# Patient Record
Sex: FEMALE | Race: BLACK OR AFRICAN AMERICAN | HISPANIC OR LATINO | Employment: OTHER | URBAN - METROPOLITAN AREA
[De-identification: names, ages, dates, MRNs, and addresses within clinical notes are randomized per-mention and may not be internally consistent; named-entity substitution may affect disease eponyms.]

---

## 2017-01-05 RX ORDER — ESOMEPRAZOLE MAGNESIUM 40 MG/1
CAPSULE, DELAYED RELEASE ORAL
Qty: 90 CAPSULE | Refills: 0 | Status: SHIPPED | OUTPATIENT
Start: 2017-01-05 | End: 2017-03-03

## 2017-01-13 ENCOUNTER — OFFICE VISIT (OUTPATIENT)
Dept: INTERNAL MEDICINE | Facility: CLINIC | Age: 77
End: 2017-01-13
Attending: FAMILY MEDICINE
Payer: MEDICARE

## 2017-01-13 VITALS
SYSTOLIC BLOOD PRESSURE: 136 MMHG | WEIGHT: 179 LBS | HEIGHT: 58 IN | OXYGEN SATURATION: 97 % | DIASTOLIC BLOOD PRESSURE: 96 MMHG | BODY MASS INDEX: 37.57 KG/M2 | HEART RATE: 83 BPM

## 2017-01-13 DIAGNOSIS — R05.9 COUGH: Primary | ICD-10-CM

## 2017-01-13 DIAGNOSIS — I50.9 CONGESTIVE HEART FAILURE, UNSPECIFIED CONGESTIVE HEART FAILURE CHRONICITY, UNSPECIFIED CONGESTIVE HEART FAILURE TYPE: ICD-10-CM

## 2017-01-13 DIAGNOSIS — B18.2 CHRONIC HEPATITIS C WITHOUT HEPATIC COMA: ICD-10-CM

## 2017-01-13 DIAGNOSIS — E03.9 HYPOTHYROIDISM, UNSPECIFIED TYPE: ICD-10-CM

## 2017-01-13 DIAGNOSIS — I48.91 ATRIAL FIBRILLATION, UNSPECIFIED TYPE: ICD-10-CM

## 2017-01-13 DIAGNOSIS — I10 HTN (HYPERTENSION), BENIGN: ICD-10-CM

## 2017-01-13 DIAGNOSIS — M51.36 BULGING LUMBAR DISC: ICD-10-CM

## 2017-01-13 PROCEDURE — 99999 PR PBB SHADOW E&M-EST. PATIENT-LVL III: CPT | Mod: PBBFAC,,, | Performed by: FAMILY MEDICINE

## 2017-01-13 PROCEDURE — 3080F DIAST BP >= 90 MM HG: CPT | Mod: S$GLB,,, | Performed by: FAMILY MEDICINE

## 2017-01-13 PROCEDURE — 99214 OFFICE O/P EST MOD 30 MIN: CPT | Mod: S$GLB,,, | Performed by: FAMILY MEDICINE

## 2017-01-13 PROCEDURE — 3075F SYST BP GE 130 - 139MM HG: CPT | Mod: S$GLB,,, | Performed by: FAMILY MEDICINE

## 2017-01-13 PROCEDURE — 99499 UNLISTED E&M SERVICE: CPT | Mod: S$PBB,,, | Performed by: FAMILY MEDICINE

## 2017-01-13 PROCEDURE — 1160F RVW MEDS BY RX/DR IN RCRD: CPT | Mod: S$GLB,,, | Performed by: FAMILY MEDICINE

## 2017-01-13 PROCEDURE — 1157F ADVNC CARE PLAN IN RCRD: CPT | Mod: S$GLB,,, | Performed by: FAMILY MEDICINE

## 2017-01-13 PROCEDURE — 1159F MED LIST DOCD IN RCRD: CPT | Mod: S$GLB,,, | Performed by: FAMILY MEDICINE

## 2017-01-13 PROCEDURE — 1125F AMNT PAIN NOTED PAIN PRSNT: CPT | Mod: S$GLB,,, | Performed by: FAMILY MEDICINE

## 2017-01-13 RX ORDER — LIDOCAINE AND PRILOCAINE 25; 25 MG/G; MG/G
CREAM TOPICAL
Qty: 30 G | Refills: 3 | Status: SHIPPED | OUTPATIENT
Start: 2017-01-13 | End: 2017-04-06

## 2017-01-13 RX ORDER — AZITHROMYCIN 250 MG/1
TABLET, FILM COATED ORAL
Qty: 6 TABLET | Refills: 0 | Status: SHIPPED | OUTPATIENT
Start: 2017-01-13 | End: 2017-01-18

## 2017-01-13 RX ORDER — FLUTICASONE PROPIONATE AND SALMETEROL 50; 250 UG/1; UG/1
POWDER RESPIRATORY (INHALATION)
Refills: 11 | COMMUNITY
Start: 2016-10-29 | End: 2017-10-25 | Stop reason: SDUPTHER

## 2017-01-13 RX ORDER — EPINEPHRINE 0.3 MG/.3ML
1 INJECTION SUBCUTANEOUS ONCE
Qty: 1 ML | Refills: 3 | Status: SHIPPED | OUTPATIENT
Start: 2017-01-13 | End: 2017-02-17

## 2017-01-13 NOTE — PROGRESS NOTES
"Subjective:      Patient ID: Alfredina Claudette Parks is a 76 y.o. female.    Chief Complaint: Asthma (f/u)    HPI Comments: She is here for one week productive cough, wheezing, runny nose. She denies ear pain or drainage. Her sore throat has resolved. She denies fevers.     Review of Systems   Constitutional: Negative for fever.   Respiratory: Positive for cough. Negative for shortness of breath.    Cardiovascular: Negative.    Gastrointestinal: Negative.      I personally reviewed Past Medical History, Past Surgical history,  Past Social History and Family History    Objective:     Visit Vitals    BP (!) 136/96    Pulse 83    Ht 4' 10" (1.473 m)    Wt 81.2 kg (179 lb 0.2 oz)    SpO2 97%    BMI 37.41 kg/m2       Physical Exam   Constitutional: She is oriented to person, place, and time. She appears well-developed and well-nourished. No distress.   HENT:   Head: Normocephalic and atraumatic.   Right Ear: External ear normal.   Left Ear: External ear normal.   Mouth/Throat: Oropharynx is clear and moist.   Eyes: Conjunctivae and EOM are normal. Pupils are equal, round, and reactive to light. Right eye exhibits no discharge. Left eye exhibits no discharge. No scleral icterus.   Neck: Normal range of motion. Neck supple.   Cardiovascular: Normal rate, regular rhythm, normal heart sounds and intact distal pulses.  Exam reveals no gallop.    No murmur heard.  Pulmonary/Chest: Effort normal and breath sounds normal. No respiratory distress. She has no wheezes. She has no rales. She exhibits no tenderness.   Neurological: She is alert and oriented to person, place, and time.   Skin: Skin is warm and dry.   Vitals reviewed.      Mitzy was seen today for asthma.    Diagnoses and all orders for this visit:    Cough  -will treat with azithromycin, no worsening asthma or wheezing, will continue with current asthma regimen, call if no improvement and we will check cxr    Bulging lumbar disc  -will trial topical emla " for pain, patient follows with pain management and does not want follow up at this time     Hypothyroidism, unspecified type  -     TSH; Future  -     T4, free; Future      HTN (hypertension), benign  -  Uncontrolled, she will return in one week for BP recheck     Congestive heart failure, unspecified congestive heart failure chronicity, unspecified congestive heart failure type  Stable, cont current medication     Atrial fibrillation, unspecified type  -rate/rhythm controlled, will continue current regimen     Chronic hepatitis C without hepatic coma  -     HEPATITIS C RNA, QUANTITATIVE, PCR; Future    Other orders  -     epinephrine (EPIPEN) 0.3 mg/0.3 mL AtIn; Inject 0.3 mLs (0.3 mg total) into the muscle once.  -     azithromycin (Z-COLTEN) 250 MG tablet; Take 2 tablets by mouth on day 1; Take 1 tablet by mouth on days 2-5  -     lidocaine-prilocaine (EMLA) cream; APPLY TOPICALLY AS NEEDED

## 2017-01-16 ENCOUNTER — TELEPHONE (OUTPATIENT)
Dept: INTERNAL MEDICINE | Facility: CLINIC | Age: 77
End: 2017-01-16

## 2017-01-16 NOTE — TELEPHONE ENCOUNTER
Attempted to contact Breana Lerma with [facility]. Message left on [employer] in reference to Breana's request. No fax has been received for this patient at this time. Will attempt to contact at a later time.

## 2017-01-16 NOTE — TELEPHONE ENCOUNTER
"----- Message from Mariana Cee sent at 1/16/2017 11:17 AM CST -----  Contact: Breana / [employer] / # [phone] / ext# [phone]  X  1st Request  _  2nd Request  _  3rd Request    Who: Mitzy Garcia (mrn# [id number])    Why:  Breana with [employer] called requesting, "confirmation that paperwork has been received."  Please give a call back at your earliest convenience.  THANKS!    What Number to Call Back: [phone] /ext# [phone]    When to Expect a call back: (Before the end of the day)   -- if call after 3:00 call back will be tomorrow.                        "

## 2017-01-18 ENCOUNTER — CLINICAL SUPPORT (OUTPATIENT)
Dept: INTERNAL MEDICINE | Facility: CLINIC | Age: 77
End: 2017-01-18
Payer: MEDICARE

## 2017-01-18 ENCOUNTER — TELEPHONE (OUTPATIENT)
Dept: INTERNAL MEDICINE | Facility: CLINIC | Age: 77
End: 2017-01-18

## 2017-01-18 ENCOUNTER — LAB VISIT (OUTPATIENT)
Dept: LAB | Facility: OTHER | Age: 77
End: 2017-01-18
Attending: FAMILY MEDICINE
Payer: MEDICARE

## 2017-01-18 VITALS — HEART RATE: 74 BPM | DIASTOLIC BLOOD PRESSURE: 82 MMHG | SYSTOLIC BLOOD PRESSURE: 134 MMHG

## 2017-01-18 DIAGNOSIS — I48.91 ATRIAL FIBRILLATION, UNSPECIFIED TYPE: ICD-10-CM

## 2017-01-18 DIAGNOSIS — I10 HTN (HYPERTENSION), BENIGN: ICD-10-CM

## 2017-01-18 DIAGNOSIS — E03.9 HYPOTHYROIDISM, UNSPECIFIED TYPE: ICD-10-CM

## 2017-01-18 DIAGNOSIS — I50.9 CONGESTIVE HEART FAILURE, UNSPECIFIED CONGESTIVE HEART FAILURE CHRONICITY, UNSPECIFIED CONGESTIVE HEART FAILURE TYPE: ICD-10-CM

## 2017-01-18 DIAGNOSIS — B18.2 CHRONIC HEPATITIS C WITHOUT HEPATIC COMA: ICD-10-CM

## 2017-01-18 DIAGNOSIS — M51.36 BULGING LUMBAR DISC: ICD-10-CM

## 2017-01-18 LAB
25(OH)D3+25(OH)D2 SERPL-MCNC: 25 NG/ML
ALBUMIN SERPL BCP-MCNC: 3 G/DL
ALP SERPL-CCNC: 36 U/L
ALT SERPL W/O P-5'-P-CCNC: 38 U/L
ANION GAP SERPL CALC-SCNC: 7 MMOL/L
AST SERPL-CCNC: 42 U/L
BASOPHILS # BLD AUTO: 0.02 K/UL
BASOPHILS NFR BLD: 0.3 %
BILIRUB SERPL-MCNC: 0.3 MG/DL
BUN SERPL-MCNC: 23 MG/DL
CALCIUM SERPL-MCNC: 8.9 MG/DL
CHLORIDE SERPL-SCNC: 104 MMOL/L
CHOLEST/HDLC SERPL: 3.2 {RATIO}
CO2 SERPL-SCNC: 29 MMOL/L
CREAT SERPL-MCNC: 0.9 MG/DL
DIFFERENTIAL METHOD: NORMAL
EOSINOPHIL # BLD AUTO: 0.2 K/UL
EOSINOPHIL NFR BLD: 2.7 %
ERYTHROCYTE [DISTWIDTH] IN BLOOD BY AUTOMATED COUNT: 13.4 %
EST. GFR  (AFRICAN AMERICAN): >60 ML/MIN/1.73 M^2
EST. GFR  (NON AFRICAN AMERICAN): >60 ML/MIN/1.73 M^2
FERRITIN SERPL-MCNC: 75 NG/ML
GLUCOSE SERPL-MCNC: 107 MG/DL
HCT VFR BLD AUTO: 38.2 %
HDL/CHOLESTEROL RATIO: 31.1 %
HDLC SERPL-MCNC: 132 MG/DL
HDLC SERPL-MCNC: 41 MG/DL
HGB BLD-MCNC: 12.4 G/DL
IRON SERPL-MCNC: 63 UG/DL
LDLC SERPL CALC-MCNC: 67.2 MG/DL
LYMPHOCYTES # BLD AUTO: 2.5 K/UL
LYMPHOCYTES NFR BLD: 42.6 %
MCH RBC QN AUTO: 30.3 PG
MCHC RBC AUTO-ENTMCNC: 32.5 %
MCV RBC AUTO: 93 FL
MONOCYTES # BLD AUTO: 0.5 K/UL
MONOCYTES NFR BLD: 8.1 %
NEUTROPHILS # BLD AUTO: 2.7 K/UL
NEUTROPHILS NFR BLD: 46.1 %
NONHDLC SERPL-MCNC: 91 MG/DL
PLATELET # BLD AUTO: 152 K/UL
PMV BLD AUTO: 11.9 FL
POTASSIUM SERPL-SCNC: 4.2 MMOL/L
PROT SERPL-MCNC: 7.4 G/DL
RBC # BLD AUTO: 4.09 M/UL
SATURATED IRON: 18 %
SODIUM SERPL-SCNC: 140 MMOL/L
T4 FREE SERPL-MCNC: 1.05 NG/DL
TOTAL IRON BINDING CAPACITY: 354 UG/DL
TRANSFERRIN SERPL-MCNC: 239 MG/DL
TRIGL SERPL-MCNC: 119 MG/DL
TSH SERPL DL<=0.005 MIU/L-ACNC: 3.3 UIU/ML
WBC # BLD AUTO: 5.82 K/UL

## 2017-01-18 PROCEDURE — 80061 LIPID PANEL: CPT

## 2017-01-18 PROCEDURE — 99999 PR PBB SHADOW E&M-EST. PATIENT-LVL I: CPT | Mod: PBBFAC,,,

## 2017-01-18 PROCEDURE — 87522 HEPATITIS C REVRS TRNSCRPJ: CPT

## 2017-01-18 PROCEDURE — 84443 ASSAY THYROID STIM HORMONE: CPT

## 2017-01-18 PROCEDURE — 82728 ASSAY OF FERRITIN: CPT

## 2017-01-18 PROCEDURE — 83036 HEMOGLOBIN GLYCOSYLATED A1C: CPT

## 2017-01-18 PROCEDURE — 36415 COLL VENOUS BLD VENIPUNCTURE: CPT

## 2017-01-18 PROCEDURE — 80053 COMPREHEN METABOLIC PANEL: CPT

## 2017-01-18 PROCEDURE — 85025 COMPLETE CBC W/AUTO DIFF WBC: CPT

## 2017-01-18 PROCEDURE — 84439 ASSAY OF FREE THYROXINE: CPT

## 2017-01-18 PROCEDURE — 83540 ASSAY OF IRON: CPT

## 2017-01-18 PROCEDURE — 82306 VITAMIN D 25 HYDROXY: CPT

## 2017-01-18 NOTE — PROGRESS NOTES
Alfredina Claudette Parks 76 y.o. female is here today for Blood Pressure check.   History of HTN yes.    Review of patient's allergies indicates:   Allergen Reactions    Milk containing products Shortness Of Breath    Nuts [tree nut] Anaphylaxis    Fosamax [alendronate]      dizziness     Creatinine   Date Value Ref Range Status   08/25/2016 0.89 0.60 - 0.93 mg/dL Final     Comment:     For patients >49 years of age, the reference limit  for Creatinine is approximately 13% higher for people  identified as -American.          Sodium   Date Value Ref Range Status   08/25/2016 138 135 - 146 mmol/L Final     Potassium   Date Value Ref Range Status   08/25/2016 4.2 3.5 - 5.3 mmol/L Final   ]  Patient verifies taking blood pressure medications on a regular bases at the same time of the day.     Current Outpatient Prescriptions:     ADVAIR DISKUS 250-50 mcg/dose diskus inhaler, TAKE 1 PUFF PO BID, Disp: , Rfl: 11    albuterol (PROVENTIL) 2.5 mg /3 mL (0.083 %) nebulizer solution, TAKE 3ML BY NEBULIZATION EVERY 6 HOURS AS NEEDED FOR WHEEZING., Disp: 1050 mL, Rfl: 3    amlodipine (NORVASC) 5 MG tablet, TK 1 T PO QD., Disp: , Rfl: 1    aspirin (ECOTRIN) 81 MG EC tablet, Take 81 mg by mouth once daily.  , Disp: , Rfl:     azithromycin (Z-COLTEN) 250 MG tablet, Take 2 tablets by mouth on day 1; Take 1 tablet by mouth on days 2-5, Disp: 6 tablet, Rfl: 0    benzonatate (TESSALON) 100 MG capsule, TAKE 2 CAPSULES BY MOUTH THREE TIMES DAILY AS NEEDED FOR COUGH, Disp: 40 capsule, Rfl: 0    calcium carbonate-vitamin D3 (CALCIUM 600 + D,3,) 600 mg calcium- 200 unit Cap, Take 1,200 mg by mouth once daily., Disp: , Rfl:     carvedilol (COREG) 12.5 MG tablet, TAKE 1 TABLET BY MOUTH TWICE DAILY, Disp: 180 tablet, Rfl: 4    diclofenac sodium 1 % Gel, Apply 2 g topically 2 (two) times daily. To affected areas, Disp: 100 g, Rfl: 5    diltiaZEM (CARDIZEM CD) 240 MG 24 hr capsule, Take 1 capsule (240 mg total) by mouth once  daily., Disp: 30 capsule, Rfl: 11    docusate sodium (COLACE) 100 MG capsule, Take 1 capsule (100 mg total) by mouth 3 (three) times daily as needed for Constipation., Disp: 90 capsule, Rfl: 1    epinephrine (EPIPEN) 0.3 mg/0.3 mL AtIn, Inject 0.3 mLs (0.3 mg total) into the muscle once., Disp: 1 mL, Rfl: 3    esomeprazole (NEXIUM) 40 MG capsule, Take 1 capsule (40 mg total) by mouth before breakfast., Disp: 90 capsule, Rfl: 1    fluticasone (FLONASE) 50 mcg/actuation nasal spray, SPRAY ONCE IN EACH NOSTRIL EVERY DAY (Patient taking differently: SPRAY ONCE IN EACH NOSTRIL PRN), Disp: 48 g, Rfl: 5    fluticasone (FLONASE) 50 mcg/actuation nasal spray, SPRAY ONCE IN EACH NOSTRIL EVERY DAY, Disp: 16 g, Rfl: 11    fluticasone-salmeterol 500-50 mcg/dose (ADVAIR DISKUS) 500-50 mcg/dose DsDv diskus inhaler, Inhale 1 puff into the lungs 2 (two) times daily., Disp: 60 each, Rfl: 3    hydrocodone-acetaminophen 7.5-325mg (NORCO) 7.5-325 mg per tablet, Take 1 tablet by mouth every 6 (six) hours as needed for Pain., Disp: , Rfl:     levothyroxine (SYNTHROID) 50 MCG tablet, TAKE 1 TABLET BY MOUTH EVERY DAY, Disp: 90 tablet, Rfl: 3    lidocaine-prilocaine (EMLA) cream, APPLY TOPICALLY AS NEEDED, Disp: 30 g, Rfl: 3    montelukast (SINGULAIR) 10 mg tablet, , Disp: , Rfl: 3    nabumetone (RELAFEN) 500 MG tablet, Take 1 tablet (500 mg total) by mouth 4 (four) times daily as needed for Pain., Disp: 60 tablet, Rfl: 1    NEXIUM 40 mg capsule, TAKE 1 CAPSULE(40 MG) BY MOUTH DAILY AS NEEDED, Disp: 90 capsule, Rfl: 0    oxybutynin (DITROPAN-XL) 5 MG TR24, TK 1 T PO  QD PRF  INCONTINENECE, Disp: , Rfl: 3    PATADAY 0.2 % Drop, INT 1 GTT IN OU QD, Disp: , Rfl: 2    polyethylene glycol (GLYCOLAX) 17 gram PwPk, Take 17 g by mouth daily as needed., Disp: 30 each, Rfl: 0    triamcinolone acetonide 0.025% (KENALOG) 0.025 % cream, Apply topically 2 (two) times daily., Disp: 60 g, Rfl: 0    valsartan-hydrochlorothiazide (DIOVAN-HCT)  320-25 mg per tablet, TAKE 1 TABLET BY MOUTH EVERY DAY, Disp: 90 tablet, Rfl: 4    VENTOLIN HFA 90 mcg/actuation inhaler, INHALE 2 PUFFS BY MOUTH EVERY 6 HOURS AS NEEDED, Disp: 18 g, Rfl: 11    [DISCONTINUED] denosumab (PROLIA) 60 mg/mL Syrg, Inject 1 mL (60 mg total) into the skin every 6 (six) months., Disp: 1 mL, Rfl: 3  Does patient have record of home blood pressure readings.  NO  Last dose of blood pressure medication was taken at 10:am yesterday.  Patient is asymptomatic.     BP: 134/82 , Pulse: 74.      Dr. Frost notified.

## 2017-01-19 ENCOUNTER — TELEPHONE (OUTPATIENT)
Dept: INTERNAL MEDICINE | Facility: CLINIC | Age: 77
End: 2017-01-19

## 2017-01-19 LAB
ESTIMATED AVG GLUCOSE: 123 MG/DL
HBA1C MFR BLD HPLC: 5.9 %
HCV LOG: 6.65 LOG (10) IU/ML
HCV RNA QUANT PCR: ABNORMAL IU/ML
HCV, QUALITATIVE: DETECTED IU/ML

## 2017-01-19 RX ORDER — ERGOCALCIFEROL 1.25 MG/1
50000 CAPSULE ORAL
Qty: 12 CAPSULE | Refills: 0 | Status: SHIPPED | OUTPATIENT
Start: 2017-01-19 | End: 2017-02-18

## 2017-01-20 ENCOUNTER — TELEPHONE (OUTPATIENT)
Dept: INTERNAL MEDICINE | Facility: CLINIC | Age: 77
End: 2017-01-20

## 2017-01-20 NOTE — TELEPHONE ENCOUNTER
----- Message from Ayesha Nettles sent at 1/19/2017  4:43 PM CST -----  Contact: pt  _  1st Request  _  2nd Request  _  3rd Request        Who: pt    Why: pt returned the nurse's phone call.    What Number to Call Back:[phone]    When to Expect a call back: (Before the end of the day)   -- if call after 3:00 call back will be tomorrow.      3:06 PM  Left message to return clinic's call

## 2017-01-30 ENCOUNTER — TELEPHONE (OUTPATIENT)
Dept: GASTROENTEROLOGY | Facility: CLINIC | Age: 77
End: 2017-01-30

## 2017-01-30 NOTE — TELEPHONE ENCOUNTER
Spoke with Tari and informed that clinic appt was booked vs procedure. Informed her that the clinic appt was not needed and that we can just schedule the procedure. Daughter verbalized understanding and she will call me back once she checks her mothers other appts.

## 2017-01-30 NOTE — TELEPHONE ENCOUNTER
----- Message from Herminia Benito MA sent at 1/30/2017  3:17 PM CST -----  Contact: [phone] daughter marylou  Want's to know if mother need to fast on the day of her appt. Please advise

## 2017-01-31 ENCOUNTER — TELEPHONE (OUTPATIENT)
Dept: GASTROENTEROLOGY | Facility: CLINIC | Age: 77
End: 2017-01-31

## 2017-01-31 NOTE — TELEPHONE ENCOUNTER
----- Message from Herminia Benito MA sent at 1/30/2017  3:54 PM CST -----  Contact: [phone] BANG DAUGHTER   Please return her call at your earliest convenience regarding mother's Endoscopy appt. Please advsie

## 2017-02-08 ENCOUNTER — TELEPHONE (OUTPATIENT)
Dept: INTERNAL MEDICINE | Facility: CLINIC | Age: 77
End: 2017-02-08

## 2017-02-08 NOTE — TELEPHONE ENCOUNTER
----- Message from Ally Kearney sent at 2/7/2017  4:52 PM CST -----  Contact: Renée Chinchilla with [facility] called about medication appeal. Please call her at [phone]  9:43 AM  Attempted to return Renée with [facility] phone call. Left a vm.

## 2017-02-17 ENCOUNTER — OFFICE VISIT (OUTPATIENT)
Dept: INTERNAL MEDICINE | Facility: CLINIC | Age: 77
End: 2017-02-17
Attending: FAMILY MEDICINE
Payer: MEDICARE

## 2017-02-17 VITALS
OXYGEN SATURATION: 97 % | SYSTOLIC BLOOD PRESSURE: 104 MMHG | DIASTOLIC BLOOD PRESSURE: 62 MMHG | HEART RATE: 82 BPM | BODY MASS INDEX: 36 KG/M2 | HEIGHT: 58 IN | WEIGHT: 171.5 LBS

## 2017-02-17 DIAGNOSIS — Z23 NEED FOR TDAP VACCINATION: ICD-10-CM

## 2017-02-17 DIAGNOSIS — I48.91 ATRIAL FIBRILLATION, UNSPECIFIED TYPE: Primary | ICD-10-CM

## 2017-02-17 DIAGNOSIS — J45.21 ASTHMA WITHOUT STATUS ASTHMATICUS, MILD INTERMITTENT, WITH ACUTE EXACERBATION: ICD-10-CM

## 2017-02-17 DIAGNOSIS — I10 HTN (HYPERTENSION), BENIGN: ICD-10-CM

## 2017-02-17 PROCEDURE — 1160F RVW MEDS BY RX/DR IN RCRD: CPT | Mod: S$GLB,,, | Performed by: FAMILY MEDICINE

## 2017-02-17 PROCEDURE — 1157F ADVNC CARE PLAN IN RCRD: CPT | Mod: S$GLB,,, | Performed by: FAMILY MEDICINE

## 2017-02-17 PROCEDURE — 3074F SYST BP LT 130 MM HG: CPT | Mod: S$GLB,,, | Performed by: FAMILY MEDICINE

## 2017-02-17 PROCEDURE — 99499 UNLISTED E&M SERVICE: CPT | Mod: S$PBB,,, | Performed by: FAMILY MEDICINE

## 2017-02-17 PROCEDURE — 1159F MED LIST DOCD IN RCRD: CPT | Mod: S$GLB,,, | Performed by: FAMILY MEDICINE

## 2017-02-17 PROCEDURE — 99999 PR PBB SHADOW E&M-EST. PATIENT-LVL III: CPT | Mod: PBBFAC,,, | Performed by: FAMILY MEDICINE

## 2017-02-17 PROCEDURE — 99213 OFFICE O/P EST LOW 20 MIN: CPT | Mod: S$GLB,,, | Performed by: FAMILY MEDICINE

## 2017-02-17 PROCEDURE — 3078F DIAST BP <80 MM HG: CPT | Mod: S$GLB,,, | Performed by: FAMILY MEDICINE

## 2017-02-17 PROCEDURE — 1125F AMNT PAIN NOTED PAIN PRSNT: CPT | Mod: S$GLB,,, | Performed by: FAMILY MEDICINE

## 2017-02-17 RX ORDER — SOLIFENACIN SUCCINATE 5 MG/1
5 TABLET, FILM COATED ORAL DAILY
Qty: 30 TABLET | Refills: 11 | Status: ON HOLD
Start: 2017-02-17 | End: 2019-01-09 | Stop reason: HOSPADM

## 2017-02-17 RX ORDER — VALSARTAN 160 MG/1
160 TABLET ORAL DAILY PRN
Qty: 90 TABLET | Refills: 0
Start: 2017-02-17 | End: 2017-03-10 | Stop reason: SDUPTHER

## 2017-02-17 RX ORDER — NIFEDIPINE 60 MG/1
60 TABLET, EXTENDED RELEASE ORAL DAILY
Qty: 30 TABLET | Refills: 0
Start: 2017-02-17 | End: 2017-03-10

## 2017-02-17 RX ORDER — HYDRALAZINE HYDROCHLORIDE 10 MG/1
10 TABLET, FILM COATED ORAL DAILY PRN
Qty: 90 TABLET | Refills: 0
Start: 2017-02-17 | End: 2018-12-07

## 2017-02-17 RX ORDER — LEVALBUTEROL TARTRATE 45 UG/1
1-2 AEROSOL, METERED ORAL EVERY 6 HOURS PRN
Qty: 15 G | Refills: 3 | Status: SHIPPED | OUTPATIENT
Start: 2017-02-17 | End: 2017-02-20

## 2017-02-17 RX ORDER — AMIODARONE HYDROCHLORIDE 200 MG/1
200 TABLET ORAL DAILY
Qty: 30 TABLET | Refills: 0
Start: 2017-02-17 | End: 2017-04-06

## 2017-02-17 NOTE — PROGRESS NOTES
"Subjective:      Patient ID: Alfredina Claudette Parks is a 76 y.o. female.    Chief Complaint: Hypertension (f/u and ER discharge)    HPI Comments: She is here for follow up. She is continuing to feel jittery after albuterol and she is 11 days from discharge from [facility].  She was at her cardiologist appointment and sent over to the ED. She was ruled out of stroke. She did get nifedipine and valsartan today. She does have the omron bp cuff. She does have a loop monitor that has been placed.     Review of Systems   Respiratory: Negative.    Cardiovascular: Negative.    Gastrointestinal: Negative.    Genitourinary: Negative.      I personally reviewed Past Medical History, Past Surgical history,  Past Social History and Family History    Objective:     Visit Vitals    /62    Pulse 82    Ht 4' 10" (1.473 m)    Wt 77.8 kg (171 lb 8.3 oz)    SpO2 97%    BMI 35.85 kg/m2       Physical Exam   Constitutional: She is oriented to person, place, and time. She appears well-developed and well-nourished. No distress.   HENT:   Head: Normocephalic and atraumatic.   Right Ear: External ear normal.   Left Ear: External ear normal.   Mouth/Throat: Oropharynx is clear and moist.   Eyes: Conjunctivae and EOM are normal. Pupils are equal, round, and reactive to light. Right eye exhibits no discharge. Left eye exhibits no discharge. No scleral icterus.   Neck: Normal range of motion. No tracheal deviation present. No thyromegaly present.   Cardiovascular: Normal rate, regular rhythm, normal heart sounds and intact distal pulses.  Exam reveals no gallop.    No murmur heard.  Pulmonary/Chest: Effort normal and breath sounds normal. No respiratory distress. She has no wheezes. She has no rales. She exhibits no tenderness.   Abdominal: Soft. Bowel sounds are normal. She exhibits no distension and no mass. There is no tenderness. There is no rebound and no guarding.   Neurological: She is alert and oriented to person, place, and " time.   Skin: Skin is warm and dry.   Psychiatric: She has a normal mood and affect. Her behavior is normal. Judgment and thought content normal.   Vitals reviewed.      Mitzy was seen today for hypertension.    Diagnoses and all orders for this visit:    Atrial fibrillation, unspecified type  -controlled, cont amiodarone     Need for Tdap vaccination     HTN (hypertension), benign  -controlled on current regimen     Asthma without status asthmaticus, mild intermittent, with acute exacerbation  -patient to use xopenex prn, singulair held, and pulm recommended advair prn     Other orders  -     Cancel: Tdap Vaccine  -     levalbuterol (XOPENEX HFA) 45 mcg/actuation inhaler; Inhale 1-2 puffs into the lungs every 6 (six) hours as needed for Wheezing or Shortness of Breath. Rescue  -     solifenacin (VESICARE) 5 MG tablet; Take 1 tablet (5 mg total) by mouth once daily.  -     amiodarone (PACERONE) 200 MG Tab; Take 1 tablet (200 mg total) by mouth once daily.  -     hydrALAZINE (APRESOLINE) 10 MG tablet; Take 1 tablet (10 mg total) by mouth daily as needed (SBP>180).  -     nifedipine (PROCARDIA-XL) 60 MG (OSM) 24 hr tablet; Take 1 tablet (60 mg total) by mouth once daily. Hold for spb <120  -     valsartan (DIOVAN) 160 MG tablet; Take 1 tablet (160 mg total) by mouth daily as needed. Hold if sbp <120

## 2017-02-17 NOTE — MR AVS SNAPSHOT
[facility]  [address]  [facility] [address]  Phone: [phone]  Fax: [phone]                  Alfredina Claudette Parks   2017 10:20 AM   Office Visit    Description:  Female : 1940   Provider:  Veronica Frost MD   Department:  [facility]           Reason for Visit     Hypertension           Diagnoses this Visit        Comments    Need for Tdap vaccination    -  Primary            To Do List           Goals (5 Years of Data)     None       These Medications        Disp Refills Start End    levalbuterol (XOPENEX HFA) 45 mcg/actuation inhaler 15 g 3 2017    Inhale 1-2 puffs into the lungs every 6 (six) hours as needed for Wheezing or Shortness of Breath. Rescue - Inhalation    Pharmacy: [id number] - [place], [place] - [address] Ph #: [phone]       solifenacin (VESICARE) 5 MG tablet 30 tablet 11 2017    Take 1 tablet (5 mg total) by mouth once daily. - Oral    Pharmacy: [id number] - [place], [place] - [address] Ph #: [phone]       amiodarone (PACERONE) 200 MG Tab 30 tablet 0 2017    Take 1 tablet (200 mg total) by mouth once daily. - Oral    Pharmacy: [id number] - [place], [place] - [address] Ph #: [phone]       hydrALAZINE (APRESOLINE) 10 MG tablet 90 tablet 0 2017    Take 1 tablet (10 mg total) by mouth daily as needed (SBP>180). - Oral    Pharmacy: [id number] - [place] - [address] Ph #: [phone]       nifedipine (PROCARDIA-XL) 60 MG (OSM) 24 hr tablet 30 tablet 0 2017    Take 1 tablet (60 mg total) by mouth once daily. Hold for spb <120 - Oral    Pharmacy: [id number] - [place] - [address] [facility] Ph #: [phone]       valsartan (DIOVAN) 160 MG tablet 90 tablet 0 2/17/2017 2/17/2018    Take 1 tablet (160 mg total) by mouth daily as needed. Hold if sbp <120 - Oral    Pharmacy: [id number] - [place], [place] - [address] [facility] Ph #: [phone]         [facility] On Call     [facility] On Call Nurse Care Line - 24/7 Assistance  Registered nurses in the [facility] On Call Center provide clinical advisement, health education, appointment booking, and other advisory services.  Call for this free service at [phone].             Medications           Message regarding Medications     Verify the changes and/or additions to your medication regime listed below are the same as discussed with your clinician today.  If any of these changes or additions are incorrect, please notify your healthcare provider.        START taking these NEW medications        Refills    levalbuterol (XOPENEX HFA) 45 mcg/actuation inhaler 3    Sig: Inhale 1-2 puffs into the lungs every 6 (six) hours as needed for Wheezing or Shortness of Breath. Rescue    Class: Normal    Route: Inhalation    solifenacin (VESICARE) 5 MG tablet 11    Sig: Take 1 tablet (5 mg total) by mouth once daily.    Class: No Print    Route: Oral    amiodarone (PACERONE) 200 MG Tab 0    Sig: Take 1 tablet (200 mg total) by mouth once daily.    Class: No Print    Route: Oral    hydrALAZINE (APRESOLINE) 10 MG tablet 0    Sig: Take 1 tablet (10 mg total) by mouth daily as needed (SBP>180).    Class: No Print    Route: Oral    nifedipine (PROCARDIA-XL) 60 MG (OSM) 24 hr tablet 0    Sig: Take 1 tablet (60 mg total) by mouth once daily. Hold for spb <120    Class: No Print    Route: Oral    valsartan (DIOVAN) 160 MG tablet 0    Sig: Take 1 tablet (160 mg total) by mouth daily as needed. Hold if sbp <120    Class: No Print    Route: Oral      STOP taking  these medications     hydrocodone-acetaminophen 7.5-325mg (NORCO) 7.5-325 mg per tablet Take 1 tablet by mouth every 6 (six) hours as needed for Pain.    fluticasone (FLONASE) 50 mcg/actuation nasal spray SPRAY ONCE IN EACH NOSTRIL EVERY DAY    montelukast (SINGULAIR) 10 mg tablet     oxybutynin (DITROPAN-XL) 5 MG TR24 TK 1 T PO  QD PRF  INCONTINENECE    diltiaZEM (CARDIZEM CD) 240 MG 24 hr capsule Take 1 capsule (240 mg total) by mouth once daily.    VENTOLIN HFA 90 mcg/actuation inhaler INHALE 2 PUFFS BY MOUTH EVERY 6 HOURS AS NEEDED    benzonatate (TESSALON) 100 MG capsule TAKE 2 CAPSULES BY MOUTH THREE TIMES DAILY AS NEEDED FOR COUGH    epinephrine (EPIPEN) 0.3 mg/0.3 mL AtIn Inject 0.3 mLs (0.3 mg total) into the muscle once.    valsartan-hydrochlorothiazide (DIOVAN-HCT) 320-25 mg per tablet TAKE 1 TABLET BY MOUTH EVERY DAY    carvedilol (COREG) 12.5 MG tablet TAKE 1 TABLET BY MOUTH TWICE DAILY           Verify that the below list of medications is an accurate representation of the medications you are currently taking.  If none reported, the list may be blank. If incorrect, please contact your healthcare provider. Carry this list with you in case of emergency.           Current Medications     ADVAIR DISKUS 250-50 mcg/dose diskus inhaler TAKE 1 PUFF PO BID    albuterol (PROVENTIL) 2.5 mg /3 mL (0.083 %) nebulizer solution TAKE 3ML BY NEBULIZATION EVERY 6 HOURS AS NEEDED FOR WHEEZING.    aspirin (ECOTRIN) 81 MG EC tablet Take 81 mg by mouth once daily.      calcium carbonate-vitamin D3 (CALCIUM 600 + D,3,) 600 mg calcium- 200 unit Cap Take 1,200 mg by mouth once daily.    diclofenac sodium 1 % Gel Apply 2 g topically 2 (two) times daily. To affected areas    docusate sodium (COLACE) 100 MG capsule Take 1 capsule (100 mg total) by mouth 3 (three) times daily as needed for Constipation.    ergocalciferol (ERGOCALCIFEROL) 50,000 unit Cap Take 1 capsule (50,000 Units total) by mouth every 7 days.     "fluticasone-salmeterol 500-50 mcg/dose (ADVAIR DISKUS) 500-50 mcg/dose DsDv diskus inhaler Inhale 1 puff into the lungs 2 (two) times daily.    levothyroxine (SYNTHROID) 50 MCG tablet TAKE 1 TABLET BY MOUTH EVERY DAY    lidocaine-prilocaine (EMLA) cream APPLY TOPICALLY AS NEEDED    nabumetone (RELAFEN) 500 MG tablet Take 1 tablet (500 mg total) by mouth 4 (four) times daily as needed for Pain.    NEXIUM 40 mg capsule TAKE 1 CAPSULE(40 MG) BY MOUTH DAILY AS NEEDED    PATADAY 0.2 % Drop INT 1 GTT IN OU QD    amiodarone (PACERONE) 200 MG Tab Take 1 tablet (200 mg total) by mouth once daily.    hydrALAZINE (APRESOLINE) 10 MG tablet Take 1 tablet (10 mg total) by mouth daily as needed (SBP>180).    levalbuterol (XOPENEX HFA) 45 mcg/actuation inhaler Inhale 1-2 puffs into the lungs every 6 (six) hours as needed for Wheezing or Shortness of Breath. Rescue    nifedipine (PROCARDIA-XL) 60 MG (OSM) 24 hr tablet Take 1 tablet (60 mg total) by mouth once daily. Hold for spb <120    polyethylene glycol (GLYCOLAX) 17 gram PwPk Take 17 g by mouth daily as needed.    solifenacin (VESICARE) 5 MG tablet Take 1 tablet (5 mg total) by mouth once daily.    triamcinolone acetonide 0.025% (KENALOG) 0.025 % cream Apply topically 2 (two) times daily.    valsartan (DIOVAN) 160 MG tablet Take 1 tablet (160 mg total) by mouth daily as needed. Hold if sbp <120           Clinical Reference Information           Your Vitals Were     BP Pulse Height Weight SpO2 BMI    104/62 82 4' 10" (1.473 m) 77.8 kg (171 lb 8.3 oz) 97% 35.85 kg/m2      Blood Pressure          Most Recent Value    BP  104/62      Allergies as of 2/17/2017     Milk Containing Products    Nuts [Tree Nut]    Fosamax [Alendronate]      Immunizations Administered on Date of Encounter - 2/17/2017     None      [facility] Sign-Up     Activating your [facility] account is as easy as 1-2-3!     1) Visit [email or web address], select Sign Up Now, enter this activation code and your date of birth, " then select Next.  [email or web address]  Expires: 4/3/2017 10:14 AM      2) Create a username and password to use when you visit [facility] in the future and select a security question in case you lose your password and select Next.    3) Enter your e-mail address and click Sign Up!    Additional Information  If you have questions, please e-mail [email or web address] or call [phone] to talk to our [facility] staff. Remember, [email or web address] is NOT to be used for urgent needs. For medical emergencies, dial 911.         Language Assistance Services     ATTENTION: Language assistance services are available, free of charge. Please call [phone].      ATENCIÓN: Si habla español, tiene a cho disposición servicios gratuitos de asistencia lingüística. Llame al [phone].     CHÚ Ý: N?u b?n nói Ti?ng Vi?t, có các d?ch v? h? tr? ngôn ng? mi?n phí dành cho b?n. G?i s? [phone].         [facility] complies with applicable Federal civil rights laws and does not discriminate on the basis of race, color, national origin, age, disability, or sex.

## 2017-02-21 ENCOUNTER — TELEPHONE (OUTPATIENT)
Dept: GASTROENTEROLOGY | Facility: CLINIC | Age: 77
End: 2017-02-21

## 2017-02-21 NOTE — TELEPHONE ENCOUNTER
Spoke with pts daughter, pt was recently in hospital for episode of afib, she is now on a loop monitor and following cardiology, daughter stated that she will go back next month for another f/u appt and will inquire with cardiology if ok to proceed with procedures and let me know.

## 2017-02-22 ENCOUNTER — TELEPHONE (OUTPATIENT)
Dept: INTERNAL MEDICINE | Facility: CLINIC | Age: 77
End: 2017-02-22

## 2017-02-22 NOTE — TELEPHONE ENCOUNTER
----- Message from Karla Pina sent at 2/22/2017 11:16 AM CST -----  _  1st Request  _  2nd Request  _  3rd Request        Who: Amy from Pharm dept at [facility]    Why: they are calling to check to see of you received the forms for her RX of NEXIUM 40 mg and if so they need them fax back before Friday  Fax [phone]  What Number to Call Back:[phone]  When to Expect a call back: (Before the end of the day)   -- if the call is after 12:00, the call back will be tomorrow.  3:01 PM  Confirmed with pharmacy that we received the form and that it was filled out and faxed back                  ]

## 2017-02-23 ENCOUNTER — TELEPHONE (OUTPATIENT)
Dept: INTERNAL MEDICINE | Facility: CLINIC | Age: 77
End: 2017-02-23

## 2017-02-23 NOTE — TELEPHONE ENCOUNTER
----- Message from Lana Castillo sent at 2/23/2017 11:11 AM CST -----  Contact: Tari Ortiz(Daughter)  X  1st Request  _  2nd Request  _  3rd Request        Who: Tari Ortiz(Daughter)    Why: Pt daughter is calling to speak with clinical staff regarding pt Nexium prescription.  Pt daughter says that she was calling to provide some insurance information to get medication approved for pt.    What Number to Call Back: [phone]    When to Expect a call back: (Before the end of the day)   -- if the call is after 12:00, the call back will be tomorrow.      3:38 PM  Spoke with patient's daughter Tari and informed her that we did fax patient's PA form for her Nexium medication. Tari requested a similar medication that will hold her mother over until we get a response from patient's insurance company.                .

## 2017-02-24 NOTE — TELEPHONE ENCOUNTER
Spoke with patient's daughter Tari and informed her of the medication recommendation per Dr. Pearl. Tari verbalized understanding.

## 2017-03-03 ENCOUNTER — TELEPHONE (OUTPATIENT)
Dept: INTERNAL MEDICINE | Facility: CLINIC | Age: 77
End: 2017-03-03

## 2017-03-03 PROBLEM — R60.0 BILATERAL EDEMA OF LOWER EXTREMITY: Status: ACTIVE | Noted: 2017-03-03

## 2017-03-03 RX ORDER — OMEPRAZOLE 40 MG/1
40 CAPSULE, DELAYED RELEASE ORAL DAILY
Qty: 90 CAPSULE | Refills: 4 | Status: SHIPPED | OUTPATIENT
Start: 2017-03-03 | End: 2017-03-03

## 2017-03-03 RX ORDER — ESOMEPRAZOLE MAGNESIUM 40 MG/1
40 CAPSULE, DELAYED RELEASE ORAL
Qty: 90 CAPSULE | Refills: 2 | Status: SHIPPED | OUTPATIENT
Start: 2017-03-03 | End: 2017-11-24 | Stop reason: SDUPTHER

## 2017-03-03 NOTE — TELEPHONE ENCOUNTER
----- Message from Kaylene Mejia sent at 3/3/2017 12:54 PM CST -----  Contact: Madie   X_  1st Request  _  2nd Request  _  3rd Request    Who:Madie with [employer]     Why:Madie with [employer] states she would like to speak the staff in regards to the patient medication NEXIUM 40 mg capsule    What Number to Call Back: [phone]    When to Expect a call back: (Before the end of the day)   -- if call after 3:00 call back will be tomorrow.

## 2017-03-03 NOTE — TELEPHONE ENCOUNTER
Spoke to Madie from [employer]. They state that the pt can take the esomeprazole magnesium 40mg (generic for NEXIUM) and would not need a PA. States she will have needed to tried and failed that med first before they can start an appeal. Please advise

## 2017-03-06 NOTE — TELEPHONE ENCOUNTER
Pt daughter advised that med was sent to pharmacy. States she picked it up Saturday. Pt daughter has no further questions or concerns at this time.

## 2017-04-06 PROBLEM — M19.90 ARTHRITIS: Status: ACTIVE | Noted: 2017-04-06

## 2017-04-06 PROBLEM — R73.03 PRE-DIABETES: Status: ACTIVE | Noted: 2017-04-06

## 2017-05-01 RX ORDER — FLUTICASONE PROPIONATE 50 MCG
SPRAY, SUSPENSION (ML) NASAL
Qty: 16 G | Refills: 6 | Status: SHIPPED | OUTPATIENT
Start: 2017-05-01 | End: 2017-11-22 | Stop reason: SDUPTHER

## 2017-05-05 ENCOUNTER — TELEPHONE (OUTPATIENT)
Dept: INTERNAL MEDICINE | Facility: CLINIC | Age: 77
End: 2017-05-05

## 2017-05-05 NOTE — TELEPHONE ENCOUNTER
----- Message from Christina Clifford sent at 5/5/2017  9:58 AM CDT -----  Contact: Tari Ortiz (Daughter)  x_  1st Request  _  2nd Request  _  3rd Request        Who: Tari Ortiz (Daughter)    Why: pt daughter would like a call back says its regarding patient last TB skin test. Please call, Thanks!    What Number to Call Back: [phone]    When to Expect a call back: (Before the end of the day)   -- if the call is after 12:00, the call back will be tomorrow.

## 2017-05-05 NOTE — TELEPHONE ENCOUNTER
Pt's daughter stated that she may not be able to attend the appt with her mother and just wants to make sure the paperwork she brings in gets filled out by the PCP. Pt had no further questions or concerns at this time.

## 2017-05-08 NOTE — TELEPHONE ENCOUNTER
Pt's daughter (marylou) states it a form is for pt's  to sign off on pt's overall health. Said she will fax over forms to be filled out.

## 2017-05-18 ENCOUNTER — TELEPHONE (OUTPATIENT)
Dept: INTERNAL MEDICINE | Facility: CLINIC | Age: 77
End: 2017-05-18

## 2017-05-18 PROBLEM — I73.9 CLAUDICATION: Status: ACTIVE | Noted: 2017-05-18

## 2017-05-18 NOTE — TELEPHONE ENCOUNTER
----- Message from Christina Clifford sent at 5/18/2017  8:21 AM CDT -----  Contact: Tari Ortiz (Daughter)  _  1st Request  _  2nd Request  _  3rd Request        Who: Tari Ortiz (Daughter)      Why: pt daughter would like to know if faxed was received. Please call, Thanks!    What Number to Call Back: [phone]          When to Expect a call back: (Before the end of the day)   -- if the call is after 12:00, the call back will be tomorrow.

## 2017-05-19 ENCOUNTER — TELEPHONE (OUTPATIENT)
Dept: INTERNAL MEDICINE | Facility: CLINIC | Age: 77
End: 2017-05-19

## 2017-05-19 ENCOUNTER — OFFICE VISIT (OUTPATIENT)
Dept: INTERNAL MEDICINE | Facility: CLINIC | Age: 77
End: 2017-05-19
Attending: FAMILY MEDICINE
Payer: MEDICARE

## 2017-05-19 ENCOUNTER — LAB VISIT (OUTPATIENT)
Dept: LAB | Facility: OTHER | Age: 77
End: 2017-05-19
Attending: FAMILY MEDICINE
Payer: MEDICARE

## 2017-05-19 VITALS
BODY MASS INDEX: 35.36 KG/M2 | HEIGHT: 58 IN | HEART RATE: 85 BPM | OXYGEN SATURATION: 95 % | WEIGHT: 168.44 LBS | DIASTOLIC BLOOD PRESSURE: 82 MMHG | SYSTOLIC BLOOD PRESSURE: 122 MMHG

## 2017-05-19 DIAGNOSIS — R73.03 PRE-DIABETES: ICD-10-CM

## 2017-05-19 DIAGNOSIS — B18.2 HEP C W/ COMA, CHRONIC: ICD-10-CM

## 2017-05-19 DIAGNOSIS — I10 HTN (HYPERTENSION), BENIGN: ICD-10-CM

## 2017-05-19 DIAGNOSIS — E03.9 HYPOTHYROIDISM, UNSPECIFIED TYPE: Primary | ICD-10-CM

## 2017-05-19 DIAGNOSIS — E55.9 VITAMIN D DEFICIENCY: ICD-10-CM

## 2017-05-19 DIAGNOSIS — Z00.00 ANNUAL PHYSICAL EXAM: ICD-10-CM

## 2017-05-19 DIAGNOSIS — I48.0 PAF (PAROXYSMAL ATRIAL FIBRILLATION): ICD-10-CM

## 2017-05-19 DIAGNOSIS — Z00.00 ANNUAL PHYSICAL EXAM: Primary | ICD-10-CM

## 2017-05-19 LAB
25(OH)D3+25(OH)D2 SERPL-MCNC: 20 NG/ML
ALBUMIN SERPL BCP-MCNC: 3.4 G/DL
ALP SERPL-CCNC: 39 U/L
ALT SERPL W/O P-5'-P-CCNC: 50 U/L
ANION GAP SERPL CALC-SCNC: 10 MMOL/L
AST SERPL-CCNC: 49 U/L
BASOPHILS # BLD AUTO: 0.03 K/UL
BASOPHILS NFR BLD: 0.5 %
BILIRUB SERPL-MCNC: 0.4 MG/DL
BNP SERPL-MCNC: 34 PG/ML
BUN SERPL-MCNC: 19 MG/DL
CALCIUM SERPL-MCNC: 9.2 MG/DL
CHLORIDE SERPL-SCNC: 106 MMOL/L
CHOLEST/HDLC SERPL: 3.5 {RATIO}
CK SERPL-CCNC: 74 U/L
CO2 SERPL-SCNC: 24 MMOL/L
CREAT SERPL-MCNC: 0.9 MG/DL
DIFFERENTIAL METHOD: ABNORMAL
EOSINOPHIL # BLD AUTO: 0.1 K/UL
EOSINOPHIL NFR BLD: 1.8 %
ERYTHROCYTE [DISTWIDTH] IN BLOOD BY AUTOMATED COUNT: 14.5 %
EST. GFR  (AFRICAN AMERICAN): >60 ML/MIN/1.73 M^2
EST. GFR  (NON AFRICAN AMERICAN): >60 ML/MIN/1.73 M^2
GLUCOSE SERPL-MCNC: 87 MG/DL
HCT VFR BLD AUTO: 40.2 %
HDL/CHOLESTEROL RATIO: 28.5 %
HDLC SERPL-MCNC: 165 MG/DL
HDLC SERPL-MCNC: 47 MG/DL
HGB BLD-MCNC: 12.9 G/DL
LDLC SERPL CALC-MCNC: 93.2 MG/DL
LYMPHOCYTES # BLD AUTO: 3 K/UL
LYMPHOCYTES NFR BLD: 44.8 %
MCH RBC QN AUTO: 30.1 PG
MCHC RBC AUTO-ENTMCNC: 32.1 %
MCV RBC AUTO: 94 FL
MONOCYTES # BLD AUTO: 0.6 K/UL
MONOCYTES NFR BLD: 9 %
NEUTROPHILS # BLD AUTO: 2.9 K/UL
NEUTROPHILS NFR BLD: 43.7 %
NONHDLC SERPL-MCNC: 118 MG/DL
PLATELET # BLD AUTO: 147 K/UL
PMV BLD AUTO: 10.8 FL
POTASSIUM SERPL-SCNC: 4 MMOL/L
PROT SERPL-MCNC: 7.6 G/DL
RBC # BLD AUTO: 4.29 M/UL
SODIUM SERPL-SCNC: 140 MMOL/L
T4 FREE SERPL-MCNC: 0.84 NG/DL
TRIGL SERPL-MCNC: 124 MG/DL
TSH SERPL DL<=0.005 MIU/L-ACNC: 18.96 UIU/ML
WBC # BLD AUTO: 6.59 K/UL

## 2017-05-19 PROCEDURE — 99999 PR PBB SHADOW E&M-EST. PATIENT-LVL III: CPT | Mod: PBBFAC,,, | Performed by: FAMILY MEDICINE

## 2017-05-19 PROCEDURE — 80061 LIPID PANEL: CPT

## 2017-05-19 PROCEDURE — 3079F DIAST BP 80-89 MM HG: CPT | Mod: S$GLB,,, | Performed by: FAMILY MEDICINE

## 2017-05-19 PROCEDURE — 86580 TB INTRADERMAL TEST: CPT | Mod: S$GLB,,, | Performed by: FAMILY MEDICINE

## 2017-05-19 PROCEDURE — 83880 ASSAY OF NATRIURETIC PEPTIDE: CPT

## 2017-05-19 PROCEDURE — 80053 COMPREHEN METABOLIC PANEL: CPT

## 2017-05-19 PROCEDURE — 84439 ASSAY OF FREE THYROXINE: CPT

## 2017-05-19 PROCEDURE — 1125F AMNT PAIN NOTED PAIN PRSNT: CPT | Mod: S$GLB,,, | Performed by: FAMILY MEDICINE

## 2017-05-19 PROCEDURE — 3074F SYST BP LT 130 MM HG: CPT | Mod: S$GLB,,, | Performed by: FAMILY MEDICINE

## 2017-05-19 PROCEDURE — 1160F RVW MEDS BY RX/DR IN RCRD: CPT | Mod: S$GLB,,, | Performed by: FAMILY MEDICINE

## 2017-05-19 PROCEDURE — 1159F MED LIST DOCD IN RCRD: CPT | Mod: S$GLB,,, | Performed by: FAMILY MEDICINE

## 2017-05-19 PROCEDURE — 85025 COMPLETE CBC W/AUTO DIFF WBC: CPT

## 2017-05-19 PROCEDURE — 84443 ASSAY THYROID STIM HORMONE: CPT

## 2017-05-19 PROCEDURE — 82306 VITAMIN D 25 HYDROXY: CPT

## 2017-05-19 PROCEDURE — 99214 OFFICE O/P EST MOD 30 MIN: CPT | Mod: 25,S$GLB,, | Performed by: FAMILY MEDICINE

## 2017-05-19 PROCEDURE — 82550 ASSAY OF CK (CPK): CPT

## 2017-05-19 PROCEDURE — 99499 UNLISTED E&M SERVICE: CPT | Mod: S$PBB,,, | Performed by: FAMILY MEDICINE

## 2017-05-19 PROCEDURE — 36415 COLL VENOUS BLD VENIPUNCTURE: CPT

## 2017-05-19 PROCEDURE — 87522 HEPATITIS C REVRS TRNSCRPJ: CPT

## 2017-05-19 RX ORDER — LEVOTHYROXINE SODIUM 100 UG/1
TABLET ORAL
Qty: 90 TABLET | Refills: 1 | OUTPATIENT
Start: 2017-05-19

## 2017-05-19 RX ORDER — LEVOTHYROXINE SODIUM 100 UG/1
100 TABLET ORAL DAILY
Qty: 30 TABLET | Refills: 1 | Status: SHIPPED | OUTPATIENT
Start: 2017-05-19 | End: 2017-06-27 | Stop reason: SDUPTHER

## 2017-05-19 NOTE — TELEPHONE ENCOUNTER
Please inform thyroid studies are abnormal, we will need to increase her synthroid dose to 100mcg and recheck in 8 weeks please schedule  Her liver function test is elevated and we will need hepatology follow up the referral has been placed   Her anemia is stable

## 2017-05-19 NOTE — PROGRESS NOTES
"Subjective:      Patient ID: Alfredina Claudette Parks is a 77 y.o. female.    Chief Complaint: Annual Exam    HPI Comments: She is here for paperwork completion to attend adult . She denies any worsening sob or chest pain. Her swelling in her legs has improved with the compression stockings. She has been using advair as needed.     Review of Systems   Constitutional: Negative.    Respiratory: Negative.    Cardiovascular: Negative.    Gastrointestinal: Negative.      I personally reviewed Past Medical History, Past Surgical history,  Past Social History and Family History    Objective:   /82  Pulse 85  Ht 4' 10" (1.473 m)  Wt 76.4 kg (168 lb 6.9 oz)  SpO2 95%  BMI 35.2 kg/m2    Physical Exam   Constitutional: She is oriented to person, place, and time. She appears well-developed and well-nourished. No distress.   HENT:   Head: Normocephalic.   Right Ear: External ear normal.   Left Ear: External ear normal.   Mouth/Throat: Oropharynx is clear and moist.   Eyes: Conjunctivae and EOM are normal. Pupils are equal, round, and reactive to light. Right eye exhibits no discharge. Left eye exhibits no discharge. No scleral icterus.   Neck: Normal range of motion. Neck supple.   Cardiovascular: Normal rate, regular rhythm, normal heart sounds and intact distal pulses.  Exam reveals no gallop.    No murmur heard.  Pulmonary/Chest: Effort normal and breath sounds normal. No respiratory distress. She has no wheezes. She has no rales. She exhibits no tenderness.   Abdominal: Soft. Bowel sounds are normal. She exhibits no distension and no mass. There is no tenderness. There is no rebound and no guarding.   Musculoskeletal: Normal range of motion. She exhibits no edema.   Neurological: She is alert and oriented to person, place, and time.   Skin: Skin is warm and dry.   Psychiatric: She has a normal mood and affect. Her behavior is normal. Judgment and thought content normal.   Vitals reviewed.      Mitzy" was seen today for annual exam.    Diagnoses and all orders for this visit:    Annual physical exam  -     QUANTIFERON GOLD TB; Future  -     Vitamin D; Future  -     HEPATITIS C RNA, QUANTITATIVE, PCR; Future  -     Ambulatory Referral to Hepatology  -     POCT TB Skin Test  -     CBC auto differential; Future  -     Comprehensive metabolic panel; Future  -     TSH; Future  -     T4, free; Future  -     Brain natriuretic peptide; Future  -     CK; Future  -     Lipid panel; Future    Vitamin D deficiency  -     Vitamin D; Future    Hep C w/ coma, chronic  -     HEPATITIS C RNA, QUANTITATIVE, PCR; Future    Pre-diabetes  - will follow hga1c     HTN (hypertension), benign  - controlled on current regimen     PAF (paroxysmal atrial fibrillation)  - stable on current regimen     Other orders  -     levothyroxine (SYNTHROID) 100 MCG tablet; Take 1 tablet (100 mcg total) by mouth once daily.

## 2017-05-19 NOTE — MR AVS SNAPSHOT
[facility]  [address]  [place] [address]  Phone: [phone]  Fax: [phone]                  Alfredina Claudette Parks   2017 10:00 AM   Office Visit    Description:  Female : 1940   Provider:  Veronica Frost MD   Department:  [facility]           Reason for Visit     Annual Exam           Diagnoses this Visit        Comments    Annual physical exam    -  Primary     Vitamin D deficiency         Hep C w/ coma, chronic         Pre-diabetes         HTN (hypertension), benign         PAF (paroxysmal atrial fibrillation)                To Do List           Future Appointments        Provider Department Dept Phone    2017 10:00 AM Veronica Frost MD [facility] [phone]      Goals (5 Years of Data)     None      [facility]     [facility] -  Assistance  Unless otherwise directed by your provider, please contact [facility], our nurse care line that is available for  assistance.     Registered nurses in the [facility] provide: appointment scheduling, clinical advisement, health education, and other advisory services.  Call: [phone] (toll free)               Medications           Message regarding Medications     Verify the changes and/or additions to your medication regime listed below are the same as discussed with your clinician today.  If any of these changes or additions are incorrect, please notify your healthcare provider.             Verify that the below list of medications is an accurate representation of the medications you are currently taking.  If none reported, the list may be blank. If incorrect, please contact your healthcare provider. Carry this list with you in case of emergency.           Current Medications     ADVAIR DISKUS 250-50 mcg/dose diskus inhaler TAKE 1 PUFF PO BID    albuterol (PROVENTIL) 2.5 mg /3 mL (0.083 %) nebulizer solution  "TAKE 3ML BY NEBULIZATION EVERY 6 HOURS AS NEEDED FOR WHEEZING.    amiodarone (PACERONE) 100 MG Tab Take 1 tablet (100 mg total) by mouth once daily.    aspirin (ECOTRIN) 81 MG EC tablet Take 81 mg by mouth once daily.      diclofenac sodium 1 % Gel Apply 2 g topically once daily.    docusate sodium (COLACE) 100 MG capsule Take 1 capsule (100 mg total) by mouth 3 (three) times daily as needed for Constipation.    esomeprazole (NEXIUM) 40 MG capsule Take 1 capsule (40 mg total) by mouth before breakfast.    fluticasone (FLONASE) 50 mcg/actuation nasal spray SPRAY ONCE IN EACH NOSTRIL EVERY DAY    hydrALAZINE (APRESOLINE) 10 MG tablet Take 1 tablet (10 mg total) by mouth daily as needed (SBP>180).    levothyroxine (SYNTHROID) 50 MCG tablet TAKE 1 TABLET BY MOUTH EVERY DAY    meclizine (ANTIVERT) 12.5 mg tablet Take 12.5 mg by mouth 3 (three) times daily as needed.    nifedipine 30 MG ORAL TR24 (PROCARDIA-XL) 30 MG (OSM) 24 hr tablet Take 1 tablet (30 mg total) by mouth once daily.    solifenacin (VESICARE) 5 MG tablet Take 1 tablet (5 mg total) by mouth once daily.    tobramycin sulfate 0.3% (TOBREX) 0.3 % ophthalmic solution Place 1 drop into both eyes 3 (three) times daily.    valsartan (DIOVAN) 160 MG tablet Take 1 tablet (160 mg total) by mouth daily as needed. Hold if sbp <120    triamcinolone acetonide 0.025% (KENALOG) 0.025 % cream Apply topically 2 (two) times daily.           Clinical Reference Information           Your Vitals Were     BP Pulse Height Weight SpO2 BMI    122/82 85 4' 10" (1.473 m) 76.4 kg (168 lb 6.9 oz) 95% 35.2 kg/m2      Blood Pressure          Most Recent Value    BP  122/82      Allergies as of 5/19/2017     Milk Containing Products    Nuts [Tree Nut]    Fosamax [Alendronate]      Immunizations Administered on Date of Encounter - 5/19/2017     Name Date Dose VIS Date Route    PPD Test 5/19/2017 0.1 mL N/A Intradermal      Orders Placed During Today's Visit      Normal Orders This Visit    " Ambulatory Referral to Hepatology     POCT TB Skin Test     Future Labs/Procedures Expected by Expires    Brain natriuretic peptide  5/19/2017 7/18/2018    CBC auto differential  5/19/2017 5/19/2018    CK  5/19/2017 7/18/2018    Comprehensive metabolic panel  5/19/2017 5/19/2018    HEPATITIS C RNA, QUANTITATIVE, PCR  5/19/2017 7/18/2018    Lipid panel  5/19/2017 5/19/2018    QUANTIFERON GOLD TB  5/19/2017 7/18/2018    T4, free  5/19/2017 7/18/2018    TSH  5/19/2017 5/19/2018    Vitamin D  5/19/2017 5/19/2018      [facility] Sign-Up     Activating your [facility] account is as easy as 1-2-3!     1) Visit [email or web address], select Sign Up Now, enter this activation code and your date of birth, then select Next.  [email or web address]  Expires: 5/21/2017  3:55 PM      2) Create a username and password to use when you visit [facility] in the future and select a security question in case you lose your password and select Next.    3) Enter your e-mail address and click Sign Up!    Additional Information  If you have questions, please e-mail [email or web address] or call [phone] to talk to our [facility] staff. Remember, [facility] is NOT to be used for urgent needs. For medical emergencies, dial 911.         Language Assistance Services     ATTENTION: Language assistance services are available, free of charge. Please call [phone].      ATENCIÓN: Si habla español, tiene a cho disposición servicios gratuitos de asistencia lingüística. Llame al [phone].     CHÚ Ý: N?u b?n nói Ti?ng Vi?t, có các d?ch v? h? tr? ngôn ng? mi?n phí dành cho b?n. G?i s? [phone].         [facility] complies with applicable Federal civil rights laws and does not discriminate on the basis of race, color, national origin, age, disability, or sex.

## 2017-05-19 NOTE — TELEPHONE ENCOUNTER
Informed patient of lab results per Dr. Frost. Patient verbalized understanding and all f/u appointments were made.

## 2017-05-19 NOTE — PROGRESS NOTES
PPD Placement note  Alfredina Claudette Parks, 77 y.o. female is here today for placement of PPD test  Reason for PPD test: nursing home  Pt taken PPD test before: n/a  Verified in allergy area and with patient that they are not allergic to the products PPD is made of (Phenol or Tween). No:  Is patient taking any oral or IV steroid medication now or have they taken it in the last month? no  Has the patient ever received the BCG vaccine?: no  Has the patient been in recent contact with anyone known or suspected of having active TB disease?: no       Date of exposure (if applicable): n/a       Name of person they were exposed to (if applicable): n/a  Patient's Country of origin?: n/a  O: Alert and oriented in NAD.  P:  PPD placed on 5/19/2017. Left forearm. Lot#[id number] exp: 10/28/2018   Patient advised for reading within 48-72 hours. Pt having reading done at nursing home. Will fax results Monday 5/15/17

## 2017-05-22 ENCOUNTER — TELEPHONE (OUTPATIENT)
Dept: HEPATOLOGY | Facility: CLINIC | Age: 77
End: 2017-05-22

## 2017-05-22 DIAGNOSIS — B18.2 CHRONIC HEPATITIS C WITHOUT HEPATIC COMA: Primary | ICD-10-CM

## 2017-05-22 LAB
HCV LOG: 6.33 LOG (10) IU/ML
HCV RNA QUANT PCR: ABNORMAL IU/ML
HCV, QUALITATIVE: DETECTED IU/ML

## 2017-05-22 NOTE — TELEPHONE ENCOUNTER
Chart reviewed. HCV RNA and genotype 1a noted. CBC and CMP done 5/19.  Pt was incorrectly scheduled for upcoming consult.    Spoke to pt. U/s, fibroscan, and clinic visit scheduled on 6/13. Reminders mailed.

## 2017-05-22 NOTE — TELEPHONE ENCOUNTER
----- Message from Hao Flynn PA-C sent at 5/19/2017  3:29 PM CDT -----  Can you schedule her per HCV protocol. She was incorrectly scheduled by an outside department.

## 2017-05-23 ENCOUNTER — TELEPHONE (OUTPATIENT)
Dept: INTERNAL MEDICINE | Facility: CLINIC | Age: 77
End: 2017-05-23

## 2017-05-23 NOTE — TELEPHONE ENCOUNTER
Informed of need to fax result to our office. Titi given fax number with instructions to fax over PPD results.

## 2017-05-23 NOTE — TELEPHONE ENCOUNTER
----- Message from Ally Kearney sent at 5/23/2017  9:54 AM CDT -----  Contact: [facility]   x_  1st Request  _  2nd Request  _  3rd Request        Who:  Titi    Why: Titi called she stated that she have the results of the pateint's PPD. Please call her.     What Number to Call Back: [phone] Ext [phone]    When to Expect a call back: (Before the end of the day)   -- if the call is after 12:00, the call back will be tomorrow.

## 2017-05-25 ENCOUNTER — TELEPHONE (OUTPATIENT)
Dept: INTERNAL MEDICINE | Facility: CLINIC | Age: 77
End: 2017-05-25

## 2017-05-25 RX ORDER — ERGOCALCIFEROL 1.25 MG/1
50000 CAPSULE ORAL
Qty: 12 CAPSULE | Refills: 0 | Status: SHIPPED | OUTPATIENT
Start: 2017-05-25 | End: 2017-05-26

## 2017-05-25 NOTE — TELEPHONE ENCOUNTER
Her muscle enzyme is normal   Your vitamin D is low. I will send a prescription to the pharmacy for a weekly supplement you will take once weekly for 12 weeks, then after the 12 weeks you will need to take a daily supplement available over the counter of 1000 IU

## 2017-05-26 RX ORDER — ASPIRIN 325 MG
50000 TABLET, DELAYED RELEASE (ENTERIC COATED) ORAL WEEKLY
Qty: 12 CAPSULE | Refills: 0 | COMMUNITY
Start: 2017-05-26 | End: 2017-07-10

## 2017-05-26 NOTE — TELEPHONE ENCOUNTER
Please advise patient try humidifier at night, along with small application of vaseline to the nares nightly for 5 nights     decara is a name brand vitamin D that has been sent to the pharmacy and should be $22 for the 12 week course

## 2017-05-26 NOTE — TELEPHONE ENCOUNTER
Informed pt of advise. Pt demonstrated verbal understanding of information and had no further questions or concerns at this time.

## 2017-05-26 NOTE — TELEPHONE ENCOUNTER
Pt states she can not get her medication as her insurance is not covering it and she can not afford it. Pt also states she has been experincing nose bleeds. Pt states it happened last Saturday, Monday, and today. Please advise

## 2017-05-30 ENCOUNTER — TELEPHONE (OUTPATIENT)
Dept: HEPATOLOGY | Facility: CLINIC | Age: 77
End: 2017-05-30

## 2017-05-30 NOTE — TELEPHONE ENCOUNTER
----- Message from Alecia Hagan RN sent at 5/30/2017  9:55 AM CDT -----  Contact: daughter/Tari      ----- Message -----  From: Ariana Blackmon  Sent: 5/30/2017   9:17 AM  To: [facility] Hepatitis C Staff    Has a loop recorder inserted recently just wanted to let us know before her appts in case there is an issue can call daughter if needed @ # [phone]

## 2017-06-13 ENCOUNTER — OFFICE VISIT (OUTPATIENT)
Dept: HEPATOLOGY | Facility: CLINIC | Age: 77
End: 2017-06-13
Payer: MEDICARE

## 2017-06-13 ENCOUNTER — HOSPITAL ENCOUNTER (OUTPATIENT)
Dept: RADIOLOGY | Facility: HOSPITAL | Age: 77
Discharge: HOME | End: 2017-06-13
Attending: INTERNAL MEDICINE
Payer: MEDICARE

## 2017-06-13 ENCOUNTER — PROCEDURE VISIT (OUTPATIENT)
Dept: HEPATOLOGY | Facility: CLINIC | Age: 77
End: 2017-06-13
Attending: INTERNAL MEDICINE
Payer: MEDICARE

## 2017-06-13 VITALS
DIASTOLIC BLOOD PRESSURE: 67 MMHG | SYSTOLIC BLOOD PRESSURE: 141 MMHG | BODY MASS INDEX: 34.27 KG/M2 | WEIGHT: 170 LBS | HEIGHT: 59 IN | HEART RATE: 79 BPM | OXYGEN SATURATION: 97 % | TEMPERATURE: 97 F

## 2017-06-13 DIAGNOSIS — K74.00 LIVER FIBROSIS: ICD-10-CM

## 2017-06-13 DIAGNOSIS — B18.2 CHRONIC HEPATITIS C WITHOUT HEPATIC COMA: Primary | ICD-10-CM

## 2017-06-13 DIAGNOSIS — B18.2 CHRONIC HEPATITIS C WITHOUT HEPATIC COMA: ICD-10-CM

## 2017-06-13 PROCEDURE — 1159F MED LIST DOCD IN RCRD: CPT | Mod: S$GLB,,, | Performed by: PHYSICIAN ASSISTANT

## 2017-06-13 PROCEDURE — 99499 UNLISTED E&M SERVICE: CPT | Mod: S$PBB,,, | Performed by: PHYSICIAN ASSISTANT

## 2017-06-13 PROCEDURE — 99214 OFFICE O/P EST MOD 30 MIN: CPT | Mod: S$GLB,,, | Performed by: PHYSICIAN ASSISTANT

## 2017-06-13 PROCEDURE — 1125F AMNT PAIN NOTED PAIN PRSNT: CPT | Mod: S$GLB,,, | Performed by: PHYSICIAN ASSISTANT

## 2017-06-13 PROCEDURE — 76700 US EXAM ABDOM COMPLETE: CPT | Mod: 26,,, | Performed by: RADIOLOGY

## 2017-06-13 PROCEDURE — 99999 PR PBB SHADOW E&M-EST. PATIENT-LVL IV: CPT | Mod: PBBFAC,,, | Performed by: PHYSICIAN ASSISTANT
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
## 2017-06-13 NOTE — LETTER
June 13, 2017      Veronica Frost MD  [address]  [facility] [address]           [facility]as[facility] domingo - Hepatology  [address]  [facility] [address]  Phone: [phone]  Fax: [phone]          Patient: Alfredina Claudette Parks   MR Number: [id number]   YOB: 1940   Date of Visit: 6/13/2017       Dear Dr. Veronica Frost:    Thank you for referring Mitzy Garcia to me for evaluation. Attached you will find relevant portions of my assessment and plan of care.    If you have questions, please do not hesitate to call me. I look forward to following Mitzy Garcia along with you.    Sincerely,    EYAD Seoosure  CC:  No Recipients    If you would like to receive this communication electronically, please contact [email or web address] or [phone] to request more information on [facility] access.    For providers and/or their staff who would like to refer a patient to [facility], please contact us through our one-stop-shop provider referral line, [facility], at [phone].    If you feel you have received this communication in error or would no longer like to receive these types of communications, please e-mail [email or web address]

## 2017-06-19 ENCOUNTER — TELEPHONE (OUTPATIENT)
Dept: HEPATOLOGY | Facility: CLINIC | Age: 77
End: 2017-06-19

## 2017-06-19 NOTE — TELEPHONE ENCOUNTER
----- Message from Hao Flynn PA-C sent at 6/19/2017  1:51 PM CDT -----  Please let her know that these labs are stable. She lacks immunity to HBV, we can vaccinate if she would like. We can discuss results further at our follow up, thanks

## 2017-06-20 NOTE — PROCEDURES
Procedures   Fibroscan Procedure     Name: Alfredina Claudette Parks  Date of Procedure : 06/20/2017   :: Tien Sharp MD  Diagnosis: HCV  Probe: XL    Poor echo subject- unable to get accurate reading.

## 2017-06-23 ENCOUNTER — TELEPHONE (OUTPATIENT)
Dept: INTERNAL MEDICINE | Facility: CLINIC | Age: 77
End: 2017-06-23

## 2017-06-23 NOTE — TELEPHONE ENCOUNTER
She may need to give the Colace more of a chance to work.  She could also try a different laxative such as senna or dulcolax, both of which are OTC.

## 2017-06-23 NOTE — TELEPHONE ENCOUNTER
Pt states she is experiencing constipation. Pt states symptoms started yesterday. She states she was prescribed colace and took it yesterday and today but is still not having any relief. Pt states she is also drinking plenty of fluids but that it is not helping either. Pt states she is also feeling pain in her side. Pt was unable to rate the pain on a scale of 1-10 when asked but did say its hurts very bad. Pt requesting for something to be sent in to help with the constipation. Please advise and authorize.

## 2017-06-23 NOTE — TELEPHONE ENCOUNTER
----- Message from Brett Macedo sent at 6/23/2017  1:41 PM CDT -----  Contact: pt  x_ 1st Request   _ 2nd Request   _ 3rd Request     Who: PARKS,ALFREDINA CLAUDETTE [[id number]]    Why: pt called stated she is constipated and would like something called in to [id number] - [place], [place] - [address] AT [facility] [phone]      What Number to Call Back: [phone]    When to Expect a call back: (Before the end of the day)   -- if call after 3:00 call back will be tomorrow.

## 2017-06-26 ENCOUNTER — TELEPHONE (OUTPATIENT)
Dept: INTERNAL MEDICINE | Facility: CLINIC | Age: 77
End: 2017-06-26

## 2017-06-26 NOTE — TELEPHONE ENCOUNTER
----- Message from Christina Hi sent at 6/26/2017  4:04 PM CDT -----  Contact: Tari patient's daughter  _x  1st Request  _  2nd Request  _  3rd Request        Who: Tari patient's daughter    Why: Patient daughter would like a callback in regards to clarification on Rx levothyroxine (SYNTHROID) 100 MCG tablet for her mother.     What Number to Call Back: [phone]    When to Expect a call back: (Before the end of the day)   -- if call after 3:00 call back will be tomorrow.    4:14 PM  Patient's daughter Tari had a question regarding her mother's medication. Daughter was informed that her mother can take 100mcg of that medication. Ms hickman verbalized understanding.

## 2017-06-26 NOTE — TELEPHONE ENCOUNTER
----- Message from Ayesha Nettles sent at 6/26/2017  9:07 AM CDT -----  Contact: pt  _  1st Request  _  2nd Request  _  3rd Request        Who:     Why: pt needs advice and also requesting stool softners. Please call the pt     What Number to Call Back:[phone]    When to Expect a call back: (Before the end of the day)   -- if the call is after 12:00, the call back will be tomorrow.    11:44 AM  Patient called in regards to her medication not working. Patient was reminded about the advice that Dr. Grove gave her on last Friday and the otc stool softeners that he recommended. Patient stated that the ducolax that Dr. Grove recommended but was confused about if it was a stool softener. Patient was informed that it was in fact an otc stool softener. Patient had no further question.

## 2017-06-27 RX ORDER — LEVOTHYROXINE SODIUM 100 UG/1
TABLET ORAL
Qty: 30 TABLET | Refills: 0 | Status: SHIPPED | OUTPATIENT
Start: 2017-06-27 | End: 2017-07-18

## 2017-07-07 ENCOUNTER — OFFICE VISIT (OUTPATIENT)
Dept: HEPATOLOGY | Facility: CLINIC | Age: 77
End: 2017-07-07
Payer: MEDICARE

## 2017-07-07 ENCOUNTER — TELEPHONE (OUTPATIENT)
Dept: OTOLARYNGOLOGY | Facility: CLINIC | Age: 77
End: 2017-07-07

## 2017-07-07 VITALS
BODY MASS INDEX: 35.12 KG/M2 | TEMPERATURE: 97 F | WEIGHT: 167.31 LBS | DIASTOLIC BLOOD PRESSURE: 68 MMHG | HEIGHT: 58 IN | SYSTOLIC BLOOD PRESSURE: 145 MMHG | HEART RATE: 80 BPM | OXYGEN SATURATION: 97 % | RESPIRATION RATE: 18 BRPM

## 2017-07-07 DIAGNOSIS — B18.2 CHRONIC HEPATITIS C WITHOUT HEPATIC COMA: ICD-10-CM

## 2017-07-07 DIAGNOSIS — K74.60 CIRRHOSIS OF LIVER WITHOUT ASCITES, UNSPECIFIED HEPATIC CIRRHOSIS TYPE: Primary | ICD-10-CM

## 2017-07-07 PROCEDURE — 99499 UNLISTED E&M SERVICE: CPT | Mod: S$PBB,,, | Performed by: PHYSICIAN ASSISTANT

## 2017-07-07 PROCEDURE — 1125F AMNT PAIN NOTED PAIN PRSNT: CPT | Mod: S$GLB,,, | Performed by: PHYSICIAN ASSISTANT

## 2017-07-07 PROCEDURE — 1159F MED LIST DOCD IN RCRD: CPT | Mod: S$GLB,,, | Performed by: PHYSICIAN ASSISTANT

## 2017-07-07 PROCEDURE — 99999 PR PBB SHADOW E&M-EST. PATIENT-LVL V: CPT | Mod: PBBFAC,,, | Performed by: PHYSICIAN ASSISTANT

## 2017-07-07 PROCEDURE — 99214 OFFICE O/P EST MOD 30 MIN: CPT | Mod: S$GLB,,, | Performed by: PHYSICIAN ASSISTANT

## 2017-07-07 NOTE — TELEPHONE ENCOUNTER
----- Message from Hao Flynn PA-C sent at 7/7/2017  2:14 PM CDT -----  Please recall for follow up and HCC screening in 6 months

## 2017-07-07 NOTE — PROGRESS NOTES
HEPATOLOGY CLINIC VISIT NOTE - HCV clinic    REFERRING PROVIDER: Dr. Veronica Frost     CHIEF COMPLAINT: Hepatitis C - discuss treatment    HISTORY: This is a 77 y.o. Black or  female, here with daughter, with chronic hepatitis C and advacned fibrosis/probable cirrhosis who returns for follow up. Labs reveal that she is genotype 1a with a HCV RNA of 2,142,558 IU/mL. She has taken treatment in the past with IFN x 1 year during 8033-1867, was able to continue treatment during [place] in [place], [place]. She had a liver biopsy in 2004 noting Stage 3, fibrosis. Unfortunately, her body habitus and breathing was prohibitive to getting a fibroscan today. Her labs reveal elevated transaminases, hypoalbuminemia, and mild thrombocytopenia. Pt denies signs of hepatic decompensation including: jaundice, dark urine, abdominal distention, hematemesis, melena, slowed mentation.Her baseline Ns5a testing revealed no resistance. Given Amiodarone use, will proceed with Zepatier x 12 weeks.     H/o Afib on Amiodarone. Has loop recorder for monitoring of Afib. She has h/o pancreatic cyst, overdue for EUS.     HCV history:  Genotype 1a  HCV RNA: 2,142,558 IU/mL  Treatment experienced: IFN x 1 year     Risk Factors:  Blood transfusion- (+) ? in 70s    service-  Tattoos-  Incarceration-  IV/JUDY-    Liver staging:  Liver biopsy 2004, grade 2, Stage 3  AST 49, ALT 50  Tbili WNL  Albumin <3.5  PLTs 147    Advanced fibrosis?Cirrhosis history:  - Well compensated  - MELD score   Computed MELD-Na score unavailable. Necessary lab results were not found in the last year.  Computed MELD score unavailable. Necessary lab results were not found in the last year.    - HCC screening:   - U/S: due 10/2017   - AFP: 9.5, will monitor with HCV treatment   (?)Portal HTN:   - EGD: due for EUS, can plan for EGD at same time.       Denies jaundice, dark urine, abdominal distention, hematemesis, melena, slowed mentation.   No  abnormal skin rashes. No generalized joint pain.                     Past Medical History:   Diagnosis Date    Asthma     Asthma     Atrial fibrillation     Bulging lumbar disc     Cataract     Cervical radiculopathy     Diabetes mellitus     pre diabetes     Hepatitis C     Hepatitis C     Hyperglycemia     Hypertension     Hypothyroidism     Osteoporosis     Pancreatic cyst     Vitamin D deficiency disease        Past Surgical History:   Procedure Laterality Date    BREAST SURGERY      biopsy    CYST REMOVAL      EYE SURGERY      cataracts    HYSTERECTOMY      TOE SURGERY Bilateral     big toenails     FAMILY HISTORY: Negative for liver disease    SOCIAL HISTORY:   History   Smoking Status    Never Smoker   Smokeless Tobacco    Never Used       History   Alcohol Use No       History   Drug Use No       ROS:   No fever, chills, weight loss, fatigue  No chest pain, palpitations, dyspnea, cough  No abdominal pain, nausea, vomiting  No skin rashes   (+) mild lower extremity edema  No depression or anxiety      PHYSICAL EXAM:  Friendly Black or  female, in no acute distress; alert and oriented to person, place and time  VITALS: reviewed  HEENT: Sclerae anicteric.   NECK: Supple  CVS: Regular rate and rhythm. No murmurs  LUNGS: Normal respiratory effort. Clear bilaterally  ABDOMEN: Flat, soft, nontender. No organomegaly or masses. No ascites or hernias.  SKIN: Warm and dry. No jaundice, No obvious rashes.   EXTREMITIES: No lower extremity edema  NEURO/PSYCH: Normal gate. Memory intact. Thought and speech pattern appropriate. Behavior normal. No depression or anxiety noted.    RECENT LABS:  Lab Results   Component Value Date    WBC 6.59 05/19/2017    HGB 12.9 05/19/2017     (L) 05/19/2017     Lab Results   Component Value Date    INR 1.2 03/13/2006     Lab Results   Component Value Date    AST 49 (H) 05/19/2017    ALT 50 (H) 05/19/2017    BILITOT 0.4 05/19/2017    ALBUMIN 3.4  (L) 05/19/2017    ALKPHOS 39 (L) 05/19/2017    CREATININE 0.9 05/19/2017    BUN 19 05/19/2017     05/19/2017    K 4.0 05/19/2017    AFP 9.5 (H) 06/13/2017     RECENT IMAGING:  U/S abdomen 06/2017  Narrative     Time of Procedure: 06/13/17 08:45:00  Accession # [id number]    Technique: Complete abdominal ultrasound    Clinical indication: Chronic viral hepatitis c    Comparison: CT abdomen pelvis 10/7/2015.    Findings:    The visualized portion of the pancreas is unremarkable.      The liver is normal in size.  The liver demonstrates homogeneous echotexture.  No focal hepatic lesions are seen.    The gallbladder is unremarkable with no evidence of calculi.  The common duct is dilated in the extrahepatic portion and 1.1 cm previously 1.0 cm.  The gallbladder wall is not thickened.  There is no sonographic Boggs sign.  No dilated intrahepatic radicles are seen.  No pericholecystic fluid.    The spleen is normal in size measuring 10.4 x 4.1 cm with a homogeneous echotexture.    The aorta tapers normally.  The inferior vena cava is normal in appearance.    The kidneys are normal size bilaterally, with a left renal simple cyst, stable from prior exams      There is no evidence of ascites.   Impression     Stable extrahepatic biliary ductal dilatation without evidence of intrahepatic biliary ductal dilatation.   No focal liver lesions.     ASSESSMENT  77 y.o. Black or  female with:  1. CHRONIC HEPATITIS C, GENOTYPE 1a - treatment experienced  -- Prior HCV treatment - IFN x 1 year  -- Elevated transaminases  -- Unknown immunity to HAV or HBV  -- given current amiodarone use, would not consider for treatment with Sofosbuvir based regimen    2. ADVANCED FIBROSIS/?CIRRHOSIS  -- unable to get fibroscan, loop recorded makes her ineligible for MR elastography  -- certainly could have had progression from biopsy >10 yeas ago, however liver function stable  -- will need life long HCC screening regardless  -  HCC screening:   - U/S: due 10/2017   - AFP: 9.5, will monitor with HCV treatment   (?)Portal HTN:   - EGD: due for EUS, can plan for EGD at same time.     3. H/O PANCREATIC CYST  -- EUS done 02/106, rec repeat in 1 year, overdue   -- discussed liver biopsy at time an option, however does not  of HCV or liver cancer screening  -- will review with cardiologist whether he believes she can proceed with EUS, per daughter's request.     EDUCATION:  Discussed goal of HCV eradication to prevent progression of liver disease.  Discussed use of Zepatier daily x 12 weeks w/ potential side effects of fatigue and headache.      Discussed that if Ribavirin is used, Ribavirin is classified as a Category X medication. I advised the patient on the need for 2 methods of contraception during treatment and 6 months following treatment.     Herbal / alternative therapies must be discontinued / avoided    - Pt to notify me if other drugs are prescribed so potential interactions can be assessed    Importance of drug compliance reviewed w/ risk of treatment failure and viral resistance if pt is not adherent to regimen      Zepatier contains lactose as binder to form tablet, also inert ingredient of several other medications. Pt expressed no intolerance to current medications     PLAN:  1. Obtain authorization to treat HCV with Zepatier daily x 12 weeks  -- Rx will be routed to [facility]  -- Patient will notify me of exact treatment start date so appropriate lab f/u can be scheduled.  2. Follow up at SVR 12 with HCC screening  3. Will need EUS/EGD, pt to review with cards.     Duration of visit: 45 minutes  With >50% of visit spent on counseling pt on HCV diagnosis    Hao Flynn PA-C

## 2017-07-11 ENCOUNTER — TELEPHONE (OUTPATIENT)
Dept: PHARMACY | Facility: CLINIC | Age: 77
End: 2017-07-11

## 2017-07-11 NOTE — TELEPHONE ENCOUNTER
Faxed prior authorization for Zepatier to insurance company for review on Tues 07/11/2017 @12:43pm SUZY

## 2017-07-12 ENCOUNTER — EPISODE CHANGES (OUTPATIENT)
Dept: HEPATOLOGY | Facility: CLINIC | Age: 77
End: 2017-07-12

## 2017-07-12 NOTE — TELEPHONE ENCOUNTER
DOCUMENTATION ONLY:  Prior authorization for zepatier approved from 7/12/2017-7/12/2018    $0 copay

## 2017-07-14 ENCOUNTER — OFFICE VISIT (OUTPATIENT)
Dept: INTERNAL MEDICINE | Facility: CLINIC | Age: 77
End: 2017-07-14
Attending: INTERNAL MEDICINE
Payer: MEDICARE

## 2017-07-14 ENCOUNTER — LAB VISIT (OUTPATIENT)
Dept: LAB | Facility: OTHER | Age: 77
End: 2017-07-14
Attending: FAMILY MEDICINE
Payer: MEDICARE

## 2017-07-14 VITALS
SYSTOLIC BLOOD PRESSURE: 138 MMHG | HEIGHT: 58 IN | DIASTOLIC BLOOD PRESSURE: 80 MMHG | WEIGHT: 169.31 LBS | BODY MASS INDEX: 35.54 KG/M2 | HEART RATE: 72 BPM

## 2017-07-14 DIAGNOSIS — K59.00 CONSTIPATION, UNSPECIFIED CONSTIPATION TYPE: Primary | ICD-10-CM

## 2017-07-14 DIAGNOSIS — M25.511 CHRONIC PAIN OF BOTH SHOULDERS: ICD-10-CM

## 2017-07-14 DIAGNOSIS — M25.512 CHRONIC PAIN OF BOTH SHOULDERS: ICD-10-CM

## 2017-07-14 DIAGNOSIS — R60.0 BILATERAL LEG EDEMA: ICD-10-CM

## 2017-07-14 DIAGNOSIS — G89.29 CHRONIC PAIN OF BOTH SHOULDERS: ICD-10-CM

## 2017-07-14 DIAGNOSIS — E03.9 HYPOTHYROIDISM, UNSPECIFIED TYPE: ICD-10-CM

## 2017-07-14 LAB
T4 FREE SERPL-MCNC: 1.02 NG/DL
TSH SERPL DL<=0.005 MIU/L-ACNC: 10.64 UIU/ML

## 2017-07-14 PROCEDURE — 1159F MED LIST DOCD IN RCRD: CPT | Mod: S$GLB,,, | Performed by: INTERNAL MEDICINE

## 2017-07-14 PROCEDURE — 1125F AMNT PAIN NOTED PAIN PRSNT: CPT | Mod: S$GLB,,, | Performed by: INTERNAL MEDICINE

## 2017-07-14 PROCEDURE — 99499 UNLISTED E&M SERVICE: CPT | Mod: S$PBB,,, | Performed by: INTERNAL MEDICINE

## 2017-07-14 PROCEDURE — 99214 OFFICE O/P EST MOD 30 MIN: CPT | Mod: S$GLB,,, | Performed by: INTERNAL MEDICINE

## 2017-07-14 PROCEDURE — 99999 PR PBB SHADOW E&M-EST. PATIENT-LVL III: CPT | Mod: PBBFAC,,, | Performed by: INTERNAL MEDICINE

## 2017-07-14 RX ORDER — POLYETHYLENE GLYCOL 3350 17 G/17G
17 POWDER, FOR SOLUTION ORAL DAILY
Qty: 238 G | Refills: 1 | Status: SHIPPED | OUTPATIENT
Start: 2017-07-14 | End: 2017-09-15 | Stop reason: SDUPTHER

## 2017-07-14 NOTE — PATIENT INSTRUCTIONS
High fiber diet - 25 grams per day. Emphasis on whole grain breads such as double fiber wheat bread and high fiber cereals and bars.   Drink 8 glasses of water per day 64 - 96 oz per day  Fiber supplements such as KONSYL, METAMUCIL can be taken 1-2 x per day  Regular exercise - 30 min brisk walking 5 days per week  Stool softener such as colace 1 tablet twice a day. If works too well then can take 1 tablet a day or every other day.  Probiotics such as culturelle or align    Laxative  m

## 2017-07-14 NOTE — PROGRESS NOTES
"Subjective:       Patient ID: Alfredina Claudette Parks is a 77 y.o. female.    Chief Complaint: Leg Swelling and Shoulder Pain    Here for routine f/u  Pt normally seen by my colleague Dr. Frost  Her concers today are chronic pains and swelling in legs.  Chronic pain of both knees, shoulders and low back pain. Followed by Dr. Pugh and is interested in getting injections again. meloxicam offered but pt politely declined as she does not want any additional medication.    Leg swelling  Frequent nosebleeds and had one 7/4/17 and she has f/u with ENT    Pancreatic cyst/HCV/liver fibrosis  -cleared by cardiology for EGD/EUS. Needs f/u of pancreatic cyst along with liver Bx and hepatology will pursue at same time    Hypothyroidism  -increased to 100mcg 2 months prior. + constipation. Pt denies fatigue, unexplained georgette gain/loss, palpitations, tremors, diarrhea,or dysphagia.      Paroxysmal A-fib  -seen by card earlier this week. Checked loop recorder due to Afib history and it showed 0.25% Afib,no tachycardia or pauses. Her CHADVASC score is a 3. After discussion with family and pt decided on ASA for anticoagulation due to fall risk. Coreg 6.25 added. On amiodarone with LFT elevation prior to starting this.    Asthma  -no acute issues. Denies chest tightness/wheezing, chronic cough    Constipation  -drinks 1 bottle of water daily.Daughter gives her spinach and other vegetables but she does not like to "eat horse food"            Review of Systems    Objective:      Vitals:    07/14/17 1039   BP: 138/80   Pulse: 72   Weight: 76.8 kg (169 lb 5 oz)   Height: 4' 10" (1.473 m)      Physical Exam   Constitutional: She is oriented to person, place, and time. She appears well-developed and well-nourished. She does not have a sickly appearance. No distress.   HENT:   Head: Normocephalic and atraumatic.   Eyes: Conjunctivae and EOM are normal. Right eye exhibits no discharge. Left eye exhibits no discharge. No scleral " icterus.   Pulmonary/Chest: Effort normal. No respiratory distress.   Abdominal: Normal appearance. She exhibits no distension.   Musculoskeletal: She exhibits edema (1+ bilaterally).   Neurological: She is alert and oriented to person, place, and time.   Skin: Skin is warm and dry. She is not diaphoretic.   Psychiatric: She has a normal mood and affect. Her speech is normal.       Assessment:       1. Constipation, unspecified constipation type    2. Chronic pain of both shoulders    3. Bilateral leg edema        Plan:       Mitzy was seen today for leg swelling and shoulder pain.    Diagnoses and all orders for this visit:    Constipation, unspecified constipation type  -discussed need to increase water and fiber intake. Exercise is difficulty  -     polyethylene glycol (GLYCOLAX) 17 gram/dose powder; Take 17 g by mouth once daily.    Chronic pain of both shoulders  -no acute changes. Pt not interested in PT or additional medications at this time. f/u with ortho    Bilateral leg edema  -no respiratory complaints. Compression stockings, low salt diet, and elevation. Continue to monitor. Office prompts discussed.     RTC in 4-6 months with PCP        Side effects of medication(s) were discussed in detail and patient voiced understanding.  Patient will call back for any issues or complications.

## 2017-07-18 ENCOUNTER — PATIENT MESSAGE (OUTPATIENT)
Dept: HEPATOLOGY | Facility: CLINIC | Age: 77
End: 2017-07-18

## 2017-07-18 ENCOUNTER — TELEPHONE (OUTPATIENT)
Dept: INTERNAL MEDICINE | Facility: CLINIC | Age: 77
End: 2017-07-18

## 2017-07-18 DIAGNOSIS — K86.2 PANCREATIC CYST: Primary | ICD-10-CM

## 2017-07-18 DIAGNOSIS — E03.9 HYPOTHYROIDISM, UNSPECIFIED TYPE: Primary | ICD-10-CM

## 2017-07-18 RX ORDER — LEVOTHYROXINE SODIUM 125 UG/1
125 TABLET ORAL DAILY
Qty: 30 TABLET | Refills: 1 | Status: SHIPPED | OUTPATIENT
Start: 2017-07-18 | End: 2017-09-27 | Stop reason: SDUPTHER

## 2017-07-18 RX ORDER — LEVOTHYROXINE SODIUM 125 UG/1
TABLET ORAL
Qty: 90 TABLET | Refills: 1 | OUTPATIENT
Start: 2017-07-18

## 2017-07-18 NOTE — TELEPHONE ENCOUNTER
Informed pt of lab results and advise. Pt demonstrated verbal understanding of information and had no further questions or concerns at this time.

## 2017-07-18 NOTE — TELEPHONE ENCOUNTER
Thyroid studies have improved however we need to increase your dose for synthroid. We will send in the new prescription and repeat lab   in 6-8 weeks

## 2017-07-21 ENCOUNTER — EPISODE CHANGES (OUTPATIENT)
Dept: HEPATOLOGY | Facility: CLINIC | Age: 77
End: 2017-07-21

## 2017-07-31 ENCOUNTER — PATIENT MESSAGE (OUTPATIENT)
Dept: HEPATOLOGY | Facility: CLINIC | Age: 77
End: 2017-07-31

## 2017-08-11 ENCOUNTER — PATIENT MESSAGE (OUTPATIENT)
Dept: HEPATOLOGY | Facility: CLINIC | Age: 77
End: 2017-08-11

## 2017-08-22 ENCOUNTER — PATIENT MESSAGE (OUTPATIENT)
Dept: HEPATOLOGY | Facility: CLINIC | Age: 77
End: 2017-08-22

## 2017-08-23 RX ORDER — FLUTICASONE PROPIONATE AND SALMETEROL 50; 250 UG/1; UG/1
POWDER RESPIRATORY (INHALATION)
Qty: 60 EACH | Refills: 3 | Status: SHIPPED | OUTPATIENT
Start: 2017-08-23 | End: 2018-01-31 | Stop reason: SDUPTHER

## 2017-09-06 ENCOUNTER — TELEPHONE (OUTPATIENT)
Dept: INTERNAL MEDICINE | Facility: CLINIC | Age: 77
End: 2017-09-06

## 2017-09-06 ENCOUNTER — TELEPHONE (OUTPATIENT)
Dept: ADMINISTRATIVE | Facility: HOSPITAL | Age: 77
End: 2017-09-06

## 2017-09-06 NOTE — TELEPHONE ENCOUNTER
----- Message from Ally Christel sent at 9/6/2017 12:48 PM CDT -----  Contact: Symone vasquez_  1st Request  _  2nd Request  _  3rd Request        Who: Symone/ [facility]    Why: Needs Home Health order for the patient. Fax number below.    What Number to Call Back: [phone]    When to Expect a call back: (With in 24 hours)

## 2017-09-06 NOTE — TELEPHONE ENCOUNTER
Attached is the d/c summary for MRN: [id number] MELANI BARONE. She was recently d/c' from [facility]. Please have Dr. Frost send HH orders to [facility] for a new diagnosis of UTI and Syncope. Mbr is currently not feeling well and has [facility] in the home but only for PT/OT. We attempted to get an appointment with Dr. Frost next week, but the soonest she had available was on 10/5/17.    Please see media for discharge report.

## 2017-09-06 NOTE — TELEPHONE ENCOUNTER
"----- Message from Mariana Cee sent at 9/6/2017  2:31 PM CDT -----  Contact: Patient herself  _  1st Request  X  2nd Request  _  3rd Request    Who: Mitzy Garcia (mrn# [id number])    Why: Patient's daughter called requesting an appointment for her mother on Wednesday 09/13/2017 or Thursday 09/14/2017. Say, "it would be her hospital follow-up."  I did attempt to schedule per request but was unsuccessful.  Please give a call back at your earliest convenience.      THANKS!      What Number to Call Back: [phone]    When to Expect a call back: (With in 24 hours)    3:12 PM  Spoke with Tari (patient's daughter) and informed her that Dr. Frost will be out of the office during her requested time. Offered appointment with Dr. Doll whom the patient has previously seen, and Tari agreed. Tari scheduled the appointment and had no further questions or concerns.                    "

## 2017-09-08 ENCOUNTER — TELEPHONE (OUTPATIENT)
Dept: ENDOSCOPY | Facility: HOSPITAL | Age: 77
End: 2017-09-08

## 2017-09-08 ENCOUNTER — PATIENT MESSAGE (OUTPATIENT)
Dept: HEPATOLOGY | Facility: CLINIC | Age: 77
End: 2017-09-08

## 2017-09-08 NOTE — TELEPHONE ENCOUNTER
"Contacted patient to schedule an EUS. Patient stated she was not ready to schedule at this time stating, "I already have so many doctor appointments." Patient to call back when ready to schedule Direct number given.   "

## 2017-09-15 ENCOUNTER — OFFICE VISIT (OUTPATIENT)
Dept: INTERNAL MEDICINE | Facility: CLINIC | Age: 77
End: 2017-09-15
Attending: FAMILY MEDICINE
Payer: MEDICARE

## 2017-09-15 VITALS
WEIGHT: 168.88 LBS | BODY MASS INDEX: 35.45 KG/M2 | HEIGHT: 58 IN | DIASTOLIC BLOOD PRESSURE: 62 MMHG | HEART RATE: 76 BPM | SYSTOLIC BLOOD PRESSURE: 114 MMHG

## 2017-09-15 DIAGNOSIS — K59.00 CONSTIPATION, UNSPECIFIED CONSTIPATION TYPE: ICD-10-CM

## 2017-09-15 DIAGNOSIS — S22.43XS CLOSED FRACTURE OF MULTIPLE RIBS OF BOTH SIDES, SEQUELA: ICD-10-CM

## 2017-09-15 DIAGNOSIS — A41.9 SEPSIS SECONDARY TO UTI: Primary | ICD-10-CM

## 2017-09-15 DIAGNOSIS — N39.0 SEPSIS SECONDARY TO UTI: Primary | ICD-10-CM

## 2017-09-15 PROCEDURE — 3008F BODY MASS INDEX DOCD: CPT | Mod: S$GLB,,, | Performed by: INTERNAL MEDICINE

## 2017-09-15 PROCEDURE — 99999 PR PBB SHADOW E&M-EST. PATIENT-LVL III: CPT | Mod: PBBFAC,,, | Performed by: INTERNAL MEDICINE

## 2017-09-15 PROCEDURE — 3078F DIAST BP <80 MM HG: CPT | Mod: S$GLB,,, | Performed by: INTERNAL MEDICINE

## 2017-09-15 PROCEDURE — 1159F MED LIST DOCD IN RCRD: CPT | Mod: S$GLB,,, | Performed by: INTERNAL MEDICINE

## 2017-09-15 PROCEDURE — 1125F AMNT PAIN NOTED PAIN PRSNT: CPT | Mod: S$GLB,,, | Performed by: INTERNAL MEDICINE

## 2017-09-15 PROCEDURE — 99213 OFFICE O/P EST LOW 20 MIN: CPT | Mod: S$GLB,,, | Performed by: INTERNAL MEDICINE

## 2017-09-15 PROCEDURE — 3074F SYST BP LT 130 MM HG: CPT | Mod: S$GLB,,, | Performed by: INTERNAL MEDICINE

## 2017-09-15 RX ORDER — METHYLPREDNISOLONE 4 MG/1
TABLET ORAL
COMMUNITY
Start: 2017-07-26 | End: 2018-02-20

## 2017-09-15 RX ORDER — HYDROCHLOROTHIAZIDE 12.5 MG/1
TABLET ORAL
COMMUNITY
Start: 2017-07-26 | End: 2017-10-25

## 2017-09-15 RX ORDER — POLYETHYLENE GLYCOL 3350 17 G/17G
17 POWDER, FOR SOLUTION ORAL DAILY
Qty: 238 G | Refills: 1 | Status: SHIPPED | OUTPATIENT
Start: 2017-09-15 | End: 2017-11-22 | Stop reason: SDUPTHER

## 2017-09-15 RX ORDER — DOCUSATE SODIUM 100 MG/1
100 CAPSULE, LIQUID FILLED ORAL 3 TIMES DAILY PRN
Qty: 270 CAPSULE | Refills: 1 | Status: SHIPPED | OUTPATIENT
Start: 2017-09-15 | End: 2017-11-22 | Stop reason: SDUPTHER

## 2017-09-15 NOTE — PROGRESS NOTES
"Subjective:       Patient ID: Alfredina Claudette Parks is a 77 y.o. female.    Chief Complaint: Hospital Follow Up    Here for hospital f/u  Pt of Dr. Frost    Diagnosed with sepsis related to UTI that was diagnosed after fall. She reports she was having frequent urination and when walking to restroom in middle of the night she blacked out and was taken to [facility]. Reports fractured ribs bilaterally. Discharged approx 3 weeks prior. PT finished yesterday. Denies F/C, abd pain, dysuria, freuqent urination, dizziness. Energy levels are almost back to normal. She reports some constipation.         Review of Systems    Objective:       Vitals:    09/15/17 0831   BP: 114/62   Pulse: 76   Weight: 76.6 kg (168 lb 14 oz)   Height: 4' 10" (1.473 m)      Physical Exam   Constitutional: She is oriented to person, place, and time. She appears well-developed and well-nourished. No distress.   HENT:   Head: Normocephalic and atraumatic.   Mouth/Throat: Oropharynx is clear and moist. No oropharyngeal exudate.   Eyes: Conjunctivae and EOM are normal. Pupils are equal, round, and reactive to light. No scleral icterus.   Neck: No thyromegaly present.   Cardiovascular: Normal rate, regular rhythm and normal heart sounds.    No murmur heard.  Pulmonary/Chest: Effort normal and breath sounds normal. She has no wheezes. She has no rales. She exhibits tenderness.   Abdominal: Soft. She exhibits no distension. There is no tenderness.   Musculoskeletal: She exhibits edema (1+ bilaterally). She exhibits no tenderness.   Lymphadenopathy:     She has no cervical adenopathy.   Neurological: She is alert and oriented to person, place, and time.   Skin: Skin is warm and dry.   Psychiatric: She has a normal mood and affect. Her behavior is normal.       Assessment:       1. Sepsis secondary to UTI    2. Constipation, unspecified constipation type    3. Closed fracture of multiple ribs of both sides, sequela        Plan:       Mitzy was " seen today for hospital follow up.    Diagnoses and all orders for this visit:    Sepsis secondary to UTI  -resolved. Will obtain records to make sure admission was uncomplicated.     Constipation, unspecified constipation type  -     polyethylene glycol (GLYCOLAX) 17 gram/dose powder; Take 17 g by mouth once daily.  -     docusate sodium (COLACE) 100 MG capsule; Take 1 capsule (100 mg total) by mouth 3 (three) times daily as needed for Constipation.    Closed fracture of multiple ribs of both sides, sequela  Has IS. Continue OTC pain meds             Side effects of medication(s) were discussed in detail and patient voiced understanding.  Patient will call back for any issues or complications.

## 2017-09-22 ENCOUNTER — TELEPHONE (OUTPATIENT)
Dept: INTERNAL MEDICINE | Facility: CLINIC | Age: 77
End: 2017-09-22

## 2017-09-22 DIAGNOSIS — R53.81 DEBILITY: Primary | ICD-10-CM

## 2017-09-27 RX ORDER — LEVOTHYROXINE SODIUM 125 UG/1
TABLET ORAL
Qty: 30 TABLET | Refills: 0 | Status: SHIPPED | OUTPATIENT
Start: 2017-09-27 | End: 2018-02-20

## 2017-10-02 RX ORDER — FLUTICASONE PROPIONATE 50 MCG
SPRAY, SUSPENSION (ML) NASAL
Qty: 16 G | Refills: 3 | Status: SHIPPED | OUTPATIENT
Start: 2017-10-02 | End: 2017-10-25 | Stop reason: SDUPTHER

## 2017-10-05 ENCOUNTER — TELEPHONE (OUTPATIENT)
Dept: INTERNAL MEDICINE | Facility: CLINIC | Age: 77
End: 2017-10-05

## 2017-10-05 ENCOUNTER — EPISODE CHANGES (OUTPATIENT)
Dept: HEPATOLOGY | Facility: CLINIC | Age: 77
End: 2017-10-05

## 2017-10-05 NOTE — TELEPHONE ENCOUNTER
----- Message from Rodger Sandoval sent at 10/5/2017 12:24 PM CDT -----  Contact: Tari Cao (daughter)  X_ 1st Request  _ 2nd Request  _ 3rd Request    Who: Tari Cao (daughter)    Why: Patient needs to be seen tomorrow for swelling of the ankles recommended by the cardiologist, Dr. Morse.    What Number to Call Back: [phone]    When to Expect a call back: (Before the end of the day)  -- if call after 3:00 call back will be tomorrow.  12:47 PM  Ms Marc stated that the patient will follow up with her cardiologist regarding her ankle swelling.

## 2017-10-19 ENCOUNTER — PATIENT MESSAGE (OUTPATIENT)
Dept: HEPATOLOGY | Facility: CLINIC | Age: 77
End: 2017-10-19

## 2017-10-24 ENCOUNTER — PATIENT OUTREACH (OUTPATIENT)
Dept: INTERNAL MEDICINE | Facility: CLINIC | Age: 77
End: 2017-10-24

## 2017-10-24 RX ORDER — ALBUTEROL SULFATE 90 UG/1
AEROSOL, METERED RESPIRATORY (INHALATION)
Refills: 9 | COMMUNITY
Start: 2017-08-23 | End: 2018-02-20 | Stop reason: SDUPTHER

## 2017-10-24 NOTE — PROGRESS NOTES
[facility] is committed to your overall health.  To help you get the most out of each of your visits, we will review your information to make sure you are up to date on all of your recommended tests and/or procedures.       Your PCP  Veronica Frost MD   found that you may be due for:       Health Maintenance Due   Topic Date Due    TETANUS VACCINE  03/06/1958    Influenza Vaccine  08/01/2017     Medicare does not cover all immunizations to be given in the clinic. Check your benefits to ensure that you do not need to receive your immunizations at the pharmacy.  You are more than welcome to visit our pharmacy here on campus to receive your vaccinations on the same day of your upcoming scheduled visit.             If you have had any of the above done at another facility, please bring the records or information with you so that your record at [facility] will be complete.  If you would like to schedule any of these, please contact me.     If you are currently taking medication, please bring it with you to your appointment for review.     Also, if you have any type of Advanced Directives, please bring them with you to your office visit so we may scan them into your chart.       Thank you for choosing [facility] for your healthcare needs.     
Yes

## 2017-10-25 ENCOUNTER — OFFICE VISIT (OUTPATIENT)
Dept: HEPATOLOGY | Facility: CLINIC | Age: 77
End: 2017-10-25
Payer: MEDICARE

## 2017-10-25 ENCOUNTER — OFFICE VISIT (OUTPATIENT)
Dept: INTERNAL MEDICINE | Facility: CLINIC | Age: 77
End: 2017-10-25
Attending: INTERNAL MEDICINE
Payer: MEDICARE

## 2017-10-25 VITALS
WEIGHT: 168.44 LBS | OXYGEN SATURATION: 97 % | DIASTOLIC BLOOD PRESSURE: 72 MMHG | HEIGHT: 58 IN | SYSTOLIC BLOOD PRESSURE: 150 MMHG | RESPIRATION RATE: 16 BRPM | BODY MASS INDEX: 35.36 KG/M2 | TEMPERATURE: 97 F | HEART RATE: 79 BPM

## 2017-10-25 VITALS
DIASTOLIC BLOOD PRESSURE: 78 MMHG | HEART RATE: 72 BPM | WEIGHT: 168.44 LBS | HEIGHT: 58 IN | SYSTOLIC BLOOD PRESSURE: 122 MMHG | BODY MASS INDEX: 35.36 KG/M2

## 2017-10-25 DIAGNOSIS — M79.642 LEFT HAND PAIN: Primary | ICD-10-CM

## 2017-10-25 DIAGNOSIS — R60.0 EDEMA OF BOTH LEGS: ICD-10-CM

## 2017-10-25 DIAGNOSIS — B18.2 CHRONIC HEPATITIS C WITHOUT HEPATIC COMA: ICD-10-CM

## 2017-10-25 DIAGNOSIS — K74.60 CIRRHOSIS OF LIVER WITHOUT ASCITES, UNSPECIFIED HEPATIC CIRRHOSIS TYPE: Primary | ICD-10-CM

## 2017-10-25 DIAGNOSIS — K86.2 PANCREATIC CYST: ICD-10-CM

## 2017-10-25 PROCEDURE — 99499 UNLISTED E&M SERVICE: CPT | Mod: S$PBB,,, | Performed by: PHYSICIAN ASSISTANT

## 2017-10-25 PROCEDURE — 99213 OFFICE O/P EST LOW 20 MIN: CPT | Mod: S$GLB,,, | Performed by: INTERNAL MEDICINE

## 2017-10-25 PROCEDURE — 99214 OFFICE O/P EST MOD 30 MIN: CPT | Mod: S$GLB,,, | Performed by: PHYSICIAN ASSISTANT

## 2017-10-25 PROCEDURE — 99999 PR PBB SHADOW E&M-EST. PATIENT-LVL III: CPT | Mod: PBBFAC,,, | Performed by: INTERNAL MEDICINE

## 2017-10-25 PROCEDURE — 99999 PR PBB SHADOW E&M-EST. PATIENT-LVL V: CPT | Mod: PBBFAC,,, | Performed by: PHYSICIAN ASSISTANT

## 2017-10-25 RX ORDER — FUROSEMIDE 20 MG/1
TABLET ORAL
COMMUNITY
Start: 2017-10-05 | End: 2017-12-19 | Stop reason: SDUPTHER

## 2017-10-25 RX ORDER — LEVOTHYROXINE SODIUM 150 UG/1
TABLET ORAL
COMMUNITY
Start: 2017-10-05 | End: 2017-10-25 | Stop reason: SDUPTHER

## 2017-10-25 RX ORDER — VALSARTAN 40 MG/1
TABLET ORAL
COMMUNITY
Start: 2017-10-05 | End: 2017-11-21 | Stop reason: SDUPTHER

## 2017-10-25 NOTE — PROGRESS NOTES
Subjective:       Patient ID: Alfredina Claudette Parks is a 77 y.o. female.    Chief Complaint: Follow-up and Flu Vaccine    Here for urgent visit    Here for urgent visit    3 days ago had left hand pain with swelling, stiffness and pain. Her daughter started her on an old medrol dose pack and symptoms are improving. She denies redness, F/C, trauma, decreased strength distally. Symptoms have since resolved. She denies any similar prior episodes. Daughter reports it was sensitive to even light touch         Answers for HPI/ROS submitted by the patient on 10/24/2017   activity change: No  unexpected weight change: No  neck pain: No  hearing loss: No  rhinorrhea: No  trouble swallowing: No  eye discharge: No  visual disturbance: No  chest tightness: No  wheezing: Yes  chest pain: No  palpitations: No  blood in stool: No  constipation: Yes  vomiting: No  diarrhea: No  polydipsia: No  polyuria: No  difficulty urinating: No  hematuria: No  menstrual problem: No  dysuria: No  joint swelling: Yes  arthralgias: Yes  headaches: No  weakness: No  confusion: No  dysphoric mood: No        Review of Systems   Constitutional: Negative for activity change and unexpected weight change.   HENT: Negative for hearing loss, rhinorrhea and trouble swallowing.    Eyes: Negative for discharge and visual disturbance.   Respiratory: Negative for chest tightness and wheezing.    Cardiovascular: Negative for chest pain and palpitations.   Gastrointestinal: Positive for constipation. Negative for blood in stool, diarrhea and vomiting.   Endocrine: Negative for polydipsia and polyuria.   Genitourinary: Negative for difficulty urinating, dysuria, hematuria and menstrual problem.   Musculoskeletal: Positive for arthralgias and joint swelling. Negative for neck pain.   Neurological: Negative for weakness and headaches.   Psychiatric/Behavioral: Negative for confusion and dysphoric mood.       Objective:      Vitals:    10/25/17 0818   BP: 122/78  "  Pulse: 72   Weight: 76.4 kg (168 lb 6.9 oz)   Height: 4' 10" (1.473 m)      Physical Exam   Constitutional: She is oriented to person, place, and time. She appears well-developed and well-nourished. She does not have a sickly appearance. No distress.   HENT:   Head: Normocephalic and atraumatic.   Eyes: Conjunctivae and EOM are normal. Right eye exhibits no discharge. Left eye exhibits no discharge. No scleral icterus.   Pulmonary/Chest: Effort normal. No respiratory distress.   Abdominal: Normal appearance. She exhibits no distension.   Musculoskeletal:        Left hand: She exhibits normal range of motion, no tenderness, no bony tenderness and normal capillary refill. Normal sensation noted. Normal strength noted.   Neurological: She is alert and oriented to person, place, and time.   Skin: Skin is warm and dry. She is not diaphoretic.   Psychiatric: She has a normal mood and affect. Her speech is normal.       Assessment:       1. Left hand pain        Plan:       Mitzy was seen today for follow-up and flu vaccine.    Diagnoses and all orders for this visit:    Left hand pain  -resolved. Unclear etiology. Gout is on differential.              Side effects of medication(s) were discussed in detail and patient voiced understanding.  Patient will call back for any issues or complications.   "

## 2017-10-25 NOTE — PROGRESS NOTES
HEPATOLOGY CLINIC VISIT NOTE - HCV clinic    REFERRING PROVIDER: Dr. Veronica Frost     CHIEF COMPLAINT: Hepatitis C - discuss treatment    HISTORY: This is a 77 y.o. Black or  female, here with daughter, with chronic hepatitis C and advanced fibrosis/probable cirrhosis who returns for follow up. Since last visit, she was hospitalized for a fall. She has returned to her usual state of health. Recently she has had some mild ankle edema. She was seen by cardiology who felt it could be due to liver disease. Albumin 3.4. Her edema is stable on lasix. She has compression hose on and tries to follow a low salt diet. She has not yet started Zepatier.     HCV history:  Genotype 1a  HCV RNA: 2,142,558 IU/mL  Treatment experienced: IFN x 1 year     Risk Factors:  Blood transfusion- (+) ? in 70s    service-  Tattoos-  Incarceration-  IV/JUDY-    Liver staging:  Liver biopsy 2004, grade 2, Stage 3  AST 49, ALT 50  Tbili WNL  Albumin <3.5  PLTs 147    Advanced fibrosis?Cirrhosis history:  - Well compensated  - MELD score   Computed MELD-Na score unavailable. Necessary lab results were not found in the last year.  Computed MELD score unavailable. Necessary lab results were not found in the last year.    - HCC screening:   - U/S: due 10/2017   - AFP: 9.5, will monitor with HCV treatment   (?)Portal HTN:   - EGD: due for EUS, can plan for EGD at same time.       Denies jaundice, dark urine, abdominal distention, hematemesis, melena, slowed mentation.   No abnormal skin rashes. No generalized joint pain.                     Past Medical History:   Diagnosis Date    Asthma     Asthma     Atrial fibrillation     Bulging lumbar disc     Cataract     Cervical radiculopathy     Diabetes mellitus     pre diabetes     Hepatitis C     Hepatitis C     Hyperglycemia     Hypertension     Hypothyroidism     Osteoporosis     Pancreatic cyst     Vitamin D deficiency disease        Past Surgical History:    Procedure Laterality Date    BREAST SURGERY      biopsy    CYST REMOVAL      EYE SURGERY      cataracts    HYSTERECTOMY      TOE SURGERY Bilateral     big toenails     FAMILY HISTORY: Negative for liver disease    SOCIAL HISTORY:   History   Smoking Status    Never Smoker   Smokeless Tobacco    Never Used       History   Alcohol Use No       History   Drug Use No       ROS:   No fever, chills, weight loss, fatigue  No chest pain, palpitations, dyspnea, cough  No abdominal pain, nausea, vomiting  No skin rashes   (+) mild lower extremity edema, only mildly appreciable in right ankle.   No depression or anxiety      PHYSICAL EXAM:  Friendly Black or  female, in no acute distress; alert and oriented to person, place and time  VITALS: reviewed  HEENT: Sclerae anicteric.   NECK: Supple  CVS: Regular rate and rhythm. No murmurs  LUNGS: Normal respiratory effort. Clear bilaterally  ABDOMEN: Flat, soft, nontender. No organomegaly or masses. No ascites or hernias.  SKIN: Warm and dry. No jaundice, No obvious rashes.   EXTREMITIES: No lower extremity edema  NEURO/PSYCH: Normal gate. Memory intact. Thought and speech pattern appropriate. Behavior normal. No depression or anxiety noted.    RECENT LABS:  Lab Results   Component Value Date    WBC 6.59 05/19/2017    HGB 12.9 05/19/2017     (L) 05/19/2017     Lab Results   Component Value Date    INR 1.2 03/13/2006     Lab Results   Component Value Date    AST 27 10/11/2017    ALT 23 10/11/2017    BILITOT 0.4 10/11/2017    ALBUMIN 3.4 (L) 10/11/2017    ALKPHOS 45 10/11/2017    CREATININE 0.98 (H) 10/11/2017    BUN 26 (H) 10/11/2017     10/11/2017    K 3.9 10/11/2017    AFP 9.5 (H) 06/13/2017     RECENT IMAGING:  U/S abdomen 06/2017  Narrative     Time of Procedure: 06/13/17 08:45:00  Accession # [id number]    Technique: Complete abdominal ultrasound    Clinical indication: Chronic viral hepatitis c    Comparison: CT abdomen pelvis  10/7/2015.    Findings:    The visualized portion of the pancreas is unremarkable.      The liver is normal in size.  The liver demonstrates homogeneous echotexture.  No focal hepatic lesions are seen.    The gallbladder is unremarkable with no evidence of calculi.  The common duct is dilated in the extrahepatic portion and 1.1 cm previously 1.0 cm.  The gallbladder wall is not thickened.  There is no sonographic Boggs sign.  No dilated intrahepatic radicles are seen.  No pericholecystic fluid.    The spleen is normal in size measuring 10.4 x 4.1 cm with a homogeneous echotexture.    The aorta tapers normally.  The inferior vena cava is normal in appearance.    The kidneys are normal size bilaterally, with a left renal simple cyst, stable from prior exams      There is no evidence of ascites.   Impression     Stable extrahepatic biliary ductal dilatation without evidence of intrahepatic biliary ductal dilatation.   No focal liver lesions.     ASSESSMENT  77 y.o. Black or  female with:  1. ANKLE EDEMA  -- none appreciated today, started lasix 20mg  -- albumin 3.4  -- discussed Na restriction  -- increase protein intake   -- could add spironolactone if edema worsens.     2. CHRONIC HEPATITIS C, GENOTYPE 1a - treatment experienced  -- Prior HCV treatment - IFN x 1 year  -- Elevated transaminases  -- Unknown immunity to HAV or HBV  -- given current amiodarone use, would not consider for treatment with Sofosbuvir based regimen    3. ADVANCED FIBROSIS/?CIRRHOSIS  -- unable to get fibroscan, loop recorded makes her ineligible for MR elastography  -- certainly could have had progression from biopsy >10 yeas ago, however liver function stable  -- will need life long HCC screening regardless  - HCC screening:   - U/S: due 10/2017   - AFP: 9.5, will monitor with HCV treatment   (?)Portal HTN:   - EGD: due for EUS, can plan for EGD at same time.     4. H/O PANCREATIC CYST  -- EUS done 02/106, rec repeat in 1  year, overdue   -- waiting for cards clearance- per daughter     PLAN:  1. Pt to notify when she plans to start Zepatier  2. HCC screening due 12/2017, will review by phone.     Hao Flynn PA-C

## 2017-11-22 ENCOUNTER — OFFICE VISIT (OUTPATIENT)
Dept: INTERNAL MEDICINE | Facility: CLINIC | Age: 77
End: 2017-11-22
Attending: INTERNAL MEDICINE
Payer: MEDICARE

## 2017-11-22 VITALS
BODY MASS INDEX: 34.43 KG/M2 | WEIGHT: 164 LBS | DIASTOLIC BLOOD PRESSURE: 88 MMHG | SYSTOLIC BLOOD PRESSURE: 132 MMHG | HEIGHT: 58 IN | HEART RATE: 72 BPM

## 2017-11-22 DIAGNOSIS — J06.9 UPPER RESPIRATORY TRACT INFECTION, UNSPECIFIED TYPE: Primary | ICD-10-CM

## 2017-11-22 DIAGNOSIS — K59.00 CONSTIPATION, UNSPECIFIED CONSTIPATION TYPE: ICD-10-CM

## 2017-11-22 PROCEDURE — 99999 PR PBB SHADOW E&M-EST. PATIENT-LVL III: CPT | Mod: PBBFAC,,, | Performed by: INTERNAL MEDICINE

## 2017-11-22 PROCEDURE — 99213 OFFICE O/P EST LOW 20 MIN: CPT | Mod: S$GLB,,, | Performed by: INTERNAL MEDICINE

## 2017-11-22 RX ORDER — DOCUSATE SODIUM 100 MG/1
100 CAPSULE, LIQUID FILLED ORAL 3 TIMES DAILY PRN
Qty: 270 CAPSULE | Refills: 1 | Status: SHIPPED | OUTPATIENT
Start: 2017-11-22 | End: 2018-02-20 | Stop reason: SDUPTHER

## 2017-11-22 RX ORDER — POLYETHYLENE GLYCOL 3350 17 G/17G
17 POWDER, FOR SOLUTION ORAL DAILY
Qty: 238 G | Refills: 1 | Status: SHIPPED | OUTPATIENT
Start: 2017-11-22 | End: 2018-08-22 | Stop reason: SDUPTHER

## 2017-11-22 RX ORDER — FLUTICASONE PROPIONATE 50 MCG
1 SPRAY, SUSPENSION (ML) NASAL DAILY
Qty: 16 G | Refills: 6 | Status: SHIPPED | OUTPATIENT
Start: 2017-11-22 | End: 2018-05-23 | Stop reason: SDUPTHER

## 2017-11-22 NOTE — PATIENT INSTRUCTIONS
High fiber diet - 25 grams per day. Emphasis on whole grain breads such as double fiber wheat bread and high fiber cereals and bars.   Drink 8 glasses of water per day 64 - 96 oz per day  Fiber supplements such as KONSYL, METAMUCIL can be taken 1-2 x per day  Regular exercise - 30 min brisk walking 5 days per week  Stool softener such as colace 1 tablet twice a day. If works too well then can take 1 tablet a day or every other day.    For cough I recommend guaifenesin DM- 200 to 400 mg every 4 hours as needed; maximum: 2,400 mg/24 hours      Adult Self-Care for Colds  Colds are caused by viruses. They can't be cured with antibiotics. However, you can ease symptoms and support your body's efforts to heal itself.  No matter which symptoms you have, be sure to:  · Drink plenty of fluids (water or clear soup)  · Stop smoking and drinking alcohol  · Get plenty of rest    Understand a fever  · Take your temperature several times a day. If your fever is 100.4°F (38.0°C) for more than a day, call your healthcare provider.  · Relax, lie down. Go to bed if you want. Just get off your feet and rest. Also, drink plenty of fluids to avoid dehydration.  · Take acetaminophen or a nonsteroidal anti-inflammatory agent (NSAID), such as ibuprofen.  Treat a troubled nose kindly  · Breathe steam or heated humidified air to open blocked nasal passages.  a hot shower or use a vaporizer. Be careful not to get burned by the steam.  · Saline nasal sprays and decongestant tablets help open a stuffy nose. Antihistamines can also help, but they can cause side effects such as drowsiness and drying of the eyes, nose, and mouth.  Soothe a sore throat and cough  · Gargle every 2 hours with 1/4 teaspoon of salt dissolved in 1/2 cup of warm water. Suck on throat lozenges and cough drops to moisten your throat.  · Cough medicines are available but it is unclear how well they actually work.  · Take acetaminophen or an NSAID, such as ibuprofen,  to ease throat pain  Ease digestive problems  · Put fluids back into your body. Take frequent sips of clear liquids such as water or broth. Avoid drinks that have a lot of sugar in them, such as juices and sodas. These can make diarrhea worse. Older children and adults can drink sports drinks.  · As your appetite returns, you can resume your normal diet. Ask your healthcare provider if there are any foods you should avoid.  When to seek medical care  When you first notice symptoms, ask your healthcare provider if antiviral medicines are appropriate. Antibiotics should not be taken for colds or flu. Also, call your healthcare provider if you have any of the following symptoms or if you aren't feeling better after 7 days:  · Shortness of breath  · Pain or pressure in the chest or belly (abdomen)  · Worsening symptoms, especially after a period of improvement  · Fever of 100.4°F  (38.0°C) or higher, or fever that doesn't go down with medicine  · Sudden dizziness or confusion  · Severe or continued vomiting  · Signs of dehydration, including extreme thirst, dark urine, infrequent urination, dry mouth  · Spotted, red, or very sore throat   Date Last Reviewed: 12/1/2016  © 6549-6923 [employer]. [address] [place] [address], [place] [address]. All rights reserved. This information is not intended as a substitute for professional medical care. Always follow your healthcare professional's instructions.

## 2017-11-22 NOTE — PROGRESS NOTES
"Subjective:       Patient ID: Alfredina Claudette Parks is a 77 y.o. female.    Chief Complaint: Cough    Here for urgent visit    3 weeks ago felt feverish and went on to develop nasal congestion, rhinorrhea, productive cough. Nasal congestion and fatigue have improved but her cough has continued. Hx of asthma. She denies chest tightness, SOB, wheezing. Patient denies SOB, eye pain, severe HA,abd pain, N/V, diarrhea, or inability to maintain adequate PO intake. Taking coricidin HBP.      She would also like a refill of polyethyline glycol for chronic constipation.          Review of Systems   Constitutional: Negative for activity change and unexpected weight change.   HENT: Negative for hearing loss, rhinorrhea and trouble swallowing.    Eyes: Negative for discharge and visual disturbance.   Respiratory: Positive for wheezing. Negative for chest tightness.    Cardiovascular: Negative for chest pain and palpitations.   Gastrointestinal: Positive for constipation. Negative for blood in stool, diarrhea and vomiting.   Endocrine: Negative for polydipsia.   Genitourinary: Negative for difficulty urinating, dysuria, hematuria and menstrual problem.   Musculoskeletal: Positive for arthralgias. Negative for joint swelling.   Neurological: Negative for weakness and headaches.   Psychiatric/Behavioral: Negative for confusion and dysphoric mood.       Objective:      Vitals:    11/22/17 0847   BP: 132/88   Pulse: 72   Weight: 74.4 kg (164 lb 0.4 oz)   Height: 4' 10" (1.473 m)      Physical Exam   Constitutional: She is oriented to person, place, and time. She appears well-developed and well-nourished.   HENT:   Head: Normocephalic and atraumatic.   Right Ear: Tympanic membrane, external ear and ear canal normal.   Left Ear: Tympanic membrane, external ear and ear canal normal.   Nose: No mucosal edema or rhinorrhea.   Mouth/Throat: No oropharyngeal exudate, posterior oropharyngeal edema or posterior oropharyngeal erythema. "   Eyes: Conjunctivae and EOM are normal.   Pulmonary/Chest: Effort normal and breath sounds normal. No respiratory distress. She has no wheezes.   Abdominal: She exhibits no distension.   Musculoskeletal: She exhibits no edema.   Lymphadenopathy:     She has no cervical adenopathy.   Neurological: She is alert and oriented to person, place, and time.   Skin: Skin is warm and dry. No rash noted.       Assessment:       1. Upper respiratory tract infection, unspecified type    2. Constipation, unspecified constipation type        Plan:       Mitzy was seen today for cough.    Diagnoses and all orders for this visit:    Upper respiratory tract infection, unspecified type  -resolved. Presumed viral. OTC meds discussed.  Prompts to call office discussed.  -     fluticasone (FLONASE) 50 mcg/actuation nasal spray; 1 spray by Each Nare route once daily.    Constipation, unspecified constipation type  -     docusate sodium (COLACE) 100 MG capsule; Take 1 capsule (100 mg total) by mouth 3 (three) times daily as needed for Constipation.  -     polyethylene glycol (GLYCOLAX) 17 gram/dose powder; Take 17 g by mouth once daily.                   Side effects of medication(s) were discussed in detail and patient voiced understanding.  Patient will call back for any issues or complications.

## 2017-11-27 RX ORDER — ESOMEPRAZOLE MAGNESIUM 40 MG/1
CAPSULE, DELAYED RELEASE ORAL
Qty: 90 CAPSULE | Refills: 0 | Status: SHIPPED | OUTPATIENT
Start: 2017-11-27 | End: 2017-11-30 | Stop reason: SDUPTHER

## 2017-11-30 DIAGNOSIS — K21.9 GASTROESOPHAGEAL REFLUX DISEASE, ESOPHAGITIS PRESENCE NOT SPECIFIED: Primary | ICD-10-CM

## 2017-11-30 RX ORDER — ESOMEPRAZOLE MAGNESIUM 40 MG/1
CAPSULE, DELAYED RELEASE ORAL
Qty: 90 CAPSULE | Refills: 0 | Status: SHIPPED | OUTPATIENT
Start: 2017-11-30 | End: 2018-06-07 | Stop reason: SDUPTHER

## 2017-12-04 ENCOUNTER — TELEPHONE (OUTPATIENT)
Dept: INTERNAL MEDICINE | Facility: CLINIC | Age: 77
End: 2017-12-04

## 2017-12-04 DIAGNOSIS — Z12.31 ENCOUNTER FOR SCREENING MAMMOGRAM FOR BREAST CANCER: Primary | ICD-10-CM

## 2017-12-04 NOTE — TELEPHONE ENCOUNTER
----- Message from Ayesha Tho sent at 12/4/2017  8:41 AM CST -----  Contact: pt  _  1st Request  _  2nd Request  _  3rd Request        Who: pt    Why: Requesting a call back in regards to requesting order for mammogram. Please call pt    What Number to Call Back:[phone]    When to Expect a call back: (Within 24 hours)    Please return the call at earliest convenience. Thanks!

## 2017-12-06 ENCOUNTER — TELEPHONE (OUTPATIENT)
Dept: PHARMACY | Facility: CLINIC | Age: 77
End: 2017-12-06

## 2017-12-06 ENCOUNTER — TELEPHONE (OUTPATIENT)
Dept: GASTROENTEROLOGY | Facility: CLINIC | Age: 77
End: 2017-12-06

## 2017-12-06 NOTE — TELEPHONE ENCOUNTER
DOCUMENTATION ONLY:  Prior authorization for Zepatier approved from 12/06/2017 to 02/27/2018 x 12 weeks of treatment.   Case ID# None    Co-pay: $0    Patient Assistance IS NOT required. Forward to clinical pharmacist for consult & shipment.

## 2017-12-06 NOTE — TELEPHONE ENCOUNTER
Spoke with patient's daughter. EUS scheduled for 12/20 at 2p.Reviewed prep instructions. Tari verbalized understanding.

## 2017-12-06 NOTE — TELEPHONE ENCOUNTER
Zepatier initial consultation and shipment attempted.  Ms. Ortiz, patient's daughter, feels comfortable with Ms. Garcia starting Melitonpatier at this time. Daughter requests a call back tomorrow, 12/6, for initial consultation.

## 2017-12-06 NOTE — TELEPHONE ENCOUNTER
Faxed prior authorization for Zepatier to insurance company for review on Wed 12/06/2017 @12:24pm SUZY

## 2017-12-07 NOTE — TELEPHONE ENCOUNTER
Zepatier initial consult requested by daughter to be reschedule until tomorrow, 12/8.  Will call back at that time.

## 2017-12-08 ENCOUNTER — TELEPHONE (OUTPATIENT)
Dept: ENDOSCOPY | Facility: HOSPITAL | Age: 77
End: 2017-12-08

## 2017-12-08 NOTE — TELEPHONE ENCOUNTER
Spoke with patient's daughter, Tari, about instructions for UEUS scheduled 12/20/17 at 1400.  Instructions mailed and emailed.

## 2017-12-11 ENCOUNTER — EPISODE CHANGES (OUTPATIENT)
Dept: HEPATOLOGY | Facility: CLINIC | Age: 77
End: 2017-12-11

## 2017-12-11 ENCOUNTER — TELEPHONE (OUTPATIENT)
Dept: PHARMACY | Facility: CLINIC | Age: 77
End: 2017-12-11

## 2017-12-11 DIAGNOSIS — B18.2 CHRONIC HEPATITIS C WITHOUT HEPATIC COMA: Primary | ICD-10-CM

## 2017-12-11 NOTE — TELEPHONE ENCOUNTER
Initial Medication Consult: Zepatier     Initial Zepatier consult completed on . Zepatier will be shipped on  to arrive at patient's home on  via [employer]. $0.00 copay. Patient will start Zepatier on . Address and CC info confirmed. Confirmed 2 patient identifiers - name and . Therapy Appropriate.     Zepatier 50/100mg- Take one tablet by mouth daily x 12 weeks  Counseling was reviewed:   1. Patient MUST take Zepatier at the SAME time every day.   2. Potential Side effects include: nausea, diarrhea, headaches, insomnia and fatigue.   Headache: Patient may treat with OTC remedies. If Tylenol is used, dose should not exceed 2000mg per day.    4. Medication list reviewed. No DDIs or allergies noted. Patient MUST contact myself or provider prior to starting any new OTC, herbal, or prescription drugs to avoid potential DDIs.    DDI: Medication list reviewed and potential DDIs addressed.     Discussed the importance of staying well hydrated while on therapy. Compliance stressed - patient to take missed doses as soon as remembered, but NOT to take 2 doses in one day. Patient will report questions or concerns to myself or practitioner. Patient verbalizes understanding.  Patient will start Zepatier on . Consultation included: indication; goals of treatment; administration; storage and handling; side effects; how to handle side effects; the importance of compliance; how to handle missed doses; the importance of laboratory monitoring; the importance of keeping all follow up appointments.  Patient understands to report any medication changes to [facility] and provider. All questions answered and addressed to patients satisfaction. I will f/u with her in 1 week from start, [facility] to contact patient in 3 weeks for refills.     Lacey Bishop, PharmD, BCPS  Clinical Pharmacist   [facility]  Phone: [phone]

## 2017-12-12 ENCOUNTER — TELEPHONE (OUTPATIENT)
Dept: HEPATOLOGY | Facility: CLINIC | Age: 77
End: 2017-12-12

## 2017-12-12 ENCOUNTER — TELEPHONE (OUTPATIENT)
Dept: ENDOSCOPY | Facility: HOSPITAL | Age: 77
End: 2017-12-12

## 2017-12-12 DIAGNOSIS — K74.00 LIVER FIBROSIS: ICD-10-CM

## 2017-12-12 DIAGNOSIS — B18.2 CHRONIC HEPATITIS C WITH HEPATIC COMA: Primary | ICD-10-CM

## 2017-12-12 NOTE — TELEPHONE ENCOUNTER
Pt beginning 12 weeks of Zepatier on 12/14/17    Pls schedule:  - CMP, HCV RNA, CBC, PT/INR, AFP  at week 4 - 01/10/18  - CMP at week 8 - 02/07/18  - CMP, HCV RNA at week 12 - end of treatment - 03/07/18    thanks    I believe she will do labs externally.

## 2017-12-12 NOTE — TELEPHONE ENCOUNTER
Patient's daughter, Tari, called.  CARLOSS rescheduled to 1/15/18 at 0800.  Instructions discussed, mailed to patient and emailed to daughter.

## 2017-12-13 ENCOUNTER — HOSPITAL ENCOUNTER (OUTPATIENT)
Dept: RADIOLOGY | Facility: OTHER | Age: 77
Discharge: HOME | End: 2017-12-13
Attending: FAMILY MEDICINE
Payer: MEDICARE

## 2017-12-13 DIAGNOSIS — Z12.31 ENCOUNTER FOR SCREENING MAMMOGRAM FOR BREAST CANCER: ICD-10-CM

## 2017-12-13 PROCEDURE — 77067 SCR MAMMO BI INCL CAD: CPT | Mod: TC

## 2017-12-13 PROCEDURE — 77067 SCR MAMMO BI INCL CAD: CPT | Mod: 26,,, | Performed by: RADIOLOGY

## 2017-12-14 ENCOUNTER — EPISODE CHANGES (OUTPATIENT)
Dept: HEPATOLOGY | Facility: CLINIC | Age: 77
End: 2017-12-14

## 2017-12-16 ENCOUNTER — HOSPITAL ENCOUNTER (OUTPATIENT)
Dept: RADIOLOGY | Facility: OTHER | Age: 77
Discharge: HOME | End: 2017-12-16
Attending: PHYSICIAN ASSISTANT
Payer: MEDICARE

## 2017-12-16 DIAGNOSIS — K74.60 CIRRHOSIS OF LIVER WITHOUT ASCITES, UNSPECIFIED HEPATIC CIRRHOSIS TYPE: ICD-10-CM

## 2017-12-16 DIAGNOSIS — B18.2 CHRONIC HEPATITIS C WITHOUT HEPATIC COMA: ICD-10-CM

## 2017-12-16 PROCEDURE — 76705 ECHO EXAM OF ABDOMEN: CPT | Mod: 26,,, | Performed by: RADIOLOGY

## 2017-12-16 PROCEDURE — 76705 ECHO EXAM OF ABDOMEN: CPT | Mod: TC

## 2017-12-17 ENCOUNTER — PATIENT MESSAGE (OUTPATIENT)
Dept: HEPATOLOGY | Facility: CLINIC | Age: 77
End: 2017-12-17

## 2017-12-18 DIAGNOSIS — K74.00 LIVER FIBROSIS: Primary | ICD-10-CM

## 2017-12-19 ENCOUNTER — PATIENT MESSAGE (OUTPATIENT)
Dept: HEPATOLOGY | Facility: CLINIC | Age: 77
End: 2017-12-19

## 2017-12-19 RX ORDER — FUROSEMIDE 20 MG/1
40 TABLET ORAL DAILY
Qty: 60 TABLET | Refills: 11 | Status: SHIPPED | OUTPATIENT
Start: 2017-12-19 | End: 2018-12-12 | Stop reason: SDUPTHER

## 2017-12-22 ENCOUNTER — TELEPHONE (OUTPATIENT)
Dept: PHARMACY | Facility: CLINIC | Age: 77
End: 2017-12-22

## 2017-12-22 NOTE — TELEPHONE ENCOUNTER
Initial clinical follow-up conducted for Zepatier.  Spoke with patient's caregiver, daughter, Tari.  Name/ confirmed. no missed doses; no new medications; no side effects noted. Patient understands to report any medication changes to [facility] and provider. All questions answered and addressed to patients satisfaction.        Jamil Bradford R.Ph.  Clinical Pharmacist  [facility]  Phone: [phone]

## 2017-12-26 ENCOUNTER — LAB VISIT (OUTPATIENT)
Dept: LAB | Facility: OTHER | Age: 77
End: 2017-12-26
Attending: PHYSICIAN ASSISTANT
Payer: MEDICARE

## 2017-12-26 DIAGNOSIS — K74.00 LIVER FIBROSIS: ICD-10-CM

## 2017-12-26 DIAGNOSIS — B18.2 CHRONIC HEPATITIS C WITH HEPATIC COMA: ICD-10-CM

## 2017-12-26 LAB
ALBUMIN SERPL BCP-MCNC: 3.2 G/DL
ALP SERPL-CCNC: 45 U/L
ALT SERPL W/O P-5'-P-CCNC: 21 U/L
ANION GAP SERPL CALC-SCNC: 11 MMOL/L
AST SERPL-CCNC: 21 U/L
BILIRUB SERPL-MCNC: 0.3 MG/DL
BUN SERPL-MCNC: 32 MG/DL
CALCIUM SERPL-MCNC: 9 MG/DL
CHLORIDE SERPL-SCNC: 103 MMOL/L
CO2 SERPL-SCNC: 29 MMOL/L
CREAT SERPL-MCNC: 1 MG/DL
EST. GFR  (AFRICAN AMERICAN): >60 ML/MIN/1.73 M^2
EST. GFR  (NON AFRICAN AMERICAN): 55 ML/MIN/1.73 M^2
GLUCOSE SERPL-MCNC: 97 MG/DL
POTASSIUM SERPL-SCNC: 3.4 MMOL/L
PROT SERPL-MCNC: 7.9 G/DL
SODIUM SERPL-SCNC: 143 MMOL/L

## 2017-12-26 PROCEDURE — 36415 COLL VENOUS BLD VENIPUNCTURE: CPT

## 2017-12-26 PROCEDURE — 80053 COMPREHEN METABOLIC PANEL: CPT

## 2017-12-27 ENCOUNTER — TELEPHONE (OUTPATIENT)
Dept: HEPATOLOGY | Facility: CLINIC | Age: 77
End: 2017-12-27

## 2017-12-27 ENCOUNTER — PATIENT MESSAGE (OUTPATIENT)
Dept: HEPATOLOGY | Facility: CLINIC | Age: 77
End: 2017-12-27

## 2017-12-27 DIAGNOSIS — K74.60 CIRRHOSIS OF LIVER WITHOUT ASCITES, UNSPECIFIED HEPATIC CIRRHOSIS TYPE: Primary | ICD-10-CM

## 2017-12-27 RX ORDER — SPIRONOLACTONE 50 MG/1
50 TABLET, FILM COATED ORAL DAILY
Qty: 30 TABLET | Refills: 5 | Status: SHIPPED | OUTPATIENT
Start: 2017-12-27 | End: 2018-07-02 | Stop reason: SDUPTHER

## 2017-12-27 NOTE — TELEPHONE ENCOUNTER
MA left pt a voicemail letting her know that I scheduled some labs at [facility] on Jan 4th at 11:15. Told her to call us if she could not make that appt. Left a call back number. EMS

## 2018-01-04 ENCOUNTER — TELEPHONE (OUTPATIENT)
Dept: PHARMACY | Facility: CLINIC | Age: 78
End: 2018-01-04

## 2018-01-04 NOTE — TELEPHONE ENCOUNTER
Zepatier (2 of 3)  refill confirmed. We will ship Zepatier refill on 18 via fedex to arrive on 18. $0 copay- 004. Confirmed 2 patient identifiers - name and .      Spoke with patient's caregiver/daughter, Tari.  Patient takes Zepatier at 4pm daily.  Patient's daughter did not have medication with her to give dose count.  Pt reports they are not having any side effects so far. Patient was started on furosemide for edema in her legs.  No DDIs expected with furosemide and lasix.  No  missed doses, no new allergies or health conditions reported at this time. All questions answered and addressed to patients satisfaction. Pt verbalized understanding.

## 2018-01-10 ENCOUNTER — LAB VISIT (OUTPATIENT)
Dept: LAB | Facility: OTHER | Age: 78
End: 2018-01-10
Payer: MEDICARE

## 2018-01-10 DIAGNOSIS — B18.2 CHRONIC HEPATITIS C WITH HEPATIC COMA: ICD-10-CM

## 2018-01-10 DIAGNOSIS — B18.2 CHRONIC HEPATITIS C WITHOUT HEPATIC COMA: ICD-10-CM

## 2018-01-10 DIAGNOSIS — K74.60 CIRRHOSIS OF LIVER WITHOUT ASCITES, UNSPECIFIED HEPATIC CIRRHOSIS TYPE: ICD-10-CM

## 2018-01-10 DIAGNOSIS — K74.00 LIVER FIBROSIS: ICD-10-CM

## 2018-01-10 LAB
AFP SERPL-MCNC: 4.2 NG/ML
ALBUMIN SERPL BCP-MCNC: 3.2 G/DL
ALP SERPL-CCNC: 47 U/L
ALT SERPL W/O P-5'-P-CCNC: 22 U/L
ANION GAP SERPL CALC-SCNC: 8 MMOL/L
AST SERPL-CCNC: 22 U/L
BASOPHILS # BLD AUTO: 0.04 K/UL
BASOPHILS NFR BLD: 0.6 %
BILIRUB SERPL-MCNC: 0.4 MG/DL
BUN SERPL-MCNC: 26 MG/DL
CALCIUM SERPL-MCNC: 9.1 MG/DL
CHLORIDE SERPL-SCNC: 105 MMOL/L
CO2 SERPL-SCNC: 28 MMOL/L
CREAT SERPL-MCNC: 0.9 MG/DL
DIFFERENTIAL METHOD: ABNORMAL
EOSINOPHIL # BLD AUTO: 0.2 K/UL
EOSINOPHIL NFR BLD: 2.2 %
ERYTHROCYTE [DISTWIDTH] IN BLOOD BY AUTOMATED COUNT: 14.5 %
EST. GFR  (AFRICAN AMERICAN): >60 ML/MIN/1.73 M^2
EST. GFR  (NON AFRICAN AMERICAN): >60 ML/MIN/1.73 M^2
GLUCOSE SERPL-MCNC: 90 MG/DL
HCT VFR BLD AUTO: 38.7 %
HGB BLD-MCNC: 12.2 G/DL
INR PPP: 0.9
LYMPHOCYTES # BLD AUTO: 3.1 K/UL
LYMPHOCYTES NFR BLD: 45.7 %
MCH RBC QN AUTO: 29 PG
MCHC RBC AUTO-ENTMCNC: 31.5 G/DL
MCV RBC AUTO: 92 FL
MONOCYTES # BLD AUTO: 0.7 K/UL
MONOCYTES NFR BLD: 10 %
NEUTROPHILS # BLD AUTO: 2.8 K/UL
NEUTROPHILS NFR BLD: 41.4 %
PLATELET # BLD AUTO: 169 K/UL
PMV BLD AUTO: 10.9 FL
POTASSIUM SERPL-SCNC: 4.1 MMOL/L
PROT SERPL-MCNC: 8.1 G/DL
PROTHROMBIN TIME: 10.4 SEC
RBC # BLD AUTO: 4.21 M/UL
SODIUM SERPL-SCNC: 141 MMOL/L
WBC # BLD AUTO: 6.81 K/UL

## 2018-01-10 PROCEDURE — 85610 PROTHROMBIN TIME: CPT

## 2018-01-10 PROCEDURE — 85025 COMPLETE CBC W/AUTO DIFF WBC: CPT

## 2018-01-10 PROCEDURE — 82105 ALPHA-FETOPROTEIN SERUM: CPT

## 2018-01-10 PROCEDURE — 36415 COLL VENOUS BLD VENIPUNCTURE: CPT

## 2018-01-10 PROCEDURE — 80053 COMPREHEN METABOLIC PANEL: CPT

## 2018-01-10 PROCEDURE — 87522 HEPATITIS C REVRS TRNSCRPJ: CPT

## 2018-01-11 ENCOUNTER — PATIENT MESSAGE (OUTPATIENT)
Dept: HEPATOLOGY | Facility: CLINIC | Age: 78
End: 2018-01-11

## 2018-01-12 LAB
HCV LOG: 1.97 LOG (10) IU/ML
HCV RNA QUANT PCR: 94 IU/ML
HCV, QUALITATIVE: DETECTED IU/ML

## 2018-01-15 ENCOUNTER — SURGERY (OUTPATIENT)
Age: 78
End: 2018-01-15

## 2018-01-15 ENCOUNTER — ANESTHESIA (OUTPATIENT)
Dept: ENDOSCOPY | Facility: HOSPITAL | Age: 78
End: 2018-01-15
Payer: MEDICARE

## 2018-01-15 ENCOUNTER — HOSPITAL ENCOUNTER (OUTPATIENT)
Facility: HOSPITAL | Age: 78
Discharge: HOME | End: 2018-01-15
Attending: INTERNAL MEDICINE | Admitting: INTERNAL MEDICINE
Payer: MEDICARE

## 2018-01-15 ENCOUNTER — ANESTHESIA EVENT (OUTPATIENT)
Dept: ENDOSCOPY | Facility: HOSPITAL | Age: 78
End: 2018-01-15
Payer: MEDICARE

## 2018-01-15 VITALS
HEIGHT: 58 IN | SYSTOLIC BLOOD PRESSURE: 139 MMHG | HEART RATE: 72 BPM | TEMPERATURE: 99 F | BODY MASS INDEX: 35.26 KG/M2 | DIASTOLIC BLOOD PRESSURE: 85 MMHG | OXYGEN SATURATION: 98 % | WEIGHT: 168 LBS | RESPIRATION RATE: 16 BRPM

## 2018-01-15 DIAGNOSIS — K86.2 PANCREAS CYST: ICD-10-CM

## 2018-01-15 LAB — POCT GLUCOSE: 85 MG/DL (ref 70–110)

## 2018-01-15 PROCEDURE — D9220A PRA ANESTHESIA: Mod: CRNA,,, | Performed by: NURSE ANESTHETIST, CERTIFIED REGISTERED

## 2018-01-15 PROCEDURE — 82962 GLUCOSE BLOOD TEST: CPT | Performed by: INTERNAL MEDICINE

## 2018-01-15 PROCEDURE — 37000009 HC ANESTHESIA EA ADD 15 MINS: Performed by: INTERNAL MEDICINE

## 2018-01-15 PROCEDURE — 25000003 PHARM REV CODE 250: Performed by: INTERNAL MEDICINE

## 2018-01-15 PROCEDURE — 37000008 HC ANESTHESIA 1ST 15 MINUTES: Performed by: INTERNAL MEDICINE

## 2018-01-15 PROCEDURE — 43237 ENDOSCOPIC US EXAM ESOPH: CPT | Performed by: INTERNAL MEDICINE

## 2018-01-15 PROCEDURE — 63600175 PHARM REV CODE 636 W HCPCS: Performed by: NURSE ANESTHETIST, CERTIFIED REGISTERED

## 2018-01-15 PROCEDURE — D9220A PRA ANESTHESIA: Mod: ANES,,, | Performed by: ANESTHESIOLOGY

## 2018-01-15 PROCEDURE — 43237 ENDOSCOPIC US EXAM ESOPH: CPT | Mod: ,,, | Performed by: INTERNAL MEDICINE

## 2018-01-15 RX ORDER — LIDOCAINE HCL/PF 100 MG/5ML
SYRINGE (ML) INTRAVENOUS
Status: DISCONTINUED | OUTPATIENT
Start: 2018-01-15 | End: 2018-01-15

## 2018-01-15 RX ORDER — PROPOFOL 10 MG/ML
VIAL (ML) INTRAVENOUS
Status: DISCONTINUED | OUTPATIENT
Start: 2018-01-15 | End: 2018-01-15

## 2018-01-15 RX ORDER — SODIUM CHLORIDE 9 MG/ML
INJECTION, SOLUTION INTRAVENOUS CONTINUOUS
Status: DISCONTINUED | OUTPATIENT
Start: 2018-01-15 | End: 2018-01-15 | Stop reason: HOSPADM

## 2018-01-15 RX ORDER — PROPOFOL 10 MG/ML
VIAL (ML) INTRAVENOUS CONTINUOUS PRN
Status: DISCONTINUED | OUTPATIENT
Start: 2018-01-15 | End: 2018-01-15

## 2018-01-15 RX ADMIN — LIDOCAINE HYDROCHLORIDE 40 MG: 20 INJECTION, SOLUTION INTRAVENOUS at 08:01

## 2018-01-15 RX ADMIN — PROPOFOL 100 MCG/KG/MIN: 10 INJECTION, EMULSION INTRAVENOUS at 08:01

## 2018-01-15 RX ADMIN — PROPOFOL 70 MG: 10 INJECTION, EMULSION INTRAVENOUS at 08:01

## 2018-01-15 RX ADMIN — SODIUM CHLORIDE: 0.9 INJECTION, SOLUTION INTRAVENOUS at 07:01

## 2018-01-15 NOTE — ANESTHESIA PREPROCEDURE EVALUATION
01/15/2018  Alfredina Claudette Parks is a 77 y.o., female.    Anesthesia Evaluation    I have reviewed the Patient Summary Reports.     I have reviewed the Medications.     Review of Systems  Anesthesia Hx:  No problems with previous Anesthesia Denies Hx of Anesthetic complications  History of prior surgery of interest to airway management or planning: Denies Family Hx of Anesthesia complications.    Social:  Non-Smoker    Cardiovascular:   Exercise tolerance: poor Hypertension Dysrhythmias atrial fibrillation Angina ECG has been reviewed. Negative heart cath last year per patient, will have another cath this year.    Pulmonary:   Asthma mild Shortness of breath Denies Sleep Apnea.    Hepatic/GI:   GERD Liver Disease, Hepatitis, C Pandreatic mass   Musculoskeletal:   Neck more painful in flexion Spine Disorders: (Cervical radiculopathy  ) cervical    OB/GYN/PEDS:  Hysterectomy     Neurological:   Neuromuscular Disease, (Cervical radiculopathy )    Endocrine:   Diabetes Hypothyroidism        Physical Exam  General:  Morbid Obesity    Airway/Jaw/Neck:  Airway Findings: Mouth Opening: Normal Tongue: Normal  General Airway Assessment: Adult  Mallampati: II  Improves to II with phonation.  TM Distance: < 4 cm  Jaw/Neck Findings:  Neck ROM: Decreased Lateral Motion, to the right, to the left, Normal Extension, Painful  Neck Findings:  Girth Increased      Dental:  Dental Findings: Upper Dentures   Chest/Lungs:  Chest/Lungs Findings: Clear to auscultation     Heart/Vascular:  Heart Findings: Rate: Normal     Abdomen:  Abdomen Findings:  Normal       Mental Status:  Mental Status Findings:  Cooperative, Alert and Oriented         Anesthesia Plan  Type of Anesthesia, risks & benefits discussed:  Anesthesia Type:  general  Patient's Preference: general  Intra-op Monitoring Plan: standard ASA monitors  Intra-op  Monitoring Plan Comments:   Post Op Pain Control Plan:   Post Op Pain Control Plan Comments:   Induction:   IV  Beta Blocker:  Patient is not currently on a Beta-Blocker (No further documentation required).       Informed Consent: Patient understands risks and agrees with Anesthesia plan.  Questions answered. Anesthesia consent signed with patient.  ASA Score: 3     Day of Surgery Review of History & Physical:    H&P update referred to the surgeon.         Ready For Surgery From Anesthesia Perspective.

## 2018-01-15 NOTE — ANESTHESIA RELEASE NOTE
"Anesthesia Release from PACU Note    Patient: Alfredina Claudette Parks    Procedure(s) Performed: Procedure(s) (LRB):  ULTRASOUND-ENDOSCOPIC-UPPER (N/A)    Anesthesia type: general    Post pain: Adequate analgesia    Post assessment: no apparent anesthetic complications    Last Vitals:   Visit Vitals  BP (!) 148/86   Pulse 73   Temp 36.6 °C (97.9 °F) (Temporal)   Resp 16   Ht 4' 10" (1.473 m)   Wt 76.2 kg (168 lb)   SpO2 97%   Breastfeeding? No   BMI 35.11 kg/m²       Post vital signs: stable    Level of consciousness: awake    Nausea/Vomiting: no nausea/no vomiting    Complications: none    Airway Patency: patent    Respiratory: unassisted    Cardiovascular: stable and blood pressure at baseline    Hydration: euvolemic  "

## 2018-01-15 NOTE — H&P
Short Stay Endoscopy History and Physical    PCP - Veronica Frost MD  Referring Physician - Hao Flynn PA-C  [address] [place] [address]    Procedure - eus  ASA - per anesthesia  Mallampati - per anesthesia  History of Anesthesia problems - no  Family history Anesthesia problems -  no   Plan of anesthesia - General    HPI:  This is a 77 y.o. female here for evaluation of: pancrease cyst    Reflux - no  Dysphagia - no  Abdominal pain - no  Diarrhea - no    ROS:  Constitutional: No fevers, chills, No weight loss  CV: No chest pain  Pulm: No cough, No shortness of breath  Ophtho: No vision changes  GI: see HPI  Derm: No rash    Medical History:  has a past medical history of Asthma; Atrial fibrillation; Bulging lumbar disc; Cataract; Cervical radiculopathy; Diabetes mellitus; Hepatitis C; Hyperglycemia; Hypertension; Hypothyroidism; Osteoporosis; Pancreatic cyst; and Vitamin D deficiency disease.    Surgical History:  has a past surgical history that includes Hysterectomy; Toe Surgery (Bilateral); Eye surgery; Cyst Removal; and Breast cyst excision (Bilateral).    Family History: family history includes Asthma in her other and sister; Breast cancer (age of onset: 55) in her sister; Diabetes in her maternal aunt and maternal grandfather; Lung cancer in her father; Ovarian cancer in her mother; Stomach cancer in her sister; Throat cancer in her brother..    Social History:  reports that she has never smoked. She has never used smokeless tobacco. She reports that she does not drink alcohol or use drugs.    Review of patient's allergies indicates:   Allergen Reactions    Milk containing products Shortness Of Breath    Nuts [tree nut] Anaphylaxis    Fosamax [alendronate]      dizziness       Medications:   Prescriptions Prior to Admission   Medication Sig Dispense Refill Last Dose    ADVAIR DISKUS 250-50 mcg/dose diskus inhaler TAKE 1 PUFF BY MOUTH TWICE DAILY 60 each 3 1/15/2018 at  Unknown time    albuterol (PROVENTIL) 2.5 mg /3 mL (0.083 %) nebulizer solution TAKE 3ML BY NEBULIZATION EVERY 6 HOURS AS NEEDED FOR WHEEZING. 1050 mL 3 1/15/2018 at Unknown time    amiodarone (PACERONE) 100 MG Tab Take 1 tablet (100 mg total) by mouth once daily. 30 tablet 11 1/15/2018 at Unknown time    aspirin (ECOTRIN) 81 MG EC tablet Take 81 mg by mouth once daily.     1/14/2018 at Unknown time    carvedilol (COREG) 6.25 MG tablet Take 1 tablet (6.25 mg total) by mouth 2 (two) times daily with meals. 60 tablet 11 1/15/2018 at Unknown time    diclofenac sodium 1 % Gel Apply 2 g topically once daily. 1 Tube 1 1/15/2018 at Unknown time    docusate sodium (COLACE) 100 MG capsule Take 1 capsule (100 mg total) by mouth 3 (three) times daily as needed for Constipation. 270 capsule 1 1/15/2018 at Unknown time    elbasvir-grazoprevir (ZEPATIER)  mg Tab Take 1 tablet by mouth once daily. 28 tablet 2 1/15/2018 at Unknown time    esomeprazole (NEXIUM) 40 MG capsule TAKE 1 CAPSULE(40 MG) BY MOUTH BEFORE BREAKFAST 90 capsule 0 1/15/2018 at Unknown time    fluticasone (FLONASE) 50 mcg/actuation nasal spray 1 spray by Each Nare route once daily. 16 g 6 1/15/2018 at Unknown time    furosemide (LASIX) 20 MG tablet Take 2 tablets (40 mg total) by mouth once daily. 60 tablet 11 1/15/2018 at Unknown time    hydrALAZINE (APRESOLINE) 10 MG tablet Take 1 tablet (10 mg total) by mouth daily as needed (SBP>180). 90 tablet 0 1/15/2018 at Unknown time    levothyroxine (SYNTHROID) 125 MCG tablet TAKE 1 TABLET(125 MCG) BY MOUTH EVERY DAY 30 tablet 0 1/15/2018 at Unknown time    meclizine (ANTIVERT) 12.5 mg tablet Take 12.5 mg by mouth 3 (three) times daily as needed.   1/15/2018 at Unknown time    methylPREDNISolone (MEDROL DOSEPACK) 4 mg tablet    1/15/2018 at Unknown time    NIFEdipine (PROCARDIA-XL) 30 MG (OSM) 24 hr tablet TAKE 1 TABLET(30 MG) BY MOUTH TWICE DAILY 180 tablet 4 1/15/2018 at Unknown time     polyethylene glycol (GLYCOLAX) 17 gram/dose powder Take 17 g by mouth once daily. 238 g 1 1/15/2018 at Unknown time    solifenacin (VESICARE) 5 MG tablet Take 1 tablet (5 mg total) by mouth once daily. 30 tablet 11 1/15/2018 at Unknown time    spironolactone (ALDACTONE) 50 MG tablet Take 1 tablet (50 mg total) by mouth once daily. 30 tablet 5 1/15/2018 at Unknown time    tobramycin sulfate 0.3% (TOBREX) 0.3 % ophthalmic solution Place 1 drop into both eyes 3 (three) times daily.  0 1/15/2018 at Unknown time    valsartan (DIOVAN) 40 MG tablet Take 1 tablet (40 mg total) by mouth 2 (two) times daily. 180 tablet 2 1/15/2018 at Unknown time    VENTOLIN HFA 90 mcg/actuation inhaler INHALE 2 PUFFS PO Q 6 H PRN  9 1/15/2018 at Unknown time    triamcinolone acetonide 0.025% (KENALOG) 0.025 % cream Apply topically 2 (two) times daily. 60 g 0        Physical Exam:    Vital Signs:   Vitals:    01/15/18 0741   BP: 128/85   Pulse: 79   Resp: 18   Temp: 98.1 °F (36.7 °C)       General Appearance: Well appearing in no acute distress  Eyes:    No scleral icterus  ENT: Neck supple  Lungs: CTA anteriorly  Heart:  Regular rate  Abdomen: Soft, non tender, non distended with normal bowel sounds.  Extremities: No edema  Skin: No rash    Labs:  Lab Results   Component Value Date    WBC 6.81 01/10/2018    HGB 12.2 01/10/2018    HCT 38.7 01/10/2018     01/10/2018    CHOL 165 05/19/2017    TRIG 124 05/19/2017    HDL 47 05/19/2017    ALT 22 01/10/2018    AST 22 01/10/2018     01/10/2018    K 4.1 01/10/2018     01/10/2018    CREATININE 0.9 01/10/2018    BUN 26 (H) 01/10/2018    CO2 28 01/10/2018    TSH 0.94 10/11/2017    INR 0.9 01/10/2018    HGBA1C 5.9 01/18/2017       I have explained the risks and benefits of this endoscopic procedure to the patient including but not limited to bleeding, inflammation, infection, perforation, and death.      Jamil King MD

## 2018-01-15 NOTE — PROVATION PATIENT INSTRUCTIONS
Discharge Summary/Instructions after an Endoscopic Procedure  Patient Name: Mitzy Garcia  Patient MRN: [id number]  Patient YOB: 1940  Monday, January 15, 2018  Jamil King MD  RESTRICTIONS:  During your procedure today, you received medications for sedation.  These   medications may affect your judgment, balance and coordination.  Therefore,   for 24 hours, you have the following restrictions:   - DO NOT drive a car, operate machinery, make legal/financial decisions,   sign important papers or drink alcohol.    ACTIVITY:  The following day: return to full activity including work, except no heavy   lifting, straining or running for 3 days if polyps were removed.  DIET:  Eat and drink normally unless instructed otherwise.     TREATMENT FOR COMMON SIDE EFFECTS:  - Mild abdominal pain, belching, bloating or excessive gas: rest, eat   lightly and use a heating pad.  - Sore Throat: treat with throat lozenges and/or gargle with warm salt   water.  SYMPTOMS TO WATCH FOR AND REPORT TO YOUR PHYSICIAN:  1. Abdominal pain or bloating, other than gas cramps.  2. Chest pain.  3. Back pain.  4. Chills or fever occurring within 24 hours after the procedure.  5. Rectal bleeding, which would show as bright red, maroon, or black stools.   (A tablespoon of blood from the rectum is not serious, especially if   hemorrhoids are present.)  6. Vomiting.  7. Weakness or dizziness.  8. Because air was used during the procedure, expelling large amounts of air   from your rectum or belching is normal.  9. If a bowel prep was taken, you may not have a bowel movement for 1-3   days.  This is normal.  GO DIRECTLY TO THE NEAREST EMERGENCY ROOM IF YOU HAVE ANY OF THE FOLLOWING:      Difficulty breathing  Chills and/or fever over 101 F   Persistent vomiting and/or vomiting blood   Severe abdominal pain   Severe chest pain   Black, tarry stools   Bleeding- more than one tablespoon   Any other symptom or condition that you may feel needs  urgent attention  Your doctor recommends these additional instructions:  If any biopsies were taken, your doctor s clinic will contact you in 1 to 2   weeks with any results.  You are being discharged to home.   Resume your previous diet.   Continue your present medications.   Return to pancreas cyst clinic in one year.  For questions, problems or results please call your physician - Jamil King MD at Work:  [phone].  [facility], EMERGENCY ROOM PHONE NUMBER: [phone]  IF A COMPLICATION OR EMERGENCY SITUATION ARISES AND YOU ARE UNABLE TO REACH   YOUR PHYSICIAN - GO DIRECTLY TO THE EMERGENCY ROOM.  Jamil King MD  1/15/2018 8:40:41 AM  This report has been verified and signed electronically.

## 2018-01-15 NOTE — DISCHARGE INSTRUCTIONS
Endoscopic Ultrasound (EUS)    An endoscopic ultrasound (EUS) is a test to look at the inside of your gastrointestinal (GI) tract. It's commonly used to look for cancers or growths in the esophagus, stomach, pancreas, liver, and rectum. It can help to stage cancer (see how advanced a cancer is). EUS may also be used to help diagnose certain diseases or to drain cysts or abscesses.  What is EUS?  EUS shows both ultrasound images and live video of the GI tract. During the test, a flexible tube called an endoscope (scope) is used. At the end of the scope is a tiny video camera and light. The video camera sends live images to a monitor. The scope also contains a very small ultrasound device. This uses sound waves to create images and send them to a monitor.  A needle is passed through the scope. The needle can be used take a small sample of tissue for testing. This is called a biopsy. The needle can be used to take a sample of fluid. This is called fine-needle aspiration (FNA).  Risks and possible complications of EUS  Risks and possible complications include the following:  · Bleeding  · Infection  · A perforation (hole) in the digestive tract   · Risks of sedation or anesthesia   Before the test  Be prepared prior to the test:  · Tell your healthcare provider what medicine you take. This includes vitamins, herbs, and over-the-counter medicine. It also includes any blood thinners, such as warfarin, clopidogrel, ibuprofen, or daily aspirin. Ask your healthcare provider if you need to stop taking some or all of them before the test.  · You may be prescribed antibiotics to take before or after the test. This depends on the area being studied and what is done during the test. These medicines help prevent infection.  · Carefully follow the instructions for preparing for the test to make sure results are accurate. Instructions may include:  ¨ If youre having an EUS of the upper GI tract (esophagus, stomach, duodenum,  pancreas, liver):  § Do not eat or drink for 6 hours before the test.  ¨ If youre having an EUS of the lower GI tract (rectum):  § Before the test, do bowel prep as instructed to clean your rectum of stool. This may involve a clear liquid diet and using a laxative (liquid or pills) the night before the test. Or it may mean doing one or more enemas the morning of the test.  § Do not eat or drink for 6 hours before the test.  · Be sure to arrive on time at the facility. Bring your identification and health insurance card. Leave valuables at home. If you have them, bring X-rays or other test results with you.  Let the healthcare provider know  For your safety, tell the healthcare provider if you:  · Take insulin. Your dose may need to be changed on the day of your test.  · Are allergic to latex.  · Have any other allergies.  · Are taking blood thinners.   During the test  An endoscopic ultrasound usually takes place in a hospital. The procedure itself may take 1 to 2 hours. You will likely go home soon afterward. During the test:  · You lie on your left side on an exam table.  · An intravenous (IV) line will be put into a vein in your arm or hand. This line supplies fluids and medicines. To keep you comfortable during the test, you will be given a sedative medicine. This medicine prevents discomfort and will make you sleepy.  · If you are having an EUS of the upper GI tract, local anesthetic may be sprayed in your throat. This will help you be more comfortable as the healthcare provider inserts the scope. The healthcare provider then gently puts the flexible scope into your mouth or nose and down your throat.  · If youre having an EUS of the lower GI tract, the healthcare provider gently puts the flexible scope into your anus.  · During the test, the scope sends live video and ultrasound images from inside your body to nearby monitors. These are used to examine your GI tract. Specialized procedures, such as drainage,  are done as needed.  · The healthcare provider may discuss the results with you soon after the test. Biopsy results take several  days.  · In most cases, you can go home within a few hours of the test. When you leave the facility, have an adult family member or friend drive you, even if you don't feel that sleepy.  After the test  Here is what to expect after the test:  · You may feel tired from the sedative. This should wear off by the end of the day.  · If you had an upper digestive endoscopy, your throat may feel sore for a day or two. Over-the-counter sore throat lozenges and spray should help.  · You can eat and drink normally as soon as the test is done.  When to call the healthcare provider  Call your healthcare provider if you notice any of the following:  · Fever of 100.4°F (38.0°C) or higher, or as advised by your healthcare provider  · Shortness of breath  · Vomiting blood, blood in stool, or black stools  · Coughing or hoarse voice that wont go away   Date Last Reviewed: 7/1/2016  © 0765-9992 [employer]. [address] [place] [address]. All rights reserved. This information is not intended as a substitute for professional medical care. Always follow your healthcare professional's instructions.

## 2018-01-15 NOTE — ANESTHESIA POSTPROCEDURE EVALUATION
"Anesthesia Post Evaluation    Patient: Alfredina Claudette Parks    Procedure(s) Performed: Procedure(s) (LRB):  ULTRASOUND-ENDOSCOPIC-UPPER (N/A)    Final Anesthesia Type: general  Patient location during evaluation: [facility]  Patient participation: Yes- Able to Participate  Level of consciousness: awake and alert, oriented and awake  Post-procedure vital signs: reviewed and stable  Pain management: adequate  Airway patency: patent  PONV status at discharge: No PONV  Anesthetic complications: no      Cardiovascular status: blood pressure returned to baseline and hemodynamically stable  Respiratory status: unassisted, spontaneous ventilation and room air  Hydration status: euvolemic  Follow-up not needed.        Visit Vitals  BP (!) 148/86   Pulse 73   Temp 36.6 °C (97.9 °F) (Temporal)   Resp 16   Ht 4' 10" (1.473 m)   Wt 76.2 kg (168 lb)   SpO2 97%   Breastfeeding? No   BMI 35.11 kg/m²       Pain/Shantel Score: Pain Assessment Performed: Yes (1/15/2018  8:40 AM)  Presence of Pain: non-verbal indicators absent (1/15/2018  8:40 AM)  Shantel Score: 10 (1/15/2018  8:56 AM)      "

## 2018-01-16 ENCOUNTER — EPISODE CHANGES (OUTPATIENT)
Dept: HEPATOLOGY | Facility: CLINIC | Age: 78
End: 2018-01-16

## 2018-01-16 ENCOUNTER — TELEPHONE (OUTPATIENT)
Dept: HEPATOLOGY | Facility: CLINIC | Age: 78
End: 2018-01-16

## 2018-01-16 DIAGNOSIS — B18.2 CHRONIC HEPATITIS C WITHOUT HEPATIC COMA: Primary | ICD-10-CM

## 2018-01-16 PROBLEM — M19.90 ARTHRITIS: Status: RESOLVED | Noted: 2017-04-06 | Resolved: 2018-01-16

## 2018-01-16 PROBLEM — K86.2 PANCREAS CYST: Status: RESOLVED | Noted: 2018-01-15 | Resolved: 2018-01-16

## 2018-01-16 PROBLEM — I73.9 CLAUDICATION: Status: RESOLVED | Noted: 2017-05-18 | Resolved: 2018-01-16

## 2018-01-16 NOTE — TELEPHONE ENCOUNTER
HCV LAB REVIEW  Week 4 of Zepatier, planning on 12 weeks treatment    Pertinent labs:  CBC: stable  CMPL stable  PT/INR: WNL  AFP: WNL  HCV RNA: 94    pls call pt:  Labs look good. Liver enzymes look fine. HCV was previously over 2 million and is now down to 94. The medication is working!!  - Continue Zepatier - 1 pill daily - don't miss any doses.    Next labs due / pls schedule:  - CMP at week 8 - 02/07/18  - CMP, HCV RNA at week 12 - end of treatment - 03/07/18

## 2018-01-26 ENCOUNTER — TELEPHONE (OUTPATIENT)
Dept: PHARMACY | Facility: CLINIC | Age: 78
End: 2018-01-26

## 2018-01-26 NOTE — TELEPHONE ENCOUNTER
Zepatier (3 or 3) refill attempted.  No answer.  [facility] for call back.  Copay $0

## 2018-01-29 ENCOUNTER — PATIENT MESSAGE (OUTPATIENT)
Dept: INTERNAL MEDICINE | Facility: CLINIC | Age: 78
End: 2018-01-29

## 2018-01-29 DIAGNOSIS — M79.671 RIGHT FOOT PAIN: Primary | ICD-10-CM

## 2018-01-29 NOTE — TELEPHONE ENCOUNTER
[facility] .     BIBIANA Cherry.Ph.  Clinical Pharmacist  [facility]  Phone: [phone]

## 2018-01-29 NOTE — TELEPHONE ENCOUNTER
Refill readiness for Zepatier confirmed with patient; name/ confirmed; no missed doses; no new medications; no side effects noted; address confirmed for  shipment and  delivery    BIBIANA Cherry.Ph.  Clinical Pharmacist  [facility]  Phone: [phone]

## 2018-02-01 RX ORDER — FLUTICASONE PROPIONATE AND SALMETEROL 50; 250 UG/1; UG/1
POWDER RESPIRATORY (INHALATION)
Qty: 180 EACH | Refills: 3 | Status: SHIPPED | OUTPATIENT
Start: 2018-02-01 | End: 2018-08-17

## 2018-02-07 ENCOUNTER — LAB VISIT (OUTPATIENT)
Dept: LAB | Facility: OTHER | Age: 78
End: 2018-02-07
Attending: PHYSICIAN ASSISTANT
Payer: MEDICARE

## 2018-02-07 DIAGNOSIS — B18.2 CHRONIC HEPATITIS C WITHOUT HEPATIC COMA: ICD-10-CM

## 2018-02-07 DIAGNOSIS — B18.2 CHRONIC HEPATITIS C WITH HEPATIC COMA: ICD-10-CM

## 2018-02-07 DIAGNOSIS — K74.00 LIVER FIBROSIS: ICD-10-CM

## 2018-02-07 LAB
ALBUMIN SERPL BCP-MCNC: 3.4 G/DL
ALP SERPL-CCNC: 43 U/L
ALT SERPL W/O P-5'-P-CCNC: 21 U/L
ANION GAP SERPL CALC-SCNC: 8 MMOL/L
AST SERPL-CCNC: 20 U/L
BILIRUB SERPL-MCNC: 0.3 MG/DL
BUN SERPL-MCNC: 32 MG/DL
CALCIUM SERPL-MCNC: 9 MG/DL
CHLORIDE SERPL-SCNC: 103 MMOL/L
CO2 SERPL-SCNC: 28 MMOL/L
CREAT SERPL-MCNC: 1.1 MG/DL
EST. GFR  (AFRICAN AMERICAN): 57 ML/MIN/1.73 M^2
EST. GFR  (NON AFRICAN AMERICAN): 49 ML/MIN/1.73 M^2
GLUCOSE SERPL-MCNC: 93 MG/DL
POTASSIUM SERPL-SCNC: 4.6 MMOL/L
PROT SERPL-MCNC: 7.9 G/DL
SODIUM SERPL-SCNC: 139 MMOL/L

## 2018-02-07 PROCEDURE — 87522 HEPATITIS C REVRS TRNSCRPJ: CPT

## 2018-02-07 PROCEDURE — 80053 COMPREHEN METABOLIC PANEL: CPT

## 2018-02-08 LAB
HCV LOG: 1.43 LOG (10) IU/ML
HCV RNA QUANT PCR: 27 IU/ML
HCV, QUALITATIVE: DETECTED IU/ML

## 2018-02-12 ENCOUNTER — EPISODE CHANGES (OUTPATIENT)
Dept: HEPATOLOGY | Facility: CLINIC | Age: 78
End: 2018-02-12

## 2018-02-12 ENCOUNTER — TELEPHONE (OUTPATIENT)
Dept: HEPATOLOGY | Facility: CLINIC | Age: 78
End: 2018-02-12

## 2018-02-12 NOTE — TELEPHONE ENCOUNTER
HCV LAB REVIEW  Week 8 of Zepatier, planning on 12 weeks treatment    Pertinent labs:  CMP: stable  HCV RNA: 29 IU/mL     pls call pt:  Labs look good. 4 weeks left!!    Next labs due / pls schedule:  - CMP, HCV RNA at week 12 - end of treatment - 03/07/18

## 2018-02-20 ENCOUNTER — OFFICE VISIT (OUTPATIENT)
Dept: INTERNAL MEDICINE | Facility: CLINIC | Age: 78
End: 2018-02-20
Attending: FAMILY MEDICINE
Payer: MEDICARE

## 2018-02-20 ENCOUNTER — OFFICE VISIT (OUTPATIENT)
Dept: PODIATRY | Facility: CLINIC | Age: 78
End: 2018-02-20
Attending: FAMILY MEDICINE
Payer: MEDICARE

## 2018-02-20 ENCOUNTER — LAB VISIT (OUTPATIENT)
Dept: LAB | Facility: OTHER | Age: 78
End: 2018-02-20
Attending: FAMILY MEDICINE
Payer: MEDICARE

## 2018-02-20 VITALS — BODY MASS INDEX: 34.72 KG/M2 | HEIGHT: 58 IN | WEIGHT: 165.38 LBS

## 2018-02-20 VITALS
OXYGEN SATURATION: 95 % | WEIGHT: 165.38 LBS | SYSTOLIC BLOOD PRESSURE: 134 MMHG | HEART RATE: 69 BPM | BODY MASS INDEX: 34.72 KG/M2 | DIASTOLIC BLOOD PRESSURE: 82 MMHG | HEIGHT: 58 IN

## 2018-02-20 DIAGNOSIS — M05.771 RHEUMATOID ARTHRITIS INVOLVING BOTH FEET WITH POSITIVE RHEUMATOID FACTOR: ICD-10-CM

## 2018-02-20 DIAGNOSIS — Z11.1 SCREENING-PULMONARY TB: ICD-10-CM

## 2018-02-20 DIAGNOSIS — M54.2 NECK PAIN: ICD-10-CM

## 2018-02-20 DIAGNOSIS — J45.21 ASTHMA WITHOUT STATUS ASTHMATICUS, MILD INTERMITTENT, WITH ACUTE EXACERBATION: ICD-10-CM

## 2018-02-20 DIAGNOSIS — N39.41 URGE INCONTINENCE OF URINE: ICD-10-CM

## 2018-02-20 DIAGNOSIS — I87.2 VENOUS INSUFFICIENCY: Primary | ICD-10-CM

## 2018-02-20 DIAGNOSIS — E03.9 HYPOTHYROIDISM, UNSPECIFIED TYPE: ICD-10-CM

## 2018-02-20 DIAGNOSIS — M05.772 RHEUMATOID ARTHRITIS INVOLVING BOTH FEET WITH POSITIVE RHEUMATOID FACTOR: ICD-10-CM

## 2018-02-20 DIAGNOSIS — B18.2 CHRONIC HEPATITIS C WITH HEPATIC COMA: ICD-10-CM

## 2018-02-20 DIAGNOSIS — B35.1 ONYCHOMYCOSIS DUE TO DERMATOPHYTE: ICD-10-CM

## 2018-02-20 DIAGNOSIS — M79.604 PAIN IN BOTH LOWER EXTREMITIES: ICD-10-CM

## 2018-02-20 DIAGNOSIS — L65.9 HAIR LOSS: Primary | ICD-10-CM

## 2018-02-20 DIAGNOSIS — M20.12 HALLUX VALGUS OF LEFT FOOT: ICD-10-CM

## 2018-02-20 DIAGNOSIS — M79.605 PAIN IN BOTH LOWER EXTREMITIES: ICD-10-CM

## 2018-02-20 DIAGNOSIS — K59.00 CONSTIPATION, UNSPECIFIED CONSTIPATION TYPE: ICD-10-CM

## 2018-02-20 DIAGNOSIS — I10 HTN (HYPERTENSION), BENIGN: ICD-10-CM

## 2018-02-20 DIAGNOSIS — M19.90 ARTHRITIS: ICD-10-CM

## 2018-02-20 PROCEDURE — 11721 DEBRIDE NAIL 6 OR MORE: CPT | Mod: Q9,S$GLB,,

## 2018-02-20 PROCEDURE — 1159F MED LIST DOCD IN RCRD: CPT | Mod: S$GLB,,, | Performed by: FAMILY MEDICINE

## 2018-02-20 PROCEDURE — 3008F BODY MASS INDEX DOCD: CPT | Mod: S$GLB,,,

## 2018-02-20 PROCEDURE — 1159F MED LIST DOCD IN RCRD: CPT | Mod: S$GLB,,,

## 2018-02-20 PROCEDURE — 99499 UNLISTED E&M SERVICE: CPT | Mod: S$GLB,,,

## 2018-02-20 PROCEDURE — 99499 UNLISTED E&M SERVICE: CPT | Mod: S$GLB,,, | Performed by: FAMILY MEDICINE

## 2018-02-20 PROCEDURE — 86480 TB TEST CELL IMMUN MEASURE: CPT

## 2018-02-20 PROCEDURE — 99999 PR PBB SHADOW E&M-EST. PATIENT-LVL IV: CPT | Mod: PBBFAC,,, | Performed by: FAMILY MEDICINE

## 2018-02-20 PROCEDURE — 1126F AMNT PAIN NOTED NONE PRSNT: CPT | Mod: S$GLB,,,

## 2018-02-20 PROCEDURE — 1125F AMNT PAIN NOTED PAIN PRSNT: CPT | Mod: S$GLB,,, | Performed by: FAMILY MEDICINE

## 2018-02-20 PROCEDURE — 99203 OFFICE O/P NEW LOW 30 MIN: CPT | Mod: 25,S$GLB,,

## 2018-02-20 PROCEDURE — 99214 OFFICE O/P EST MOD 30 MIN: CPT | Mod: S$GLB,,, | Performed by: FAMILY MEDICINE

## 2018-02-20 PROCEDURE — 3008F BODY MASS INDEX DOCD: CPT | Mod: S$GLB,,, | Performed by: FAMILY MEDICINE

## 2018-02-20 RX ORDER — MUPIROCIN 20 MG/G
OINTMENT TOPICAL
Refills: 0 | COMMUNITY
Start: 2018-01-30 | End: 2018-12-07

## 2018-02-20 RX ORDER — KETOCONAZOLE 20 MG/ML
SHAMPOO, SUSPENSION TOPICAL
Qty: 120 ML | Refills: 5 | Status: SHIPPED | OUTPATIENT
Start: 2018-02-22 | End: 2018-12-07

## 2018-02-20 RX ORDER — ALBUTEROL SULFATE 90 UG/1
AEROSOL, METERED RESPIRATORY (INHALATION)
Qty: 18 G | Refills: 6 | Status: SHIPPED | OUTPATIENT
Start: 2018-02-20

## 2018-02-20 RX ORDER — LEVOTHYROXINE SODIUM 150 UG/1
TABLET ORAL
Refills: 2 | COMMUNITY
Start: 2017-12-30 | End: 2018-02-23

## 2018-02-20 RX ORDER — CLOTRIMAZOLE 1 G/ML
SOLUTION TOPICAL DAILY
Qty: 30 ML | Refills: 11 | Status: ON HOLD | OUTPATIENT
Start: 2018-02-20 | End: 2018-12-17

## 2018-02-20 RX ORDER — DICLOFENAC SODIUM 10 MG/G
4 GEL TOPICAL 4 TIMES DAILY
Qty: 1 TUBE | Refills: 1 | Status: SHIPPED | OUTPATIENT
Start: 2018-02-20 | End: 2018-07-27 | Stop reason: SDUPTHER

## 2018-02-20 RX ORDER — DOCUSATE SODIUM 100 MG/1
100 CAPSULE, LIQUID FILLED ORAL 3 TIMES DAILY PRN
Qty: 270 CAPSULE | Refills: 1 | Status: ON HOLD | OUTPATIENT
Start: 2018-02-20 | End: 2018-12-17

## 2018-02-20 RX ORDER — TOBRAMYCIN AND DEXAMETHASONE 3; 1 MG/ML; MG/ML
SUSPENSION/ DROPS OPHTHALMIC
Refills: 0 | COMMUNITY
Start: 2018-01-08 | End: 2018-02-20 | Stop reason: SDUPTHER

## 2018-02-20 NOTE — PATIENT INSTRUCTIONS
ketoconazole (NIZORAL) 2 % shampoo; Wash hair with medicated shampoo at least 2x/week - let sit on scalp at least 5 minutes prior to rinsing  Dispense: 120 mL; Refill: 5      Understanding Alopecia Areata    Alopecia areata is a condition that causes your hair to fall out. It causes bald patches on the scalp, but it can also cause hair loss on other parts of the body.  How to say it  [id number]   What causes alopecia areata?  Alopecia areata is an autoimmune disease. This means its caused by the bodys immune system attacking its own tissues. The immune system attacks the hair follicles. It causes hair to stop growing, and then break off and fall out.  You may be more likely to have alopecia areata if you have another type of autoimmune disease, such as:  · Thyroid disease  · Vitiligo  · Eczema (atopic dermatitis)  Symptoms of alopecia areata  The main symptom is one or more bald patches on the scalp that occur over a few weeks as hair falls out. Bald patches most often occur on the scalp, but hair can also fall out on the face and other parts of the body. The skin may itch or burn before the hair falls out. Then the skin may be smooth, or have some short hairs left. In some people, the rest of the hair on the head and the entire body may also thin or fall out.  Some people also have small dents or pits in their fingernails, or other nail symptoms. These may include roughness, cracks, red spots, or the nail pulling away from the finger.  Treatment for alopecia areata  You can choose not to have any treatment. For many people, the hair will grow back in time. In some cases, it may fall out again. Treatment doesnt prevent hair from falling out in the future.  Some medicines can help regrow hair faster, or stop hair from falling out. These medicines include:  · Corticosteroid medicine. This is injected into the areas where hair is falling out or gone. The injections are done every 4 to 12 weeks.  Corticosteroid medicine can also be used as an ointment or cream put on the skin. These are often used along with the injections.  · Immunotherapy medicine. This is a type of medicine that causes an allergic reaction on the skin. You apply it once a week as a lotion onto the skin of the scalp.  · Topical minoxidil. You put this over-the-counter medicine right on the scalp. It may help new hair grow.  · Other medicines. Many other medicines can be used, but they may suppress the immune system. They can have unwanted side effects.  Medicines used for treatment have side effects. They also work better on some people than others. It depends on how severe your hair loss is. Talk with your healthcare provider about what medicines are the best options for you.  Living with alopecia areata  For many people, the hair grows back within a year. But your hair may fall out again the future. This process may occur a few times over several years. Or the hair may not fully grow back. In some people, all the scalp hair or body hair may fall out.  Hair loss is more likely to happen again if you have any of these:  · Hair loss for more than 1 year  · Nail symptoms  · A family history of alopecia areata  · Hair loss that started in childhood  · Loss of a lot of hair  · Loss of hair in a band from the ears around the back of the head  Hair loss can cause stress and other emotional upset. Talk with your healthcare provider about finding support groups in your area to help you manage your condition. You can also talk to him or her about cosmetic fixes. A wig can be used to conceal bald patches. Tattooing of the eyebrows can restore the look of eyebrow hairs. Your healthcare provider may have resources to help.  When to call your healthcare provider  Call your healthcare provider right away if you have any of these:  · Symptoms that dont get better, or get worse  · New symptoms   Date Last Reviewed: 5/1/2016  © 2323-7869 [employer]. [address] [place] [address], [place] [address]. All rights reserved. This information is not intended as a substitute for professional medical care. Always follow your healthcare professional's instructions.        Eating to Prevent Gout  Gout is a painful form of arthritis caused by an excess of uric acid. This is a waste product made by the body. It builds up in the body and forms crystals that collect in the joints, bringing on a gout attack. Alcohol and certain foods can trigger a gout attack. Below are some guidelines for changing your diet to help you manage gout. Your healthcare provider can work with you to determine the best eating plan for you. Know that diet is only one part of managing gout. Take your medicines as prescribed and follow the other guidelines your healthcare provider has given you.  Foods to limit  Eating too many foods containing purines may increase the levels of uric acid in your body and increase your risk for a gout attack. It may be best to limit these high-purine foods:  · Alcohol (beer, red wine). You may be told to avoid alcohol completely.  · Certain fish (anchovies, sardines, fish roes, herring, tuna, mussels, codfish, scallops, trout, and leoncio)  · Certain meats (red meat, processed meat, corea, turkey, wild game, and goose)  · Sauces and gravies made with meat  · Organ meats (such as liver, kidneys, sweetbreads, and tripe)  · Legumes (such as dried beans, peas)  · Mushrooms, spinach, asparagus, and cauliflower  · Yeast and yeast extract supplements  Foods to try  Some foods may be helpful for people with gout. You may want to try adding some of the following foods to your diet:  · Dark berries: These include blueberries, blackberries, and cherries. These berries contain chemicals that may lower uric acid.  · Tofu: Tofu, which is made from soy, is a good source of protein. Studies have shown that it may be a better choice than meat for people with gout.  · Omega fatty  acids: These acids are found in fatty fish (such as salmon), certain oils (such as flax, olive, or nut oils), or nuts. They may help prevent inflammation due to gout.  The following guidelines are recommended by the American Medical Association for people with gout. Your diet should be:  · High in fiber, whole grains, fruits, and vegetables.  · Low in protein (15% of calories should come from protein. Choose lean sources such as soy, lean meats, and poultry).  · Low in fat (no more than 30% of calories should come from fat, with only 10% coming from animal fat).   Date Last Reviewed: 6/17/2015  © 6869-3598 [employer]. [address] [place] [address], [place] [address]. All rights reserved. This information is not intended as a substitute for professional medical care. Always follow your healthcare professional's instructions.

## 2018-02-20 NOTE — PROGRESS NOTES
Subjective:       Patient ID: Alfredina Claudette Parks is a 77 y.o. female.    Chief Complaint: Foot Pain (Right foot Pain ) and Last PCP/Visit (PCP: Veronica Frost 2/20/18)    HPI  Mitzy is a 77 y.o. female who presents to the clinic for evaluation and treatment of high risk feet. Mitzy has a past medical history of Absence of bladder continence (4/20/2015); Asthma; Asthma without status asthmaticus (6/28/2012); Atrial fibrillation; BMI 36.0-36.9,adult (8/25/2014); Bulging lumbar disc; Cataract; Cervical radiculopathy; Claudication (5/18/2017); Constipation (8/30/2013); Diabetes mellitus; Diverticular disease (8/30/2013); GERD (gastroesophageal reflux disease) (6/28/2012); Hepatitis C; Hyperglycemia; Hypertension; Hypothyroidism; Osteoporosis; Pancreatic cyst; and Vitamin D deficiency disease. The patient's chief complaint is painful feet and legs especially when her feet swell. She does have chronic swelling in her legs and wears compression stockings which appear to be 20-30 millimeters of mercury pressure. She also complains about painful thick yellow discolored toenails. She had a complete matricectomy on the right and 1 on the left which failed and her toenail removed back. She also underwent a right bunionectomy in the past.    PCP: Veronica Frost MD      Current shoe gear:  Affected Foot: Tennis shoes     Unaffected Foot: Tennis shoes    Last encounter in this department: Visit date not found    Hemoglobin A1C   Date Value Ref Range Status   01/18/2017 5.9 4.5 - 6.2 % Final     Comment:     According to ADA guidelines, hemoglobin A1C <7.0% represents  optimal control in non-pregnant diabetic patients.  Different  metrics may apply to specific populations.   Standards of Medical Care in Diabetes - 2016.  For the purpose of screening for the presence of diabetes:  <5.7%     Consistent with the absence of diabetes  5.7-6.4%  Consistent with increasing risk for diabetes    (prediabetes)  >or=6.5%  Consistent with diabetes  Currently no consensus exists for use of hemoglobin A1C  for diagnosis of diabetes for children.     08/20/2015 5.6 4.5 - 6.2 % Final   08/21/2014 5.6 4.5 - 6.2 % Final       Past Medical History:   Diagnosis Date    Absence of bladder continence 4/20/2015    Asthma     Asthma without status asthmaticus 6/28/2012    Atrial fibrillation     BMI 36.0-36.9,adult 8/25/2014    Bulging lumbar disc     Cataract     Cervical radiculopathy     Claudication 5/18/2017    Constipation 8/30/2013    Diabetes mellitus     pre diabetes     Diverticular disease 8/30/2013    GERD (gastroesophageal reflux disease) 6/28/2012    Hepatitis C     Hyperglycemia     Hypertension     Hypothyroidism     Osteoporosis     Pancreatic cyst     Vitamin D deficiency disease        Past Surgical History:   Procedure Laterality Date    BREAST CYST EXCISION Bilateral     CYST REMOVAL      EYE SURGERY      cataracts    HYSTERECTOMY      TOE SURGERY Bilateral     big toenails       Family History   Problem Relation Age of Onset    Ovarian cancer Mother     Lung cancer Father     Stomach cancer Sister     Asthma Sister     Throat cancer Brother     Diabetes Maternal Grandfather     Diabetes Maternal Aunt     Breast cancer Sister 55    Asthma Other     Emphysema Neg Hx        Social History     Social History    Marital status:      Spouse name: N/A    Number of children: N/A    Years of education: N/A     Occupational History    retired  at [employer]       Social History Main Topics    Smoking status: Never Smoker    Smokeless tobacco: Never Used    Alcohol use No    Drug use: No    Sexual activity: Not Currently     Partners: Male     Other Topics Concern    None     Social History Narrative    None       Current Outpatient Prescriptions   Medication Sig Dispense Refill    ADVAIR DISKUS 250-50 mcg/dose diskus inhaler TAKE 1 PUFF BY MOUTH  TWICE DAILY 180 each 3    albuterol (PROVENTIL) 2.5 mg /3 mL (0.083 %) nebulizer solution TAKE 3ML BY NEBULIZATION EVERY 6 HOURS AS NEEDED FOR WHEEZING. 1050 mL 3    albuterol (VENTOLIN HFA) 90 mcg/actuation inhaler INHALE 2 PUFFS PO Q 6 H PRN 18 g 6    amiodarone (PACERONE) 100 MG Tab Take 1 tablet (100 mg total) by mouth once daily. 30 tablet 11    aspirin (ECOTRIN) 81 MG EC tablet Take 81 mg by mouth once daily.        carvedilol (COREG) 6.25 MG tablet Take 1 tablet (6.25 mg total) by mouth 2 (two) times daily with meals. 60 tablet 11    diclofenac sodium 1 % Gel Apply 4 g topically 4 (four) times daily. 1 Tube 1    docusate sodium (COLACE) 100 MG capsule Take 1 capsule (100 mg total) by mouth 3 (three) times daily as needed for Constipation. 270 capsule 1    elbasvir-grazoprevir (ZEPATIER)  mg Tab Take 1 tablet by mouth once daily. 28 tablet 2    esomeprazole (NEXIUM) 40 MG capsule TAKE 1 CAPSULE(40 MG) BY MOUTH BEFORE BREAKFAST 90 capsule 0    fluticasone (FLONASE) 50 mcg/actuation nasal spray 1 spray by Each Nare route once daily. 16 g 6    furosemide (LASIX) 20 MG tablet Take 2 tablets (40 mg total) by mouth once daily. 60 tablet 11    [START ON 2/22/2018] ketoconazole (NIZORAL) 2 % shampoo Apply topically twice a week. 120 mL 5    levothyroxine (SYNTHROID) 150 MCG tablet TK 1 T PO QD  2    meclizine (ANTIVERT) 12.5 mg tablet Take 12.5 mg by mouth 3 (three) times daily as needed.      mupirocin (BACTROBAN) 2 % ointment APPLY TO NARES BID  0    NIFEdipine (PROCARDIA-XL) 30 MG (OSM) 24 hr tablet TAKE 1 TABLET(30 MG) BY MOUTH TWICE DAILY 60 tablet 0    polyethylene glycol (GLYCOLAX) 17 gram/dose powder Take 17 g by mouth once daily. 238 g 1    solifenacin (VESICARE) 5 MG tablet Take 1 tablet (5 mg total) by mouth once daily. 30 tablet 11    spironolactone (ALDACTONE) 50 MG tablet Take 1 tablet (50 mg total) by mouth once daily. 30 tablet 5    valsartan (DIOVAN) 40 MG tablet Take 1  tablet (40 mg total) by mouth 2 (two) times daily. 180 tablet 2    clotrimazole (LOTRIMIN) 1 % Soln Apply topically once daily. 30 mL 11    hydrALAZINE (APRESOLINE) 10 MG tablet Take 1 tablet (10 mg total) by mouth daily as needed (SBP>180). 90 tablet 0    triamcinolone acetonide 0.025% (KENALOG) 0.025 % cream Apply topically 2 (two) times daily. 60 g 0     No current facility-administered medications for this visit.        Review of patient's allergies indicates:   Allergen Reactions    Milk containing products Shortness Of Breath    Nuts [tree nut] Anaphylaxis    Fosamax [alendronate]      dizziness       Review of Systems  ROS:  Constitution: Negative for chills, fever, weakness and malaise/fatigue.   HEENT: Negative for headaches.   Cardiovascular: Positive for chronic bilateral lower extremity edema   Respiratory: Negative for cough and shortness of breath.   Musculoskeletal: Positive for bilateral foot pain.  Negative for muscle cramps and muscle weakness.   Gastrointestinal: Negative for nausea and vomiting.   Neurological: Negative for numbness and paresthesias.   Dermatological: Negativefor skin rash, Negative for calluses, Positive for fungal nails, Negative for wound.        Objective:      Physical Exam  Constitutional:  Patient is oriented to person, place, and time. Vital signs are normal.  Appears well-developed and well-nourished.     Vascular:  Dorsalis pedis pulses are 2/4 on the right side, and 2/4 on the left side.   Posterior tibial pulses are 2/4 on the right side, and 2/4 on the left side.   Negative for digital hair growth, capillary fill time to all toes <3 seconds, toes are warm touch, moderate bilateral lower extremity swelling and edema with no evidence of stasis changes     Skin/Dermatological:  Skin is warm and intact.  No cyanosis or clubbing.  No rashes noted.  No open wounds.  All ten toenails yellow discolored, thickened 2-4 mm to base with subungual debris except right hallux  nail has been removed.    Musculoskeletal:      Hallux abducto valgus bilaterally worse on the left, hammertoes 2-5 observed.  Pedal rom limited.   (--) ankle joint DF restriction with both knee flexed and extened.    Neurological:  (--) deficits to sharp/dull, light touch or vibratory sensation bilateral feet, ten points tested.   Muscle strength to tibialis anterior, extensor hallucis longus, extensor digitorum longus, peroneal muscles, flexor hallucis/digotorum longus, posterior tibial and gastrosoleal complex is 5/5, normal tone without assymmetry   Patellar reflexes are 2+ on the right side and 2+ on the left side.  Achilles reflexes are 2+ on the right side and 2+ on the left side.      Assessment:       1. Venous insufficiency    2. Pain in both lower extremities    3. Onychomycosis due to dermatophyte    4. Hallux valgus of left foot    5. Rheumatoid arthritis involving both feet with positive rheumatoid factor        Plan:       Venous insufficiency  -     COMPRESSION SLEEVE/SOCK FOR HOME USE    Pain in both lower extremities    Onychomycosis due to dermatophyte  -     clotrimazole (LOTRIMIN) 1 % Soln; Apply topically once daily.  Dispense: 30 mL; Refill: 11    Hallux valgus of left foot    Rheumatoid arthritis involving both feet with positive rheumatoid factor        Shoe inspection. Foot Education. Patient instructed on proper foot hygeine. We discussed wearing proper shoe gear, daily foot inspections, never walking without protective shoe gear, never putting sharp instruments to feet.  We also discussed padding and shoes with high toe boxes for  foot deformities.  Discussed importance of wearing the compression stockings and to monitor for any wounds or infection.  She declined referral for US or to a vascular surgeon for her swelling.  - With patient's permission, all ten toenails were aggressively reduced and debrided  to their soft tissue attachment mechanically with nail nipper, removing all  offending nail and debris. Patient relates relief following the procedure. Patient will continue to monitor the areas daily, inspect her feet, wear protective shoe gear when ambulatory, moisturizer to maintain skin integrity and follow in this office in approximately 3 months, sooner p.r.n.

## 2018-02-20 NOTE — PROGRESS NOTES
"Subjective:      Patient ID: Alfredina Claudette Parks is a 77 y.o. female.    Chief Complaint: Hypertension (Check up) and Neck Pain    She just started claritin yesterday. She did get cauterization of nasal passage by Dr. Mcgrath last week. Her ankles have not been swelling. Yesterday she noticed bilateral neck ache after sleeping funny on it. She did take tylenol and it did not help. She did try an ice pack that did not help. She did try heating pad and this did help. No falls or trauma to the neck. No weakness in her hands. She reports breathing controlled with as needed albuterol.       Review of Systems   Constitutional: Negative.    Respiratory: Negative.    Cardiovascular: Negative.    Gastrointestinal: Negative.    Genitourinary: Negative.      I personally reviewed Past Medical History, Past Surgical history,  Past Social History and Family History    Objective:   /82   Pulse 69   Ht 4' 10" (1.473 m)   Wt 75 kg (165 lb 5.5 oz)   SpO2 95%   BMI 34.56 kg/m²     Physical Exam   Constitutional: She is oriented to person, place, and time. She appears well-developed and well-nourished. No distress.   HENT:   Head: Normocephalic.   Right Ear: External ear normal.   Left Ear: External ear normal.   Mouth/Throat: Oropharynx is clear and moist.   Eyes: Conjunctivae and EOM are normal. Pupils are equal, round, and reactive to light. Right eye exhibits no discharge. Left eye exhibits no discharge. No scleral icterus.   Neck: Normal range of motion. No tracheal deviation present. No thyromegaly present.   Cardiovascular: Normal rate, regular rhythm, normal heart sounds and intact distal pulses.  Exam reveals no gallop.    No murmur heard.  Pulmonary/Chest: Effort normal and breath sounds normal. No respiratory distress. She has no wheezes. She has no rales. She exhibits no tenderness.   Abdominal: Soft. Bowel sounds are normal. She exhibits no distension and no mass. There is no tenderness. There is no " rebound and no guarding.   Musculoskeletal: Normal range of motion.        Cervical back: She exhibits tenderness. She exhibits normal range of motion, no bony tenderness, no swelling, no edema and no deformity.   Neurological: She is alert and oriented to person, place, and time.   Skin: Skin is warm and dry.   Psychiatric: She has a normal mood and affect. Her behavior is normal. Judgment and thought content normal.   Vitals reviewed.      Mitzy was seen today for hypertension and neck pain.    Diagnoses and all orders for this visit:    Hair loss  -ketoconazole twice a week and if no improvement follow up with derm  -     Ambulatory consult to Dermatology    Screening-pulmonary TB  -     QUANTIFERON GOLD TB; Future    Hypothyroidism, unspecified type  -     TSH; Future  -  Cont synthroid     Arthritis  -     diclofenac sodium 1 % Gel; Apply 4 g topically 4 (four) times daily.    Urge incontinence of urine  -     Stable on vesicare  Asthma without status asthmaticus, mild intermittent, with acute exacerbation  -   Stable on albuterol prn     Chronic hepatitis C with hepatic coma  -followed by hepatology and on zepatier   -     diclofenac sodium 1 % Gel; Apply 4 g topically 4 (four) times daily.    HTN (hypertension), benign  -controlled, cont current regimen    Constipation, unspecified constipation type  -     docusate sodium (COLACE) 100 MG capsule; Take 1 capsule (100 mg total) by mouth 3 (three) times daily as needed for Constipation.    Neck strain  -declined any muscle relaxers or formal PT  -handout of exercises given, voltaren prn and call if no improvement or worsening     -     albuterol (VENTOLIN HFA) 90 mcg/actuation inhaler; INHALE 2 PUFFS PO Q 6 H PRN  -     ketoconazole (NIZORAL) 2 % shampoo; Apply topically twice a week.

## 2018-02-21 ENCOUNTER — PATIENT MESSAGE (OUTPATIENT)
Dept: INTERNAL MEDICINE | Facility: CLINIC | Age: 78
End: 2018-02-21

## 2018-02-21 ENCOUNTER — TELEPHONE (OUTPATIENT)
Dept: INTERNAL MEDICINE | Facility: CLINIC | Age: 78
End: 2018-02-21

## 2018-02-21 NOTE — TELEPHONE ENCOUNTER
Spoke with pt. Pt states she does not want to see a dermatologist for hair loss. Pt verbally understood and had no further questions

## 2018-02-22 LAB
MITOGEN NIL: >10 IU/ML
NIL: 0.11 IU/ML
TB ANTIGEN NIL: 0.24 IU/ML
TB ANTIGEN: 0.35 IU/ML
TB GOLD: NEGATIVE

## 2018-02-23 ENCOUNTER — PATIENT MESSAGE (OUTPATIENT)
Dept: INTERNAL MEDICINE | Facility: CLINIC | Age: 78
End: 2018-02-23

## 2018-02-23 DIAGNOSIS — E03.9 HYPOTHYROIDISM, UNSPECIFIED TYPE: Primary | ICD-10-CM

## 2018-02-23 RX ORDER — LEVOTHYROXINE SODIUM 137 UG/1
137 TABLET ORAL
Qty: 30 TABLET | Refills: 2 | Status: SHIPPED | OUTPATIENT
Start: 2018-02-23 | End: 2018-05-02

## 2018-02-23 NOTE — TELEPHONE ENCOUNTER
Thyroid studies are abnormal, we will reduce your dose of synthroid to 137 mcg and recheck in 8 weeks

## 2018-03-05 ENCOUNTER — TELEPHONE (OUTPATIENT)
Dept: INTERNAL MEDICINE | Facility: CLINIC | Age: 78
End: 2018-03-05

## 2018-03-05 NOTE — TELEPHONE ENCOUNTER
Progress notes received from [facility] , Candance McKenna, NP, scanned into patient chart can be viewed under media tab.

## 2018-03-07 ENCOUNTER — LAB VISIT (OUTPATIENT)
Dept: LAB | Facility: OTHER | Age: 78
End: 2018-03-07
Payer: MEDICARE

## 2018-03-07 DIAGNOSIS — K74.00 LIVER FIBROSIS: ICD-10-CM

## 2018-03-07 DIAGNOSIS — B18.2 CHRONIC HEPATITIS C WITH HEPATIC COMA: ICD-10-CM

## 2018-03-07 LAB
ALBUMIN SERPL BCP-MCNC: 3.3 G/DL
ALP SERPL-CCNC: 46 U/L
ALT SERPL W/O P-5'-P-CCNC: 14 U/L
ANION GAP SERPL CALC-SCNC: 7 MMOL/L
AST SERPL-CCNC: 21 U/L
BILIRUB SERPL-MCNC: 0.3 MG/DL
BUN SERPL-MCNC: 33 MG/DL
CALCIUM SERPL-MCNC: 8.9 MG/DL
CHLORIDE SERPL-SCNC: 106 MMOL/L
CO2 SERPL-SCNC: 29 MMOL/L
CREAT SERPL-MCNC: 1 MG/DL
EST. GFR  (AFRICAN AMERICAN): >60 ML/MIN/1.73 M^2
EST. GFR  (NON AFRICAN AMERICAN): 55 ML/MIN/1.73 M^2
GLUCOSE SERPL-MCNC: 100 MG/DL
POTASSIUM SERPL-SCNC: 4.2 MMOL/L
PROT SERPL-MCNC: 7.7 G/DL
SODIUM SERPL-SCNC: 142 MMOL/L

## 2018-03-07 PROCEDURE — 87522 HEPATITIS C REVRS TRNSCRPJ: CPT

## 2018-03-07 PROCEDURE — 80053 COMPREHEN METABOLIC PANEL: CPT

## 2018-03-09 LAB
HCV LOG: <1.08 LOG (10) IU/ML
HCV RNA QUANT PCR: <12 IU/ML
HCV, QUALITATIVE: DETECTED IU/ML

## 2018-03-12 ENCOUNTER — TELEPHONE (OUTPATIENT)
Dept: PHARMACY | Facility: CLINIC | Age: 78
End: 2018-03-12

## 2018-03-12 ENCOUNTER — TELEPHONE (OUTPATIENT)
Dept: HEPATOLOGY | Facility: CLINIC | Age: 78
End: 2018-03-12

## 2018-03-12 DIAGNOSIS — B18.2 CHRONIC HEPATITIS C WITHOUT HEPATIC COMA: Primary | ICD-10-CM

## 2018-03-12 DIAGNOSIS — K74.00 LIVER FIBROSIS: ICD-10-CM

## 2018-03-12 NOTE — TELEPHONE ENCOUNTER
HCV LAB REVIEW  Week 12 of Zepatier, EOT  F3-F4    Pertinent labs:  CMP: stable  HCV RNA: <12, detected    pls call pt:  Labs look good. HCV was too low for lab to count. We will check it again in 6 weeks and 12 weeks to determine a cure. There's a small chance HCV could return. FINGERS CROSSED!    Next labs due/Please schedule:  HCV RNA in 6 weeks-SVR 6: 04/18/18  CBC, CMP, PT/INR, AFP and HCV RNA in 12 weeks- SVR 12: 05/30/18    U/S and f/u visit 06/2018

## 2018-03-12 NOTE — TELEPHONE ENCOUNTER
QoL assessment at EOT Zepatier conducted with patient's daughter/caregiver, Tari.  Patient is doing well after completing Zepatier.  She is able to do normal daily activities such as partcipating in senior center activities.  Stressed importance of keeping EOT and SVR12 labs and appt to ensure virus does not return. Due to Rx completion, [facility] will disharge him from our services but he is welcome to contact us should he have any questions.

## 2018-03-14 ENCOUNTER — EPISODE CHANGES (OUTPATIENT)
Dept: HEPATOLOGY | Facility: CLINIC | Age: 78
End: 2018-03-14

## 2018-03-24 ENCOUNTER — TELEPHONE (OUTPATIENT)
Dept: INTERNAL MEDICINE | Facility: CLINIC | Age: 78
End: 2018-03-24

## 2018-03-24 DIAGNOSIS — M25.562 PAIN AND SWELLING OF LEFT KNEE: Primary | ICD-10-CM

## 2018-03-24 DIAGNOSIS — M79.671 RIGHT FOOT PAIN: ICD-10-CM

## 2018-03-24 DIAGNOSIS — M25.462 PAIN AND SWELLING OF LEFT KNEE: Primary | ICD-10-CM

## 2018-03-24 NOTE — TELEPHONE ENCOUNTER
Please schedule xray of foot and left knee   [email or web address]

## 2018-04-06 ENCOUNTER — HOSPITAL ENCOUNTER (OUTPATIENT)
Dept: RADIOLOGY | Facility: OTHER | Age: 78
Discharge: HOME | End: 2018-04-06
Attending: FAMILY MEDICINE
Payer: MEDICARE

## 2018-04-06 ENCOUNTER — PATIENT MESSAGE (OUTPATIENT)
Dept: INTERNAL MEDICINE | Facility: CLINIC | Age: 78
End: 2018-04-06

## 2018-04-06 DIAGNOSIS — M25.462 PAIN AND SWELLING OF LEFT KNEE: ICD-10-CM

## 2018-04-06 DIAGNOSIS — M25.562 PAIN AND SWELLING OF LEFT KNEE: ICD-10-CM

## 2018-04-06 DIAGNOSIS — M79.671 RIGHT FOOT PAIN: ICD-10-CM

## 2018-04-06 DIAGNOSIS — E79.0 ELEVATED URIC ACID IN BLOOD: Primary | ICD-10-CM

## 2018-04-06 PROCEDURE — 73630 X-RAY EXAM OF FOOT: CPT | Mod: 26,RT,, | Performed by: RADIOLOGY

## 2018-04-06 PROCEDURE — 73630 X-RAY EXAM OF FOOT: CPT | Mod: TC,FY,RT

## 2018-04-06 PROCEDURE — 73560 X-RAY EXAM OF KNEE 1 OR 2: CPT | Mod: TC,FY,LT

## 2018-04-06 PROCEDURE — 73560 X-RAY EXAM OF KNEE 1 OR 2: CPT | Mod: 26,LT,, | Performed by: RADIOLOGY

## 2018-04-06 RX ORDER — ALLOPURINOL 100 MG/1
TABLET ORAL
Qty: 30 TABLET | Refills: 1 | Status: SHIPPED | OUTPATIENT
Start: 2018-04-06 | End: 2018-06-05

## 2018-04-06 RX ORDER — ALLOPURINOL 100 MG/1
100 TABLET ORAL DAILY
Qty: 30 TABLET | Refills: 1 | Status: SHIPPED | OUTPATIENT
Start: 2018-04-06 | End: 2018-04-06 | Stop reason: SDUPTHER

## 2018-04-09 ENCOUNTER — PATIENT MESSAGE (OUTPATIENT)
Dept: INTERNAL MEDICINE | Facility: CLINIC | Age: 78
End: 2018-04-09

## 2018-04-12 RX ORDER — COLCHICINE 0.6 MG/1
.6-1.2 TABLET ORAL 2 TIMES DAILY PRN
Qty: 30 TABLET | Refills: 1 | Status: SHIPPED | OUTPATIENT
Start: 2018-04-12 | End: 2018-07-30 | Stop reason: SDUPTHER

## 2018-04-24 ENCOUNTER — PATIENT MESSAGE (OUTPATIENT)
Dept: INTERNAL MEDICINE | Facility: CLINIC | Age: 78
End: 2018-04-24

## 2018-04-30 ENCOUNTER — LAB VISIT (OUTPATIENT)
Dept: LAB | Facility: OTHER | Age: 78
End: 2018-04-30
Attending: FAMILY MEDICINE
Payer: MEDICARE

## 2018-04-30 ENCOUNTER — PATIENT MESSAGE (OUTPATIENT)
Dept: HEPATOLOGY | Facility: CLINIC | Age: 78
End: 2018-04-30

## 2018-04-30 DIAGNOSIS — E03.9 HYPOTHYROIDISM, UNSPECIFIED TYPE: ICD-10-CM

## 2018-04-30 DIAGNOSIS — B18.2 CHRONIC HEPATITIS C WITHOUT HEPATIC COMA: ICD-10-CM

## 2018-04-30 DIAGNOSIS — K74.00 LIVER FIBROSIS: ICD-10-CM

## 2018-04-30 LAB
T4 FREE SERPL-MCNC: 1.41 NG/DL
TSH SERPL DL<=0.005 MIU/L-ACNC: 0.2 UIU/ML

## 2018-04-30 PROCEDURE — 36415 COLL VENOUS BLD VENIPUNCTURE: CPT

## 2018-04-30 PROCEDURE — 87522 HEPATITIS C REVRS TRNSCRPJ: CPT

## 2018-04-30 PROCEDURE — 84439 ASSAY OF FREE THYROXINE: CPT

## 2018-04-30 PROCEDURE — 84443 ASSAY THYROID STIM HORMONE: CPT

## 2018-05-01 ENCOUNTER — EPISODE CHANGES (OUTPATIENT)
Dept: HEPATOLOGY | Facility: CLINIC | Age: 78
End: 2018-05-01

## 2018-05-02 ENCOUNTER — PATIENT MESSAGE (OUTPATIENT)
Dept: INTERNAL MEDICINE | Facility: CLINIC | Age: 78
End: 2018-05-02

## 2018-05-02 ENCOUNTER — TELEPHONE (OUTPATIENT)
Dept: HEPATOLOGY | Facility: CLINIC | Age: 78
End: 2018-05-02

## 2018-05-02 ENCOUNTER — PATIENT MESSAGE (OUTPATIENT)
Dept: HEPATOLOGY | Facility: CLINIC | Age: 78
End: 2018-05-02

## 2018-05-02 DIAGNOSIS — E03.9 HYPOTHYROIDISM, UNSPECIFIED TYPE: Primary | ICD-10-CM

## 2018-05-02 LAB
HCV LOG: <1.08 LOG (10) IU/ML
HCV RNA QUANT PCR: <12 IU/ML
HCV, QUALITATIVE: NOT DETECTED IU/ML

## 2018-05-02 RX ORDER — LEVOTHYROXINE SODIUM 125 UG/1
125 TABLET ORAL DAILY
Qty: 30 TABLET | Refills: 2 | Status: SHIPPED | OUTPATIENT
Start: 2018-05-02 | End: 2018-06-05 | Stop reason: DRUGHIGH

## 2018-05-02 NOTE — TELEPHONE ENCOUNTER
Informed pt of thyroid studies and Rx reduction. Scheduled pt for TSH recheck in 6 weeks. Pt had no further questions or concerns.

## 2018-05-02 NOTE — TELEPHONE ENCOUNTER
Thyroid studies have improved but are abnormal, we will reduce your dose synthroid to 125 mcg and recheck in 6-8 weeks

## 2018-05-02 NOTE — TELEPHONE ENCOUNTER
HCV LAB REVIEW  SVR 8  F3-F4    Pertinent labs:  HCV RNA: <12, not detected    [facility] message sent     Next labs due/Please schedule:  CBC, CMP, PT/INR, AFP and HCV RNA in 12 weeks- SVR 12: 05/30/18    U/S and f/u visit 06/2018

## 2018-05-03 ENCOUNTER — EPISODE CHANGES (OUTPATIENT)
Dept: HEPATOLOGY | Facility: CLINIC | Age: 78
End: 2018-05-03

## 2018-05-22 ENCOUNTER — PATIENT MESSAGE (OUTPATIENT)
Dept: INTERNAL MEDICINE | Facility: CLINIC | Age: 78
End: 2018-05-22

## 2018-05-22 ENCOUNTER — TELEPHONE (OUTPATIENT)
Dept: INTERNAL MEDICINE | Facility: CLINIC | Age: 78
End: 2018-05-22

## 2018-05-22 NOTE — TELEPHONE ENCOUNTER
----- Message from Rodger Sandoval sent at 5/22/2018  9:43 AM CDT -----  Contact: Tari (daughter)  Name of Who is Calling: Tari (daughter)      What is the request in detail: would like to speak with staff in regards to an expedited order to get patient's Rolator repair. Requesting a call back.      Can the clinic reply by [facility]: yes      [facility] [phone]  5:17 PM  Spoke to patient's daughter Tari and stated that she just needs a letter written by Dr. Frost explaining the need for expedited Rolator repair

## 2018-05-22 NOTE — TELEPHONE ENCOUNTER
Pt needs letter stating a rollator (medical equipment) is medically necessary. Please advise if this can be made for pt

## 2018-05-23 RX ORDER — FLUTICASONE PROPIONATE 50 MCG
SPRAY, SUSPENSION (ML) NASAL
Qty: 48 ML | Refills: 3 | Status: SHIPPED | OUTPATIENT
Start: 2018-05-23

## 2018-06-05 ENCOUNTER — OFFICE VISIT (OUTPATIENT)
Dept: HEPATOLOGY | Facility: CLINIC | Age: 78
End: 2018-06-05
Payer: MEDICARE

## 2018-06-05 ENCOUNTER — HOSPITAL ENCOUNTER (OUTPATIENT)
Dept: RADIOLOGY | Facility: HOSPITAL | Age: 78
Discharge: HOME | End: 2018-06-05
Attending: PHYSICIAN ASSISTANT
Payer: MEDICARE

## 2018-06-05 ENCOUNTER — PATIENT MESSAGE (OUTPATIENT)
Dept: HEPATOLOGY | Facility: CLINIC | Age: 78
End: 2018-06-05

## 2018-06-05 VITALS
BODY MASS INDEX: 35.49 KG/M2 | WEIGHT: 169.06 LBS | SYSTOLIC BLOOD PRESSURE: 141 MMHG | HEIGHT: 58 IN | RESPIRATION RATE: 18 BRPM | DIASTOLIC BLOOD PRESSURE: 78 MMHG | TEMPERATURE: 97 F | OXYGEN SATURATION: 95 % | HEART RATE: 66 BPM

## 2018-06-05 DIAGNOSIS — K74.00 LIVER FIBROSIS: ICD-10-CM

## 2018-06-05 DIAGNOSIS — B18.2 CHRONIC HEPATITIS C WITHOUT HEPATIC COMA: ICD-10-CM

## 2018-06-05 DIAGNOSIS — K83.8 COMMON BILE DUCT DILATATION: ICD-10-CM

## 2018-06-05 DIAGNOSIS — K74.00 LIVER FIBROSIS: Primary | ICD-10-CM

## 2018-06-05 PROCEDURE — 76705 ECHO EXAM OF ABDOMEN: CPT | Mod: 26,,, | Performed by: RADIOLOGY

## 2018-06-05 PROCEDURE — 99214 OFFICE O/P EST MOD 30 MIN: CPT | Mod: S$GLB,,, | Performed by: PHYSICIAN ASSISTANT

## 2018-06-05 PROCEDURE — 3077F SYST BP >= 140 MM HG: CPT | Mod: CPTII,S$GLB,, | Performed by: PHYSICIAN ASSISTANT

## 2018-06-05 PROCEDURE — 3078F DIAST BP <80 MM HG: CPT | Mod: CPTII,S$GLB,, | Performed by: PHYSICIAN ASSISTANT

## 2018-06-05 PROCEDURE — 76705 ECHO EXAM OF ABDOMEN: CPT | Mod: TC

## 2018-06-05 PROCEDURE — 99499 UNLISTED E&M SERVICE: CPT | Mod: S$GLB,,, | Performed by: PHYSICIAN ASSISTANT

## 2018-06-05 PROCEDURE — 99999 PR PBB SHADOW E&M-EST. PATIENT-LVL V: CPT | Mod: PBBFAC,,, | Performed by: PHYSICIAN ASSISTANT

## 2018-06-05 RX ORDER — LEVOTHYROXINE SODIUM 137 UG/1
0.14 CAPSULE ORAL DAILY
COMMUNITY
End: 2018-10-01 | Stop reason: SDUPTHER

## 2018-06-05 NOTE — Clinical Note
Please schedule labs and U/S in 6 months, will review by phone Please recall for f/u visit in 1 year Thanks

## 2018-06-05 NOTE — PROGRESS NOTES
HEPATOLOGY CLINIC VISIT NOTE - HCV clinic    REFERRING PROVIDER: Dr. Veronica Frost     CHIEF COMPLAINT: Hepatitis C - discuss treatment    HISTORY: This is a 78 y.o. Black or  female, here with daughter returns for HCC screening and SVR 12 labs. She has a h/o with chronic hepatitis C and advanced fibrosis/probable cirrhosis.    She completed 12 weeks of Zepatier for HCV, SVR 6 HCV RNA was negative. HCV RNA is pending today. She underwent an U/S prior to visit. There were no concerning liver masses. It was noted that CBD has increased to 13mm. It was last measured at 12mm on U/S from 12/2017. Pt had an EUS to evaluated dilated CBD and pancreatic cyst 01/2018 with Dr. King.     HCV history:  Genotype 1a  HCV RNA: 2,142,558 IU/mL  Treatment experienced: IFN x 1 year; S/p 12 weeks of Zepatier  SVR 12 labs pending today     Risk Factors:  Blood transfusion- (+) ? in 70s       Liver staging:  Liver biopsy 2004, grade 2, Stage 3  Fibroscan has been pursued, but  unfortunately, her body habitus and breathing was prohibitive to getting a fibroscan. At her initial visit, labs reveal elevated transaminases, hypoalbuminemia, and mild thrombocytopenia so patient has been followed for advanced fibrosis/cirrhosis  AST 49, ALT 50  Tbili WNL  Albumin <3.5  PLTs 147    Advanced fibrosis?Cirrhosis history:  - Well compensated  - MELD score   MELD-Na score: 6 at 1/10/2018  9:05 AM  MELD score: 6 at 1/10/2018  9:05 AM  Calculated from:  Serum Creatinine: 0.9 mg/dL (Rounded to 1) at 1/10/2018  9:05 AM  Serum Sodium: 141 mmol/L (Rounded to 137) at 1/10/2018  9:05 AM  Total Bilirubin: 0.4 mg/dL (Rounded to 1) at 1/10/2018  9:05 AM  INR(ratio): 0.9 (Rounded to 1) at 1/10/2018  9:05 AM  Age: 77 years    - HCC screening:   - U/S: due 12/2018   - AFP: WNL    (?)Portal HTN:   - EGD: EUS done 01/2018, esophagus endoscopically normal.       Denies jaundice, dark urine, abdominal distention, hematemesis, melena, slowed  mentation.   No abnormal skin rashes. No generalized joint pain.                     Past Medical History:   Diagnosis Date    Absence of bladder continence 4/20/2015    Asthma     Asthma without status asthmaticus 6/28/2012    Atrial fibrillation     BMI 36.0-36.9,adult 8/25/2014    Bulging lumbar disc     Cataract     Cervical radiculopathy     Claudication 5/18/2017    Constipation 8/30/2013    Diabetes mellitus     pre diabetes     Diverticular disease 8/30/2013    GERD (gastroesophageal reflux disease) 6/28/2012    Hepatitis C     Hyperglycemia     Hypertension     Hypothyroidism     Osteoporosis     Pancreatic cyst     Vitamin D deficiency disease        Past Surgical History:   Procedure Laterality Date    BREAST CYST EXCISION Bilateral     CYST REMOVAL      EYE SURGERY      cataracts    HYSTERECTOMY      TOE SURGERY Bilateral     big toenails     FAMILY HISTORY: Negative for liver disease    SOCIAL HISTORY:   History   Smoking Status    Never Smoker   Smokeless Tobacco    Never Used       History   Alcohol Use No       History   Drug Use No       ROS:   No fever, chills, weight loss, fatigue  No chest pain, palpitations, dyspnea, cough  No abdominal pain, nausea, vomiting  No skin rashes   No lower extremity edema  No depression or anxiety      PHYSICAL EXAM:  Friendly Black or  female, in no acute distress; alert and oriented to person, place and time  VITALS: reviewed  HEENT: Sclerae anicteric.   NECK: Supple  LUNGS: Normal respiratory effort.   ABDOMEN: Flat, soft, nontender. No organomegaly or masses. No ascites or hernias.  SKIN: Warm and dry. No jaundice, No obvious rashes.   EXTREMITIES: No lower extremity edema  NEURO/PSYCH: Normal gate. Memory intact. Thought and speech pattern appropriate. Behavior normal. No depression or anxiety noted.    RECENT LABS:  Lab Results   Component Value Date    WBC 6.81 01/10/2018    HGB 12.2 01/10/2018     01/10/2018      Lab Results   Component Value Date    INR 0.9 01/10/2018     Lab Results   Component Value Date    AST 21 03/07/2018    ALT 14 03/07/2018    BILITOT 0.3 03/07/2018    ALBUMIN 3.3 (L) 03/07/2018    ALKPHOS 46 (L) 03/07/2018    CREATININE 1.0 03/07/2018    BUN 33 (H) 03/07/2018     03/07/2018    K 4.2 03/07/2018    AFP 4.2 01/10/2018     RECENT IMAGING:  U/S abdomen 06/2017  Narrative     Time of Procedure: 06/13/17 08:45:00  Accession # [id number]    Technique: Complete abdominal ultrasound    Clinical indication: Chronic viral hepatitis c    Comparison: CT abdomen pelvis 10/7/2015.    Findings:    The visualized portion of the pancreas is unremarkable.      The liver is normal in size.  The liver demonstrates homogeneous echotexture.  No focal hepatic lesions are seen.    The gallbladder is unremarkable with no evidence of calculi.  The common duct is dilated in the extrahepatic portion and 1.1 cm previously 1.0 cm.  The gallbladder wall is not thickened.  There is no sonographic Boggs sign.  No dilated intrahepatic radicles are seen.  No pericholecystic fluid.    The spleen is normal in size measuring 10.4 x 4.1 cm with a homogeneous echotexture.    The aorta tapers normally.  The inferior vena cava is normal in appearance.    The kidneys are normal size bilaterally, with a left renal simple cyst, stable from prior exams      There is no evidence of ascites.   Impression     Stable extrahepatic biliary ductal dilatation without evidence of intrahepatic biliary ductal dilatation.   No focal liver lesions.     ASSESSMENT  78 y.o. Black or  female with:  1. CHRONIC HEPATITIS C, GENOTYPE 1a - treatment experienced  -- Prior HCV treatment - IFN x 1 year; S/p 12 weeks of Zepatier   -- Elevated transaminases  -- lacking immunity to HAV and HBV, when discussed at previous visit, elected not to pursue given age and minimal risk factors     3. ADVANCED FIBROSIS/?CIRRHOSIS  -- unable to get fibroscan,  loop recorded makes her ineligible for MR elastography  -- certainly could have had progression from biopsy >10 yeas ago, however liver function stable  -- will need life long HCC screening regardless     - HCC screening:   - U/S: due 12/2018   - AFP: WNL    (?)Portal HTN:   - EGD: EUS done 01/2018, esophagus endoscopically normal.       3.DILATED CBD  -- would consider stable but will review with Dr. King in [place]    PLAN:  1. Await pending labs  2. HCC screening due 12/2018, will review by phone.     Hao Flynn PA-C

## 2018-06-06 ENCOUNTER — PATIENT MESSAGE (OUTPATIENT)
Dept: INTERNAL MEDICINE | Facility: CLINIC | Age: 78
End: 2018-06-06

## 2018-06-06 ENCOUNTER — TELEPHONE (OUTPATIENT)
Dept: HEPATOLOGY | Facility: CLINIC | Age: 78
End: 2018-06-06

## 2018-06-06 ENCOUNTER — EPISODE CHANGES (OUTPATIENT)
Dept: HEPATOLOGY | Facility: CLINIC | Age: 78
End: 2018-06-06

## 2018-06-06 NOTE — TELEPHONE ENCOUNTER
----- Message from Jamil King MD sent at 6/6/2018 10:08 AM CDT -----  I think it's within the variability so I agree that I don't see a need for a repeat EUS.  She may be getting follow up imaging for her cyst so that should identify any issues

## 2018-06-07 ENCOUNTER — TELEPHONE (OUTPATIENT)
Dept: HEPATOLOGY | Facility: CLINIC | Age: 78
End: 2018-06-07

## 2018-06-07 ENCOUNTER — EPISODE CHANGES (OUTPATIENT)
Dept: HEPATOLOGY | Facility: CLINIC | Age: 78
End: 2018-06-07

## 2018-06-07 DIAGNOSIS — K21.9 GASTROESOPHAGEAL REFLUX DISEASE, ESOPHAGITIS PRESENCE NOT SPECIFIED: ICD-10-CM

## 2018-06-07 DIAGNOSIS — Z86.19 HISTORY OF HEPATITIS C: Primary | ICD-10-CM

## 2018-06-07 RX ORDER — ESOMEPRAZOLE MAGNESIUM 40 MG/1
CAPSULE, DELAYED RELEASE ORAL
Qty: 90 CAPSULE | Refills: 3 | Status: SHIPPED | OUTPATIENT
Start: 2018-06-07 | End: 2018-11-06

## 2018-06-07 NOTE — TELEPHONE ENCOUNTER
HCV LAB REVIEW  SVR 12  F3-F4    Pertinent labs:  HCV RNA: <12, not detected    [facility] message sent     Next labs due/Please schedule:  HCV RNA in 12 weeks- SVR 24: 08/22/18  HCV RNA in 1 year: 05/30/19

## 2018-06-07 NOTE — TELEPHONE ENCOUNTER
----- Message from Supriya Black sent at 6/7/2018  3:30 PM CDT -----  Contact: Tari Pt daughter   Tari was calling to get some information about medication.    Tari would like a call back at [phone].    Thank You  4:00 PM  Patient's daughter Tari spoke with Dr. Frost personally

## 2018-06-08 ENCOUNTER — PATIENT MESSAGE (OUTPATIENT)
Dept: INTERNAL MEDICINE | Facility: CLINIC | Age: 78
End: 2018-06-08

## 2018-06-08 DIAGNOSIS — M10.9 GOUT, UNSPECIFIED CAUSE, UNSPECIFIED CHRONICITY, UNSPECIFIED SITE: Primary | ICD-10-CM

## 2018-06-12 ENCOUNTER — PATIENT MESSAGE (OUTPATIENT)
Dept: PODIATRY | Facility: CLINIC | Age: 78
End: 2018-06-12

## 2018-06-12 ENCOUNTER — LAB VISIT (OUTPATIENT)
Dept: LAB | Facility: OTHER | Age: 78
End: 2018-06-12
Attending: FAMILY MEDICINE
Payer: MEDICARE

## 2018-06-12 ENCOUNTER — OFFICE VISIT (OUTPATIENT)
Dept: PODIATRY | Facility: CLINIC | Age: 78
End: 2018-06-12
Payer: MEDICARE

## 2018-06-12 VITALS
SYSTOLIC BLOOD PRESSURE: 102 MMHG | WEIGHT: 172.31 LBS | BODY MASS INDEX: 36.01 KG/M2 | DIASTOLIC BLOOD PRESSURE: 63 MMHG | HEART RATE: 79 BPM

## 2018-06-12 DIAGNOSIS — M05.771 RHEUMATOID ARTHRITIS INVOLVING BOTH FEET WITH POSITIVE RHEUMATOID FACTOR: ICD-10-CM

## 2018-06-12 DIAGNOSIS — B35.1 ONYCHOMYCOSIS DUE TO DERMATOPHYTE: ICD-10-CM

## 2018-06-12 DIAGNOSIS — M05.772 RHEUMATOID ARTHRITIS INVOLVING BOTH FEET WITH POSITIVE RHEUMATOID FACTOR: ICD-10-CM

## 2018-06-12 DIAGNOSIS — I87.2 VENOUS INSUFFICIENCY: Primary | ICD-10-CM

## 2018-06-12 DIAGNOSIS — M20.12 HALLUX VALGUS OF LEFT FOOT: ICD-10-CM

## 2018-06-12 DIAGNOSIS — E03.9 HYPOTHYROIDISM, UNSPECIFIED TYPE: ICD-10-CM

## 2018-06-12 LAB — TSH SERPL DL<=0.005 MIU/L-ACNC: 0.54 UIU/ML

## 2018-06-12 PROCEDURE — 84443 ASSAY THYROID STIM HORMONE: CPT

## 2018-06-12 PROCEDURE — 11721 DEBRIDE NAIL 6 OR MORE: CPT | Mod: Q9,S$GLB,,

## 2018-06-12 PROCEDURE — 99499 UNLISTED E&M SERVICE: CPT | Mod: S$GLB,,,

## 2018-06-12 PROCEDURE — 36415 COLL VENOUS BLD VENIPUNCTURE: CPT

## 2018-06-12 NOTE — PROGRESS NOTES
Subjective:       Patient ID: Alfredina Claudette Parks is a 78 y.o. female.    Chief Complaint: Follow-up    HPI  Mitzy is a 78 y.o. female who presents to the clinic for evaluation and treatment of high risk feet. Mitzy has a past medical history of Absence of bladder continence (4/20/2015); Asthma; Asthma without status asthmaticus (6/28/2012); Atrial fibrillation; BMI 36.0-36.9,adult (8/25/2014); Bulging lumbar disc; Cataract; Cervical radiculopathy; Claudication (5/18/2017); Constipation (8/30/2013); Diabetes mellitus; Diverticular disease (8/30/2013); GERD (gastroesophageal reflux disease) (6/28/2012); Hepatitis C; Hyperglycemia; Hypertension; Hypothyroidism; Osteoporosis; Pancreatic cyst; and Vitamin D deficiency disease. The patient's chief complaint is painful feet and legs especially when her feet swell. She does have chronic swelling in her legs and wears compression stockings which appear to be 20-30 millimeters of mercury pressure. She also complains about painful thick yellow discolored toenails. She had a complete matricectomy on the right and 1 on the left which failed and her toenail removed back. She also underwent a right bunionectomy in the past.    PCP: Veronica Frost MD  2/20/18    Current shoe gear:  Affected Foot: Tennis shoes     Unaffected Foot: Tennis shoes    Last encounter in this department: Visit date not found    Hemoglobin A1C   Date Value Ref Range Status   01/18/2017 5.9 4.5 - 6.2 % Final     Comment:     According to ADA guidelines, hemoglobin A1C <7.0% represents  optimal control in non-pregnant diabetic patients.  Different  metrics may apply to specific populations.   Standards of Medical Care in Diabetes - 2016.  For the purpose of screening for the presence of diabetes:  <5.7%     Consistent with the absence of diabetes  5.7-6.4%  Consistent with increasing risk for diabetes   (prediabetes)  >or=6.5%  Consistent with diabetes  Currently no consensus exists for  use of hemoglobin A1C  for diagnosis of diabetes for children.     08/20/2015 5.6 4.5 - 6.2 % Final   08/21/2014 5.6 4.5 - 6.2 % Final       Past Medical History:   Diagnosis Date    Absence of bladder continence 4/20/2015    Asthma     Asthma without status asthmaticus 6/28/2012    Atrial fibrillation     BMI 36.0-36.9,adult 8/25/2014    Bulging lumbar disc     Cataract     Cervical radiculopathy     Claudication 5/18/2017    Constipation 8/30/2013    Diabetes mellitus     pre diabetes     Diverticular disease 8/30/2013    GERD (gastroesophageal reflux disease) 6/28/2012    Hepatitis C     Hyperglycemia     Hypertension     Hypothyroidism     Osteoporosis     Pancreatic cyst     Vitamin D deficiency disease        Past Surgical History:   Procedure Laterality Date    BREAST CYST EXCISION Bilateral     CYST REMOVAL      EYE SURGERY      cataracts    HYSTERECTOMY      TOE SURGERY Bilateral     big toenails       Family History   Problem Relation Age of Onset    Ovarian cancer Mother     Lung cancer Father     Stomach cancer Sister     Asthma Sister     Throat cancer Brother     Diabetes Maternal Grandfather     Diabetes Maternal Aunt     Breast cancer Sister 55    Asthma Other     Emphysema Neg Hx        Social History     Social History    Marital status:      Spouse name: N/A    Number of children: N/A    Years of education: N/A     Occupational History    retired  at [employer]       Social History Main Topics    Smoking status: Never Smoker    Smokeless tobacco: Never Used    Alcohol use No    Drug use: No    Sexual activity: Not Currently     Partners: Male     Other Topics Concern    Not on file     Social History Narrative    No narrative on file       Current Outpatient Prescriptions   Medication Sig Dispense Refill    ADVAIR DISKUS 250-50 mcg/dose diskus inhaler TAKE 1 PUFF BY MOUTH TWICE DAILY 180 each 3    albuterol (PROVENTIL) 2.5 mg /3 mL  (0.083 %) nebulizer solution TAKE 3ML BY NEBULIZATION EVERY 6 HOURS AS NEEDED FOR WHEEZING. 1050 mL 3    albuterol (VENTOLIN HFA) 90 mcg/actuation inhaler INHALE 2 PUFFS PO Q 6 H PRN 18 g 6    amiodarone (PACERONE) 100 MG Tab Take 1 tablet (100 mg total) by mouth once daily. 30 tablet 11    aspirin (ECOTRIN) 81 MG EC tablet Take 81 mg by mouth once daily.        carvedilol (COREG) 6.25 MG tablet Take 1 tablet (6.25 mg total) by mouth 2 (two) times daily with meals. 60 tablet 11    clotrimazole (LOTRIMIN) 1 % Soln Apply topically once daily. 30 mL 11    colchicine 0.6 mg tablet Take 1-2 tablets (0.6-1.2 mg total) by mouth 2 (two) times daily as needed (gout). 30 tablet 1    diclofenac sodium 1 % Gel Apply 4 g topically 4 (four) times daily. 1 Tube 1    docusate sodium (COLACE) 100 MG capsule Take 1 capsule (100 mg total) by mouth 3 (three) times daily as needed for Constipation. 270 capsule 1    esomeprazole (NEXIUM) 40 MG capsule TAKE 1 CAPSULE(40 MG) BY MOUTH BEFORE BREAKFAST 90 capsule 3    fluticasone (FLONASE) 50 mcg/actuation nasal spray SHAKE LIQUID AND USE 1 SPRAY IN EACH NOSTRIL EVERY DAY 48 mL 3    furosemide (LASIX) 20 MG tablet Take 2 tablets (40 mg total) by mouth once daily. (Patient taking differently: Take 20 mg by mouth once daily. ) 60 tablet 11    hydrALAZINE (APRESOLINE) 10 MG tablet Take 1 tablet (10 mg total) by mouth daily as needed (SBP>180). 90 tablet 0    ketoconazole (NIZORAL) 2 % shampoo Apply topically twice a week. 120 mL 5    levothyroxine 137 mcg Cap Take 0.137 mg by mouth once daily.      meclizine (ANTIVERT) 12.5 mg tablet Take 12.5 mg by mouth 3 (three) times daily as needed.      mupirocin (BACTROBAN) 2 % ointment APPLY TO NARES BID  0    NIFEdipine (PROCARDIA-XL) 30 MG (OSM) 24 hr tablet TAKE 1 TABLET(30 MG) BY MOUTH TWICE DAILY 60 tablet 0    polyethylene glycol (GLYCOLAX) 17 gram/dose powder Take 17 g by mouth once daily. 238 g 1    solifenacin (VESICARE) 5 MG  tablet Take 1 tablet (5 mg total) by mouth once daily. 30 tablet 11    spironolactone (ALDACTONE) 50 MG tablet Take 1 tablet (50 mg total) by mouth once daily. 30 tablet 5    triamcinolone acetonide 0.025% (KENALOG) 0.025 % cream Apply topically 2 (two) times daily. 60 g 0    valsartan (DIOVAN) 40 MG tablet Take 1 tablet (40 mg total) by mouth 2 (two) times daily. 180 tablet 2     No current facility-administered medications for this visit.        Review of patient's allergies indicates:   Allergen Reactions    Milk containing products Shortness Of Breath    Nuts [tree nut] Anaphylaxis    Fosamax [alendronate]      dizziness       Review of Systems  ROS:  Constitution: Negative for chills, fever, weakness and malaise/fatigue.   HEENT: Negative for headaches.   Cardiovascular: Positive for chronic bilateral lower extremity edema   Respiratory: Negative for cough and shortness of breath.   Musculoskeletal: Positive for bilateral foot pain.  Negative for muscle cramps and muscle weakness.   Gastrointestinal: Negative for nausea and vomiting.   Neurological: Negative for numbness and paresthesias.   Dermatological: Negativefor skin rash, Negative for calluses, Positive for fungal nails, Negative for wound.        Objective:      Physical Exam  Constitutional:  Patient is oriented to person, place, and time. Vital signs are normal.  Appears well-developed and well-nourished.     Vascular:  Dorsalis pedis pulses are 2/4 on the right side, and 2/4 on the left side.   Posterior tibial pulses are 2/4 on the right side, and 2/4 on the left side.   Negative for digital hair growth, capillary fill time to all toes <3 seconds, toes are warm touch, moderate bilateral lower extremity swelling and edema with no evidence of stasis changes     Skin/Dermatological:  Skin is warm and intact.  No cyanosis or clubbing.  No rashes noted.  No open wounds.  All ten toenails yellow discolored, thickened 2-4 mm to base with subungual  debris except right hallux nail has been removed.    Musculoskeletal:      Hallux abducto valgus bilaterally worse on the left, hammertoes 2-5 observed.  Pedal rom limited.   (--) ankle joint DF restriction with both knee flexed and extened.    Neurological:  (--) deficits to sharp/dull, light touch or vibratory sensation bilateral feet, ten points tested.   Muscle strength to tibialis anterior, extensor hallucis longus, extensor digitorum longus, peroneal muscles, flexor hallucis/digotorum longus, posterior tibial and gastrosoleal complex is 5/5, normal tone without assymmetry   Patellar reflexes are 2+ on the right side and 2+ on the left side.  Achilles reflexes are 2+ on the right side and 2+ on the left side.      Assessment:       1. Venous insufficiency    2. Onychomycosis due to dermatophyte    3. Hallux valgus of left foot    4. Rheumatoid arthritis involving both feet with positive rheumatoid factor        Plan:       Venous insufficiency    Onychomycosis due to dermatophyte    Hallux valgus of left foot    Rheumatoid arthritis involving both feet with positive rheumatoid factor        Shoe inspection. Foot Education. Patient instructed on proper foot hygeine. We discussed wearing proper shoe gear, daily foot inspections, never walking without protective shoe gear, never putting sharp instruments to feet.  We also discussed padding and shoes with high toe boxes for  foot deformities.  Discussed importance of wearing the compression stockings and to monitor for any wounds or infection.  She declined referral for US or to a vascular surgeon for her swelling.  - With patient's permission, all ten toenails were aggressively reduced and debrided  to their soft tissue attachment mechanically with nail nipper, removing all offending nail and debris. Patient relates relief following the procedure. Patient will continue to monitor the areas daily, inspect her feet, wear protective shoe gear when ambulatory,  moisturizer to maintain skin integrity and follow in this office in approximately 2-3 months, sooner p.r.n.

## 2018-06-18 ENCOUNTER — PATIENT MESSAGE (OUTPATIENT)
Dept: INTERNAL MEDICINE | Facility: CLINIC | Age: 78
End: 2018-06-18

## 2018-07-02 RX ORDER — SPIRONOLACTONE 50 MG/1
50 TABLET, FILM COATED ORAL DAILY
Qty: 30 TABLET | Refills: 5 | Status: SHIPPED | OUTPATIENT
Start: 2018-07-02 | End: 2018-07-10 | Stop reason: SDUPTHER

## 2018-07-10 ENCOUNTER — PATIENT MESSAGE (OUTPATIENT)
Dept: HEPATOLOGY | Facility: CLINIC | Age: 78
End: 2018-07-10

## 2018-07-10 RX ORDER — SPIRONOLACTONE 25 MG/1
50 TABLET ORAL DAILY
Qty: 30 TABLET | Refills: 5 | OUTPATIENT
Start: 2018-07-10 | End: 2019-07-10

## 2018-07-10 RX ORDER — SPIRONOLACTONE 25 MG/1
50 TABLET ORAL DAILY
Qty: 30 TABLET | Refills: 5 | Status: SHIPPED | OUTPATIENT
Start: 2018-07-10 | End: 2018-11-05 | Stop reason: SDUPTHER

## 2018-07-24 ENCOUNTER — PATIENT MESSAGE (OUTPATIENT)
Dept: INTERNAL MEDICINE | Facility: CLINIC | Age: 78
End: 2018-07-24

## 2018-07-24 RX ORDER — IRBESARTAN 75 MG/1
75 TABLET ORAL NIGHTLY
Qty: 90 TABLET | Refills: 3
Start: 2018-07-24 | End: 2018-12-04

## 2018-07-27 ENCOUNTER — PATIENT MESSAGE (OUTPATIENT)
Dept: INTERNAL MEDICINE | Facility: CLINIC | Age: 78
End: 2018-07-27

## 2018-07-27 DIAGNOSIS — E03.9 HYPOTHYROIDISM, UNSPECIFIED TYPE: ICD-10-CM

## 2018-07-27 DIAGNOSIS — M19.90 ARTHRITIS: ICD-10-CM

## 2018-07-27 DIAGNOSIS — I10 HTN (HYPERTENSION), BENIGN: ICD-10-CM

## 2018-07-27 DIAGNOSIS — J45.21 ASTHMA WITHOUT STATUS ASTHMATICUS, MILD INTERMITTENT, WITH ACUTE EXACERBATION: ICD-10-CM

## 2018-07-27 DIAGNOSIS — N39.41 URGE INCONTINENCE OF URINE: ICD-10-CM

## 2018-07-27 DIAGNOSIS — B18.2 CHRONIC HEPATITIS C WITH HEPATIC COMA: ICD-10-CM

## 2018-07-30 ENCOUNTER — PATIENT MESSAGE (OUTPATIENT)
Dept: INTERNAL MEDICINE | Facility: CLINIC | Age: 78
End: 2018-07-30

## 2018-07-30 RX ORDER — DICLOFENAC SODIUM 10 MG/G
4 GEL TOPICAL 4 TIMES DAILY
Qty: 1 TUBE | Refills: 6 | Status: SHIPPED | OUTPATIENT
Start: 2018-07-30 | End: 2018-10-25

## 2018-07-30 RX ORDER — COLCHICINE 0.6 MG/1
0.6 TABLET ORAL DAILY
Qty: 90 TABLET | Refills: 3 | Status: ON HOLD | OUTPATIENT
Start: 2018-07-30 | End: 2019-01-02

## 2018-08-07 ENCOUNTER — PATIENT MESSAGE (OUTPATIENT)
Dept: INTERNAL MEDICINE | Facility: CLINIC | Age: 78
End: 2018-08-07

## 2018-08-07 ENCOUNTER — LAB VISIT (OUTPATIENT)
Dept: LAB | Facility: OTHER | Age: 78
End: 2018-08-07
Payer: MEDICARE

## 2018-08-07 DIAGNOSIS — Z86.19 HISTORY OF HEPATITIS C: ICD-10-CM

## 2018-08-07 DIAGNOSIS — M10.9 GOUT, UNSPECIFIED CAUSE, UNSPECIFIED CHRONICITY, UNSPECIFIED SITE: ICD-10-CM

## 2018-08-07 LAB — URATE SERPL-MCNC: 7.3 MG/DL

## 2018-08-07 PROCEDURE — 87522 HEPATITIS C REVRS TRNSCRPJ: CPT

## 2018-08-07 PROCEDURE — 84550 ASSAY OF BLOOD/URIC ACID: CPT

## 2018-08-08 ENCOUNTER — PATIENT MESSAGE (OUTPATIENT)
Dept: ADMINISTRATIVE | Facility: HOSPITAL | Age: 78
End: 2018-08-08

## 2018-08-09 ENCOUNTER — TELEPHONE (OUTPATIENT)
Dept: HEPATOLOGY | Facility: CLINIC | Age: 78
End: 2018-08-09

## 2018-08-09 LAB
HCV LOG: <1.08 LOG (10) IU/ML
HCV RNA QUANT PCR: <12 IU/ML
HCV, QUALITATIVE: NOT DETECTED IU/ML

## 2018-08-09 NOTE — TELEPHONE ENCOUNTER
HCV LAB REVIEW  SVR 24  F3-F4    Pertinent labs:  HCV RNA: <12, not detected    [facility] message sent     Next labs due/Please schedule:  HCV RNA in 1 year: 05/30/19

## 2018-08-10 ENCOUNTER — PATIENT MESSAGE (OUTPATIENT)
Dept: HEPATOLOGY | Facility: CLINIC | Age: 78
End: 2018-08-10

## 2018-08-19 RX ORDER — FLUTICASONE PROPIONATE AND SALMETEROL 250; 50 UG/1; UG/1
1 POWDER RESPIRATORY (INHALATION) 2 TIMES DAILY
Qty: 180 EACH | Refills: 3 | Status: SHIPPED | OUTPATIENT
Start: 2018-08-19

## 2018-08-20 ENCOUNTER — PATIENT MESSAGE (OUTPATIENT)
Dept: INTERNAL MEDICINE | Facility: CLINIC | Age: 78
End: 2018-08-20

## 2018-08-22 ENCOUNTER — OFFICE VISIT (OUTPATIENT)
Dept: INTERNAL MEDICINE | Facility: CLINIC | Age: 78
End: 2018-08-22
Attending: FAMILY MEDICINE
Payer: MEDICARE

## 2018-08-22 ENCOUNTER — EPISODE CHANGES (OUTPATIENT)
Dept: HEPATOLOGY | Facility: CLINIC | Age: 78
End: 2018-08-22

## 2018-08-22 VITALS
OXYGEN SATURATION: 98 % | BODY MASS INDEX: 34.22 KG/M2 | WEIGHT: 169.75 LBS | DIASTOLIC BLOOD PRESSURE: 72 MMHG | HEART RATE: 76 BPM | SYSTOLIC BLOOD PRESSURE: 118 MMHG | HEIGHT: 59 IN

## 2018-08-22 DIAGNOSIS — K59.00 CONSTIPATION, UNSPECIFIED CONSTIPATION TYPE: ICD-10-CM

## 2018-08-22 DIAGNOSIS — M10.9 GOUT, UNSPECIFIED CAUSE, UNSPECIFIED CHRONICITY, UNSPECIFIED SITE: ICD-10-CM

## 2018-08-22 DIAGNOSIS — I10 HTN (HYPERTENSION), BENIGN: Primary | ICD-10-CM

## 2018-08-22 DIAGNOSIS — E03.9 HYPOTHYROIDISM, UNSPECIFIED TYPE: ICD-10-CM

## 2018-08-22 PROCEDURE — 3078F DIAST BP <80 MM HG: CPT | Mod: CPTII,S$GLB,, | Performed by: FAMILY MEDICINE

## 2018-08-22 PROCEDURE — 3074F SYST BP LT 130 MM HG: CPT | Mod: CPTII,S$GLB,, | Performed by: FAMILY MEDICINE

## 2018-08-22 PROCEDURE — 99214 OFFICE O/P EST MOD 30 MIN: CPT | Mod: S$GLB,,, | Performed by: FAMILY MEDICINE

## 2018-08-22 PROCEDURE — 99999 PR PBB SHADOW E&M-EST. PATIENT-LVL III: CPT | Mod: PBBFAC,,, | Performed by: FAMILY MEDICINE

## 2018-08-22 RX ORDER — POLYETHYLENE GLYCOL 3350 17 G/17G
17 POWDER, FOR SOLUTION ORAL DAILY
Qty: 1700 G | Refills: 3 | Status: SHIPPED | OUTPATIENT
Start: 2018-08-22

## 2018-08-22 NOTE — PROGRESS NOTES
"Subjective:      Patient ID: Alfredina Claudette Parks is a 78 y.o. female.    Chief Complaint: Follow-up    She has been using prunes to help with constipation. She does use miralax as needed. She does not like the taste of metamucil. She reports tylenol as needed helps with aches. She reports her hair is not falling out and ketoconazole helped. She is here for paperwork completion.       Review of Systems   Constitutional: Negative for activity change, appetite change, chills, diaphoresis, fatigue, fever and unexpected weight change.   HENT: Negative for congestion, ear discharge, ear pain, hearing loss, postnasal drip, rhinorrhea, sinus pressure and sore throat.    Respiratory: Negative for cough, shortness of breath and wheezing.    Cardiovascular: Negative for chest pain.   Gastrointestinal: Positive for constipation. Negative for abdominal pain, diarrhea, nausea and vomiting.   Musculoskeletal: Positive for arthralgias.     I personally reviewed Past Medical History, Past Surgical history,  Past Social History and Family History    Objective:   /72 (BP Location: Left arm, Patient Position: Sitting, BP Method: Thigh Cuff (Manual))   Pulse 76   Ht 4' 11" (1.499 m)   Wt 77 kg (169 lb 12.1 oz)   SpO2 98%   BMI 34.29 kg/m²     Physical Exam   Constitutional: She is oriented to person, place, and time. She appears well-developed and well-nourished. No distress.   HENT:   Head: Normocephalic.   Right Ear: External ear normal.   Left Ear: External ear normal.   Mouth/Throat: Oropharynx is clear and moist.   Eyes: Conjunctivae and EOM are normal. Pupils are equal, round, and reactive to light. Right eye exhibits no discharge. Left eye exhibits no discharge. No scleral icterus.   Neck: Normal range of motion. No tracheal deviation present. No thyromegaly present.   Cardiovascular: Normal rate, regular rhythm, normal heart sounds and intact distal pulses. Exam reveals no gallop.   No murmur " heard.  Pulmonary/Chest: Effort normal and breath sounds normal. No respiratory distress. She has no wheezes. She has no rales. She exhibits no tenderness.   Abdominal: Soft. Bowel sounds are normal. She exhibits no distension and no mass. There is no tenderness. There is no rebound and no guarding.   Musculoskeletal: Normal range of motion.   Neurological: She is alert and oriented to person, place, and time.   Skin: Skin is warm and dry.   Psychiatric: She has a normal mood and affect. Her behavior is normal. Judgment and thought content normal.   Vitals reviewed.      Mitzy was seen today for follow-up.    Diagnoses and all orders for this visit:    HTN (hypertension), benign  -controlled, cont current regimen     Constipation, unspecified constipation type  -     polyethylene glycol (GLYCOLAX) 17 gram/dose powder; Take 17 g by mouth once daily.    Hypothyroidism, unspecified type  -controlled, cont synthroid     Gout  -patient to start allopurinol and recheck in 6-8 weeks    Over 25 minutes spend with patient reviewing chart and completing paperwork

## 2018-08-27 ENCOUNTER — EPISODE CHANGES (OUTPATIENT)
Dept: HEPATOLOGY | Facility: CLINIC | Age: 78
End: 2018-08-27

## 2018-08-30 ENCOUNTER — OFFICE VISIT (OUTPATIENT)
Dept: PODIATRY | Facility: CLINIC | Age: 78
End: 2018-08-30
Payer: MEDICARE

## 2018-08-30 VITALS
DIASTOLIC BLOOD PRESSURE: 69 MMHG | BODY MASS INDEX: 34.07 KG/M2 | HEART RATE: 77 BPM | HEIGHT: 59 IN | WEIGHT: 169 LBS | SYSTOLIC BLOOD PRESSURE: 111 MMHG

## 2018-08-30 DIAGNOSIS — I87.2 VENOUS INSUFFICIENCY: ICD-10-CM

## 2018-08-30 DIAGNOSIS — B35.3 TINEA PEDIS OF BOTH FEET: ICD-10-CM

## 2018-08-30 DIAGNOSIS — M20.12 HALLUX VALGUS OF LEFT FOOT: ICD-10-CM

## 2018-08-30 DIAGNOSIS — M05.771 RHEUMATOID ARTHRITIS INVOLVING BOTH FEET WITH POSITIVE RHEUMATOID FACTOR: ICD-10-CM

## 2018-08-30 DIAGNOSIS — R73.03 PRE-DIABETES: Primary | ICD-10-CM

## 2018-08-30 DIAGNOSIS — M05.772 RHEUMATOID ARTHRITIS INVOLVING BOTH FEET WITH POSITIVE RHEUMATOID FACTOR: ICD-10-CM

## 2018-08-30 DIAGNOSIS — B35.1 ONYCHOMYCOSIS DUE TO DERMATOPHYTE: ICD-10-CM

## 2018-08-30 PROCEDURE — 99999 PR PBB SHADOW E&M-EST. PATIENT-LVL III: CPT | Mod: PBBFAC,,,

## 2018-08-30 PROCEDURE — 3078F DIAST BP <80 MM HG: CPT | Mod: CPTII,S$GLB,,

## 2018-08-30 PROCEDURE — 3074F SYST BP LT 130 MM HG: CPT | Mod: CPTII,S$GLB,,

## 2018-08-30 PROCEDURE — 99214 OFFICE O/P EST MOD 30 MIN: CPT | Mod: 25,S$GLB,,

## 2018-08-30 PROCEDURE — 11721 DEBRIDE NAIL 6 OR MORE: CPT | Mod: 59,Q9,S$GLB,

## 2018-08-30 PROCEDURE — 11056 PARNG/CUTG B9 HYPRKR LES 2-4: CPT | Mod: Q9,S$GLB,,

## 2018-08-30 RX ORDER — ALLOPURINOL 100 MG/1
TABLET ORAL
Refills: 1 | COMMUNITY
Start: 2018-08-21 | End: 2018-09-17 | Stop reason: SDUPTHER

## 2018-08-30 NOTE — PROGRESS NOTES
Subjective:       Patient ID: Alfredina Claudette Parks is a 78 y.o. female.    Chief Complaint: Follow-up (Nail fungus) and Foot Pain (In between toes, Left foot)    HPI  Mitzy is a 78 y.o. female who presents to the clinic for evaluation and treatment of high risk feet. Mitzy has a past medical history of Absence of bladder continence (4/20/2015), Asthma, Asthma without status asthmaticus (6/28/2012), Atrial fibrillation, BMI 36.0-36.9,adult (8/25/2014), Bulging lumbar disc, Cataract, Cervical radiculopathy, Claudication (5/18/2017), Constipation (8/30/2013), Diabetes mellitus, Diverticular disease (8/30/2013), GERD (gastroesophageal reflux disease) (6/28/2012), Hepatitis C, Hyperglycemia, Hypertension, Hypothyroidism, Osteoporosis, Pancreatic cyst, and Vitamin D deficiency disease. Main complaint today is pain between her toes.  The patient's chief complaint is painful feet and legs especially when her feet swell. She does have chronic swelling in her legs and wears compression stockings which appear to be 20-30 millimeters of mercury pressure. She also complains about painful thick yellow discolored toenails. She had a complete matricectomy on the right and 1 on the left which failed and her toenail removed back. She also underwent a right bunionectomy in the past.    PCP: Veronica Frost MD  2/20/18    Current shoe gear:  Affected Foot: Tennis shoes     Unaffected Foot: Tennis shoes    Last encounter in this department: Visit date not found    Hemoglobin A1C   Date Value Ref Range Status   01/18/2017 5.9 4.5 - 6.2 % Final     Comment:     According to ADA guidelines, hemoglobin A1C <7.0% represents  optimal control in non-pregnant diabetic patients.  Different  metrics may apply to specific populations.   Standards of Medical Care in Diabetes - 2016.  For the purpose of screening for the presence of diabetes:  <5.7%     Consistent with the absence of diabetes  5.7-6.4%  Consistent with increasing  risk for diabetes   (prediabetes)  >or=6.5%  Consistent with diabetes  Currently no consensus exists for use of hemoglobin A1C  for diagnosis of diabetes for children.     08/20/2015 5.6 4.5 - 6.2 % Final   08/21/2014 5.6 4.5 - 6.2 % Final       Past Medical History:   Diagnosis Date    Absence of bladder continence 4/20/2015    Asthma     Asthma without status asthmaticus 6/28/2012    Atrial fibrillation     BMI 36.0-36.9,adult 8/25/2014    Bulging lumbar disc     Cataract     Cervical radiculopathy     Claudication 5/18/2017    Constipation 8/30/2013    Diabetes mellitus     pre diabetes     Diverticular disease 8/30/2013    GERD (gastroesophageal reflux disease) 6/28/2012    Hepatitis C     Hyperglycemia     Hypertension     Hypothyroidism     Osteoporosis     Pancreatic cyst     Vitamin D deficiency disease        Past Surgical History:   Procedure Laterality Date    BREAST CYST EXCISION Bilateral     CYST REMOVAL      EYE SURGERY      cataracts    HYSTERECTOMY      TOE SURGERY Bilateral     big toenails       Family History   Problem Relation Age of Onset    Ovarian cancer Mother     Lung cancer Father     Stomach cancer Sister     Asthma Sister     Throat cancer Brother     Diabetes Maternal Grandfather     Diabetes Maternal Aunt     Breast cancer Sister 55    Asthma Other     Emphysema Neg Hx        Social History     Socioeconomic History    Marital status:      Spouse name: None    Number of children: None    Years of education: None    Highest education level: None   Social Needs    Financial resource strain: None    Food insecurity - worry: None    Food insecurity - inability: None    Transportation needs - medical: None    Transportation needs - non-medical: None   Occupational History    Occupation: retired  at [employer]    Tobacco Use    Smoking status: Never Smoker    Smokeless tobacco: Never Used   Substance and Sexual Activity    Alcohol  use: No     Alcohol/week: 0.0 oz    Drug use: No    Sexual activity: Not Currently     Partners: Male   Other Topics Concern    None   Social History Narrative    None       Current Outpatient Medications   Medication Sig Dispense Refill    albuterol (PROVENTIL) 2.5 mg /3 mL (0.083 %) nebulizer solution TAKE 3ML BY NEBULIZATION EVERY 6 HOURS AS NEEDED FOR WHEEZING. 1050 mL 3    albuterol (VENTOLIN HFA) 90 mcg/actuation inhaler INHALE 2 PUFFS PO Q 6 H PRN 18 g 6    allopurinol (ZYLOPRIM) 100 MG tablet   1    aspirin (ECOTRIN) 81 MG EC tablet Take 81 mg by mouth once daily.        clotrimazole (LOTRIMIN) 1 % Soln Apply topically once daily. 30 mL 11    colchicine 0.6 mg tablet Take 1 tablet (0.6 mg total) by mouth once daily. 90 tablet 3    diclofenac sodium 1 % Gel Apply 4 g topically 4 (four) times daily. 1 Tube 6    docusate sodium (COLACE) 100 MG capsule Take 1 capsule (100 mg total) by mouth 3 (three) times daily as needed for Constipation. 270 capsule 1    esomeprazole (NEXIUM) 40 MG capsule TAKE 1 CAPSULE(40 MG) BY MOUTH BEFORE BREAKFAST 90 capsule 3    fluticasone (FLONASE) 50 mcg/actuation nasal spray SHAKE LIQUID AND USE 1 SPRAY IN EACH NOSTRIL EVERY DAY 48 mL 3    fluticasone-salmeterol 250-50 mcg/dose (ADVAIR DISKUS) 250-50 mcg/dose diskus inhaler Inhale 1 puff into the lungs 2 (two) times daily. Controller 180 each 3    furosemide (LASIX) 20 MG tablet Take 2 tablets (40 mg total) by mouth once daily. (Patient taking differently: Take 20 mg by mouth once daily. ) 60 tablet 11    irbesartan (AVAPRO) 75 MG tablet Take 1 tablet (75 mg total) by mouth every evening. 90 tablet 3    ketoconazole (NIZORAL) 2 % shampoo Apply topically twice a week. 120 mL 5    levothyroxine 137 mcg Cap Take 0.137 mg by mouth once daily.      meclizine (ANTIVERT) 12.5 mg tablet Take 12.5 mg by mouth 3 (three) times daily as needed.      mupirocin (BACTROBAN) 2 % ointment APPLY TO NARES BID  0    NIFEdipine  (PROCARDIA-XL) 30 MG (OSM) 24 hr tablet TAKE 1 TABLET(30 MG) BY MOUTH TWICE DAILY 60 tablet 0    polyethylene glycol (GLYCOLAX) 17 gram/dose powder Take 17 g by mouth once daily. 1700 g 3    spironolactone (ALDACTONE) 25 MG tablet Take 2 tablets (50 mg total) by mouth once daily. 30 tablet 5    carvedilol (COREG) 6.25 MG tablet Take 1 tablet (6.25 mg total) by mouth 2 (two) times daily with meals. 60 tablet 11    hydrALAZINE (APRESOLINE) 10 MG tablet Take 1 tablet (10 mg total) by mouth daily as needed (SBP>180). 90 tablet 0    solifenacin (VESICARE) 5 MG tablet Take 1 tablet (5 mg total) by mouth once daily. 30 tablet 11    triamcinolone acetonide 0.025% (KENALOG) 0.025 % cream Apply topically 2 (two) times daily. 60 g 0     No current facility-administered medications for this visit.        Review of patient's allergies indicates:   Allergen Reactions    Milk containing products Shortness Of Breath    Nuts [tree nut] Anaphylaxis    Fosamax [alendronate]      dizziness       Review of Systems  ROS:  Constitution: Negative for chills, fever, weakness and malaise/fatigue.   HEENT: Negative for headaches.   Cardiovascular: Positive for chronic bilateral lower extremity edema   Respiratory: Negative for cough and shortness of breath.   Musculoskeletal: Positive for bilateral foot pain.  Negative for muscle cramps and muscle weakness.   Gastrointestinal: Negative for nausea and vomiting.   Neurological: Negative for numbness and paresthesias.   Dermatological: Negativefor skin rash, Negative for calluses, Positive for fungal nails, Negative for wound.        Objective:      Physical Exam  Constitutional:  Patient is oriented to person, place, and time. Vital signs are normal.  Appears well-developed and well-nourished.     Vascular:  Dorsalis pedis pulses are 2/4 on the right side, and 2/4 on the left side.   Posterior tibial pulses are 2/4 on the right side, and 2/4 on the left side.   Negative for digital  hair growth, capillary fill time to all toes <3 seconds, toes are warm touch, moderate bilateral lower extremity swelling and edema with no evidence of stasis changes     Skin/Dermatological:  Skin is warm and intact.  No cyanosis or clubbing.  No rashes noted.  No open wounds.  All ten toenails yellow discolored, thickened 2-4 mm to base with subungual debris except right hallux nail has been removed.  + scaly, erythematous rash all interspaces  B/l distal 2nd toe callus    Musculoskeletal:      Hallux abducto valgus bilaterally worse on the left, hammertoes 2-5 observed.  Pedal rom limited.   (--) ankle joint DF restriction with both knee flexed and extened.    Neurological:  (--) deficits to sharp/dull, light touch or vibratory sensation bilateral feet, ten points tested.   Muscle strength to tibialis anterior, extensor hallucis longus, extensor digitorum longus, peroneal muscles, flexor hallucis/digotorum longus, posterior tibial and gastrosoleal complex is 5/5, normal tone without assymmetry   Patellar reflexes are 2+ on the right side and 2+ on the left side.  Achilles reflexes are 2+ on the right side and 2+ on the left side.      Assessment:       1. Pre-diabetes    2. Venous insufficiency    3. Onychomycosis due to dermatophyte    4. Hallux valgus of left foot    5. Rheumatoid arthritis involving both feet with positive rheumatoid factor    6. Tinea pedis of both feet        Plan:       Pre-diabetes    Venous insufficiency    Onychomycosis due to dermatophyte    Hallux valgus of left foot    Rheumatoid arthritis involving both feet with positive rheumatoid factor    Tinea pedis of both feet        Shoe inspection. Foot Education. Patient instructed on proper foot hygeine. We discussed wearing proper shoe gear, daily foot inspections, never walking without protective shoe gear, never putting sharp instruments to feet.  We also discussed padding and shoes with high toe boxes for  foot deformities.  Discussed  importance of wearing the compression stockings and to monitor for any wounds or infection.  She declined referral for US or to a vascular surgeon for her swelling.  Betadine between the toes daily, spacer first IS b/l.  - With patient's permission, all ten toenails were aggressively reduced and debrided  to their soft tissue attachment mechanically with nail nipper, removing all offending nail and debris.  Calluses pared x 2 with 15 blade.  Patient relates relief following the procedure. Patient will continue to monitor the areas daily, inspect her feet, wear protective shoe gear when ambulatory, moisturizer to maintain skin integrity and follow in this office in approximately 2-3 months, sooner p.r.n.

## 2018-09-15 ENCOUNTER — PATIENT MESSAGE (OUTPATIENT)
Dept: INTERNAL MEDICINE | Facility: CLINIC | Age: 78
End: 2018-09-15

## 2018-09-15 DIAGNOSIS — M10.00 ACUTE IDIOPATHIC GOUT, UNSPECIFIED SITE: Primary | ICD-10-CM

## 2018-09-17 ENCOUNTER — PATIENT MESSAGE (OUTPATIENT)
Dept: INTERNAL MEDICINE | Facility: CLINIC | Age: 78
End: 2018-09-17

## 2018-09-17 RX ORDER — ALLOPURINOL 100 MG/1
100 TABLET ORAL DAILY
Qty: 90 TABLET | Refills: 3 | Status: SHIPPED | OUTPATIENT
Start: 2018-09-17

## 2018-09-18 ENCOUNTER — TELEPHONE (OUTPATIENT)
Dept: INTERNAL MEDICINE | Facility: CLINIC | Age: 78
End: 2018-09-18

## 2018-09-18 NOTE — TELEPHONE ENCOUNTER
----- Message from Kalyene Mejia sent at 9/18/2018  3:47 PM CDT -----  Contact: Tari   Name of Who is Calling: Tari    What is the request in detail: Patients daughter Tari  is requesting to speak with the staff in regards to her mother flu injection......Please contact to further discuss and advise     Can the clinic reply by [facility]: No      What [facility] [phone]

## 2018-09-30 ENCOUNTER — PATIENT MESSAGE (OUTPATIENT)
Dept: INTERNAL MEDICINE | Facility: CLINIC | Age: 78
End: 2018-09-30

## 2018-10-01 RX ORDER — LEVOTHYROXINE SODIUM 137 UG/1
0.14 CAPSULE ORAL DAILY
Qty: 90 CAPSULE | Refills: 3 | Status: SHIPPED | OUTPATIENT
Start: 2018-10-01 | End: 2018-10-03

## 2018-10-02 NOTE — TELEPHONE ENCOUNTER
----- Message from Tiffany Diaz sent at 10/1/2018  1:14 PM CDT -----  Contact: daughter            Name of Who is Calling: PARKS,ALFREDINA CLAUDETTE [[id number]]      What is the request in detail: pt's daughter states there is a error with pt's medication.. Pt's daughter would like to speak with Kci.. Please advise    Can the clinic reply by [facility]: no    [facility] [phone]

## 2018-10-03 ENCOUNTER — EPISODE CHANGES (OUTPATIENT)
Dept: HEPATOLOGY | Facility: CLINIC | Age: 78
End: 2018-10-03

## 2018-10-03 RX ORDER — LEVOTHYROXINE SODIUM 125 UG/1
125 TABLET ORAL DAILY
Qty: 90 TABLET | Refills: 2 | Status: SHIPPED | OUTPATIENT
Start: 2018-10-03 | End: 2019-10-03

## 2018-10-03 RX ORDER — LEVOTHYROXINE SODIUM 137 UG/1
137 TABLET ORAL
Qty: 30 TABLET | Refills: 2 | OUTPATIENT
Start: 2018-10-03 | End: 2019-10-03

## 2018-10-03 RX ORDER — LEVOTHYROXINE SODIUM 137 UG/1
137 TABLET ORAL
Qty: 90 TABLET | Refills: 3 | Status: SHIPPED | OUTPATIENT
Start: 2018-10-03 | End: 2018-10-03

## 2018-10-03 NOTE — TELEPHONE ENCOUNTER
----- Message from Christina Clifford sent at 10/3/2018 10:01 AM CDT -----  Contact: Tari Ortiz (Daughter)  Name of Who is Calling: Tari Ortiz (Daughter)    What is the request in detail: patient daughter would like a call back from [facility] regarding wrong medication for levothyroxine being sent to pharmacy. Please call     Can the clinic reply by [facility]: no    What [facility]: [phone]  5:51 PM

## 2018-10-25 ENCOUNTER — OFFICE VISIT (OUTPATIENT)
Dept: PODIATRY | Facility: CLINIC | Age: 78
End: 2018-10-25
Payer: MEDICARE

## 2018-10-25 ENCOUNTER — PATIENT MESSAGE (OUTPATIENT)
Dept: PODIATRY | Facility: CLINIC | Age: 78
End: 2018-10-25

## 2018-10-25 VITALS
SYSTOLIC BLOOD PRESSURE: 110 MMHG | HEART RATE: 77 BPM | HEIGHT: 59 IN | WEIGHT: 169 LBS | BODY MASS INDEX: 34.07 KG/M2 | DIASTOLIC BLOOD PRESSURE: 65 MMHG

## 2018-10-25 DIAGNOSIS — B35.3 TINEA PEDIS OF BOTH FEET: ICD-10-CM

## 2018-10-25 DIAGNOSIS — R73.03 PRE-DIABETES: Primary | ICD-10-CM

## 2018-10-25 DIAGNOSIS — L84 CORN OR CALLUS: ICD-10-CM

## 2018-10-25 DIAGNOSIS — I87.2 VENOUS INSUFFICIENCY: ICD-10-CM

## 2018-10-25 DIAGNOSIS — M05.771 RHEUMATOID ARTHRITIS INVOLVING BOTH FEET WITH POSITIVE RHEUMATOID FACTOR: ICD-10-CM

## 2018-10-25 DIAGNOSIS — M05.772 RHEUMATOID ARTHRITIS INVOLVING BOTH FEET WITH POSITIVE RHEUMATOID FACTOR: ICD-10-CM

## 2018-10-25 DIAGNOSIS — B35.1 ONYCHOMYCOSIS DUE TO DERMATOPHYTE: ICD-10-CM

## 2018-10-25 PROCEDURE — 99499 UNLISTED E&M SERVICE: CPT | Mod: S$GLB,,,

## 2018-10-25 PROCEDURE — 11721 DEBRIDE NAIL 6 OR MORE: CPT | Mod: 59,Q9,S$PBB,

## 2018-10-25 PROCEDURE — 99999 PR PBB SHADOW E&M-EST. PATIENT-LVL III: CPT | Mod: PBBFAC,,,

## 2018-10-25 PROCEDURE — 11056 PARNG/CUTG B9 HYPRKR LES 2-4: CPT | Mod: Q9,PBBFAC,PN

## 2018-10-25 PROCEDURE — 99213 OFFICE O/P EST LOW 20 MIN: CPT | Mod: PBBFAC,PN

## 2018-10-25 PROCEDURE — 11056 PARNG/CUTG B9 HYPRKR LES 2-4: CPT | Mod: Q9,S$PBB,,

## 2018-10-25 PROCEDURE — 11721 DEBRIDE NAIL 6 OR MORE: CPT | Mod: Q9,PBBFAC,PN

## 2018-10-25 PROCEDURE — 99499 UNLISTED E&M SERVICE: CPT | Mod: S$PBB,,,

## 2018-10-25 RX ORDER — AMIODARONE HYDROCHLORIDE 100 MG/1
TABLET ORAL
Refills: 3 | COMMUNITY
Start: 2018-09-10

## 2018-10-25 RX ORDER — CARVEDILOL 6.25 MG/1
TABLET ORAL
Refills: 0 | COMMUNITY
Start: 2018-10-16 | End: 2018-12-07

## 2018-10-25 NOTE — PROGRESS NOTES
Subjective:       Patient ID: Alfredina Claudette Parks is a 78 y.o. female.    Chief Complaint: Foot Pain and Diabetes Mellitus (PCP JUSTIN Frost 08/22/18)    Foot Pain       Mitzy is a 78 y.o. female who presents to the clinic for evaluation and treatment of high risk feet. Mitzy has a past medical history of Absence of bladder continence (4/20/2015), Asthma, Asthma without status asthmaticus (6/28/2012), Atrial fibrillation, BMI 36.0-36.9,adult (8/25/2014), Bulging lumbar disc, Cataract, Cervical radiculopathy, Claudication (5/18/2017), Constipation (8/30/2013), Diabetes mellitus, Diverticular disease (8/30/2013), GERD (gastroesophageal reflux disease) (6/28/2012), Hepatitis C, Hyperglycemia, Hypertension, Hypothyroidism, Osteoporosis, Pancreatic cyst, and Vitamin D deficiency disease. Main complaint today is pain between her toes.  The patient's chief complaint is painful feet and legs especially when her feet swell. She does have chronic swelling in her legs and wears compression stockings which appear to be 20-30 millimeters of mercury pressure. She also complains about painful thick yellow discolored toenails. She had a complete matricectomy on the right and 1 on the left which failed and her toenail removed back. She also underwent a right bunionectomy in the past.    PCP: Veronica Frost MD  2/20/18    Current shoe gear:  Affected Foot: Tennis shoes     Unaffected Foot: Tennis shoes    Last encounter in this department: Visit date not found    Hemoglobin A1C   Date Value Ref Range Status   01/18/2017 5.9 4.5 - 6.2 % Final     Comment:     According to ADA guidelines, hemoglobin A1C <7.0% represents  optimal control in non-pregnant diabetic patients.  Different  metrics may apply to specific populations.   Standards of Medical Care in Diabetes - 2016.  For the purpose of screening for the presence of diabetes:  <5.7%     Consistent with the absence of diabetes  5.7-6.4%  Consistent with  increasing risk for diabetes   (prediabetes)  >or=6.5%  Consistent with diabetes  Currently no consensus exists for use of hemoglobin A1C  for diagnosis of diabetes for children.     08/20/2015 5.6 4.5 - 6.2 % Final   08/21/2014 5.6 4.5 - 6.2 % Final       Past Medical History:   Diagnosis Date    Absence of bladder continence 4/20/2015    Asthma     Asthma without status asthmaticus 6/28/2012    Atrial fibrillation     BMI 36.0-36.9,adult 8/25/2014    Bulging lumbar disc     Cataract     Cervical radiculopathy     Claudication 5/18/2017    Constipation 8/30/2013    Diabetes mellitus     pre diabetes     Diverticular disease 8/30/2013    GERD (gastroesophageal reflux disease) 6/28/2012    Hepatitis C     Hyperglycemia     Hypertension     Hypothyroidism     Osteoporosis     Pancreatic cyst     Vitamin D deficiency disease        Past Surgical History:   Procedure Laterality Date    BREAST CYST EXCISION Bilateral     COLONOSCOPY N/A 7/11/2013    [facility]    CYST REMOVAL      ESOPHAGOGASTRODUODENOSCOPY (EGD) N/A 1/21/2015    [facility]    EYE SURGERY      cataracts    HYSTERECTOMY      TOE SURGERY Bilateral     big toenails    ULTRASOUND-ENDOSCOPIC-UPPER N/A 1/15/2018    [facility] (2ND FLR)    ULTRASOUND-ENDOSCOPIC-UPPER N/A 8/14/2015    [facility]    ULTRASOUND-ENDOSCOPIC-UPPER N/A 1/21/2015    [facility]    ULTRASOUND-ENDOSCOPIC-UPPER W/POSSIBLE FNA N/A 2/15/2016    [facility] at [facility]       Family History   Problem Relation Age of Onset    Ovarian cancer Mother     Lung cancer Father     Stomach cancer Sister     Asthma Sister     Throat cancer Brother     Diabetes Maternal Grandfather     Diabetes Maternal Aunt     Breast cancer Sister 55    Asthma Other     Emphysema Neg  Hx        Social History     Socioeconomic History    Marital status:      Spouse name: None    Number of children: None    Years of education: None    Highest education level: None   Social Needs    Financial resource strain: None    Food insecurity - worry: None    Food insecurity - inability: None    Transportation needs - medical: None    Transportation needs - non-medical: None   Occupational History    Occupation: retired  at [employer]    Tobacco Use    Smoking status: Never Smoker    Smokeless tobacco: Never Used   Substance and Sexual Activity    Alcohol use: No     Alcohol/week: 0.0 oz    Drug use: No    Sexual activity: Not Currently     Partners: Male   Other Topics Concern    None   Social History Narrative    None       Current Outpatient Medications   Medication Sig Dispense Refill    albuterol (PROVENTIL) 2.5 mg /3 mL (0.083 %) nebulizer solution TAKE 3ML BY NEBULIZATION EVERY 6 HOURS AS NEEDED FOR WHEEZING. 1050 mL 3    albuterol (VENTOLIN HFA) 90 mcg/actuation inhaler INHALE 2 PUFFS PO Q 6 H PRN 18 g 6    allopurinol (ZYLOPRIM) 100 MG tablet Take 1 tablet (100 mg total) by mouth once daily. 90 tablet 3    aspirin (ECOTRIN) 81 MG EC tablet Take 81 mg by mouth once daily.        carvedilol (COREG) 6.25 MG tablet Take 1 tablet (6.25 mg total) by mouth 2 (two) times daily with meals. 60 tablet 11    clotrimazole (LOTRIMIN) 1 % Soln Apply topically once daily. 30 mL 11    colchicine 0.6 mg tablet Take 1 tablet (0.6 mg total) by mouth once daily. 90 tablet 3    diclofenac sodium 1 % Gel Apply 4 g topically 4 (four) times daily. 1 Tube 6    docusate sodium (COLACE) 100 MG capsule Take 1 capsule (100 mg total) by mouth 3 (three) times daily as needed for Constipation. 270 capsule 1    esomeprazole (NEXIUM) 40 MG capsule TAKE 1 CAPSULE(40 MG) BY MOUTH BEFORE BREAKFAST 90 capsule 3    fluticasone (FLONASE) 50 mcg/actuation nasal spray SHAKE LIQUID AND USE 1 SPRAY IN  EACH NOSTRIL EVERY DAY 48 mL 3    fluticasone-salmeterol 250-50 mcg/dose (ADVAIR DISKUS) 250-50 mcg/dose diskus inhaler Inhale 1 puff into the lungs 2 (two) times daily. Controller 180 each 3    furosemide (LASIX) 20 MG tablet Take 2 tablets (40 mg total) by mouth once daily. (Patient taking differently: Take 20 mg by mouth once daily. ) 60 tablet 11    hydrALAZINE (APRESOLINE) 10 MG tablet Take 1 tablet (10 mg total) by mouth daily as needed (SBP>180). 90 tablet 0    influenza (FLUZONE HIGH-DOSE) 180 mcg/0.5 mL vaccine Inject into the muscle. 0.5 mL 0    irbesartan (AVAPRO) 75 MG tablet Take 1 tablet (75 mg total) by mouth every evening. 90 tablet 3    ketoconazole (NIZORAL) 2 % shampoo Apply topically twice a week. 120 mL 5    levothyroxine (SYNTHROID) 125 MCG tablet Take 1 tablet (125 mcg total) by mouth once daily. 90 tablet 2    meclizine (ANTIVERT) 12.5 mg tablet Take 12.5 mg by mouth 3 (three) times daily as needed.      mupirocin (BACTROBAN) 2 % ointment APPLY TO NARES BID  0    NIFEdipine (PROCARDIA-XL) 30 MG (OSM) 24 hr tablet TAKE 1 TABLET(30 MG) BY MOUTH TWICE DAILY 60 tablet 0    polyethylene glycol (GLYCOLAX) 17 gram/dose powder Take 17 g by mouth once daily. 1700 g 3    solifenacin (VESICARE) 5 MG tablet Take 1 tablet (5 mg total) by mouth once daily. 30 tablet 11    spironolactone (ALDACTONE) 25 MG tablet Take 2 tablets (50 mg total) by mouth once daily. 30 tablet 5    triamcinolone acetonide 0.025% (KENALOG) 0.025 % cream Apply topically 2 (two) times daily. 60 g 0     No current facility-administered medications for this visit.        Review of patient's allergies indicates:   Allergen Reactions    Milk containing products Shortness Of Breath    Nuts [tree nut] Anaphylaxis    Fosamax [alendronate]      dizziness       Review of Systems  ROS:  Constitution: Negative for chills, fever, weakness and malaise/fatigue.   HEENT: Negative for headaches.   Cardiovascular: Positive for  chronic bilateral lower extremity edema   Respiratory: Negative for cough and shortness of breath.   Musculoskeletal: Positive for bilateral foot pain.  Negative for muscle cramps and muscle weakness.   Gastrointestinal: Negative for nausea and vomiting.   Neurological: Negative for numbness and paresthesias.   Dermatological: Negativefor skin rash, Negative for calluses, Positive for fungal nails, Negative for wound.        Objective:      Physical Exam  Constitutional:  Patient is oriented to person, place, and time. Vital signs are normal.  Appears well-developed and well-nourished.     Vascular:  Dorsalis pedis pulses are 2/4 on the right side, and 2/4 on the left side.   Posterior tibial pulses are 2/4 on the right side, and 2/4 on the left side.   Negative for digital hair growth, capillary fill time to all toes <3 seconds, toes are warm touch, moderate bilateral lower extremity swelling and edema with no evidence of stasis changes     Skin/Dermatological:  Skin is warm and intact.  No cyanosis or clubbing.  No rashes noted.  No open wounds.  All ten toenails yellow discolored, thickened 2-4 mm to base with subungual debris except right hallux nail has been removed.  + scaly, erythematous rash all interspaces  B/l distal 2nd toe callus    Musculoskeletal:      Hallux abducto valgus bilaterally worse on the left, hammertoes 2-5 observed.  Pedal rom limited.   (--) ankle joint DF restriction with both knee flexed and extened.    Neurological:  (--) deficits to sharp/dull, light touch or vibratory sensation bilateral feet, ten points tested.   Muscle strength to tibialis anterior, extensor hallucis longus, extensor digitorum longus, peroneal muscles, flexor hallucis/digotorum longus, posterior tibial and gastrosoleal complex is 5/5, normal tone without assymmetry   Patellar reflexes are 2+ on the right side and 2+ on the left side.  Achilles reflexes are 2+ on the right side and 2+ on the left  side.      Assessment:       1. Pre-diabetes    2. Venous insufficiency    3. Onychomycosis due to dermatophyte    4. Rheumatoid arthritis involving both feet with positive rheumatoid factor        Plan:       Pre-diabetes    Venous insufficiency    Onychomycosis due to dermatophyte    Rheumatoid arthritis involving both feet with positive rheumatoid factor        Shoe inspection. Foot Education. Patient instructed on proper foot hygeine. We discussed wearing proper shoe gear, daily foot inspections, never walking without protective shoe gear, never putting sharp instruments to feet.  We also discussed padding and shoes with high toe boxes for  foot deformities.  Discussed importance of wearing the compression stockings and to monitor for any wounds or infection.  She declined referral for US or to a vascular surgeon for her swelling.  Betadine between the toes daily, spacer first IS b/l.  - With patient's permission, all ten toenails were aggressively reduced and debrided  to their soft tissue attachment mechanically with nail nipper, removing all offending nail and debris.  Calluses pared x 2 with 15 blade.  Patient relates relief following the procedure. Patient will continue to monitor the areas daily, inspect her feet, wear protective shoe gear when ambulatory, moisturizer to maintain skin integrity and follow in this office in approximately 2-3 months, sooner p.r.n.

## 2018-11-05 RX ORDER — SPIRONOLACTONE 25 MG/1
50 TABLET ORAL DAILY
Qty: 30 TABLET | Refills: 5 | Status: ON HOLD | OUTPATIENT
Start: 2018-11-05 | End: 2018-12-17

## 2018-11-06 ENCOUNTER — PATIENT MESSAGE (OUTPATIENT)
Dept: INTERNAL MEDICINE | Facility: CLINIC | Age: 78
End: 2018-11-06

## 2018-11-06 ENCOUNTER — OFFICE VISIT (OUTPATIENT)
Dept: INTERNAL MEDICINE | Facility: CLINIC | Age: 78
End: 2018-11-06
Attending: FAMILY MEDICINE
Payer: MEDICARE

## 2018-11-06 VITALS
DIASTOLIC BLOOD PRESSURE: 62 MMHG | HEIGHT: 59 IN | SYSTOLIC BLOOD PRESSURE: 98 MMHG | BODY MASS INDEX: 33.52 KG/M2 | OXYGEN SATURATION: 98 % | HEART RATE: 77 BPM | WEIGHT: 166.25 LBS

## 2018-11-06 DIAGNOSIS — E03.9 HYPOTHYROIDISM, UNSPECIFIED TYPE: ICD-10-CM

## 2018-11-06 DIAGNOSIS — R43.8 BITTER TASTE: ICD-10-CM

## 2018-11-06 DIAGNOSIS — R73.03 PRE-DIABETES: ICD-10-CM

## 2018-11-06 DIAGNOSIS — M10.9 GOUT, UNSPECIFIED CAUSE, UNSPECIFIED CHRONICITY, UNSPECIFIED SITE: ICD-10-CM

## 2018-11-06 DIAGNOSIS — E78.5 HYPERLIPIDEMIA, UNSPECIFIED HYPERLIPIDEMIA TYPE: ICD-10-CM

## 2018-11-06 DIAGNOSIS — Z12.31 VISIT FOR SCREENING MAMMOGRAM: ICD-10-CM

## 2018-11-06 DIAGNOSIS — I10 HTN (HYPERTENSION), BENIGN: Primary | ICD-10-CM

## 2018-11-06 PROCEDURE — 3078F DIAST BP <80 MM HG: CPT | Mod: CPTII,S$GLB,, | Performed by: FAMILY MEDICINE

## 2018-11-06 PROCEDURE — 3074F SYST BP LT 130 MM HG: CPT | Mod: CPTII,S$GLB,, | Performed by: FAMILY MEDICINE

## 2018-11-06 PROCEDURE — 99214 OFFICE O/P EST MOD 30 MIN: CPT | Mod: S$GLB,,, | Performed by: FAMILY MEDICINE

## 2018-11-06 PROCEDURE — 99999 PR PBB SHADOW E&M-EST. PATIENT-LVL III: CPT | Mod: PBBFAC,,, | Performed by: FAMILY MEDICINE

## 2018-11-06 PROCEDURE — 1101F PT FALLS ASSESS-DOCD LE1/YR: CPT | Mod: CPTII,S$GLB,, | Performed by: FAMILY MEDICINE

## 2018-11-06 RX ORDER — PANTOPRAZOLE SODIUM 40 MG/1
40 TABLET, DELAYED RELEASE ORAL DAILY
Qty: 30 TABLET | Refills: 1 | Status: SHIPPED | OUTPATIENT
Start: 2018-11-06 | End: 2018-11-06 | Stop reason: SDUPTHER

## 2018-11-06 RX ORDER — PANTOPRAZOLE SODIUM 40 MG/1
TABLET, DELAYED RELEASE ORAL
Qty: 30 TABLET | Refills: 1 | Status: SHIPPED | OUTPATIENT
Start: 2018-11-06

## 2018-11-06 NOTE — MEDICAL/APP STUDENT
Subjective:       Patient ID: Alfredina Claudette Parks is a 78 y.o. female.    Chief Complaint: Hypertension (f/u)    Pt is here for f/u    Pt is borderline hypotensive at 98/60 today. She is on spironolactone 25 BID, Nifedipine 30 OD, irbesartan 75 OD, Lasix 40 OD, Carvedilol 6.25 OD,  and Hydralazine 10 mg po PRN.    Pt has had 3 episodes of wheezing last month for which were relieved using her advair and albuterol. Pt reports using albuterol prn every other day. Pt reports dyspnea on exertion, but generally well when resting. Pt does not take advair preventatively, but rather prn. Explained to pt that the medication is preventative rather than for symptom relief.     Pt reports a persistent dry cough that started on Sunday. She started taking Coricidin HBP (acetaminophen 325, chlorpheniramine 2mg, dextromethorphan 10 mg) which has helped. She reports the cough is resolving.     Pt reports 1-2/10 aching abdominal pain on the left side of her abdomen which started a couple of weeks ago. The pain comes and goes. Sometimes nauseated, no vomit. Pt has decreased appetite. Pt reports constipation, no diarrhea. No black or tarry stool, no gildardo blood. Pt is on docusate. Pt has an appointment with her GI next month in regards to her Hep C.     Mouth is bitter. Uses candy help the taste. Explained to pt that amiodarone may be causing the taste. She reports that her cardiologist may take her off of amio and change her to a different medication.     Pt has pain in between her toes on both feet. Saw a podiatrist who removed a corn from in between her left first and second toes. She has a follow up appointment with him next week.     Vax:   Has already had flu shot  Due for mammogram      Review of Systems   Respiratory: Positive for shortness of breath and wheezing.         Sob on exertion, wheezing on occasion which is relieved by advair and albuterol   Cardiovascular: Negative for chest pain and palpitations.    Gastrointestinal: Positive for abdominal pain.       Objective:      Physical Exam    Assessment:       No diagnosis found.    Plan:

## 2018-11-06 NOTE — PROGRESS NOTES
"Subjective:      Patient ID: Alfredina Claudette Parks is a 78 y.o. female.    Chief Complaint: Hypertension (f/u)    HPI   She is here for follow up. She had three episodes of wheezing last month. It does resolve with inhalers. She did have a cough that has improved. She reports constipation, no blood in stool or black tarry stools. She has a bad taste in her mouth that started two weeks ago. She has had some post nasal drip and using coricidin that is helping. If she does eat candy it does reduce the bitterness. She does have a shooting pain that comes and goes in her left abdomen. No vomiting with it. Eating does not bring on the pain. It occurs once or twice a week unsure of triggers.     Review of Systems   Constitutional: Negative for fever.   HENT: Positive for sore throat. Negative for ear pain, postnasal drip and rhinorrhea.    Respiratory: Positive for cough and wheezing. Negative for shortness of breath.    Cardiovascular: Negative for chest pain.   Musculoskeletal: Negative for myalgias.   Skin: Negative for rash.   Neurological: Negative for headaches.     I personally reviewed Past Medical History, Past Surgical history,  Past Social History and Family History      Objective:   BP 98/62   Pulse 77   Ht 4' 11" (1.499 m)   Wt 75.4 kg (166 lb 3.6 oz)   SpO2 98%   BMI 33.57 kg/m²     Physical Exam   Constitutional: She is oriented to person, place, and time. She appears well-developed and well-nourished. No distress.   HENT:   Head: Normocephalic and atraumatic.   Right Ear: Hearing, tympanic membrane, external ear and ear canal normal.   Left Ear: Hearing, tympanic membrane, external ear and ear canal normal.   Nose: Nose normal.   Mouth/Throat: Uvula is midline and oropharynx is clear and moist. No oropharyngeal exudate.   Eyes: Conjunctivae and EOM are normal. Pupils are equal, round, and reactive to light. Right eye exhibits no discharge. Left eye exhibits no discharge. No scleral icterus.   Neck: " Normal range of motion. Neck supple.   Cardiovascular: Normal rate, regular rhythm, normal heart sounds and intact distal pulses. Exam reveals no gallop.   No murmur heard.  Pulmonary/Chest: Effort normal and breath sounds normal. No respiratory distress. She has no wheezes. She has no rales. She exhibits no tenderness.   Abdominal: Soft. Bowel sounds are normal. She exhibits no distension and no mass. There is no tenderness. There is no rebound and no guarding.   Neurological: She is alert and oriented to person, place, and time.   Skin: Skin is warm and dry.   Vitals reviewed.      1. HTN (hypertension), benign    2. Hypothyroidism, unspecified type    3. Pre-diabetes    4. Hyperlipidemia, unspecified hyperlipidemia type    5. Gout, unspecified cause, unspecified chronicity, unspecified site    6. Bitter taste    7. Visit for screening mammogram        1. Hypotensive, consider holding nifedipine and monitoring BP at home   2. stable, synthroid   3. Diet control, will continue to monitor   4. Polypharmacy, patient to discuss with cardiology starting statin   5. Stable, cont allpurinol, check uric acid level  6. Concern for reflux contributing, possible amiodarone, will trial change to protonix and taking it 30 minutes prior to eating, call if no improvement   7. Patient would like to continue screening, will schedule   8. Patient has GI follow up to further discuss work up for abdominal pain, US completed 6/2018    Orders Placed This Encounter   Procedures    Mammo Digital Screening Bilat w/ Wesley    CBC auto differential    Comprehensive metabolic panel    Hemoglobin A1c    Lipid panel    TSH    Uric acid    Lipase     Medications Ordered This Encounter   Medications    pantoprazole (PROTONIX) 40 MG tablet     Sig: Take 1 tablet (40 mg total) by mouth once daily. Take 30 mins bf eating     Dispense:  30 tablet     Refill:  1    The 10-year ASCVD risk score ([place] GABRIEL Jr., et al., 2013) is: 8.8%    Values  used to calculate the score:      Age: 78 years      Sex: Female      Is Non- : Yes      Diabetic: No      Tobacco smoker: No      Systolic Blood Pressure: 98 mmHg      Is BP treated: Yes      HDL Cholesterol: 47 mg/dL      Total Cholesterol: 165 mg/dL

## 2018-11-27 ENCOUNTER — PATIENT MESSAGE (OUTPATIENT)
Dept: HEPATOLOGY | Facility: CLINIC | Age: 78
End: 2018-11-27

## 2018-11-27 ENCOUNTER — PATIENT MESSAGE (OUTPATIENT)
Dept: INTERNAL MEDICINE | Facility: CLINIC | Age: 78
End: 2018-11-27

## 2018-11-28 ENCOUNTER — PATIENT MESSAGE (OUTPATIENT)
Dept: GASTROENTEROLOGY | Facility: CLINIC | Age: 78
End: 2018-11-28

## 2018-11-28 ENCOUNTER — PATIENT MESSAGE (OUTPATIENT)
Dept: HEPATOLOGY | Facility: CLINIC | Age: 78
End: 2018-11-28

## 2018-11-29 ENCOUNTER — PATIENT MESSAGE (OUTPATIENT)
Dept: HEPATOLOGY | Facility: CLINIC | Age: 78
End: 2018-11-29

## 2018-12-03 RX ORDER — PANTOPRAZOLE SODIUM 40 MG/1
TABLET, DELAYED RELEASE ORAL
Qty: 90 TABLET | Refills: 1 | Status: SHIPPED | OUTPATIENT
Start: 2018-12-03 | End: 2018-12-07 | Stop reason: SDUPTHER

## 2018-12-07 ENCOUNTER — LAB VISIT (OUTPATIENT)
Dept: LAB | Facility: HOSPITAL | Age: 78
End: 2018-12-07
Payer: MEDICARE

## 2018-12-07 ENCOUNTER — TELEPHONE (OUTPATIENT)
Dept: HEPATOLOGY | Facility: CLINIC | Age: 78
End: 2018-12-07

## 2018-12-07 ENCOUNTER — PATIENT MESSAGE (OUTPATIENT)
Dept: HEPATOLOGY | Facility: CLINIC | Age: 78
End: 2018-12-07

## 2018-12-07 ENCOUNTER — OFFICE VISIT (OUTPATIENT)
Dept: HEPATOLOGY | Facility: CLINIC | Age: 78
End: 2018-12-07
Payer: MEDICARE

## 2018-12-07 VITALS
HEART RATE: 83 BPM | OXYGEN SATURATION: 97 % | RESPIRATION RATE: 18 BRPM | BODY MASS INDEX: 32.67 KG/M2 | WEIGHT: 155.63 LBS | SYSTOLIC BLOOD PRESSURE: 138 MMHG | DIASTOLIC BLOOD PRESSURE: 76 MMHG | HEIGHT: 58 IN

## 2018-12-07 DIAGNOSIS — K74.00 HEPATIC FIBROSIS: ICD-10-CM

## 2018-12-07 DIAGNOSIS — D37.6 NEOPLASM OF UNCERTAIN BEHAVIOR OF LIVER, GALLBLADDER, AND BILE DUCTS: ICD-10-CM

## 2018-12-07 DIAGNOSIS — K86.2 PANCREATIC CYST: Primary | ICD-10-CM

## 2018-12-07 DIAGNOSIS — K76.9 LIVER DISEASE: ICD-10-CM

## 2018-12-07 DIAGNOSIS — K86.2 PANCREATIC CYST: ICD-10-CM

## 2018-12-07 DIAGNOSIS — Z86.19 HISTORY OF HEPATITIS C: ICD-10-CM

## 2018-12-07 DIAGNOSIS — K74.00 LIVER FIBROSIS: ICD-10-CM

## 2018-12-07 LAB
AFP SERPL-MCNC: 15 NG/ML
ALBUMIN SERPL BCP-MCNC: 2.5 G/DL
ALP SERPL-CCNC: 218 U/L
ALT SERPL W/O P-5'-P-CCNC: 50 U/L
ANION GAP SERPL CALC-SCNC: 10 MMOL/L
AST SERPL-CCNC: 69 U/L
BASOPHILS # BLD AUTO: 0.04 K/UL
BASOPHILS NFR BLD: 0.3 %
BILIRUB SERPL-MCNC: 0.7 MG/DL
BUN SERPL-MCNC: 17 MG/DL
CALCIUM SERPL-MCNC: 9 MG/DL
CANCER AG19-9 SERPL-ACNC: 1616 U/ML
CHLORIDE SERPL-SCNC: 98 MMOL/L
CO2 SERPL-SCNC: 28 MMOL/L
CREAT SERPL-MCNC: 1.2 MG/DL
DIFFERENTIAL METHOD: ABNORMAL
EOSINOPHIL # BLD AUTO: 0.1 K/UL
EOSINOPHIL NFR BLD: 0.6 %
ERYTHROCYTE [DISTWIDTH] IN BLOOD BY AUTOMATED COUNT: 15 %
EST. GFR  (AFRICAN AMERICAN): 50.7 ML/MIN/1.73 M^2
EST. GFR  (NON AFRICAN AMERICAN): 44 ML/MIN/1.73 M^2
GLUCOSE SERPL-MCNC: 118 MG/DL
HCT VFR BLD AUTO: 37 %
HGB BLD-MCNC: 11.7 G/DL
IMM GRANULOCYTES # BLD AUTO: 0.07 K/UL
IMM GRANULOCYTES NFR BLD AUTO: 0.5 %
INR PPP: 1.1
LYMPHOCYTES # BLD AUTO: 2.2 K/UL
LYMPHOCYTES NFR BLD: 16 %
MCH RBC QN AUTO: 30.5 PG
MCHC RBC AUTO-ENTMCNC: 31.6 G/DL
MCV RBC AUTO: 96 FL
MONOCYTES # BLD AUTO: 1.4 K/UL
MONOCYTES NFR BLD: 10.2 %
NEUTROPHILS # BLD AUTO: 10 K/UL
NEUTROPHILS NFR BLD: 72.4 %
NRBC BLD-RTO: 0 /100 WBC
PLATELET # BLD AUTO: 224 K/UL
PMV BLD AUTO: 10.4 FL
POTASSIUM SERPL-SCNC: 3.2 MMOL/L
PROT SERPL-MCNC: 7.8 G/DL
PROTHROMBIN TIME: 11.1 SEC
RBC # BLD AUTO: 3.84 M/UL
SODIUM SERPL-SCNC: 136 MMOL/L
WBC # BLD AUTO: 13.84 K/UL

## 2018-12-07 PROCEDURE — 99214 OFFICE O/P EST MOD 30 MIN: CPT | Mod: S$GLB,,, | Performed by: PHYSICIAN ASSISTANT

## 2018-12-07 PROCEDURE — 36415 COLL VENOUS BLD VENIPUNCTURE: CPT

## 2018-12-07 PROCEDURE — 3078F DIAST BP <80 MM HG: CPT | Mod: CPTII,S$GLB,, | Performed by: PHYSICIAN ASSISTANT

## 2018-12-07 PROCEDURE — 86301 IMMUNOASSAY TUMOR CA 19-9: CPT

## 2018-12-07 PROCEDURE — 82105 ALPHA-FETOPROTEIN SERUM: CPT

## 2018-12-07 PROCEDURE — 85610 PROTHROMBIN TIME: CPT

## 2018-12-07 PROCEDURE — 99999 PR PBB SHADOW E&M-EST. PATIENT-LVL V: CPT | Mod: PBBFAC,,, | Performed by: PHYSICIAN ASSISTANT

## 2018-12-07 PROCEDURE — 3075F SYST BP GE 130 - 139MM HG: CPT | Mod: CPTII,S$GLB,, | Performed by: PHYSICIAN ASSISTANT

## 2018-12-07 PROCEDURE — 3288F FALL RISK ASSESSMENT DOCD: CPT | Mod: CPTII,S$GLB,, | Performed by: PHYSICIAN ASSISTANT

## 2018-12-07 PROCEDURE — 99499 UNLISTED E&M SERVICE: CPT | Mod: S$GLB,,, | Performed by: PHYSICIAN ASSISTANT

## 2018-12-07 PROCEDURE — 1100F PTFALLS ASSESS-DOCD GE2>/YR: CPT | Mod: CPTII,S$GLB,, | Performed by: PHYSICIAN ASSISTANT

## 2018-12-07 PROCEDURE — 85025 COMPLETE CBC W/AUTO DIFF WBC: CPT

## 2018-12-07 PROCEDURE — 80053 COMPREHEN METABOLIC PANEL: CPT

## 2018-12-07 RX ORDER — LOSARTAN POTASSIUM 25 MG/1
25 TABLET ORAL DAILY
Status: ON HOLD | COMMUNITY
Start: 2018-12-03 | End: 2018-12-21 | Stop reason: HOSPADM

## 2018-12-07 RX ORDER — CARVEDILOL 3.12 MG/1
3.12 TABLET ORAL 2 TIMES DAILY WITH MEALS
COMMUNITY

## 2018-12-07 RX ORDER — ESOMEPRAZOLE MAGNESIUM 40 MG/1
40 GRANULE, DELAYED RELEASE ORAL DAILY
Status: ON HOLD | COMMUNITY
End: 2018-12-17

## 2018-12-07 RX ORDER — LORATADINE 10 MG/1
10 TABLET ORAL DAILY
COMMUNITY

## 2018-12-07 NOTE — TELEPHONE ENCOUNTER
Patient: Alfredina Claudette Parks       MRN: [id number]      : 1940     Age: 78 y.o.  [address]  [address]  [facility] [address]    Provider: [facility] - JOCY Solis    Urgency of review: non-urgent    Patient Transplant Status: Not a candidate    Reason for presentation: Indeterminate lesion    Clinical Summary: 78 y.o. 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 H/o pancreatic cyst, followed by [facility]. Recent episode of abdominal pain and chest pain hospitalized at outside facility. CT done noted cystic lesion of pancreas, necrotic lymph node versus mass at pablito hepatis with intrahepatic biliary dilation. Cystic enhancing lesion noted in the liver.     Pt discharged from [facility] and wished to follow up here. EUS not done during IP stay.     Last EUS done 2018, repeat was planned for 2019.     AFP increased from 3.0 to 15  Per outside notes, CA 19 [phone]    Imaging to be reviewed: U/S, CT- external, h/o EUS as well     HCC Treatment History:     ABO:     Platelets:   Lab Results   Component Value Date/Time     2018 10:45 AM     Creatinine:   Lab Results   Component Value Date/Time    CREATININE 1.2 2018 10:45 AM     Bilirubin:   Lab Results   Component Value Date/Time    BILITOT 0.7 2018 10:45 AM     AFP Last 3 each if available:   Lab Results   Component Value Date/Time    AFP 15 (H) 2018 10:45 AM    AFP 3.9 2018 10:20 AM    AFP 4.2 01/10/2018 09:05 AM       MELD: MELD-Na score: 9 at 2018 10:45 AM  MELD score: 9 at 2018 10:45 AM  Calculated from:  Serum Creatinine: 1.2 mg/dL at 2018 10:45 AM  Serum Sodium: 136 mmol/L at 2018 10:45 AM  Total Bilirubin: 0.7 mg/dL (Rounded to 1 mg/dL) at 2018 10:45 AM  INR(ratio): 1.1 at 2018 10:45 AM  Age: 78 years    Plan:     Follow-up Provider:

## 2018-12-07 NOTE — TELEPHONE ENCOUNTER
Patient: Alfredina Claudette Parks       MRN: [id number]      : 1940     Age: 78 y.o.  [address]  [address]  [facility] [address]    Provider: BUTCH - JOCY Solis    Urgency of review: non-urgent    Patient Transplant Status: Not a candidate    Reason for presentation: Indeterminate lesion    Clinical Summary: 78 y.o. 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 H/o pancreatic cyst, followed by [facility]. Recent episode of abdominal pain and chest pain hospitalized at outside facility. CT done noted cystic lesion of pancreas, necrotic lymph node versus mass at pablito hepatis with intrahepatic biliary dilation. Cystic enhancing lesion noted in the liver.     Pt discharged from [facility] and wished to follow up here. EUS not done during IP stay.     Last EUS done 2018, repeat was planned for 2019.     AFP increased from 3.0 to 15  Per outside notes, CA 19 9 4288    Imaging to be reviewed: U/S, CT- external, h/o EUS as well     HCC Treatment History:     ABO:     Platelets:   Lab Results   Component Value Date/Time     2018 10:45 AM     Creatinine:   Lab Results   Component Value Date/Time    CREATININE 1.2 2018 10:45 AM     Bilirubin:   Lab Results   Component Value Date/Time    BILITOT 0.7 2018 10:45 AM     AFP Last 3 each if available:   Lab Results   Component Value Date/Time    AFP 15 (H) 2018 10:45 AM    AFP 3.9 2018 10:20 AM    AFP 4.2 01/10/2018 09:05 AM       MELD: MELD-Na score: 9 at 2018 10:45 AM  MELD score: 9 at 2018 10:45 AM  Calculated from:  Serum Creatinine: 1.2 mg/dL at 2018 10:45 AM  Serum Sodium: 136 mmol/L at 2018 10:45 AM  Total Bilirubin: 0.7 mg/dL (Rounded to 1 mg/dL) at 2018 10:45 AM  INR(ratio): 1.1 at 2018 10:45 AM  Age: 78 years    Plan: Agree w/ EUS for enlaging pancreatic head lesion.  Enlarging central hypoenhancing mass in liver (not definitively cystic) with L biliary ductal dilitation-  concern for panc adenocarcinoma met.- Biopsy  No evidence of HCC.     Recommend biopsy of liver mass.      Follow-up Provider: JOCY Solis

## 2018-12-07 NOTE — PROGRESS NOTES
HEPATOLOGY CLINIC VISIT NOTE - HCV clinic    REFERRING PROVIDER: Dr. Veronica Frost     CHIEF COMPLAINT: Hepatitis C - discuss treatment    HISTORY: This is a 78 y.o. Black or  female, here with daughter returns for HCC screening and SVR 12 labs. She has a h/o with chronic hepatitis C and advanced fibrosis/probable cirrhosis.    She completed 12 weeks of Zepatier for HCV, SVR 6 HCV RNA was negative. HCV RNA is pending today. She underwent an U/S prior to visit. There were no concerning liver masses. It was noted that CBD has increased to 13mm. It was last measured at 12mm on U/S from 12/2017. Pt had an EUS to evaluated dilated CBD and pancreatic cyst 01/2018 with Dr. King.     Liver staging:  Liver biopsy 2004, grade 2, Stage 3  Fibroscan has been pursued, but  unfortunately, her body habitus and breathing was prohibitive to getting a fibroscan. At her initial visit, labs reveal elevated transaminases, hypoalbuminemia, and mild thrombocytopenia so patient has been followed for advanced fibrosis/cirrhosis  Elevated liver enzymes  Tbili WNL  Albumin <3.5  PLTs 147    Advanced fibrosis?Cirrhosis history:  - Well compensated  - MELD score   MELD-Na score: 9 at 12/7/2018 10:45 AM  MELD score: 9 at 12/7/2018 10:45 AM  Calculated from:  Serum Creatinine: 1.2 mg/dL at 12/7/2018 10:45 AM  Serum Sodium: 136 mmol/L at 12/7/2018 10:45 AM  Total Bilirubin: 0.7 mg/dL (Rounded to 1 mg/dL) at 12/7/2018 10:45 AM  INR(ratio): 1.1 at 12/7/2018 10:45 AM  Age: 78 years    - HCC screening:   - U/S: due 12/2018   - AFP: WNL    (?)Portal HTN:   - EGD: EUS done 01/2018, esophagus endoscopically normal.       Denies jaundice, dark urine, abdominal distention, hematemesis, melena, slowed mentation.   No abnormal skin rashes. No generalized joint pain.                     Past Medical History:   Diagnosis Date    Absence of bladder continence 4/20/2015    Asthma     Asthma without status asthmaticus 6/28/2012     Atrial fibrillation     BMI 36.0-36.9,adult 8/25/2014    Bulging lumbar disc     Cataract     Cervical radiculopathy     Claudication 5/18/2017    Constipation 8/30/2013    Diabetes mellitus     pre diabetes     Diverticular disease 8/30/2013    GERD (gastroesophageal reflux disease) 6/28/2012    Hepatitis C     Hyperglycemia     Hypertension     Hypothyroidism     Osteoporosis     Pancreatic cyst     Vitamin D deficiency disease        Past Surgical History:   Procedure Laterality Date    BREAST CYST EXCISION Bilateral     COLONOSCOPY N/A 7/11/2013    [facility]    CYST REMOVAL      ESOPHAGOGASTRODUODENOSCOPY (EGD) N/A 1/21/2015    [facility] at [facility]    EYE SURGERY      cataracts    HYSTERECTOMY      TOE SURGERY Bilateral     big toenails    ULTRASOUND-ENDOSCOPIC-UPPER N/A 1/15/2018    [facility] (2ND F[facility])    ULTRASOUND-ENDOSCOPIC-UPPER N/A 8/14/2015    [facility]    ULTRASOUND-ENDOSCOPIC-UPPER N/A 1/21/2015    [facility] at [facility]    ULTRASOUND-ENDOSCOPIC-UPPER W/POSSIBLE FNA N/A 2/15/2016    [facility] by [facility] at [facility] END[facility]     FAMILY HISTORY: Negative for liver disease    SOCIAL HISTORY:   Social History     Tobacco Use   Smoking Status Never Smoker   Smokeless Tobacco Never Used       Social History     Substance and Sexual Activity   Alcohol Use No    Alcohol/week: 0.0 oz       Social History     Substance and Sexual Activity   Drug Use No       ROS:   No fever, chills, (+)weight loss,(+) fatigue  No chest pain, dyspnea, cough  No abdominal pain, nausea, vomiting  No skin rashes   No lower extremity edema  No depression or anxiety      PHYSICAL EXAM:  Friendly Black or  female, in no acute distress; alert and oriented to person, place and time  VITALS: reviewed  HEENT: Sclerae anicteric.   NECK:  Supple  LUNGS: Normal respiratory effort.   ABDOMEN: Flat, soft, nontender.   SKIN: Warm and dry. No jaundice, No obvious rashes.   EXTREMITIES: No lower extremity edema  NEURO/PSYCH: Normal gate. Memory intact. Thought and speech pattern appropriate. Behavior normal. No depression or anxiety noted.    RECENT LABS:  Lab Results   Component Value Date    WBC 8.63 06/05/2018    HGB 12.7 06/05/2018     06/05/2018     Lab Results   Component Value Date    INR 1.0 06/05/2018     Lab Results   Component Value Date    AST 21 06/05/2018    ALT 17 06/05/2018    BILITOT 0.4 06/05/2018    ALBUMIN 3.4 (L) 06/05/2018    ALKPHOS 49 (L) 06/05/2018    CREATININE 1.2 06/05/2018    BUN 34 (H) 06/05/2018     06/05/2018    K 4.3 06/05/2018    AFP 3.9 06/05/2018     RECENT IMAGING:  U/S abdomen 06/2017  Narrative     Time of Procedure: 06/13/17 08:45:00  Accession # [id number]    Technique: Complete abdominal ultrasound    Clinical indication: Chronic viral hepatitis c    Comparison: CT abdomen pelvis 10/7/2015.    Findings:    The visualized portion of the pancreas is unremarkable.      The liver is normal in size.  The liver demonstrates homogeneous echotexture.  No focal hepatic lesions are seen.    The gallbladder is unremarkable with no evidence of calculi.  The common duct is dilated in the extrahepatic portion and 1.1 cm previously 1.0 cm.  The gallbladder wall is not thickened.  There is no sonographic Boggs sign.  No dilated intrahepatic radicles are seen.  No pericholecystic fluid.    The spleen is normal in size measuring 10.4 x 4.1 cm with a homogeneous echotexture.    The aorta tapers normally.  The inferior vena cava is normal in appearance.    The kidneys are normal size bilaterally, with a left renal simple cyst, stable from prior exams      There is no evidence of ascites.   Impression     Stable extrahepatic biliary ductal dilatation without evidence of intrahepatic biliary ductal dilatation.   No focal  liver lesions.     ASSESSMENT  78 y.o. Black or  female with:  1. ADVANCED FIBROSIS/?CIRRHOSIS  -- unable to get fibroscan, loop recorded makes her ineligible for MR elastography  -- certainly could have had progression from biopsy >10 yeas ago, however liver function stable  -- will need life long HCC screening regardless     - HCC screening:   - U/S: due 12/2018   - AFP: WNL    (?)Portal HTN:   - EGD: EUS done 01/2018, esophagus endoscopically normal.       2.PANCREATIC CYSTLYMPHADENOPATHYBILIARY DILATION  -- recent hospitalization at [facility], imaging obtained  -- concern for progression of pancreatic mass, CA 19-9 elevated  -- imaging will be reviewed at IR, repeat EUS planned for 01/2019, will likely need to be done sooner     PLAN:  1. Labs today  2. Imaging to be reviewed at IR, f/u TBD     Hao Flynn PA-C

## 2018-12-08 ENCOUNTER — PATIENT MESSAGE (OUTPATIENT)
Dept: HEPATOLOGY | Facility: CLINIC | Age: 78
End: 2018-12-08

## 2018-12-09 ENCOUNTER — PATIENT MESSAGE (OUTPATIENT)
Dept: HEPATOLOGY | Facility: CLINIC | Age: 78
End: 2018-12-09

## 2018-12-10 ENCOUNTER — HOSPITAL ENCOUNTER (EMERGENCY)
Facility: HOSPITAL | Age: 78
Discharge: HOME | End: 2018-12-10
Attending: EMERGENCY MEDICINE
Payer: MEDICARE

## 2018-12-10 VITALS
HEART RATE: 87 BPM | HEIGHT: 58 IN | OXYGEN SATURATION: 97 % | TEMPERATURE: 99 F | BODY MASS INDEX: 34.85 KG/M2 | DIASTOLIC BLOOD PRESSURE: 75 MMHG | SYSTOLIC BLOOD PRESSURE: 125 MMHG | WEIGHT: 166 LBS | RESPIRATION RATE: 19 BRPM

## 2018-12-10 DIAGNOSIS — R10.9 ABDOMINAL PAIN, UNSPECIFIED ABDOMINAL LOCATION: Primary | ICD-10-CM

## 2018-12-10 LAB
ALBUMIN SERPL BCP-MCNC: 2.5 G/DL
ALP SERPL-CCNC: 260 U/L
ALT SERPL W/O P-5'-P-CCNC: 64 U/L
ANION GAP SERPL CALC-SCNC: 9 MMOL/L
AST SERPL-CCNC: 95 U/L
BASOPHILS # BLD AUTO: 0.07 K/UL
BASOPHILS NFR BLD: 0.6 %
BILIRUB SERPL-MCNC: 0.8 MG/DL
BILIRUB UR QL STRIP: NEGATIVE
BUN SERPL-MCNC: 22 MG/DL
CALCIUM SERPL-MCNC: 9.1 MG/DL
CHLORIDE SERPL-SCNC: 99 MMOL/L
CLARITY UR REFRACT.AUTO: ABNORMAL
CO2 SERPL-SCNC: 28 MMOL/L
COLOR UR AUTO: YELLOW
CREAT SERPL-MCNC: 1.1 MG/DL
DIFFERENTIAL METHOD: ABNORMAL
EOSINOPHIL # BLD AUTO: 0.1 K/UL
EOSINOPHIL NFR BLD: 0.8 %
ERYTHROCYTE [DISTWIDTH] IN BLOOD BY AUTOMATED COUNT: 15.1 %
EST. GFR  (AFRICAN AMERICAN): 56.4 ML/MIN/1.73 M^2
EST. GFR  (NON AFRICAN AMERICAN): 48.9 ML/MIN/1.73 M^2
GLUCOSE SERPL-MCNC: 112 MG/DL
GLUCOSE UR QL STRIP: NEGATIVE
HCT VFR BLD AUTO: 37.1 %
HGB BLD-MCNC: 11.7 G/DL
HGB UR QL STRIP: NEGATIVE
IMM GRANULOCYTES # BLD AUTO: 0.06 K/UL
IMM GRANULOCYTES NFR BLD AUTO: 0.5 %
KETONES UR QL STRIP: NEGATIVE
LEUKOCYTE ESTERASE UR QL STRIP: NEGATIVE
LIPASE SERPL-CCNC: 14 U/L
LYMPHOCYTES # BLD AUTO: 1.7 K/UL
LYMPHOCYTES NFR BLD: 13.6 %
MCH RBC QN AUTO: 29.8 PG
MCHC RBC AUTO-ENTMCNC: 31.5 G/DL
MCV RBC AUTO: 95 FL
MONOCYTES # BLD AUTO: 1.4 K/UL
MONOCYTES NFR BLD: 10.9 %
NEUTROPHILS # BLD AUTO: 9.2 K/UL
NEUTROPHILS NFR BLD: 73.6 %
NITRITE UR QL STRIP: NEGATIVE
NRBC BLD-RTO: 0 /100 WBC
PH UR STRIP: 8 [PH] (ref 5–8)
PLATELET # BLD AUTO: 243 K/UL
PMV BLD AUTO: 10.5 FL
POTASSIUM SERPL-SCNC: 3.6 MMOL/L
PROT SERPL-MCNC: 8 G/DL
PROT UR QL STRIP: NEGATIVE
RBC # BLD AUTO: 3.92 M/UL
SODIUM SERPL-SCNC: 136 MMOL/L
SP GR UR STRIP: 1 (ref 1–1.03)
URN SPEC COLLECT METH UR: ABNORMAL
WBC # BLD AUTO: 12.5 K/UL

## 2018-12-10 PROCEDURE — 96360 HYDRATION IV INFUSION INIT: CPT

## 2018-12-10 PROCEDURE — 85025 COMPLETE CBC W/AUTO DIFF WBC: CPT

## 2018-12-10 PROCEDURE — 25000003 PHARM REV CODE 250: Performed by: NURSE PRACTITIONER

## 2018-12-10 PROCEDURE — 80053 COMPREHEN METABOLIC PANEL: CPT

## 2018-12-10 PROCEDURE — 96361 HYDRATE IV INFUSION ADD-ON: CPT

## 2018-12-10 PROCEDURE — 81003 URINALYSIS AUTO W/O SCOPE: CPT

## 2018-12-10 PROCEDURE — 99284 EMERGENCY DEPT VISIT MOD MDM: CPT | Mod: 25

## 2018-12-10 PROCEDURE — 99284 EMERGENCY DEPT VISIT MOD MDM: CPT | Mod: ,,, | Performed by: NURSE PRACTITIONER

## 2018-12-10 PROCEDURE — 83690 ASSAY OF LIPASE: CPT

## 2018-12-10 RX ORDER — HYOSCYAMINE SULFATE 0.125 MG
125 TABLET ORAL EVERY 4 HOURS PRN
Qty: 30 TABLET | Refills: 0 | Status: ON HOLD | OUTPATIENT
Start: 2018-12-10 | End: 2018-12-17

## 2018-12-10 RX ORDER — HYOSCYAMINE SULFATE 0.12 MG/1
0.12 TABLET SUBLINGUAL
Status: COMPLETED | OUTPATIENT
Start: 2018-12-10 | End: 2018-12-10

## 2018-12-10 RX ADMIN — SODIUM CHLORIDE 1000 ML: 0.9 INJECTION, SOLUTION INTRAVENOUS at 08:12

## 2018-12-10 RX ADMIN — HYOSCYAMINE SULFATE 0.12 MG: 0.12 TABLET ORAL; SUBLINGUAL at 12:12

## 2018-12-10 NOTE — ED NOTES
Hourly rounding performed at this time. Daughter at bedside. Patient is in bed awake and alert. Daughter reports that patient ate 2 crackers, a bite of sandwich, a spoonful of red beans and rice and 2 bites of chicken. Approx 20 minutes later patient c/o returning abd cramping pain 8/10 and a strong urge to use the bathroom. Patient assisted to bathroom for toileting and daughter instructed to call nurse when she is finished for assistance returning to bed. Daughter verbalized understanding, denies any additional needs at this time and has no complaints or questions. Room assessed for safety measures and cleanliness, no action needed at this time. The bed is in low, locked position with side rails up x 2. Call light is in reach, and patient is oriented to call light use. Patient verbalizes will call nurse if assistance is needed.

## 2018-12-10 NOTE — ED NOTES
JOCY Dalton made aware of patient abd cramping. PA verbalized understanding. No new orders at this time and to continue with DC plan.

## 2018-12-10 NOTE — ED PROVIDER NOTES
Encounter Date: 12/10/2018       History     Chief Complaint   Patient presents with    Abdominal Pain     lower abd pain, has ultrasound sched for friday,      Pt is a 77 yo female with medical history of asthma, cirrhosis, constipation, diabetes, hepatitis-C, hypertension and pancreatic cyst presenting to the ED for abdominal pain.  Pt states she was seen by her hepatologist and was scheduled for an ultrasound on Friday but pain has increased over the past few days.  Pt endorses some nausea and diarrhea but denies vomiting.          Review of patient's allergies indicates:   Allergen Reactions    Milk containing products Shortness Of Breath    Nuts [tree nut] Anaphylaxis    Fosamax [alendronate]      dizziness     Past Medical History:   Diagnosis Date    Absence of bladder continence 4/20/2015    Asthma     Asthma without status asthmaticus 6/28/2012    Atrial fibrillation     BMI 36.0-36.9,adult 8/25/2014    Bulging lumbar disc     Cataract     Cervical radiculopathy     Claudication 5/18/2017    Constipation 8/30/2013    Diabetes mellitus     pre diabetes     Diverticular disease 8/30/2013    GERD (gastroesophageal reflux disease) 6/28/2012    Hepatitis C     Hyperglycemia     Hypertension     Hypothyroidism     Osteoporosis     Pancreatic cyst     Vitamin D deficiency disease      Past Surgical History:   Procedure Laterality Date    BREAST CYST EXCISION Bilateral     COLONOSCOPY N/A 7/11/2013    [facility] by [facility] ENDO    CYST REMOVAL      ESOPHAGOGASTRODUODENOSCOPY (EGD) N/A 1/21/2015    [facility] END[facility]    EYE SURGERY      cataracts    HYSTERECTOMY      TOE SURGERY Bilateral     big toenails    ULTRASOUND-ENDOSCOPIC-UPPER N/A 1/15/2018    [facility] ([facility])    ULTRASOUND-ENDOSCOPIC-UPPER N/A 8/14/2015    [facility] at [facility] END[facility]    ULTRASOUND-ENDOSCOPIC-UPPER N/A  1/21/2015    [facility]    ULTRASOUND-ENDOSCOPIC-UPPER W/POSSIBLE FNA N/A 2/15/2016    [facility] at [facility] ENDO     Family History   Problem Relation Age of Onset    Ovarian cancer Mother     Lung cancer Father     Stomach cancer Sister     Asthma Sister     Throat cancer Brother     Diabetes Maternal Grandfather     Diabetes Maternal Aunt     Breast cancer Sister 55    Asthma Other     Emphysema Neg Hx      Social History     Tobacco Use    Smoking status: Never Smoker    Smokeless tobacco: Never Used   Substance Use Topics    Alcohol use: No     Alcohol/week: 0.0 oz    Drug use: No     Review of Systems   Constitutional: Positive for appetite change and fatigue. Negative for chills and fever.   HENT: Negative for sore throat.    Respiratory: Negative for shortness of breath.    Cardiovascular: Negative for chest pain and palpitations.   Gastrointestinal: Positive for abdominal pain, diarrhea and nausea. Negative for constipation and vomiting.   Genitourinary: Negative for decreased urine volume, difficulty urinating, dysuria and urgency.   Musculoskeletal: Negative for back pain.   Skin: Negative for rash.   Neurological: Negative for dizziness, syncope, weakness and headaches.   Hematological: Does not bruise/bleed easily.       Physical Exam     Initial Vitals [12/10/18 0751]   BP Pulse Resp Temp SpO2   (!) 125/59 85 18 98.6 °F (37 °C) 97 %      MAP       --         Physical Exam    Nursing note and vitals reviewed.  Constitutional: Vital signs are normal. She appears well-developed and well-nourished. She is cooperative.   HENT:   Head: Normocephalic and atraumatic.   Eyes: Conjunctivae and EOM are normal. Pupils are equal, round, and reactive to light.   Neck: Trachea normal, normal range of motion, full passive range of motion without pain and phonation normal. Neck supple. No JVD present.   Cardiovascular: Normal rate and regular rhythm.    Pulses:       Radial pulses are 2+ on the right side, and 2+ on the left side.        Dorsalis pedis pulses are 2+ on the right side, and 2+ on the left side.   Pulmonary/Chest: Effort normal and breath sounds normal.   Abdominal: Soft. Normal appearance and bowel sounds are normal. She exhibits distension. There is tenderness in the periumbilical area and suprapubic area. There is no rigidity, no rebound and no guarding.   Musculoskeletal: Normal range of motion.   Neurological: She is alert and oriented to person, place, and time. She has normal strength. GCS eye subscore is 4. GCS verbal subscore is 5. GCS motor subscore is 6.   Skin: Skin is warm, dry and intact. Capillary refill takes less than 2 seconds. No abrasion and no rash noted. No cyanosis. Nails show no clubbing.         ED Course   Procedures  Labs Reviewed   CBC W/ AUTO DIFFERENTIAL - Abnormal; Notable for the following components:       Result Value    RBC 3.92 (*)     Hemoglobin 11.7 (*)     MCHC 31.5 (*)     RDW 15.1 (*)     Gran # (ANC) 9.2 (*)     Immature Grans (Abs) 0.06 (*)     Mono # 1.4 (*)     Gran% 73.6 (*)     Lymph% 13.6 (*)     All other components within normal limits   COMPREHENSIVE METABOLIC PANEL - Abnormal; Notable for the following components:    Glucose 112 (*)     Albumin 2.5 (*)     Alkaline Phosphatase 260 (*)     AST 95 (*)     ALT 64 (*)     eGFR if  56.4 (*)     eGFR if non  48.9 (*)     All other components within normal limits   URINALYSIS, REFLEX TO URINE CULTURE - Abnormal; Notable for the following components:    Appearance, UA Hazy (*)     All other components within normal limits    Narrative:     Preferred Collection Type->Urine, Clean Catch   LIPASE          Imaging Results          US Abdomen Limited (Final result)  Result time 12/10/18 10:29:43    Final result by Waldo Morse MD (12/10/18 10:29:43)                 Impression:      Persistent dilatation of the common bile  duct with intrahepatic biliary ductal dilatation in the left hepatic lobe; common bile duct measuring 1.1 cm.    Indeterminate hypoechoic solid mass in the left hepatic lobe, corresponding to the mass seen on prior CT.    Adenomyomatosis of the gallbladder wall.    Please note the previously seen mass in the pancreatic head on prior CT is not demonstrated on today's exam and likely obscured by overlying bowel gas.    Electronically signed by resident: Elvia Olvera  Date:    12/10/2018  Time:    10:03    Electronically signed by: Waldo Morse MD  Date:    12/10/2018  Time:    10:29             Narrative:    EXAMINATION:  US ABDOMEN LIMITED    CLINICAL HISTORY:  cirrohsis, lower abdominal pain, pancreatic mass;.    TECHNIQUE:  Limited ultrasound of the right upper quadrant of the abdomen including pancreas, liver, gallbladder, common bile duct was performed.    COMPARISON:  CT abdomen pelvis 11/26/2018.  Ultrasound abdomen limited 06/05/2018.    FINDINGS:  Liver: Normal in size, measuring 13 cm. Homogeneous echotexture. Hypoechoic solid mass in the left lobe, measuring 4.9 x 5.3 x 4.2 cm.    Gallbladder: Partially distended.  Adenomyomatosis of the gallbladder wall is again identified.  No calculi or pericholecystic fluid.  No sonographic Boggs's sign.    Biliary system: The common duct is not dilated, measuring 1.1 mm.  Intrahepatic biliary ductal dilatation seen only in the left hepatic lobe.    Spleen: Normal in size with a homogeneous echotexture, measuring 11.2 x 4.7 cm.    Pancreas: Partially visualized.  The previously seen mass in the pancreatic head on prior CT is not demonstrated in today's exam and likely obscured by overlying bowel gas.    Miscellaneous: No ascites.                                       APC / Resident Notes:   Emergent evaluation of a 77 yo female patient presenting to the ER with chief complaint of lower abdominal pain and dark stools.  Pt endorses some mild diarrhea but denies any  vomiting.  On exam, pt A&O x3.  Abdomen is soft but distended.  No fluid shift noted.  Tenderness noted to the suprapubic and epigastric abdomen.  Bowel sounds within normal limits.  Breath sounds clear bilaterally. No scleral icterus noted.  I will get labs, imaging, hydrate, medicate and reassess. Differential diagnoses include but are not limited to intestinal ischemia, colitis / diverticulitis, Irritable bowel syndrome, appendicitis, pyelonephritis, renal colic, bowel obstruction, cancer.    I discussed the care of this patient with my Supervising Physician.      Patient's CBC unremarkable. WBC of 12.5 with an H&H of 11.7 and 37.1.  LFTs elevated with an alk-phos of 260 with an AST of 95 and ALT of 64.  Ultrasound pending.  Lipase negative at 14.  UA negative for any acute infectious process.  Ultrasound shows Persistent dilatation of the common bile duct with intrahepatic biliary ductal dilatation in the left hepatic lobe.  Indeterminate hypoechoic solid mass in the left hepatic lobe.  Patient and family updated on lab and imaging results.  Will medicate patient and p.o. Trial.      Patient is hemodynamically stable, vital signs are normal. Discharge instructions given. Prescription for Levsin given and explained. Return to ED precautions discussed. Follow up as directed. Pt verbalized understanding of this plan. Pt is stable for discharge.                    Clinical Impression:   The encounter diagnosis was Abdominal pain, unspecified abdominal location.      Disposition:   Disposition: Discharged  Condition: Stable                        Krystle Serrano NP  12/10/18 [id number]

## 2018-12-10 NOTE — ED NOTES
All medications, prescriptions, paperwork, dx education, follow up and discharge information instructed to patient and her daughter.  All questions were answered and patient and her daughter verbalized understanding. Patient AAOx4 VSS, no acute distress reported or observed, reports pain resolved after using bathroom. All LDAs removed, intact, site WNL, no redness or edema observed. Assisted patient to get dressed. Patient transferred to ED waiting area, via wheelchair, per RN with daughter and all belongings.  .

## 2018-12-10 NOTE — ED NOTES
Pt identifiers Alfredina Claudette Parks were checked and correct  LOC: The patient is awake, alert, aware of environment with an appropriate affect. Oriented x3, speaking appropriately  APPEARANCE: Pt resting comfortably, in no acute distress, pt is clean and well groomed, clothing properly fastened  SKIN: Skin warm, dry and intact, normal skin turgor, moist mucus membranes  RESPIRATORY: Airway is open and patent, respirations are spontaneous, even and unlabored, normal effort and rate  CARDIAC: Normal rate and rhythm, no peripheral edema noted, capillary refill < 3 seconds, bilateral radial pulses 2+  ABDOMEN: Soft, non tender, non distended. Bowel sounds present x 4 quadrants. Pt c/o diffuse, cramping abdominal pain, and loose stools.     NEUROLOGIC: PERRL, facial expression is symmetrical, patient moving all extremities spontaneously, normal sensation in all extremities when touched with a finger.  Follows all commands appropriately  MUSCULOSKELETAL: No obvious deformities.

## 2018-12-10 NOTE — ED NOTES
Assumed care, received report from Lili Srivastava RN. Daughter at bedside. Patient is in bed awake, alert, and oriented x 4, cooperative, airway open and patent, respirations, spontaneous, even and unlabored, with normal rate and rhythm, skin warm, CDI, moves all extremities, but weakness observed, c/o MLQ abd cramping 8/10. Assessed for need of repositioning and given opportunity for toileting. Updated patient/daughter instructed patient and daughter on current status, discussed care plan, Hycosamine, both verbalized understanding and denies any additional needs at this time and has no complaints or questions. Room assessed for safety measures and cleanliness, no action needed at this time. The bed is in low, locked position with side rails up x 2. Call light is in reach, and patient is oriented to call light use. Patient verbalizes will call nurse if assistance is needed. Will continue to monitor.

## 2018-12-10 NOTE — ED NOTES
Pt presented to the ED via pov. Pt alert. Pt c/o diffuse abdominal pain. Pt states the pain feels like her stomach is cramping. Pt states she was recently treated for constipation. Pt states her stomach has been cramping since Thanksgiving. Pt c/o loose stools.

## 2018-12-10 NOTE — ED NOTES
Assisted pt to the restroom. Pt tolerated well. Pt alert. Daughter at the bedside. Pt awaiting ultrasound.

## 2018-12-10 NOTE — ED NOTES
Hourly rounding performed at this time. Daughter at bedside. Patient is in bed awake and alert, resting.  Assessed for need of repositioning and given opportunity for toileting. Discussed care plan, patient/daughter denies any additional needs at this time and has no complaints or questions. Room assessed for safety measures and cleanliness, no action needed at this time. The bed is in low, locked position with side rails up x 2. Call light is in reach, and patient/daughter is oriented to call light use. Patient/daughter verbalizes will call nurse if assistance is needed. Will continue to monitor.    Patient tray has not arrived, patient agreed to try a sandwich for po trial.

## 2018-12-10 NOTE — DISCHARGE INSTRUCTIONS
Your labs and imaging today are reassuring.  Please follow up with  the hepatology department for further evaluation of the mass on your pancreas.      Our goal in the emergency department is to always give you outstanding care and exceptional service. You may receive a survey by mail or e-mail in the next week regarding your experience in our ED. We would greatly appreciate your completing and returning the survey. Your feedback provides us with a way to recognize our staff who give very good care and it helps us learn how to improve when your experience was below our aspiration of excellence.

## 2018-12-11 ENCOUNTER — TELEPHONE (OUTPATIENT)
Dept: ENDOSCOPY | Facility: HOSPITAL | Age: 78
End: 2018-12-11

## 2018-12-11 ENCOUNTER — CONFERENCE (OUTPATIENT)
Dept: TRANSPLANT | Facility: CLINIC | Age: 78
End: 2018-12-11

## 2018-12-11 ENCOUNTER — TELEPHONE (OUTPATIENT)
Dept: HEPATOLOGY | Facility: CLINIC | Age: 78
End: 2018-12-11

## 2018-12-11 ENCOUNTER — PATIENT MESSAGE (OUTPATIENT)
Dept: HEPATOLOGY | Facility: CLINIC | Age: 78
End: 2018-12-11

## 2018-12-11 DIAGNOSIS — R16.0 LIVER MASS: Primary | ICD-10-CM

## 2018-12-11 DIAGNOSIS — K86.2 PANCREATIC CYST: ICD-10-CM

## 2018-12-11 NOTE — TELEPHONE ENCOUNTER
Pt reviewed at IR    Plan: Agree w/ EUS for enlaging pancreatic head lesion.  Enlarging central hypoenhancing mass in liver (not definitively cystic) with L biliary ductal dilitation- concern for panc adenocarcinoma met.- Biopsy  No evidence of HCC.      Recommend biopsy of liver mass.    Will plan for EUS with biopsy of liver mass at the same time.       Dr King did previous EUS

## 2018-12-11 NOTE — TELEPHONE ENCOUNTER
Message   Received: Today   Message Contents   MD Emi Gar MA   Caller: Unspecified (Today, 11:29 AM)             Ok, the pancreas lesion is a cyst.   I'll see if we can get the liver mass

## 2018-12-12 RX ORDER — FUROSEMIDE 20 MG/1
TABLET ORAL
Qty: 60 TABLET | Refills: 0 | Status: SHIPPED | OUTPATIENT
Start: 2018-12-12

## 2018-12-12 RX ORDER — FUROSEMIDE 20 MG/1
TABLET ORAL
Qty: 180 TABLET | Refills: 0 | OUTPATIENT
Start: 2018-12-12

## 2018-12-13 ENCOUNTER — TELEPHONE (OUTPATIENT)
Dept: ENDOSCOPY | Facility: HOSPITAL | Age: 78
End: 2018-12-13

## 2018-12-13 ENCOUNTER — PATIENT MESSAGE (OUTPATIENT)
Dept: ENDOSCOPY | Facility: HOSPITAL | Age: 78
End: 2018-12-13

## 2018-12-13 NOTE — TELEPHONE ENCOUNTER
Spoke with patient's daughter. EUS scheduled for 12/21 at 2p. Reviewed prep instructions. Tari verbalized understanding.

## 2018-12-16 ENCOUNTER — HOSPITAL ENCOUNTER (INPATIENT)
Facility: HOSPITAL | Age: 78
LOS: 6 days | Discharge: HOME HEALTH | DRG: 423 | End: 2018-12-22
Attending: EMERGENCY MEDICINE | Admitting: HOSPITALIST
Payer: MEDICARE

## 2018-12-16 DIAGNOSIS — R60.0 EDEMA OF BOTH LEGS: ICD-10-CM

## 2018-12-16 DIAGNOSIS — K83.09 ACUTE CHOLANGITIS: ICD-10-CM

## 2018-12-16 DIAGNOSIS — E87.1 HYPONATREMIA: ICD-10-CM

## 2018-12-16 DIAGNOSIS — K83.09 CHOLANGITIS: ICD-10-CM

## 2018-12-16 DIAGNOSIS — K76.9 LIVER LESION: ICD-10-CM

## 2018-12-16 DIAGNOSIS — K86.89 PANCREATIC MASS: ICD-10-CM

## 2018-12-16 DIAGNOSIS — R79.89 ELEVATED LFTS: ICD-10-CM

## 2018-12-16 DIAGNOSIS — D72.829 LEUKOCYTOSIS, UNSPECIFIED TYPE: ICD-10-CM

## 2018-12-16 DIAGNOSIS — C22.1 CHOLANGIOCARCINOMA: Primary | ICD-10-CM

## 2018-12-16 DIAGNOSIS — K83.1 BILIARY OBSTRUCTION: ICD-10-CM

## 2018-12-16 LAB
ALBUMIN SERPL BCP-MCNC: 2.4 G/DL
ALP SERPL-CCNC: 326 U/L
ALT SERPL W/O P-5'-P-CCNC: 120 U/L
ANION GAP SERPL CALC-SCNC: 10 MMOL/L
AST SERPL-CCNC: 192 U/L
BASOPHILS # BLD AUTO: 0.06 K/UL
BASOPHILS NFR BLD: 0.4 %
BILIRUB SERPL-MCNC: 1.2 MG/DL
BUN SERPL-MCNC: 18 MG/DL
CALCIUM SERPL-MCNC: 8.8 MG/DL
CHLORIDE SERPL-SCNC: 92 MMOL/L
CO2 SERPL-SCNC: 28 MMOL/L
CREAT SERPL-MCNC: 1.1 MG/DL
DIFFERENTIAL METHOD: ABNORMAL
EOSINOPHIL # BLD AUTO: 0.1 K/UL
EOSINOPHIL NFR BLD: 0.7 %
ERYTHROCYTE [DISTWIDTH] IN BLOOD BY AUTOMATED COUNT: 15.6 %
EST. GFR  (AFRICAN AMERICAN): 56.4 ML/MIN/1.73 M^2
EST. GFR  (NON AFRICAN AMERICAN): 48.9 ML/MIN/1.73 M^2
GLUCOSE SERPL-MCNC: 118 MG/DL
HCT VFR BLD AUTO: 35.6 %
HGB BLD-MCNC: 11.3 G/DL
IMM GRANULOCYTES # BLD AUTO: 0.09 K/UL
IMM GRANULOCYTES NFR BLD AUTO: 0.6 %
INR PPP: 1.1
LACTATE SERPL-SCNC: 1.6 MMOL/L
LIPASE SERPL-CCNC: 14 U/L
LYMPHOCYTES # BLD AUTO: 1.9 K/UL
LYMPHOCYTES NFR BLD: 11.6 %
MCH RBC QN AUTO: 30 PG
MCHC RBC AUTO-ENTMCNC: 31.7 G/DL
MCV RBC AUTO: 94 FL
MONOCYTES # BLD AUTO: 1.5 K/UL
MONOCYTES NFR BLD: 9.4 %
NEUTROPHILS # BLD AUTO: 12.5 K/UL
NEUTROPHILS NFR BLD: 77.3 %
NRBC BLD-RTO: 0 /100 WBC
PLATELET # BLD AUTO: 216 K/UL
PMV BLD AUTO: 10.3 FL
POTASSIUM SERPL-SCNC: 4.3 MMOL/L
PROT SERPL-MCNC: 7.9 G/DL
PROTHROMBIN TIME: 11.3 SEC
RBC # BLD AUTO: 3.77 M/UL
SODIUM SERPL-SCNC: 130 MMOL/L
WBC # BLD AUTO: 16.17 K/UL

## 2018-12-16 PROCEDURE — 80053 COMPREHEN METABOLIC PANEL: CPT

## 2018-12-16 PROCEDURE — 85610 PROTHROMBIN TIME: CPT

## 2018-12-16 PROCEDURE — 99285 EMERGENCY DEPT VISIT HI MDM: CPT | Mod: ,,, | Performed by: EMERGENCY MEDICINE

## 2018-12-16 PROCEDURE — 12000002 HC ACUTE/MED SURGE SEMI-PRIVATE ROOM

## 2018-12-16 PROCEDURE — 99285 EMERGENCY DEPT VISIT HI MDM: CPT | Mod: 25

## 2018-12-16 PROCEDURE — 63600175 PHARM REV CODE 636 W HCPCS: Performed by: PHYSICIAN ASSISTANT

## 2018-12-16 PROCEDURE — 83690 ASSAY OF LIPASE: CPT

## 2018-12-16 PROCEDURE — 96365 THER/PROPH/DIAG IV INF INIT: CPT

## 2018-12-16 PROCEDURE — 96360 HYDRATION IV INFUSION INIT: CPT | Mod: 59

## 2018-12-16 PROCEDURE — 96361 HYDRATE IV INFUSION ADD-ON: CPT

## 2018-12-16 PROCEDURE — 83605 ASSAY OF LACTIC ACID: CPT

## 2018-12-16 PROCEDURE — 85025 COMPLETE CBC W/AUTO DIFF WBC: CPT

## 2018-12-16 PROCEDURE — 96375 TX/PRO/DX INJ NEW DRUG ADDON: CPT

## 2018-12-16 RX ORDER — MORPHINE SULFATE 4 MG/ML
4 INJECTION, SOLUTION INTRAMUSCULAR; INTRAVENOUS
Status: COMPLETED | OUTPATIENT
Start: 2018-12-16 | End: 2018-12-16

## 2018-12-16 RX ORDER — METRONIDAZOLE 500 MG/100ML
500 INJECTION, SOLUTION INTRAVENOUS
Status: COMPLETED | OUTPATIENT
Start: 2018-12-17 | End: 2018-12-17

## 2018-12-16 RX ORDER — CEFEPIME HYDROCHLORIDE 1 G/1
1 INJECTION, POWDER, FOR SOLUTION INTRAMUSCULAR; INTRAVENOUS
Status: COMPLETED | OUTPATIENT
Start: 2018-12-17 | End: 2018-12-17

## 2018-12-16 RX ADMIN — MORPHINE SULFATE 4 MG: 4 INJECTION, SOLUTION INTRAMUSCULAR; INTRAVENOUS at 10:12

## 2018-12-17 PROBLEM — K83.09 ACUTE CHOLANGITIS: Status: ACTIVE | Noted: 2018-12-17

## 2018-12-17 LAB
AFP SERPL-MCNC: 16 NG/ML
ALBUMIN SERPL BCP-MCNC: 2.2 G/DL
ALP SERPL-CCNC: 297 U/L
ALT SERPL W/O P-5'-P-CCNC: 106 U/L
ANION GAP SERPL CALC-SCNC: 7 MMOL/L
AST SERPL-CCNC: 134 U/L
BACTERIA #/AREA URNS AUTO: NORMAL /HPF
BASOPHILS # BLD AUTO: 0.05 K/UL
BASOPHILS NFR BLD: 0.4 %
BILIRUB DIRECT SERPL-MCNC: 0.9 MG/DL
BILIRUB SERPL-MCNC: 1.4 MG/DL
BILIRUB UR QL STRIP: NEGATIVE
BUN SERPL-MCNC: 14 MG/DL
CALCIUM SERPL-MCNC: 8.5 MG/DL
CANCER AG19-9 SERPL-ACNC: 2096 U/ML
CHLORIDE SERPL-SCNC: 97 MMOL/L
CLARITY UR REFRACT.AUTO: CLEAR
CO2 SERPL-SCNC: 27 MMOL/L
COLOR UR AUTO: YELLOW
CREAT SERPL-MCNC: 0.8 MG/DL
DIFFERENTIAL METHOD: ABNORMAL
EOSINOPHIL # BLD AUTO: 0.1 K/UL
EOSINOPHIL NFR BLD: 0.6 %
ERYTHROCYTE [DISTWIDTH] IN BLOOD BY AUTOMATED COUNT: 15.9 %
EST. GFR  (AFRICAN AMERICAN): >60 ML/MIN/1.73 M^2
EST. GFR  (NON AFRICAN AMERICAN): >60 ML/MIN/1.73 M^2
ESTIMATED AVG GLUCOSE: 103 MG/DL
GLUCOSE SERPL-MCNC: 87 MG/DL
GLUCOSE UR QL STRIP: NEGATIVE
HBA1C MFR BLD HPLC: 5.2 %
HCT VFR BLD AUTO: 34.7 %
HGB BLD-MCNC: 10.8 G/DL
HGB UR QL STRIP: NEGATIVE
IMM GRANULOCYTES # BLD AUTO: 0.09 K/UL
IMM GRANULOCYTES NFR BLD AUTO: 0.6 %
KETONES UR QL STRIP: NEGATIVE
LEUKOCYTE ESTERASE UR QL STRIP: ABNORMAL
LYMPHOCYTES # BLD AUTO: 2 K/UL
LYMPHOCYTES NFR BLD: 14.3 %
MCH RBC QN AUTO: 29.8 PG
MCHC RBC AUTO-ENTMCNC: 31.1 G/DL
MCV RBC AUTO: 96 FL
MICROSCOPIC COMMENT: NORMAL
MONOCYTES # BLD AUTO: 1.4 K/UL
MONOCYTES NFR BLD: 9.7 %
NEUTROPHILS # BLD AUTO: 10.6 K/UL
NEUTROPHILS NFR BLD: 74.4 %
NITRITE UR QL STRIP: NEGATIVE
NRBC BLD-RTO: 0 /100 WBC
PH UR STRIP: 6 [PH] (ref 5–8)
PLATELET # BLD AUTO: 211 K/UL
PMV BLD AUTO: 10.7 FL
POCT GLUCOSE: 78 MG/DL (ref 70–110)
POCT GLUCOSE: 87 MG/DL (ref 70–110)
POTASSIUM SERPL-SCNC: 4.1 MMOL/L
PROT SERPL-MCNC: 7.4 G/DL
PROT UR QL STRIP: NEGATIVE
RBC # BLD AUTO: 3.62 M/UL
RBC #/AREA URNS AUTO: 0 /HPF (ref 0–4)
SODIUM SERPL-SCNC: 131 MMOL/L
SP GR UR STRIP: 1.01 (ref 1–1.03)
SQUAMOUS #/AREA URNS AUTO: 3 /HPF
URN SPEC COLLECT METH UR: ABNORMAL
WBC # BLD AUTO: 14.21 K/UL
WBC #/AREA URNS AUTO: 4 /HPF (ref 0–5)

## 2018-12-17 PROCEDURE — 25000003 PHARM REV CODE 250: Performed by: PHYSICIAN ASSISTANT

## 2018-12-17 PROCEDURE — 25000003 PHARM REV CODE 250: Performed by: STUDENT IN AN ORGANIZED HEALTH CARE EDUCATION/TRAINING PROGRAM

## 2018-12-17 PROCEDURE — 87522 HEPATITIS C REVRS TRNSCRPJ: CPT

## 2018-12-17 PROCEDURE — 86301 IMMUNOASSAY TUMOR CA 19-9: CPT

## 2018-12-17 PROCEDURE — S0030 INJECTION, METRONIDAZOLE: HCPCS | Performed by: PHYSICIAN ASSISTANT

## 2018-12-17 PROCEDURE — 87040 BLOOD CULTURE FOR BACTERIA: CPT

## 2018-12-17 PROCEDURE — 83036 HEMOGLOBIN GLYCOSYLATED A1C: CPT

## 2018-12-17 PROCEDURE — 81001 URINALYSIS AUTO W/SCOPE: CPT

## 2018-12-17 PROCEDURE — 36415 COLL VENOUS BLD VENIPUNCTURE: CPT

## 2018-12-17 PROCEDURE — 80076 HEPATIC FUNCTION PANEL: CPT

## 2018-12-17 PROCEDURE — 85025 COMPLETE CBC W/AUTO DIFF WBC: CPT

## 2018-12-17 PROCEDURE — 80048 BASIC METABOLIC PNL TOTAL CA: CPT

## 2018-12-17 PROCEDURE — 11000001 HC ACUTE MED/SURG PRIVATE ROOM

## 2018-12-17 PROCEDURE — 25500020 PHARM REV CODE 255: Performed by: EMERGENCY MEDICINE

## 2018-12-17 PROCEDURE — 93010 ELECTROCARDIOGRAM REPORT: CPT | Mod: ,,, | Performed by: INTERNAL MEDICINE

## 2018-12-17 PROCEDURE — 99223 1ST HOSP IP/OBS HIGH 75: CPT | Mod: AI,GC,, | Performed by: HOSPITALIST

## 2018-12-17 PROCEDURE — 63600175 PHARM REV CODE 636 W HCPCS: Performed by: PHYSICIAN ASSISTANT

## 2018-12-17 PROCEDURE — 93005 ELECTROCARDIOGRAM TRACING: CPT

## 2018-12-17 PROCEDURE — S0030 INJECTION, METRONIDAZOLE: HCPCS | Performed by: STUDENT IN AN ORGANIZED HEALTH CARE EDUCATION/TRAINING PROGRAM

## 2018-12-17 PROCEDURE — 82105 ALPHA-FETOPROTEIN SERUM: CPT

## 2018-12-17 PROCEDURE — 63600175 PHARM REV CODE 636 W HCPCS: Performed by: STUDENT IN AN ORGANIZED HEALTH CARE EDUCATION/TRAINING PROGRAM

## 2018-12-17 RX ORDER — INSULIN ASPART 100 [IU]/ML
0-5 INJECTION, SOLUTION INTRAVENOUS; SUBCUTANEOUS
Status: DISCONTINUED | OUTPATIENT
Start: 2018-12-17 | End: 2018-12-21

## 2018-12-17 RX ORDER — LEVOTHYROXINE SODIUM 125 UG/1
125 TABLET ORAL
Status: DISCONTINUED | OUTPATIENT
Start: 2018-12-17 | End: 2018-12-22 | Stop reason: HOSPADM

## 2018-12-17 RX ORDER — ACETAMINOPHEN 325 MG/1
650 TABLET ORAL EVERY 8 HOURS PRN
Status: DISCONTINUED | OUTPATIENT
Start: 2018-12-17 | End: 2018-12-18

## 2018-12-17 RX ORDER — IBUPROFEN 200 MG
24 TABLET ORAL
Status: DISCONTINUED | OUTPATIENT
Start: 2018-12-17 | End: 2018-12-21

## 2018-12-17 RX ORDER — IBUPROFEN 200 MG
16 TABLET ORAL
Status: DISCONTINUED | OUTPATIENT
Start: 2018-12-17 | End: 2018-12-21

## 2018-12-17 RX ORDER — SODIUM CHLORIDE 0.9 % (FLUSH) 0.9 %
5 SYRINGE (ML) INJECTION
Status: DISCONTINUED | OUTPATIENT
Start: 2018-12-17 | End: 2018-12-22 | Stop reason: HOSPADM

## 2018-12-17 RX ORDER — METRONIDAZOLE 500 MG/100ML
500 INJECTION, SOLUTION INTRAVENOUS
Status: DISCONTINUED | OUTPATIENT
Start: 2018-12-17 | End: 2018-12-19

## 2018-12-17 RX ORDER — ACETAMINOPHEN 325 MG/1
650 TABLET ORAL EVERY 4 HOURS PRN
Status: DISCONTINUED | OUTPATIENT
Start: 2018-12-17 | End: 2018-12-17

## 2018-12-17 RX ORDER — GLUCAGON 1 MG
1 KIT INJECTION
Status: DISCONTINUED | OUTPATIENT
Start: 2018-12-17 | End: 2018-12-21

## 2018-12-17 RX ORDER — PANTOPRAZOLE SODIUM 40 MG/1
40 TABLET, DELAYED RELEASE ORAL DAILY
Status: DISCONTINUED | OUTPATIENT
Start: 2018-12-17 | End: 2018-12-22 | Stop reason: HOSPADM

## 2018-12-17 RX ORDER — AMIODARONE HYDROCHLORIDE 100 MG/1
100 TABLET ORAL DAILY
Status: DISCONTINUED | OUTPATIENT
Start: 2018-12-17 | End: 2018-12-22 | Stop reason: HOSPADM

## 2018-12-17 RX ORDER — FLUTICASONE PROPIONATE 50 MCG
1 SPRAY, SUSPENSION (ML) NASAL DAILY
Status: DISCONTINUED | OUTPATIENT
Start: 2018-12-17 | End: 2018-12-22 | Stop reason: HOSPADM

## 2018-12-17 RX ORDER — ASPIRIN 81 MG/1
81 TABLET ORAL DAILY
Status: DISCONTINUED | OUTPATIENT
Start: 2018-12-17 | End: 2018-12-22 | Stop reason: HOSPADM

## 2018-12-17 RX ORDER — COLCHICINE 0.6 MG/1
0.6 TABLET, FILM COATED ORAL DAILY
Status: DISCONTINUED | OUTPATIENT
Start: 2018-12-17 | End: 2018-12-18

## 2018-12-17 RX ORDER — CEFTRIAXONE 1 G/1
1 INJECTION, POWDER, FOR SOLUTION INTRAMUSCULAR; INTRAVENOUS
Status: DISCONTINUED | OUTPATIENT
Start: 2018-12-18 | End: 2018-12-19

## 2018-12-17 RX ORDER — DICLOFENAC SODIUM 10 MG/G
2 GEL TOPICAL
Status: ON HOLD | COMMUNITY
End: 2019-01-02

## 2018-12-17 RX ORDER — SPIRONOLACTONE 50 MG/1
50 TABLET, FILM COATED ORAL DAILY
Status: ON HOLD | COMMUNITY
End: 2018-12-21 | Stop reason: HOSPADM

## 2018-12-17 RX ORDER — ALLOPURINOL 100 MG/1
100 TABLET ORAL DAILY
Status: DISCONTINUED | OUTPATIENT
Start: 2018-12-17 | End: 2018-12-22 | Stop reason: HOSPADM

## 2018-12-17 RX ADMIN — PANTOPRAZOLE SODIUM 40 MG: 40 TABLET, DELAYED RELEASE ORAL at 09:12

## 2018-12-17 RX ADMIN — IOHEXOL 75 ML: 350 INJECTION, SOLUTION INTRAVENOUS at 02:12

## 2018-12-17 RX ADMIN — METRONIDAZOLE 500 MG: 500 INJECTION, SOLUTION INTRAVENOUS at 12:12

## 2018-12-17 RX ADMIN — ALLOPURINOL 100 MG: 100 TABLET ORAL at 09:12

## 2018-12-17 RX ADMIN — ACETAMINOPHEN 650 MG: 325 TABLET ORAL at 09:12

## 2018-12-17 RX ADMIN — ASPIRIN 81 MG: 81 TABLET, COATED ORAL at 09:12

## 2018-12-17 RX ADMIN — AMIODARONE HYDROCHLORIDE 100 MG: 100 TABLET ORAL at 09:12

## 2018-12-17 RX ADMIN — SODIUM CHLORIDE 1800 ML: 0.9 INJECTION, SOLUTION INTRAVENOUS at 01:12

## 2018-12-17 RX ADMIN — METRONIDAZOLE 500 MG: 500 INJECTION, SOLUTION INTRAVENOUS at 09:12

## 2018-12-17 RX ADMIN — COLCHICINE 0.6 MG: 0.6 TABLET, FILM COATED ORAL at 09:12

## 2018-12-17 RX ADMIN — CEFTRIAXONE 2 G: 2 INJECTION, SOLUTION INTRAVENOUS at 02:12

## 2018-12-17 RX ADMIN — SODIUM CHLORIDE 500 ML: 0.9 INJECTION, SOLUTION INTRAVENOUS at 01:12

## 2018-12-17 RX ADMIN — CEFEPIME 1 G: 1 INJECTION, POWDER, FOR SOLUTION INTRAMUSCULAR; INTRAVENOUS at 01:12

## 2018-12-17 RX ADMIN — METRONIDAZOLE 500 MG: 500 INJECTION, SOLUTION INTRAVENOUS at 02:12

## 2018-12-17 RX ADMIN — LEVOTHYROXINE SODIUM 125 MCG: 125 TABLET ORAL at 09:12

## 2018-12-17 NOTE — H&P
Radiology Consult    Alfredina Claudette Parks is a 78 y.o. female with a history of pancreatic cyst, diverticulosis, HCV and liver lesion of unknown etiology.    Presented to ED with abd pain. CT A/P showed increased size of an ill-defined lesion in the central liver, significantly dilated left hepatic bile ducts. Mass lesion in the region of the pancreatic head/neck with additional foci of abnormal soft tissue and adenopathy in the upper abdomen.    IR consulted for liver lesion biopsy.        Past Medical History:   Diagnosis Date    Absence of bladder continence 4/20/2015    Asthma     Asthma without status asthmaticus 6/28/2012    Atrial fibrillation     BMI 36.0-36.9,adult 8/25/2014    Bulging lumbar disc     Cataract     Cervical radiculopathy     Claudication 5/18/2017    Constipation 8/30/2013    Diabetes mellitus     pre diabetes     Diverticular disease 8/30/2013    GERD (gastroesophageal reflux disease) 6/28/2012    Hepatitis C     Hyperglycemia     Hypertension     Hypothyroidism     Osteoporosis     Pancreatic cyst     Vitamin D deficiency disease      Past Surgical History:   Procedure Laterality Date    BREAST CYST EXCISION Bilateral     COLONOSCOPY N/A 7/11/2013    Per[facility]ga [facility] at [facility]    CYST REMOVAL      ESOPHAGOGASTRODUODENOSCOPY (EGD) N/A 1/21/2015    Performed by Genaro [facility] at [facility]    EYE SURGERY      cataracts    HYSTERECTOMY      TOE SURGERY Bilateral     big toenails    ULTRASOUND-ENDOSCOPIC-UPPER N/A 1/15/2018    [facility]formed by [facility]il [facility] (2ND FLR)    ULTRASOUND-ENDOSCOPIC-UPPER N/A 8/14/2015    [facility] at [facility] END[facility]    ULTRASOUND-ENDOSCOPIC-UPPER N/A 1/21/2015    Per[facility] by [facility] at [facility] END[facility]    ULTRASOUND-ENDOSCOPIC-UPPER W/POSSIBLE FNA N/A 2/15/2016    Performed by [facility]o [facility] at [facility] ENDO       Imaging reviewed with Radiology staff, Dr. York.        Scheduled Meds:    allopurinol  100 mg Oral Daily    amiodarone  100 mg Oral Daily    aspirin  81 mg Oral Daily    cefTRIAXone (ROCEPHIN) IVPB  2 g Intravenous Q24H    colchicine  0.6 mg Oral Daily    fluticasone  1 spray Each Nare Daily    levothyroxine  125 mcg Oral Before breakfast    metronidazole  500 mg Intravenous Q8H    pantoprazole  40 mg Oral Daily     Continuous Infusions:   PRN Meds:dextrose 50%, dextrose 50%, glucagon (human recombinant), glucose, glucose, insulin aspart U-100, sodium chloride 0.9%    Allergies:   Review of patient's allergies indicates:   Allergen Reactions    Milk containing products Shortness Of Breath    Nuts [tree nut] Anaphylaxis    Fosamax [alendronate]      dizziness       Labs:  Recent Labs   Lab 12/16/18  2240   INR 1.1       Recent Labs   Lab 12/17/18  0732   WBC 14.21*   HGB 10.8*   HCT 34.7*   MCV 96         Recent Labs   Lab 12/17/18  0732   GLU 87   *   K 4.1   CL 97   CO2 27   BUN 14   CREATININE 0.8   CALCIUM 8.5*   *   *   ALBUMIN 2.2*   BILITOT 1.4*   BILIDIR 0.9*         Vitals (Most Recent):  Temp: 97.8 °F (36.6 °C)(Simultaneous filing. User may not have seen previous data.) (12/17/18 0805)  Pulse: 69 (12/17/18 1151)  Resp: 18(Simultaneous filing. User may not have seen previous data.) (12/17/18 0805)  BP: (!) 120/56 (12/17/18 0805)  SpO2: (!) 94 %(Simultaneous filing. User may not have seen previous data.) (12/17/18 0805)    Assessment:    Pt with abd pain. CT revealing pancreatic mass and liver lesion that is increasing in size. This mass is indeterminate on CT and further evaluation with MRI with and without contrast can provide more details as it is less invasive with less complicaitons than tissue biopsy.     Recommendations:  MRI with and without contrast     Elvia Olvera  Diagnostic and interventional radiology  PGY- II  [phone]

## 2018-12-17 NOTE — SUBJECTIVE & OBJECTIVE
Past Medical History:   Diagnosis Date    Absence of bladder continence 4/20/2015    Asthma     Asthma without status asthmaticus 6/28/2012    Atrial fibrillation     BMI 36.0-36.9,adult 8/25/2014    Bulging lumbar disc     Cataract     Cervical radiculopathy     Claudication 5/18/2017    Constipation 8/30/2013    Diabetes mellitus     pre diabetes     Diverticular disease 8/30/2013    GERD (gastroesophageal reflux disease) 6/28/2012    Hepatitis C     Hyperglycemia     Hypertension     Hypothyroidism     Osteoporosis     Pancreatic cyst     Vitamin D deficiency disease        Past Surgical History:   Procedure Laterality Date    BREAST CYST EXCISION Bilateral     COLONOSCOPY N/A 7/11/2013    [facility] at [facility] END[facility]    CYST REMOVAL      ESOPHAGOGASTRODUODENOSCOPY (EGD) N/A 1/21/2015    Performed by [facility] at [facility] ENDO    EYE SURGERY      cataracts    HYSTERECTOMY      TOE SURGERY Bilateral     big toenails    ULTRASOUND-ENDOSCOPIC-UPPER N/A 1/15/2018    [facility] (2ND F[facility])    ULTRASOUND-ENDOSCOPIC-UPPER N/A 8/14/2015    Performed [facility] at [facility] END[facility]    ULTRASOUND-ENDOSCOPIC-UPPER N/A 1/21/2015    Performed by [facility] at [facility] ENDO    ULTRASOUND-ENDOSCOPIC-UPPER W/POSSIBLE FNA N/A 2/15/2016    Performed by Gen[facility]o MD at [facility] ENDO       Review of patient's allergies indicates:   Allergen Reactions    Milk containing products Shortness Of Breath    Nuts [tree nut] Anaphylaxis    Fosamax [alendronate]      dizziness       No current facility-administered medications on file prior to encounter.      Current Outpatient Medications on File Prior to Encounter   Medication Sig    allopurinol (ZYLOPRIM) 100 MG tablet Take 1 tablet (100 mg total) by mouth once daily.    amiodarone (PACERONE) 200 MG Tab TK 1/2 OF A T PO D    aspirin (ECOTRIN) 81 MG EC tablet Take 81 mg by mouth once  daily.      carvedilol (COREG) 3.125 MG tablet Take 3.125 mg by mouth 2 (two) times daily with meals.    furosemide (LASIX) 20 MG tablet TAKE 2 TABLETS(40 MG) BY MOUTH EVERY DAY    hyoscyamine (ANASPAZ,LEVSIN) 0.125 mg Tab Take 1 tablet (125 mcg total) by mouth every 4 (four) hours as needed.    levothyroxine (SYNTHROID) 125 MCG tablet Take 1 tablet (125 mcg total) by mouth once daily.    loratadine (CLARITIN) 10 mg tablet Take 10 mg by mouth once daily.    losartan (COZAAR) 25 MG tablet Take 25 mg by mouth once daily.    pantoprazole (PROTONIX) 40 MG tablet TAKE 1 TABLET BY MOUTH ONCE DAILY, 30 MINUTES BEFORE EATING    polyethylene glycol (GLYCOLAX) 17 gram/dose powder Take 17 g by mouth once daily.    spironolactone (ALDACTONE) 25 MG tablet Take 2 tablets (50 mg total) by mouth once daily.    albuterol (PROVENTIL) 2.5 mg /3 mL (0.083 %) nebulizer solution TAKE 3ML BY NEBULIZATION EVERY 6 HOURS AS NEEDED FOR WHEEZING.    albuterol (VENTOLIN HFA) 90 mcg/actuation inhaler INHALE 2 PUFFS PO Q 6 H PRN    clotrimazole (LOTRIMIN) 1 % Soln Apply topically once daily.    colchicine 0.6 mg tablet Take 1 tablet (0.6 mg total) by mouth once daily.    docusate sodium (COLACE) 100 MG capsule Take 1 capsule (100 mg total) by mouth 3 (three) times daily as needed for Constipation.    esomeprazole (NEXIUM) 40 mg GrPS Take 40 mg by mouth once daily.    fluticasone (FLONASE) 50 mcg/actuation nasal spray SHAKE LIQUID AND USE 1 SPRAY IN EACH NOSTRIL EVERY DAY    fluticasone-salmeterol 250-50 mcg/dose (ADVAIR DISKUS) 250-50 mcg/dose diskus inhaler Inhale 1 puff into the lungs 2 (two) times daily. Controller    influenza (FLUZONE HIGH-DOSE) 180 mcg/0.5 mL vaccine Inject into the muscle.    solifenacin (VESICARE) 5 MG tablet Take 1 tablet (5 mg total) by mouth once daily.    triamcinolone acetonide 0.025% (KENALOG) 0.025 % cream Apply topically 2 (two) times daily.    [DISCONTINUED] denosumab (PROLIA) 60 mg/mL Syrg  Inject 1 mL (60 mg total) into the skin every 6 (six) months.     Family History     Problem Relation (Age of Onset)    Asthma Sister, Other    Breast cancer Sister (55)    Diabetes Maternal Grandfather, Maternal Aunt    Lung cancer Father    Ovarian cancer Mother    Stomach cancer Sister    Throat cancer Brother        Tobacco Use    Smoking status: Never Smoker    Smokeless tobacco: Never Used   Substance and Sexual Activity    Alcohol use: No     Alcohol/week: 0.0 oz    Drug use: No    Sexual activity: Not Currently     Partners: Male     Review of Systems   Constitutional: Positive for appetite change and fatigue. Negative for activity change, chills, fever and unexpected weight change.   HENT: Negative for congestion, facial swelling, postnasal drip, sneezing and sore throat.    Eyes: Negative for photophobia.   Respiratory: Positive for cough. Negative for choking, shortness of breath and wheezing.    Cardiovascular: Negative for chest pain and leg swelling.   Gastrointestinal: Positive for abdominal pain and nausea. Negative for abdominal distention, anal bleeding, constipation, diarrhea and vomiting.        RUQ and lower abdominal pain   Endocrine: Negative for polydipsia and polyphagia.   Genitourinary: Negative for dysuria, enuresis, flank pain, hematuria and urgency.   Musculoskeletal: Negative for arthralgias, back pain, joint swelling, myalgias and neck stiffness.   Skin: Negative for color change, pallor and rash.   Allergic/Immunologic: Negative for immunocompromised state.   Neurological: Negative for dizziness, seizures, weakness, numbness and headaches.   Hematological: Negative for adenopathy.   Psychiatric/Behavioral: Negative for agitation, behavioral problems and confusion.     Objective:     Vital Signs (Most Recent):  Temp: 98.4 °F (36.9 °C) (12/17/18 0522)  Pulse: 72 (12/17/18 0522)  Resp: 20 (12/17/18 0522)  BP: 122/63 (12/17/18 0522)  SpO2: 95 % (12/17/18 0522) Vital Signs (24h  Range):  Temp:  [98.4 °F (36.9 °C)-98.7 °F (37.1 °C)] 98.4 °F (36.9 °C)  Pulse:  [72-82] 72  Resp:  [18-20] 20  SpO2:  [95 %-100 %] 95 %  BP: (122-145)/(63-91) 122/63     Weight: 75.3 kg (166 lb)  Body mass index is 34.69 kg/m².    Physical Exam   Constitutional: She is oriented to person, place, and time. She appears well-developed and well-nourished. No distress.   HENT:   Head: Normocephalic and atraumatic.   Mouth/Throat: No oropharyngeal exudate.   Eyes: Conjunctivae and EOM are normal. Pupils are equal, round, and reactive to light. Right eye exhibits no discharge. Left eye exhibits no discharge. No scleral icterus.   Very mild jaundice   Neck: Normal range of motion.   Cardiovascular: Normal rate, regular rhythm and normal heart sounds. Exam reveals no gallop.   No murmur heard.  Pulmonary/Chest: Effort normal and breath sounds normal. No respiratory distress. She has no wheezes. She has no rales.   Abdominal: Soft. Bowel sounds are normal. She exhibits no distension and no mass. There is tenderness. There is no guarding.   Musculoskeletal: Normal range of motion. She exhibits no edema or tenderness.   Neurological: She is alert and oriented to person, place, and time.   Skin: Skin is warm and dry. Capillary refill takes less than 2 seconds. No rash noted. She is not diaphoretic. No erythema.   Psychiatric: She has a normal mood and affect. Her behavior is normal.         CRANIAL NERVES     CN III, IV, VI   Pupils are equal, round, and reactive to light.  Extraocular motions are normal.        Significant Labs: All pertinent labs within the past 24 hours have been reviewed.    Significant Imaging: I have reviewed and interpreted all pertinent imaging results/findings within the past 24 hours.

## 2018-12-17 NOTE — HPI
Mrs. Garcia is 78 y.o F patient who has a past medical hx significant for HTN, DM, A Fib (loop recorder placed on 02/2017), diverticulosis, pancreatic cyst, HCV (dx 4 years ago), chronic constipation, and liver lesion of unknown etiology, presents to the ER for an emergent evaluation due to diffuse lower abdominal pain. She has had this 9/10 crampy pain for the past 3 weeks which getting worse by movement. She was seen at [facility] the last week of November when it started and was admitted for 3-4 days. She is scheduled for a liver biopsy on this Friday. She returns to the ER due to intolerable pain. She states that the pain seems to be getting worse and the medications are not helping. She also states that she had black stools, but does admit to taking Pepto Bismol for abdominal pain several times. She reports associated symptoms of decreased appetite, nausea, constipation, and generalized weakness. she denies jaundice, dark urine or itching. CT A/P showed increased size of an ill-defined lesion in the central liver, significantly dilated left hepatic bile ducts. Mass lesion in the region of the pancreatic head/neck with additional foci of abnormal soft tissue and adenopathy in the upper abdomen. Dilated upper common bile duct possibly secondary to mass effect from aforementioned peripancreatic/pancreatic mass. Vitals are normal.

## 2018-12-17 NOTE — PROGRESS NOTES
Pharmacist Renal Dose Adjustment Note    Alfredina Claudette Parks is a 78 y.o. female being treated with the medication zosyn    Patient Data:    Vital Signs (Most Recent):  Temp: 97.8 °F (36.6 °C)(Simultaneous filing. User may not have seen previous data.) (12/17/18 0805)  Pulse: 75(Simultaneous filing. User may not have seen previous data.) (12/17/18 0805)  Resp: 18(Simultaneous filing. User may not have seen previous data.) (12/17/18 0805)  BP: (!) 120/56 (12/17/18 0805)  SpO2: (!) 94 %(Simultaneous filing. User may not have seen previous data.) (12/17/18 0805)   Vital Signs (72h Range):  Temp:  [97.8 °F (36.6 °C)-98.7 °F (37.1 °C)]   Pulse:  [71-84]   Resp:  [18-20]   BP: (120-145)/(56-91)   SpO2:  [94 %-100 %]      Recent Labs   Lab 12/16/18  2240 12/17/18  0732   CREATININE 1.1 0.8     Serum creatinine: 0.8 mg/dL 12/17/18 0732  Estimated creatinine clearance: 66.5 mL/min    Medication: zosyn 4.5g q12h will be changed to medication: zosyn 4.5g q8h    Pharmacist's Name: Jerel Raoy  Pharmacist's Extension: [phone]

## 2018-12-17 NOTE — NURSING
Patient received during rounds and placed in bed. Physician notified an she positioned herself for comfort. Daughter called and gave phone number to reach her if any questions. She stated her mother has a loop recorder in her chest wall for her heart. Will inform physician. Assessment on going.

## 2018-12-17 NOTE — ED NOTES
Telemetry Verification   Patient placed on Telemetry Box  Verified with War Room  Box # [id number]       Rate 74   Rhythm NSR

## 2018-12-17 NOTE — CONSULTS
[facility]  Gastroenterology  Consult Note    Patient Name: Alfredina Claudette Parks  MRN: [id number]  Admission Date: 12/16/2018  Hospital Length of Stay: 0 days  Code Status: Full Code   Attending Provider: Shaina Hill MD   Consulting Provider: Dior Hoyos MD  Primary Care Physician: Veronica Frost MD  Principal Problem:Acute cholangitis    Inpatient consult to [facility]  Consult performed by: Dior Hoyos MD  Consult ordered by: Ori Engel MD  Reason for consult: panc mass        Subjective:     HPI:  Alfredina Claudette Parks is a 78 y.o. female with HTN, DM, A Fib not on AC, diverticulosis, pancreatic cyst (last EUS 1/2018), HCV, chronic constipation, and liver lesion of unknown etiology. She presented to the ER for an emergent evaluation due to diffuse lower abdominal pain on 12/16/2018. She has had this 9/10 crampy pain for the past 3 weeks which getting worse by movement. She was seen at [facility] the last week of November when it started and was admitted for 3-4 days. She is scheduled for a EUS liver biopsy on 12/21/18 with Dr King. She returns to the ER due to intolerable pain. She states that the pain seems to be getting worse and the medications are not helping. She also states that she had black stools, but does admit to taking Pepto Bismol for abdominal pain several times. She reports associated symptoms of decreased appetite, nausea, constipation, and generalized weakness.  she denies jaundice, dark urine or itching. Since admission CT A/P showed increased size of an ill-defined lesion in the central liver, significantly dilated left hepatic bile ducts of 5x5cm, ddx mass vs cholangitic abscess. 3x3cm mass lesion in the region of the pancreatic head/neck with additional foci of abnormal soft tissue and adenopathy in the upper abdomen. Dilated upper common bile duct up to 1.1cm. She was started on abx. VSS afebrile. [facility]  consulted for further evaluation.   She reports her pain has improved, reports low grade fevers at home. Lives alone. From [place]. Has one daughter.          Past Medical History:   Diagnosis Date    Absence of bladder continence 4/20/2015    Asthma     Asthma without status asthmaticus 6/28/2012    Atrial fibrillation     BMI 36.0-36.9,adult 8/25/2014    Bulging lumbar disc     Cataract     Cervical radiculopathy     Claudication 5/18/2017    Constipation 8/30/2013    Diabetes mellitus     pre diabetes     Diverticular disease 8/30/2013    GERD (gastroesophageal reflux disease) 6/28/2012    Hepatitis C     Hyperglycemia     Hypertension     Hypothyroidism     Osteoporosis     Pancreatic cyst     Vitamin D deficiency disease        Past Surgical History:   Procedure Laterality Date    BREAST CYST EXCISION Bilateral     COLONOSCOPY N/A 7/11/2013    [facility] by [facility] ENDO    CYST REMOVAL      ESOPHAGOGASTRODUODENOSCOPY (EGD) N/A 1/21/2015    [facility] by [facility] at [facility] ENDO    EYE SURGERY      cataracts    HYSTERECTOMY      TOE SURGERY Bilateral     big toenails    ULTRASOUND-ENDOSCOPIC-UPPER N/A 1/15/2018    [facility] (2ND F[facility])    ULTRASOUND-ENDOSCOPIC-UPPER N/A 8/14/2015    [facility] by [facility] at [facility] ENDO    ULTRASOUND-ENDOSCOPIC-UPPER N/A 1/21/2015    Per[facility] by [facility] MD at [facility] ENDO    ULTRASOUND-ENDOSCOPIC-UPPER W/POSSIBLE FNA N/A 2/15/2016    Performed by Genaro [facility]ordero MD at [facility] ENDO       Review of patient's allergies indicates:   Allergen Reactions    Milk containing products Shortness Of Breath    Nuts [tree nut] Anaphylaxis    Fosamax [alendronate]      dizziness     Family History     Problem Relation (Age of Onset)    Asthma Sister, Other    Breast cancer Sister (55)    Diabetes Maternal Grandfather, Maternal Aunt    Lung cancer Father    Ovarian cancer Mother     Stomach cancer Sister    Throat cancer Brother        Tobacco Use    Smoking status: Never Smoker    Smokeless tobacco: Never Used   Substance and Sexual Activity    Alcohol use: No     Alcohol/week: 0.0 oz    Drug use: No    Sexual activity: Not Currently     Partners: Male     Review of Systems   Constitutional: Positive for activity change, appetite change, fatigue and fever. Negative for chills and unexpected weight change.   HENT: Negative for congestion, facial swelling, postnasal drip, sneezing and sore throat.    Eyes: Negative for photophobia.   Respiratory: Positive for cough. Negative for choking, shortness of breath and wheezing.    Cardiovascular: Negative for chest pain and leg swelling.   Gastrointestinal: Positive for abdominal pain and nausea. Negative for abdominal distention, anal bleeding, constipation, diarrhea and vomiting.   Endocrine: Negative for polydipsia and polyphagia.   Genitourinary: Negative for dysuria, enuresis, flank pain, hematuria and urgency.   Musculoskeletal: Negative for arthralgias, back pain, joint swelling, myalgias and neck stiffness.   Skin: Negative for color change, pallor and rash.   Allergic/Immunologic: Negative for immunocompromised state.   Neurological: Negative for dizziness, seizures, weakness, numbness and headaches.   Hematological: Negative for adenopathy.   Psychiatric/Behavioral: Negative for agitation, behavioral problems and confusion.     Objective:     Vital Signs (Most Recent):  Temp: 97.8 °F (36.6 °C)(Simultaneous filing. User may not have seen previous data.) (12/17/18 0805)  Pulse: 75(Simultaneous filing. User may not have seen previous data.) (12/17/18 0805)  Resp: 18(Simultaneous filing. User may not have seen previous data.) (12/17/18 0805)  BP: (!) 120/56 (12/17/18 0805)  SpO2: (!) 94 %(Simultaneous filing. User may not have seen previous data.) (12/17/18 0805) Vital Signs (24h Range):  Temp:  [97.8 °F (36.6 °C)-98.7 °F (37.1 °C)] 97.8 °F  "(36.6 °C)  Pulse:  [71-84] 75  Resp:  [18-20] 18  SpO2:  [94 %-100 %] 94 %  BP: (120-145)/(56-91) 120/56     Weight: 70.4 kg (155 lb 3.3 oz) (12/17/18 0805)  Body mass index is 32.44 kg/m².      Intake/Output Summary (Last 24 hours) at 12/17/2018 1004  Last data filed at 12/17/2018 0358  Gross per 24 hour   Intake 2400 ml   Output --   Net 2400 ml       Lines/Drains/Airways     Peripheral Intravenous Line                 Peripheral IV - Single Lumen 12/17/18 0120 Right Forearm less than 1 day         Peripheral IV - Single Lumen 12/17/18 0137 Left Hand less than 1 day                Physical Exam   Constitutional: She is oriented to person, place, and time. She appears well-developed and well-nourished.   Eyes: No scleral icterus.   Neck: Neck supple.   Cardiovascular: Normal rate and regular rhythm. Exam reveals no gallop.   Pulmonary/Chest: Effort normal. No respiratory distress.   Abdominal: Soft. Bowel sounds are normal. She exhibits no distension and no mass. There is tenderness (lower abdomen, RUQ). There is no rebound and no guarding. No hernia.   Musculoskeletal: She exhibits no edema.   Neurological: She is alert and oriented to person, place, and time.   Skin: Skin is warm.   Psychiatric: She has a normal mood and affect. Her behavior is normal.     BP (!) 120/56 (BP Location: Left arm, Patient Position: Lying) Comment (Patient Position): Simultaneous filing. User may not have seen previous data.  Pulse 75 Comment: Simultaneous filing. User may not have seen previous data.  Temp 97.8 °F (36.6 °C) (Oral) Comment: Simultaneous filing. User may not have seen previous data. Comment (Src): Simultaneous filing. User may not have seen previous data.  Resp 18 Comment: Simultaneous filing. User may not have seen previous data.  Ht 4' 10" (1.473 m)   Wt 70.4 kg (155 lb 3.3 oz)   SpO2 (!) 94% Comment: Simultaneous filing. User may not have seen previous data.  Breastfeeding? No   BMI 32.44 kg/m²    Lab " Results   Component Value Date    WBC 14.21 (H) 12/17/2018    HGB 10.8 (L) 12/17/2018    HCT 34.7 (L) 12/17/2018    MCV 96 12/17/2018     12/17/2018     Lab Results   Component Value Date    INR 1.1 12/16/2018     Lab Results   Component Value Date     (L) 12/17/2018    K 4.1 12/17/2018    CREATININE 0.8 12/17/2018     Lab Results   Component Value Date    ALBUMIN 2.2 (L) 12/17/2018     (H) 12/17/2018     (H) 12/17/2018    ALKPHOS 297 (H) 12/17/2018    BILITOT 1.4 (H) 12/17/2018     Lab Results   Component Value Date    AFP 16 (H) 12/17/2018     Lab Results   Component Value Date    LIPASE 14 12/16/2018    AMYLASE 46 11/24/2014     Imaging:  Reviewed and as noted in HPI.         Assessment/Plan:     Pancreatic mass    Alfredina Claudette Parks is a 78 y.o. 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     Given concern for liver abscess, recommend IR evaluation for drainage.   We will plan EUS/ERCP after IR evaluation  Agree with abx and cultures  Hold blood thinners.   Will continue follow.            Thank you for your consult. I will follow-up with patient. Please contact us if you have any additional questions.    Dior oHyos MD  Gastroenterology  [facility]

## 2018-12-17 NOTE — PLAN OF CARE
Pt. admitted with c/o abd. pain and pancreatic .mass. Pt. lives alone and daughter lives around the corner. Uses rollator to anbulate. GI consult with possible ERCP for work up of pancreatic mass. D/C needs to be determined.     Payor: [employer] MANAGED MEDICARE / Plan: [employer] SECURE HEALTH / Product Type: Medicare Advantage /      Veronica Frost MD       [facility] [id number] - [place], [place] - [address] AT [facility] [place] [facility]  [address] [place] [address]  Phone: [phone] Fax: [phone]  \    Future Appointments   Date Time Provider Department Center   1/10/2019 11:20 AM Veronica Frost MD [facility]   1/17/2019  9:30 AM Jamil King MD [facility] GASTR[facility] [address]   1/29/2019  8:45 AM Wolf Martin DPM [facility] [place]   5/30/2019 11:00 AM [facility]          12/17/18 1030   Discharge Assessment   Assessment Type Discharge Planning Assessment   Confirmed/corrected address and phone number on facesheet? Yes   Assessment information obtained from? Patient   Expected Length of Stay (days) 3   Communicated expected length of stay with patient/caregiver yes   Prior to hospitilization cognitive status: Alert/Oriented   Prior to hospitalization functional status: Assistive Equipment   Current cognitive status: Alert/Oriented   Current Functional Status: Assistive Equipment   Lives With alone  (daughter lives close)   Able to Return to Prior Arrangements yes   Is patient able to care for self after discharge? Yes  (with assistance from family)   Who are your caregiver(s) and their phone number(s)? viji Ortiz [phone]   Patient's perception of discharge disposition home or selfcare   Readmission Within the Last 30 Days no previous admission in last 30 days   Patient currently being followed by outpatient case management? No   Patient currently receives any other outside agency services? No    Equipment Currently Used at Home rollator   Do you have any problems affording any of your prescribed medications? No   Is the patient taking medications as prescribed? yes   Does the patient have transportation home? Yes   Transportation Anticipated family or friend will provide   Does the patient receive services at the [facility]mad[facility] Clinic? No   Discharge Plan A Home;Home with family   Discharge Plan B Home   Patient/Family in Agreement with Plan yes

## 2018-12-17 NOTE — NURSING
Was informed per telemetry that patient had a 16 beat run of v-tachy, when arriving to room patient was up in bathroom finishing urinating. She denied any chest discomfort . Denied SOB. Stated she only has stomach ache. Asymptomatic. Ambulated to bed with assistance. Vital signs stable. Call to physician and awaiting call.

## 2018-12-17 NOTE — NURSING
GI physician rounded and spoke with patient questioned test. Patient resting comfortably. No concerns.

## 2018-12-17 NOTE — ASSESSMENT & PLAN NOTE
Alfredina Claudette Parks is a 78 y.o. 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     Given concern for liver abscess, recommend IR evaluation for drainage.   We will plan EUS/ERCP after IR evaluation  Agree with abx and cultures  Hold blood thinners.   Will continue follow.

## 2018-12-17 NOTE — CONSULTS
Radiology Consult    Alfredina Claudette Parks is a 78 y.o. female with a history of pancreatic cyst, diverticulosis, and HCV and who presents with a liver lesion of unknown etiology.     Initially presented to ED with abd pain. CT A/P showed increased size of an ill-defined lesion in the central liver, significantly dilated left hepatic bile ducts. Mass lesion in the region of the pancreatic head/neck with additional foci of abnormal soft tissue and adenopathy in the upper abdomen.     IR consulted for liver lesion biopsy.     .  Past Medical History:   Diagnosis Date    Absence of bladder continence 4/20/2015    Asthma     Asthma without status asthmaticus 6/28/2012    Atrial fibrillation     BMI 36.0-36.9,adult 8/25/2014    Bulging lumbar disc     Cataract     Cervical radiculopathy     Claudication 5/18/2017    Constipation 8/30/2013    Diabetes mellitus     pre diabetes     Diverticular disease 8/30/2013    GERD (gastroesophageal reflux disease) 6/28/2012    Hepatitis C     Hyperglycemia     Hypertension     Hypothyroidism     Osteoporosis     Pancreatic cyst     Vitamin D deficiency disease      Past Surgical History:   Procedure Laterality Date    BREAST CYST EXCISION Bilateral     COLONOSCOPY N/A 7/11/2013    Performed by [facility]ga [facility] at [facility] ENDO    CYST REMOVAL      ESOPHAGOGASTRODUODENOSCOPY (EGD) N/A 1/21/2015    Performed by Gen[facility]ordero [facility] at [facility] ENDO    EYE SURGERY      cataracts    HYSTERECTOMY      TOE SURGERY Bilateral     big toenails    ULTRASOUND-ENDOSCOPIC-UPPER N/A 1/15/2018    [facility] END[facility] (2ND FLR)    ULTRASOUND-ENDOSCOPIC-UPPER N/A 8/14/2015    Performed [facility] at [facility] ENDO    ULTRASOUND-ENDOSCOPIC-UPPER N/A 1/21/2015    Per[facility] by [facility] at [facility] ENDO    ULTRASOUND-ENDOSCOPIC-UPPER W/POSSIBLE FNA N/A 2/15/2016    Performed by [facility] at [facility] ENDO       Imaging reviewed with  Radiology staff, Dr. York.     Procedure: None    Scheduled Meds:    allopurinol  100 mg Oral Daily    amiodarone  100 mg Oral Daily    aspirin  81 mg Oral Daily    cefTRIAXone (ROCEPHIN) IVPB  2 g Intravenous Q24H    colchicine  0.6 mg Oral Daily    fluticasone  1 spray Each Nare Daily    levothyroxine  125 mcg Oral Before breakfast    metronidazole  500 mg Intravenous Q8H    pantoprazole  40 mg Oral Daily     Continuous Infusions:   PRN Meds:dextrose 50%, dextrose 50%, glucagon (human recombinant), glucose, glucose, insulin aspart U-100, sodium chloride 0.9%    Allergies:   Review of patient's allergies indicates:   Allergen Reactions    Milk containing products Shortness Of Breath    Nuts [tree nut] Anaphylaxis    Fosamax [alendronate]      dizziness       Labs:  Recent Labs   Lab 12/16/18  2240   INR 1.1       Recent Labs   Lab 12/17/18  0732   WBC 14.21*   HGB 10.8*   HCT 34.7*   MCV 96         Recent Labs   Lab 12/17/18  0732   GLU 87   *   K 4.1   CL 97   CO2 27   BUN 14   CREATININE 0.8   CALCIUM 8.5*   *   *   ALBUMIN 2.2*   BILITOT 1.4*   BILIDIR 0.9*         Vitals (Most Recent):  Temp: 99.2 °F (37.3 °C) (12/17/18 1212)  Pulse: 63 (12/17/18 1212)  Resp: 16 (12/17/18 1212)  BP: 138/66 (12/17/18 1212)  SpO2: 95 % (12/17/18 1212)    Assessment:     Pt with abd pain. CT revealing pancreatic neck mass and liver lesion which obstructs the left-sided ducts. Unclear if cystic or solid. This lesion appears amenable to biopsy vs drainage but further evaluation with MRI with and without contrast is recommended first.     Recommendations:  - MRI with and without contrast.  - Contact IR when this complete to re-discuss biospy.    D/w Dr. Rao at 1330 hours.     [facility]  Diagnostic and interventional radiology  PGY-II  [phone]

## 2018-12-17 NOTE — H&P
[facility] Hospital Medicine  History & Physical    Patient Name: Alfredina Claudette Parks  MRN: [id number]  Admission Date: 12/16/2018  Attending Physician: Shaina Hill MD   Primary Care Provider: Veronica Frost MD    [facility] Medicine Team: [facility] MED 5 Ori Engel MD     Patient information was obtained from patient and ER records.     Subjective:     Principal Problem:Acute cholangitis    Chief Complaint:   Chief Complaint   Patient presents with    Abdominal Pain     Pt reports lower abdominal pain since Monday. Was seen then and told her pancreas and liver were cause. Scheduled for biopsy on Friday. Pt reports constipation as well.        HPI: Mrs. Garcia is 78 y.o F patient who has a past medical hx significant for HTN, DM, A Fib (loop recorder placed on 02/2017), diverticulosis, pancreatic cyst, HCV (dx 4 years ago), chronic constipation, and liver lesion of unknown etiology, presents to the ER for an emergent evaluation due to diffuse lower abdominal pain. She has had this 9/10 crampy pain for the past 3 weeks which getting worse by movement. She was seen at [facility] the last week of November when it started and was admitted for 3-4 days. She is scheduled for a liver biopsy on this Friday. She returns to the ER due to intolerable pain. She states that the pain seems to be getting worse and the medications are not helping. She also states that she had black stools, but does admit to taking Pepto Bismol for abdominal pain several times. She reports associated symptoms of decreased appetite, nausea, constipation, and generalized weakness.  she denies jaundice, dark urine or itching. CT A/P showed increased size of an ill-defined lesion in the central liver, significantly dilated left hepatic bile ducts. Mass lesion in the region of the pancreatic head/neck with additional foci of abnormal soft tissue and adenopathy in the upper abdomen. Dilated upper common bile duct  possibly secondary to mass effect from aforementioned peripancreatic/pancreatic mass. Vitals are normal.        Past Medical History:   Diagnosis Date    Absence of bladder continence 4/20/2015    Asthma     Asthma without status asthmaticus 6/28/2012    Atrial fibrillation     BMI 36.0-36.9,adult 8/25/2014    Bulging lumbar disc     Cataract     Cervical radiculopathy     Claudication 5/18/2017    Constipation 8/30/2013    Diabetes mellitus     pre diabetes     Diverticular disease 8/30/2013    GERD (gastroesophageal reflux disease) 6/28/2012    Hepatitis C     Hyperglycemia     Hypertension     Hypothyroidism     Osteoporosis     Pancreatic cyst     Vitamin D deficiency disease        Past Surgical History:   Procedure Laterality Date    BREAST CYST EXCISION Bilateral     COLONOSCOPY N/A 7/11/2013    [facility] ENDO    CYST REMOVAL      ESOPHAGOGASTRODUODENOSCOPY (EGD) N/A 1/21/2015    [facility] ENDO    EYE SURGERY      cataracts    HYSTERECTOMY      TOE SURGERY Bilateral     big toenails    ULTRASOUND-ENDOSCOPIC-UPPER N/A 1/15/2018    [facility]formed [facility] ENDO (2ND FLR)    ULTRASOUND-ENDOSCOPIC-UPPER N/A 8/14/2015    Per[facility] by [facility] at [facility] ENDO    ULTRASOUND-ENDOSCOPIC-UPPER N/A 1/21/2015    [facility]formed by [facility] at [facility] ENDO    ULTRASOUND-ENDOSCOPIC-UPPER W/POSSIBLE FNA N/A 2/15/2016    Performed by [facility] at [facility] ENDO       Review of patient's allergies indicates:   Allergen Reactions    Milk containing products Shortness Of Breath    Nuts [tree nut] Anaphylaxis    Fosamax [alendronate]      dizziness       No current facility-administered medications on file prior to encounter.      Current Outpatient Medications on File Prior to Encounter   Medication Sig    allopurinol (ZYLOPRIM) 100 MG tablet Take 1 tablet (100 mg total) by mouth once daily.     amiodarone (PACERONE) 200 MG Tab TK 1/2 OF A T PO D    aspirin (ECOTRIN) 81 MG EC tablet Take 81 mg by mouth once daily.      carvedilol (COREG) 3.125 MG tablet Take 3.125 mg by mouth 2 (two) times daily with meals.    furosemide (LASIX) 20 MG tablet TAKE 2 TABLETS(40 MG) BY MOUTH EVERY DAY    hyoscyamine (ANASPAZ,LEVSIN) 0.125 mg Tab Take 1 tablet (125 mcg total) by mouth every 4 (four) hours as needed.    levothyroxine (SYNTHROID) 125 MCG tablet Take 1 tablet (125 mcg total) by mouth once daily.    loratadine (CLARITIN) 10 mg tablet Take 10 mg by mouth once daily.    losartan (COZAAR) 25 MG tablet Take 25 mg by mouth once daily.    pantoprazole (PROTONIX) 40 MG tablet TAKE 1 TABLET BY MOUTH ONCE DAILY, 30 MINUTES BEFORE EATING    polyethylene glycol (GLYCOLAX) 17 gram/dose powder Take 17 g by mouth once daily.    spironolactone (ALDACTONE) 25 MG tablet Take 2 tablets (50 mg total) by mouth once daily.    albuterol (PROVENTIL) 2.5 mg /3 mL (0.083 %) nebulizer solution TAKE 3ML BY NEBULIZATION EVERY 6 HOURS AS NEEDED FOR WHEEZING.    albuterol (VENTOLIN HFA) 90 mcg/actuation inhaler INHALE 2 PUFFS PO Q 6 H PRN    clotrimazole (LOTRIMIN) 1 % Soln Apply topically once daily.    colchicine 0.6 mg tablet Take 1 tablet (0.6 mg total) by mouth once daily.    docusate sodium (COLACE) 100 MG capsule Take 1 capsule (100 mg total) by mouth 3 (three) times daily as needed for Constipation.    esomeprazole (NEXIUM) 40 mg GrPS Take 40 mg by mouth once daily.    fluticasone (FLONASE) 50 mcg/actuation nasal spray SHAKE LIQUID AND USE 1 SPRAY IN EACH NOSTRIL EVERY DAY    fluticasone-salmeterol 250-50 mcg/dose (ADVAIR DISKUS) 250-50 mcg/dose diskus inhaler Inhale 1 puff into the lungs 2 (two) times daily. Controller    influenza (FLUZONE HIGH-DOSE) 180 mcg/0.5 mL vaccine Inject into the muscle.    solifenacin (VESICARE) 5 MG tablet Take 1 tablet (5 mg total) by mouth once daily.    triamcinolone acetonide 0.025%  (KENALOG) 0.025 % cream Apply topically 2 (two) times daily.    [DISCONTINUED] denosumab (PROLIA) 60 mg/mL Syrg Inject 1 mL (60 mg total) into the skin every 6 (six) months.     Family History     Problem Relation (Age of Onset)    Asthma Sister, Other    Breast cancer Sister (55)    Diabetes Maternal Grandfather, Maternal Aunt    Lung cancer Father    Ovarian cancer Mother    Stomach cancer Sister    Throat cancer Brother        Tobacco Use    Smoking status: Never Smoker    Smokeless tobacco: Never Used   Substance and Sexual Activity    Alcohol use: No     Alcohol/week: 0.0 oz    Drug use: No    Sexual activity: Not Currently     Partners: Male     Review of Systems   Constitutional: Positive for appetite change and fatigue. Negative for activity change, chills, fever and unexpected weight change.   HENT: Negative for congestion, facial swelling, postnasal drip, sneezing and sore throat.    Eyes: Negative for photophobia.   Respiratory: Positive for cough. Negative for choking, shortness of breath and wheezing.    Cardiovascular: Negative for chest pain and leg swelling.   Gastrointestinal: Positive for abdominal pain and nausea. Negative for abdominal distention, anal bleeding, constipation, diarrhea and vomiting.        RUQ and lower abdominal pain   Endocrine: Negative for polydipsia and polyphagia.   Genitourinary: Negative for dysuria, enuresis, flank pain, hematuria and urgency.   Musculoskeletal: Negative for arthralgias, back pain, joint swelling, myalgias and neck stiffness.   Skin: Negative for color change, pallor and rash.   Allergic/Immunologic: Negative for immunocompromised state.   Neurological: Negative for dizziness, seizures, weakness, numbness and headaches.   Hematological: Negative for adenopathy.   Psychiatric/Behavioral: Negative for agitation, behavioral problems and confusion.     Objective:     Vital Signs (Most Recent):  Temp: 98.4 °F (36.9 °C) (12/17/18 0522)  Pulse: 72  (12/17/18 0522)  Resp: 20 (12/17/18 0522)  BP: 122/63 (12/17/18 0522)  SpO2: 95 % (12/17/18 0522) Vital Signs (24h Range):  Temp:  [98.4 °F (36.9 °C)-98.7 °F (37.1 °C)] 98.4 °F (36.9 °C)  Pulse:  [72-82] 72  Resp:  [18-20] 20  SpO2:  [95 %-100 %] 95 %  BP: (122-145)/(63-91) 122/63     Weight: 75.3 kg (166 lb)  Body mass index is 34.69 kg/m².    Physical Exam   Constitutional: She is oriented to person, place, and time. She appears well-developed and well-nourished. No distress.   HENT:   Head: Normocephalic and atraumatic.   Mouth/Throat: No oropharyngeal exudate.   Eyes: Conjunctivae and EOM are normal. Pupils are equal, round, and reactive to light. Right eye exhibits no discharge. Left eye exhibits no discharge. No scleral icterus.   Very mild jaundice   Neck: Normal range of motion.   Cardiovascular: Normal rate, regular rhythm and normal heart sounds. Exam reveals no gallop.   No murmur heard.  Pulmonary/Chest: Effort normal and breath sounds normal. No respiratory distress. She has no wheezes. She has no rales.   Abdominal: Soft. Bowel sounds are normal. She exhibits no distension and no mass. There is tenderness. There is no guarding.   Musculoskeletal: Normal range of motion. She exhibits no edema or tenderness.   Neurological: She is alert and oriented to person, place, and time.   Skin: Skin is warm and dry. Capillary refill takes less than 2 seconds. No rash noted. She is not diaphoretic. No erythema.   Psychiatric: She has a normal mood and affect. Her behavior is normal.         CRANIAL NERVES     CN III, IV, VI   Pupils are equal, round, and reactive to light.  Extraocular motions are normal.        Significant Labs: All pertinent labs within the past 24 hours have been reviewed.    Significant Imaging: I have reviewed and interpreted all pertinent imaging results/findings within the past 24 hours.    Assessment/Plan:     * Acute cholangitis    78 y.o. F patient presented with RUQ and lower abdominal  pain. Vitals are normal. WBC is high at 16.17, high LFTs and alk phosph. CT A/P showed increased size of an ill-defined lesion in the central liver, significantly dilated left hepatic bile ducts. Mass lesion in the region of the pancreatic head/neck with additional foci of abnormal soft tissue and adenopathy in the upper abdomen. Dilated upper common bile duct possibly secondary to mass effect from aforementioned peripancreatic/pancreatic mass.    - she received one dose of Cefepime and Metronidazole at the ED.  - f/u blood culture.  - AES was consulted for possible ERCP, appreciate their recs.  - patient is NPO.           Pancreatic mass    - CT A/P showed mass lesion in the region of the pancreatic head/neck with additional foci of abnormal soft tissue and adenopathy in the upper abdomen. Dilated upper common bile duct possibly secondary to mass effect from aforementioned peripancreatic/pancreatic mass.  - f/u CA 19-9 as well as AFP.  - f/u AES recommendations.             HTN (hypertension), benign    - currently is normotensive.  - hold medications for now and continue monitoring.       Hypothyroidism    - resume home dose synthroid.         VTE Risk Mitigation (From admission, onward)        Ordered     Place sequential compression device  Until discontinued      12/17/18 0642     IP VTE HIGH RISK PATIENT  Once      12/17/18 0642             Ori Engel MD  Department of Hospital Medicine   [facility]

## 2018-12-17 NOTE — PROVIDER PROGRESS NOTES - EMERGENCY DEPT.
Encounter Date: 12/16/2018    ED Physician Progress Notes         EKG - STEMI Decision  Initial Reading: No STEMI present (NSR. HR 82. 0125 12/17/2018.).

## 2018-12-17 NOTE — ASSESSMENT & PLAN NOTE
- CT A/P showed mass lesion in the region of the pancreatic head/neck with additional foci of abnormal soft tissue and adenopathy in the upper abdomen. Dilated upper common bile duct possibly secondary to mass effect from aforementioned peripancreatic/pancreatic mass.  - f/u CA 19-9 as well as AFP.  - f/u AES recommendations.

## 2018-12-17 NOTE — ED TRIAGE NOTES
Pt reports lower abdominal pain since Monday. Had a US. Has spot on both liver and pancrease. Scheduled for biopsy on Friday. Pt reports constipation as well.

## 2018-12-17 NOTE — ED PROVIDER NOTES
Encounter Date: 12/16/2018    SCRIBE #1 NOTE: I, Jonathan Hall, am scribing for, and in the presence of,  Dr. Hong. I have scribed the following portions of the note - the APC attestation.       History     Chief Complaint   Patient presents with    Abdominal Pain     Pt reports lower abdominal pain since Monday. Was seen then and told her pancreas and liver were cause. Scheduled for biopsy on Friday. Pt reports constipation as well.     The patient, who has a past medical hx significant for HTN, DM, A Fib, diverticulosis, pancreatic cyst, HCV, constipation, and liver lesion of unknown etiology, presents to the ER for an emergent evaluation due to diffuse lower abdominal pain. The history is provided by the patient and her daughter. She has had this pain for the past 3 weeks roughly. She was seen at [facility] the last week of November when it started and was admitted. She was seen here afterwards and was discharged home. She is scheduled for a liver biopsy on this Friday. They return to the ER due to intolerable pain. She states that the pain seems to be getting worse and the medications are not helping. She also states that she had black stools, but does admit to taking Pepto Bismol for abdominal pain several times. She reports associated symptoms of decreased appetite, nausea, constipation, and generalized weakness.           Review of patient's allergies indicates:   Allergen Reactions    Milk containing products Shortness Of Breath    Nuts [tree nut] Anaphylaxis    Fosamax [alendronate]      dizziness     Past Medical History:   Diagnosis Date    Absence of bladder continence 4/20/2015    Asthma     Asthma without status asthmaticus 6/28/2012    Atrial fibrillation     BMI 36.0-36.9,adult 8/25/2014    Bulging lumbar disc     Cataract     Cervical radiculopathy     Claudication 5/18/2017    Constipation 8/30/2013    Diabetes mellitus     pre diabetes     Diverticular disease 8/30/2013    GERD  (gastroesophageal reflux disease) 6/28/2012    Hepatitis C     Hyperglycemia     Hypertension     Hypothyroidism     Osteoporosis     Pancreatic cyst     Vitamin D deficiency disease      Past Surgical History:   Procedure Laterality Date    BREAST CYST EXCISION Bilateral     COLONOSCOPY N/A 7/11/2013    Performed by [facility]isha [facility]iega [facility] at [facility]    CYST REMOVAL      ESOPHAGOGASTRODUODENOSCOPY (EGD) N/A 1/21/2015    Performed by Genaro [facility]ordero [facility] at [facility]    EYE SURGERY      cataracts    HYSTERECTOMY      TOE SURGERY Bilateral     big toenails    ULTRASOUND-ENDOSCOPIC-UPPER N/A 1/15/2018    Performed by Jamil King [facility] at [facility] (2ND FLR)    ULTRASOUND-ENDOSCOPIC-UPPER N/A 8/14/2015    Performed by [facility] at [facility]    ULTRASOUND-ENDOSCOPIC-UPPER N/A 1/21/2015    Performed by [facility]o [facility] at [facility] END[facility]    ULTRASOUND-ENDOSCOPIC-UPPER W/POSSIBLE FNA N/A 2/15/2016    Performed by Gen[facility]ordero MD at [facility] END[facility]     Family History   Problem Relation Age of Onset    Ovarian cancer Mother     Lung cancer Father     Stomach cancer Sister     Asthma Sister     Throat cancer Brother     Diabetes Maternal Grandfather     Diabetes Maternal Aunt     Breast cancer Sister 55    Asthma Other     Emphysema Neg Hx      Social History     Tobacco Use    Smoking status: Never Smoker    Smokeless tobacco: Never Used   Substance Use Topics    Alcohol use: No     Alcohol/week: 0.0 oz    Drug use: No     Review of Systems   Constitutional: Positive for appetite change. Negative for chills and fever.   HENT: Negative for sore throat.    Eyes: Negative for visual disturbance.   Respiratory: Negative for cough and shortness of breath.    Cardiovascular: Negative for chest pain.   Gastrointestinal: Positive for abdominal pain, constipation and nausea. Negative for diarrhea, rectal pain and vomiting.   Genitourinary: Negative for decreased urine volume, dysuria, flank pain  and frequency.   Musculoskeletal: Negative for arthralgias and myalgias.   Skin: Negative for color change and rash.   Neurological: Negative for dizziness, syncope, light-headedness, numbness and headaches.   Psychiatric/Behavioral: Negative for confusion.       Physical Exam     Initial Vitals [12/16/18 2117]   BP Pulse Resp Temp SpO2   (!) 145/91 82 18 98.7 °F (37.1 °C) 95 %      MAP       --         Physical Exam    Nursing note and vitals reviewed.  Constitutional: She appears well-developed and well-nourished. She is not diaphoretic.   HENT:   Head: Normocephalic.   Mouth/Throat: Oropharynx is clear and moist.   Eyes: Conjunctivae are normal. No scleral icterus.   Neck: Neck supple.   Cardiovascular: Normal rate and intact distal pulses.   Pulmonary/Chest: No respiratory distress.   Abdominal: Soft. She exhibits no distension. There is tenderness.   Diffuse lower abdominal pain. Voluntary guarding.   Genitourinary:   Genitourinary Comments: DARCIE: Female RN present as chaperone. Non-tender. Normal tone. Brown soft stool. Heme negative.    Musculoskeletal: Normal range of motion.   Neurological: She is alert and oriented to person, place, and time. She has normal strength. No sensory deficit.   Skin: Skin is warm and dry. No rash noted.   No jaundice.    Psychiatric: She has a normal mood and affect.         ED Course   Procedures  Labs Reviewed   CBC W/ AUTO DIFFERENTIAL - Abnormal; Notable for the following components:       Result Value    WBC 16.17 (*)     RBC 3.77 (*)     Hemoglobin 11.3 (*)     Hematocrit 35.6 (*)     MCHC 31.7 (*)     RDW 15.6 (*)     Immature Granulocytes 0.6 (*)     Gran # (ANC) 12.5 (*)     Immature Grans (Abs) 0.09 (*)     Mono # 1.5 (*)     Gran% 77.3 (*)     Lymph% 11.6 (*)     All other components within normal limits   COMPREHENSIVE METABOLIC PANEL - Abnormal; Notable for the following components:    Sodium 130 (*)     Chloride 92 (*)     Glucose 118 (*)     Albumin 2.4 (*)      Total Bilirubin 1.2 (*)     Alkaline Phosphatase 326 (*)      (*)      (*)     eGFR if  56.4 (*)     eGFR if non  48.9 (*)     All other components within normal limits   URINALYSIS, REFLEX TO URINE CULTURE - Abnormal; Notable for the following components:    Leukocytes, UA 1+ (*)     All other components within normal limits    Narrative:     Preferred Collection Type->Urine, Clean Catch one urine cup   LIPASE   LACTIC ACID, PLASMA   PROTIME-INR   URINALYSIS MICROSCOPIC    Narrative:     Preferred Collection Type->Urine, Clean Catch one urine cup     Results for orders placed or performed during the hospital encounter of 12/16/18   Blood culture x two cultures. Draw prior to antibiotics.   Result Value Ref Range    Blood Culture, Routine No Growth to date     Blood Culture, Routine No Growth to date     Blood Culture, Routine No Growth to date    Blood culture x two cultures. Draw prior to antibiotics.   Result Value Ref Range    Blood Culture, Routine No Growth to date     Blood Culture, Routine No Growth to date     Blood Culture, Routine No Growth to date    CBC auto differential   Result Value Ref Range    WBC 16.17 (H) 3.90 - 12.70 K/uL    RBC 3.77 (L) 4.00 - 5.40 M/uL    Hemoglobin 11.3 (L) 12.0 - 16.0 g/dL    Hematocrit 35.6 (L) 37.0 - 48.5 %    MCV 94 82 - 98 fL    MCH 30.0 27.0 - 31.0 pg    MCHC 31.7 (L) 32.0 - 36.0 g/dL    RDW 15.6 (H) 11.5 - 14.5 %    Platelets 216 150 - 350 K/uL    MPV 10.3 9.2 - 12.9 fL    Immature Granulocytes 0.6 (H) 0.0 - 0.5 %    Gran # (ANC) 12.5 (H) 1.8 - 7.7 K/uL    Immature Grans (Abs) 0.09 (H) 0.00 - 0.04 K/uL    Lymph # 1.9 1.0 - 4.8 K/uL    Mono # 1.5 (H) 0.3 - 1.0 K/uL    Eos # 0.1 0.0 - 0.5 K/uL    Baso # 0.06 0.00 - 0.20 K/uL    nRBC 0 0 /100 WBC    Gran% 77.3 (H) 38.0 - 73.0 %    Lymph% 11.6 (L) 18.0 - 48.0 %    Mono% 9.4 4.0 - 15.0 %    Eosinophil% 0.7 0.0 - 8.0 %    Basophil% 0.4 0.0 - 1.9 %    Differential Method Automated     Comprehensive metabolic panel   Result Value Ref Range    Sodium 130 (L) 136 - 145 mmol/L    Potassium 4.3 3.5 - 5.1 mmol/L    Chloride 92 (L) 95 - 110 mmol/L    CO2 28 23 - 29 mmol/L    Glucose 118 (H) 70 - 110 mg/dL    BUN, Bld 18 8 - 23 mg/dL    Creatinine 1.1 0.5 - 1.4 mg/dL    Calcium 8.8 8.7 - 10.5 mg/dL    Total Protein 7.9 6.0 - 8.4 g/dL    Albumin 2.4 (L) 3.5 - 5.2 g/dL    Total Bilirubin 1.2 (H) 0.1 - 1.0 mg/dL    Alkaline Phosphatase 326 (H) 55 - 135 U/L     (H) 10 - 40 U/L     (H) 10 - 44 U/L    Anion Gap 10 8 - 16 mmol/L    eGFR if African American 56.4 (A) >60 mL/min/1.73 m^2    eGFR if non  48.9 (A) >60 mL/min/1.73 m^2   Lipase   Result Value Ref Range    Lipase 14 4 - 60 U/L   Urinalysis, Reflex to Urine Culture Urine, Clean Catch   Result Value Ref Range    Specimen UA Urine, Clean Catch     Color, UA Yellow Yellow, Straw, Roselia    Appearance, UA Clear Clear    pH, UA 6.0 5.0 - 8.0    Specific Gravity, UA 1.015 1.005 - 1.030    Protein, UA Negative Negative    Glucose, UA Negative Negative    Ketones, UA Negative Negative    Bilirubin (UA) Negative Negative    Occult Blood UA Negative Negative    Nitrite, UA Negative Negative    Leukocytes, UA 1+ (A) Negative   Occult blood x 1, stool   Result Value Ref Range    Occult Blood Negative Negative   Lactic acid, plasma   Result Value Ref Range    Lactate (Lactic Acid) 1.6 0.5 - 2.2 mmol/L   Protime-INR   Result Value Ref Range    Prothrombin Time 11.3 9.0 - 12.5 sec    INR 1.1 0.8 - 1.2   Urinalysis Microscopic   Result Value Ref Range    RBC, UA 0 0 - 4 /hpf    WBC, UA 4 0 - 5 /hpf    Bacteria, UA Occasional None-Occ /hpf    Squam Epithel, UA 3 /hpf    Microscopic Comment SEE COMMENT    Hemoglobin A1c   Result Value Ref Range    Hemoglobin A1C 5.2 4.0 - 5.6 %    Estimated Avg Glucose 103 68 - 131 mg/dL   Cancer antigen 19-9   Result Value Ref Range    CA 19-9 2096.0 (H) 2.0 - 40.0 U/mL   AFP tumor marker   Result Value  Ref Range    AFP 16 (H) 0.0 - 8.4 ng/mL   CBC auto differential   Result Value Ref Range    WBC 14.21 (H) 3.90 - 12.70 K/uL    RBC 3.62 (L) 4.00 - 5.40 M/uL    Hemoglobin 10.8 (L) 12.0 - 16.0 g/dL    Hematocrit 34.7 (L) 37.0 - 48.5 %    MCV 96 82 - 98 fL    MCH 29.8 27.0 - 31.0 pg    MCHC 31.1 (L) 32.0 - 36.0 g/dL    RDW 15.9 (H) 11.5 - 14.5 %    Platelets 211 150 - 350 K/uL    MPV 10.7 9.2 - 12.9 fL    Immature Granulocytes 0.6 (H) 0.0 - 0.5 %    Gran # (ANC) 10.6 (H) 1.8 - 7.7 K/uL    Immature Grans (Abs) 0.09 (H) 0.00 - 0.04 K/uL    Lymph # 2.0 1.0 - 4.8 K/uL    Mono # 1.4 (H) 0.3 - 1.0 K/uL    Eos # 0.1 0.0 - 0.5 K/uL    Baso # 0.05 0.00 - 0.20 K/uL    nRBC 0 0 /100 WBC    Gran% 74.4 (H) 38.0 - 73.0 %    Lymph% 14.3 (L) 18.0 - 48.0 %    Mono% 9.7 4.0 - 15.0 %    Eosinophil% 0.6 0.0 - 8.0 %    Basophil% 0.4 0.0 - 1.9 %    Differential Method Automated    Basic metabolic panel   Result Value Ref Range    Sodium 131 (L) 136 - 145 mmol/L    Potassium 4.1 3.5 - 5.1 mmol/L    Chloride 97 95 - 110 mmol/L    CO2 27 23 - 29 mmol/L    Glucose 87 70 - 110 mg/dL    BUN, Bld 14 8 - 23 mg/dL    Creatinine 0.8 0.5 - 1.4 mg/dL    Calcium 8.5 (L) 8.7 - 10.5 mg/dL    Anion Gap 7 (L) 8 - 16 mmol/L    eGFR if African American >60.0 >60 mL/min/1.73 m^2    eGFR if non African American >60.0 >60 mL/min/1.73 m^2   Hepatic function panel   Result Value Ref Range    Total Protein 7.4 6.0 - 8.4 g/dL    Albumin 2.2 (L) 3.5 - 5.2 g/dL    Total Bilirubin 1.4 (H) 0.1 - 1.0 mg/dL    Bilirubin, Direct 0.9 (H) 0.1 - 0.3 mg/dL     (H) 10 - 40 U/L     (H) 10 - 44 U/L    Alkaline Phosphatase 297 (H) 55 - 135 U/L   Basic Metabolic Panel (BMP)   Result Value Ref Range    Sodium 135 (L) 136 - 145 mmol/L    Potassium 4.3 3.5 - 5.1 mmol/L    Chloride 101 95 - 110 mmol/L    CO2 24 23 - 29 mmol/L    Glucose 84 70 - 110 mg/dL    BUN, Bld 11 8 - 23 mg/dL    Creatinine 0.8 0.5 - 1.4 mg/dL    Calcium 8.6 (L) 8.7 - 10.5 mg/dL    Anion Gap 10 8 -  16 mmol/L    eGFR if African American >60.0 >60 mL/min/1.73 m^2    eGFR if non African American >60.0 >60 mL/min/1.73 m^2   Magnesium   Result Value Ref Range    Magnesium 1.6 1.6 - 2.6 mg/dL   Phosphorus   Result Value Ref Range    Phosphorus 3.3 2.7 - 4.5 mg/dL   CBC with Automated Differential   Result Value Ref Range    WBC 12.93 (H) 3.90 - 12.70 K/uL    RBC 3.76 (L) 4.00 - 5.40 M/uL    Hemoglobin 11.4 (L) 12.0 - 16.0 g/dL    Hematocrit 35.9 (L) 37.0 - 48.5 %    MCV 96 82 - 98 fL    MCH 30.3 27.0 - 31.0 pg    MCHC 31.8 (L) 32.0 - 36.0 g/dL    RDW 15.9 (H) 11.5 - 14.5 %    Platelets 195 150 - 350 K/uL    MPV 10.9 9.2 - 12.9 fL    Immature Granulocytes 0.5 0.0 - 0.5 %    Gran # (ANC) 9.1 (H) 1.8 - 7.7 K/uL    Immature Grans (Abs) 0.07 (H) 0.00 - 0.04 K/uL    Lymph # 2.2 1.0 - 4.8 K/uL    Mono # 1.4 (H) 0.3 - 1.0 K/uL    Eos # 0.1 0.0 - 0.5 K/uL    Baso # 0.06 0.00 - 0.20 K/uL    nRBC 0 0 /100 WBC    Gran% 70.5 38.0 - 73.0 %    Lymph% 17.3 (L) 18.0 - 48.0 %    Mono% 10.4 4.0 - 15.0 %    Eosinophil% 0.8 0.0 - 8.0 %    Basophil% 0.5 0.0 - 1.9 %    Differential Method Automated    Hepatic function panel   Result Value Ref Range    Total Protein 7.6 6.0 - 8.4 g/dL    Albumin 2.2 (L) 3.5 - 5.2 g/dL    Total Bilirubin 1.7 (H) 0.1 - 1.0 mg/dL    Bilirubin, Direct 1.1 (H) 0.1 - 0.3 mg/dL     (H) 10 - 40 U/L    ALT 90 (H) 10 - 44 U/L    Alkaline Phosphatase 295 (H) 55 - 135 U/L   Magnesium   Result Value Ref Range    Magnesium 1.4 (L) 1.6 - 2.6 mg/dL   Phosphorus   Result Value Ref Range    Phosphorus 2.8 2.7 - 4.5 mg/dL   CBC with Automated Differential   Result Value Ref Range    WBC 13.23 (H) 3.90 - 12.70 K/uL    RBC 3.76 (L) 4.00 - 5.40 M/uL    Hemoglobin 11.5 (L) 12.0 - 16.0 g/dL    Hematocrit 37.0 37.0 - 48.5 %    MCV 98 82 - 98 fL    MCH 30.6 27.0 - 31.0 pg    MCHC 31.1 (L) 32.0 - 36.0 g/dL    RDW 15.9 (H) 11.5 - 14.5 %    Platelets 197 150 - 350 K/uL    MPV 10.3 9.2 - 12.9 fL    Immature Granulocytes 0.5 0.0  - 0.5 %    Gran # (ANC) 9.8 (H) 1.8 - 7.7 K/uL    Immature Grans (Abs) 0.06 (H) 0.00 - 0.04 K/uL    Lymph # 1.8 1.0 - 4.8 K/uL    Mono # 1.4 (H) 0.3 - 1.0 K/uL    Eos # 0.1 0.0 - 0.5 K/uL    Baso # 0.05 0.00 - 0.20 K/uL    nRBC 0 0 /100 WBC    Gran% 74.2 (H) 38.0 - 73.0 %    Lymph% 13.8 (L) 18.0 - 48.0 %    Mono% 10.7 4.0 - 15.0 %    Eosinophil% 0.4 0.0 - 8.0 %    Basophil% 0.4 0.0 - 1.9 %    Differential Method Automated    Comprehensive metabolic panel   Result Value Ref Range    Sodium 135 (L) 136 - 145 mmol/L    Potassium 3.8 3.5 - 5.1 mmol/L    Chloride 103 95 - 110 mmol/L    CO2 22 (L) 23 - 29 mmol/L    Glucose 99 70 - 110 mg/dL    BUN, Bld 10 8 - 23 mg/dL    Creatinine 0.8 0.5 - 1.4 mg/dL    Calcium 8.3 (L) 8.7 - 10.5 mg/dL    Total Protein 6.9 6.0 - 8.4 g/dL    Albumin 2.0 (L) 3.5 - 5.2 g/dL    Total Bilirubin 1.5 (H) 0.1 - 1.0 mg/dL    Alkaline Phosphatase 283 (H) 55 - 135 U/L    AST 97 (H) 10 - 40 U/L    ALT 76 (H) 10 - 44 U/L    Anion Gap 10 8 - 16 mmol/L    eGFR if African American >60.0 >60 mL/min/1.73 m^2    eGFR if non African American >60.0 >60 mL/min/1.73 m^2   POCT glucose   Result Value Ref Range    POCT Glucose 87 70 - 110 mg/dL   POCT glucose   Result Value Ref Range    POCT Glucose 78 70 - 110 mg/dL   POCT glucose   Result Value Ref Range    POCT Glucose 83 70 - 110 mg/dL   POCT glucose   Result Value Ref Range    POCT Glucose 103 70 - 110 mg/dL            Imaging Results          CT Abdomen Pelvis With Contrast (Final result)     Abnormal  Result time 12/17/18 03:52:19    Final result by Tien Newell MD (12/17/18 03:52:19)                 Impression:      1. Increased size of an ill-defined hypoattenuating lesion in the central liver in close approximation to significantly dilated left hepatic bile ducts as seen on the prior exam.  Differential for this finding includes cholangitic abscess versus progression of metastatic disease or cholangiocarcinoma.  Hepatocellular carcinoma is  thought to be much less likely but difficult to entirely exclude given the patient's history of chronic liver disease and hepatitis C.  2. Mass lesion in the region of the pancreatic head/neck with additional foci of abnormal soft tissue and adenopathy in the upper abdomen.  Findings could reflect primary pancreatic malignancy or metastatic disease, noting that the main pancreatic duct is not dilated and the portal vein is patent.  Consider further characterization with pancreatic protocol CT or MRI versus ERCP/EUS.  3. Irregular gallbladder wall thickening and mucosal enhancement, nonspecific.  This finding is similar to slightly worse when compared with the prior CT on 11/26/2018.  4. Dilated upper common bile duct possibly secondary to mass effect from aforementioned peripancreatic/pancreatic mass.  5. Additional findings described in the body of the report.  This report was flagged in Epic as abnormal.    Electronically signed by resident: Modesto Chadwick  Date:    12/17/2018  Time:    02:29    Electronically signed by: Tien Newell MD  Date:    12/17/2018  Time:    03:52             Narrative:    EXAMINATION:  CT ABDOMEN PELVIS WITH CONTRAST    CLINICAL HISTORY:  abdominal pain    TECHNIQUE:  Routine axial CT images of the abdomen and pelvis were obtained after administration 75 cc of IV Omnipaque 350.  No oral contrast was administered.  Coronal and Sagittal reformatted images were also obtained.    COMPARISON:  CT abdomen pelvis 11/26/2018, ultrasound abdomen 12/10/2018.    FINDINGS:  Heart: Normal in Size.  No pericardial effusion.    Lungs: Lung bases demonstrate platelike atelectasis.  No large focal consolidation.  No pleural effusion.    Liver and bile ducts: Superior patent done extends beyond the field of view.  Morphologic changes of chronic liver disease including lobar redistribution and minimally nodular contour.  Increased size of a nonspecific low-density mass within the right hepatic lobe  measuring 5.1 x 4.2 x 4.8 cm (previously 3.6 x 3.1 x 3.4 cm).  There is mild peripheral enhancement of this lesion, with relatively poorly defined margins.  Smaller satellite hypodense lesion at the superior aspect of the large hypoattenuating lesion, new when compared with the prior exam.  There is significant dilation of the left intrahepatic bile duct ducts which are in close proximity to the large hepatic mass.  This lesion may also communicate with the common bile duct.  The superior portion of the common bile duct is dilated, measuring approximately 1.1 cm in diameter.  The inferior portion of the common bile duct is not well delineated, and may be obstructed by mass effect.    Gallbladder: There is irregular gallbladder wall thickening and/or gallbladder wall edema with mucosal hyperenhancement and relatively hyperdense fluid within the gallbladder lumen.  Findings are similar to slightly worse when compared with the prior examination.    Pancreas: There is a hypoattenuating mass lesion in the region of the pancreatic head/neck which is poorly defined and difficult to measure.  This mass measures roughly 3.1 x 2.2 x 2.9 cm (axial image 34 and coronal image 46).  Pancreatic duct is nondilated.  Peripancreatic vasculature is patent, noting that the hepatic artery may be encased by the pancreatic/peripancreatic mass.  This could be further characterized with a pancreatic protocol CT or MRI.    Spleen: Borderline enlarged, measuring approximately 12.5 cm in length.    Adrenals: Normal.    GI Tract/ Mesentery: No evidence of bowel obstruction or inflammation. Diverticulosis without evidence of diverticulitis.    Kidneys/ Ureters: Normal in size and location. No hydronephrosis or nephrolithiasis. No ureteral dilatation. Simple left renal cyst.  Bilateral subcentimeter renal hypodensities too small to characterize however likely represent cysts.    Bladder: No evidence of wall thickening.    Retroperitoneum: Mildly  prominent but subcentimeter retroperitoneal lymph nodes.    Peritoneal space/mesenteric: No ascites.  No free air.  There are multiple foci of abnormal soft tissue identified in the upper abdomen in the peripancreatic region and adjacent to the celiac artery.  Abnormal soft tissue versus enlarged lymph node is suspected in the upper aortocaval region (axial image 31).  Enlarged celiac axis lymph node measures up to 1.2 cm in short axis (axial image 24).    Abdominal wall: Normal.    Vasculature: Calcific atherosclerosis of the abdominal aorta.  No aneurysm.    Bones: No aggressive osseous lesion.  Stable degenerative changes and dextroscoliotic curvature of the spine.                               X-Ray Chest AP Portable (Final result)  Result time 12/17/18 00:39:45    Final result by Azalia Carrion MD (12/17/18 00:39:45)                 Impression:      No acute intrathoracic abnormality identified on this single radiographic view of the chest.      Electronically signed by: Azalia Carrion MD  Date:    12/17/2018  Time:    00:39             Narrative:    EXAMINATION:  XR CHEST AP PORTABLE    CLINICAL HISTORY:  Sepsis;    TECHNIQUE:  Single frontal view of the chest was performed.    COMPARISON:  Chest radiograph 11/26/2018 03/09/2016    FINDINGS:  The cardiomediastinal silhouette is stable.  The visualized airway is unremarkable.  Lungs appear symmetrically expanded.  There is increased attenuation in the left lower lung zone likely secondary to overlying soft tissue and left basilar atelectasis similar to most recent chest radiograph.  No large pleural effusion or pneumothorax is appreciated.Visualized osseous structures demonstrate no acute abnormality.                                 Medical Decision Making:   History:   Old Medical Records: I decided to obtain old medical records.  Initial Assessment:   Pt with extensive past medical history, notably pancreatic cyst, biliary ductal dilation, and liver lesion of  unknown etiology scheduled for biopsy this week, presents to the ER for worsening of abdominal pain, nausea, decreased appetite, and generalized malaise.   Differential Diagnosis:   Cholangitis, Cholecystitis, Biliary obstruction, peritonitis, electrolyte abnormality, sepsis, etc   Clinical Tests:   Lab Tests: Ordered and Reviewed  Radiological Study: Ordered and Reviewed  ED Management:  CT pending at end of shift. I discussed the case in detail with STEPH Zarate, who will follow and disposition.   Other:   I have discussed this case with another health care provider.       <> Summary of the Discussion: I discussed the case in detail with the ER attending physician             Angelique Attestation:   Scribe #1: I performed the above scribed service and the documentation accurately describes the services I performed. I attest to the accuracy of the note.    Attending Attestation:     Physician Attestation Statement for NP/PA:   I have conducted a face to face encounter with this patient in addition to the NP/PA, due to Medical Complexity    Other NP/PA Attestation Additions:    History of Present Illness: Patient comes in with worsening abdominal pain.    Physical Exam: She exhibits abdominal tenderness.    Medical Decision Making: Given the patient's history I am concerned for cholangitis. Will give IV fluids and IV antibiotics. Will admit to medicine unless CT shows something surgical.                    Clinical Impression:   The primary encounter diagnosis was Acute cholangitis. Diagnoses of Hyponatremia, Elevated LFTs, Leukocytosis, unspecified type, Cholangitis, Pancreatic mass, and Liver lesion were also pertinent to this visit.      Disposition:   Condition: Stable                        Rickey Ramos PA-C  12/17/18 0105              Rickey Ramos PA-C  12/19/18 0881

## 2018-12-17 NOTE — PROVIDER PROGRESS NOTES - EMERGENCY DEPT.
Encounter Date: 12/16/2018    ED Physician Progress Notes        Physician Note:   4:01 AM  I, Ana Zarate PA-C, assumed care of patient at sign-out pending CT results.  Previous ED provider concern for acute cholangitis given today's labs and recent ultrasound 6 days ago.  She was given IV antibiotics.  CT shows increase liver lesion with significant dilated left hepatic bowel duct.  Concern for abscess versus progression of metastatic disease or cholangiocarcinoma.  Also notes pancreatic mass lesion.  Recommend advanced imaging.  Patient will be admitted to Hospital Medicine for further evaluation and management.      Ana Zarate PA-C  Emergent Department  [facility]

## 2018-12-17 NOTE — HPI
Alfredina Claudette Parks is a 78 y.o. female with HTN, DM, A Fib not on AC, diverticulosis, pancreatic cyst (last EUS 1/2018), HCV, chronic constipation, and liver lesion of unknown etiology. She presented to the ER for an emergent evaluation due to diffuse lower abdominal pain on 12/16/2018. She has had this 9/10 crampy pain for the past 3 weeks which getting worse by movement. She was seen at [facility] the last week of November when it started and was admitted for 3-4 days. She is scheduled for a EUS liver biopsy on 12/21/18 with Dr King. She returns to the ER due to intolerable pain. She states that the pain seems to be getting worse and the medications are not helping. She also states that she had black stools, but does admit to taking Pepto Bismol for abdominal pain several times. She reports associated symptoms of decreased appetite, nausea, constipation, and generalized weakness.  she denies jaundice, dark urine or itching. Since admission CT A/P showed increased size of an ill-defined lesion in the central liver, significantly dilated left hepatic bile ducts of 5x5cm, ddx mass vs cholangitic abscess. 3x3cm mass lesion in the region of the pancreatic head/neck with additional foci of abnormal soft tissue and adenopathy in the upper abdomen. Dilated upper common bile duct up to 1.1cm. She was started on abx. VSS afebrile. AES consulted for further evaluation.   She reports her pain has improved, reports low grade fevers at home. Lives alone. From [place]. Has one daughter.

## 2018-12-17 NOTE — ASSESSMENT & PLAN NOTE
78 y.o. F patient presented with RUQ and lower abdominal pain. Vitals are normal. WBC is high at 16.17, high LFTs and alk phosph. CT A/P showed increased size of an ill-defined lesion in the central liver, significantly dilated left hepatic bile ducts. Mass lesion in the region of the pancreatic head/neck with additional foci of abnormal soft tissue and adenopathy in the upper abdomen. Dilated upper common bile duct possibly secondary to mass effect from aforementioned peripancreatic/pancreatic mass.    - she received one dose of Cefepime and Metronidazole at the ED.  - f/u blood culture.  - AES was consulted for possible ERCP, appreciate their recs.  - patient is NPO.

## 2018-12-17 NOTE — ED NOTES
Patient identifiers verified and correct for Mitzy Garcia.     LOC: The patient is awake, alert and aware of environment with an appropriate affect, the patient is oriented x 3 and speaking appropriately.   APPEARANCE: Patient appears comfortable and in no acute distress, patient is clean and well groomed.  SKIN: The skin is warm and dry, color consistent with ethnicity, patient has normal skin turgor and moist mucus membranes, skin intact, no breakdown or bruising noted.   MUSCULOSKELETAL: Patient moving all extremities spontaneously, no swelling noted.  RESPIRATORY: Airway is open and patent, respirations are spontaneous, patient has a normal effort and rate, no accessory muscle use noted.  CARDIAC: Patient has a normal rate and regular rhythm, no edema noted, capillary refill < 3 seconds.   GASTRO: Soft but tender to palpation, no distention noted. Pt reports bilateral lower abdominal pain. Pt reports constipation.   : Pt denies any pain or frequency with urination.  NEURO: Pt opens eyes spontaneously, behavior appropriate to situation, follows commands, facial expression symmetrical, bilateral hand grasp equal and even, purposeful motor response noted, normal sensation in all extremities when touched with a finger.

## 2018-12-17 NOTE — SUBJECTIVE & OBJECTIVE
Past Medical History:   Diagnosis Date    Absence of bladder continence 4/20/2015    Asthma     Asthma without status asthmaticus 6/28/2012    Atrial fibrillation     BMI 36.0-36.9,adult 8/25/2014    Bulging lumbar disc     Cataract     Cervical radiculopathy     Claudication 5/18/2017    Constipation 8/30/2013    Diabetes mellitus     pre diabetes     Diverticular disease 8/30/2013    GERD (gastroesophageal reflux disease) 6/28/2012    Hepatitis C     Hyperglycemia     Hypertension     Hypothyroidism     Osteoporosis     Pancreatic cyst     Vitamin D deficiency disease        Past Surgical History:   Procedure Laterality Date    BREAST CYST EXCISION Bilateral     COLONOSCOPY N/A 7/11/2013    Per[facility] at [facility]    CYST REMOVAL      ESOPHAGOGASTRODUODENOSCOPY (EGD) N/A 1/21/2015    Per[facility] by Gen[facility] at [facility] END[facility]    EYE SURGERY      cataracts    HYSTERECTOMY      TOE SURGERY Bilateral     big toenails    ULTRASOUND-ENDOSCOPIC-UPPER N/A 1/15/2018    [facility]formed [facility] (2ND F[facility])    ULTRASOUND-ENDOSCOPIC-UPPER N/A 8/14/2015    [facility] at [facility] END[facility]    ULTRASOUND-ENDOSCOPIC-UPPER N/A 1/21/2015    Per[facility] at [facility] ENDO    ULTRASOUND-ENDOSCOPIC-UPPER W/POSSIBLE FNA N/A 2/15/2016    Performed by Gen[facility]o [facility] at [facility] ENDO       Review of patient's allergies indicates:   Allergen Reactions    Milk containing products Shortness Of Breath    Nuts [tree nut] Anaphylaxis    Fosamax [alendronate]      dizziness     Family History     Problem Relation (Age of Onset)    Asthma Sister, Other    Breast cancer Sister (55)    Diabetes Maternal Grandfather, Maternal Aunt    Lung cancer Father    Ovarian cancer Mother    Stomach cancer Sister    Throat cancer Brother        Tobacco Use    Smoking status: Never Smoker    Smokeless tobacco: Never Used   Substance and Sexual Activity     Alcohol use: No     Alcohol/week: 0.0 oz    Drug use: No    Sexual activity: Not Currently     Partners: Male     Review of Systems   Constitutional: Positive for activity change, appetite change, fatigue and fever. Negative for chills and unexpected weight change.   HENT: Negative for congestion, facial swelling, postnasal drip, sneezing and sore throat.    Eyes: Negative for photophobia.   Respiratory: Positive for cough. Negative for choking, shortness of breath and wheezing.    Cardiovascular: Negative for chest pain and leg swelling.   Gastrointestinal: Positive for abdominal pain and nausea. Negative for abdominal distention, anal bleeding, constipation, diarrhea and vomiting.   Endocrine: Negative for polydipsia and polyphagia.   Genitourinary: Negative for dysuria, enuresis, flank pain, hematuria and urgency.   Musculoskeletal: Negative for arthralgias, back pain, joint swelling, myalgias and neck stiffness.   Skin: Negative for color change, pallor and rash.   Allergic/Immunologic: Negative for immunocompromised state.   Neurological: Negative for dizziness, seizures, weakness, numbness and headaches.   Hematological: Negative for adenopathy.   Psychiatric/Behavioral: Negative for agitation, behavioral problems and confusion.     Objective:     Vital Signs (Most Recent):  Temp: 97.8 °F (36.6 °C)(Simultaneous filing. User may not have seen previous data.) (12/17/18 0805)  Pulse: 75(Simultaneous filing. User may not have seen previous data.) (12/17/18 0805)  Resp: 18(Simultaneous filing. User may not have seen previous data.) (12/17/18 0805)  BP: (!) 120/56 (12/17/18 0805)  SpO2: (!) 94 %(Simultaneous filing. User may not have seen previous data.) (12/17/18 0805) Vital Signs (24h Range):  Temp:  [97.8 °F (36.6 °C)-98.7 °F (37.1 °C)] 97.8 °F (36.6 °C)  Pulse:  [71-84] 75  Resp:  [18-20] 18  SpO2:  [94 %-100 %] 94 %  BP: (120-145)/(56-91) 120/56     Weight: 70.4 kg (155 lb 3.3 oz) (12/17/18 0805)  Body  "mass index is 32.44 kg/m².      Intake/Output Summary (Last 24 hours) at 12/17/2018 1004  Last data filed at 12/17/2018 0358  Gross per 24 hour   Intake 2400 ml   Output --   Net 2400 ml       Lines/Drains/Airways     Peripheral Intravenous Line                 Peripheral IV - Single Lumen 12/17/18 0120 Right Forearm less than 1 day         Peripheral IV - Single Lumen 12/17/18 0137 Left Hand less than 1 day                Physical Exam   Constitutional: She is oriented to person, place, and time. She appears well-developed and well-nourished.   Eyes: No scleral icterus.   Neck: Neck supple.   Cardiovascular: Normal rate and regular rhythm. Exam reveals no gallop.   Pulmonary/Chest: Effort normal. No respiratory distress.   Abdominal: Soft. Bowel sounds are normal. She exhibits no distension and no mass. There is tenderness (lower abdomen, RUQ). There is no rebound and no guarding. No hernia.   Musculoskeletal: She exhibits no edema.   Neurological: She is alert and oriented to person, place, and time.   Skin: Skin is warm.   Psychiatric: She has a normal mood and affect. Her behavior is normal.     BP (!) 120/56 (BP Location: Left arm, Patient Position: Lying) Comment (Patient Position): Simultaneous filing. User may not have seen previous data.  Pulse 75 Comment: Simultaneous filing. User may not have seen previous data.  Temp 97.8 °F (36.6 °C) (Oral) Comment: Simultaneous filing. User may not have seen previous data. Comment (Src): Simultaneous filing. User may not have seen previous data.  Resp 18 Comment: Simultaneous filing. User may not have seen previous data.  Ht 4' 10" (1.473 m)   Wt 70.4 kg (155 lb 3.3 oz)   SpO2 (!) 94% Comment: Simultaneous filing. User may not have seen previous data.  Breastfeeding? No   BMI 32.44 kg/m²   Lab Results   Component Value Date    WBC 14.21 (H) 12/17/2018    HGB 10.8 (L) 12/17/2018    HCT 34.7 (L) 12/17/2018    MCV 96 12/17/2018     12/17/2018     Lab " Results   Component Value Date    INR 1.1 12/16/2018     Lab Results   Component Value Date     (L) 12/17/2018    K 4.1 12/17/2018    CREATININE 0.8 12/17/2018     Lab Results   Component Value Date    ALBUMIN 2.2 (L) 12/17/2018     (H) 12/17/2018     (H) 12/17/2018    ALKPHOS 297 (H) 12/17/2018    BILITOT 1.4 (H) 12/17/2018     Lab Results   Component Value Date    AFP 16 (H) 12/17/2018     Lab Results   Component Value Date    LIPASE 14 12/16/2018    AMYLASE 46 11/24/2014     Imaging:  Reviewed and as noted in HPI.

## 2018-12-18 ENCOUNTER — ANESTHESIA EVENT (OUTPATIENT)
Dept: ENDOSCOPY | Facility: HOSPITAL | Age: 78
DRG: 423 | End: 2018-12-18
Payer: MEDICARE

## 2018-12-18 PROBLEM — R16.0 LIVER MASS: Status: ACTIVE | Noted: 2018-12-18

## 2018-12-18 LAB
ALBUMIN SERPL BCP-MCNC: 2.2 G/DL
ALP SERPL-CCNC: 295 U/L
ALT SERPL W/O P-5'-P-CCNC: 90 U/L
ANION GAP SERPL CALC-SCNC: 10 MMOL/L
AST SERPL-CCNC: 100 U/L
BASOPHILS # BLD AUTO: 0.06 K/UL
BASOPHILS NFR BLD: 0.5 %
BILIRUB DIRECT SERPL-MCNC: 1.1 MG/DL
BILIRUB SERPL-MCNC: 1.7 MG/DL
BUN SERPL-MCNC: 11 MG/DL
CALCIUM SERPL-MCNC: 8.6 MG/DL
CHLORIDE SERPL-SCNC: 101 MMOL/L
CO2 SERPL-SCNC: 24 MMOL/L
CREAT SERPL-MCNC: 0.8 MG/DL
DIFFERENTIAL METHOD: ABNORMAL
EOSINOPHIL # BLD AUTO: 0.1 K/UL
EOSINOPHIL NFR BLD: 0.8 %
ERYTHROCYTE [DISTWIDTH] IN BLOOD BY AUTOMATED COUNT: 15.9 %
EST. GFR  (AFRICAN AMERICAN): >60 ML/MIN/1.73 M^2
EST. GFR  (NON AFRICAN AMERICAN): >60 ML/MIN/1.73 M^2
GLUCOSE SERPL-MCNC: 84 MG/DL
HCT VFR BLD AUTO: 35.9 %
HGB BLD-MCNC: 11.4 G/DL
IMM GRANULOCYTES # BLD AUTO: 0.07 K/UL
IMM GRANULOCYTES NFR BLD AUTO: 0.5 %
LYMPHOCYTES # BLD AUTO: 2.2 K/UL
LYMPHOCYTES NFR BLD: 17.3 %
MAGNESIUM SERPL-MCNC: 1.6 MG/DL
MCH RBC QN AUTO: 30.3 PG
MCHC RBC AUTO-ENTMCNC: 31.8 G/DL
MCV RBC AUTO: 96 FL
MONOCYTES # BLD AUTO: 1.4 K/UL
MONOCYTES NFR BLD: 10.4 %
NEUTROPHILS # BLD AUTO: 9.1 K/UL
NEUTROPHILS NFR BLD: 70.5 %
NRBC BLD-RTO: 0 /100 WBC
PHOSPHATE SERPL-MCNC: 3.3 MG/DL
PLATELET # BLD AUTO: 195 K/UL
PMV BLD AUTO: 10.9 FL
POCT GLUCOSE: 103 MG/DL (ref 70–110)
POCT GLUCOSE: 83 MG/DL (ref 70–110)
POTASSIUM SERPL-SCNC: 4.3 MMOL/L
PROT SERPL-MCNC: 7.6 G/DL
RBC # BLD AUTO: 3.76 M/UL
SODIUM SERPL-SCNC: 135 MMOL/L
WBC # BLD AUTO: 12.93 K/UL

## 2018-12-18 PROCEDURE — 84100 ASSAY OF PHOSPHORUS: CPT

## 2018-12-18 PROCEDURE — 11000001 HC ACUTE MED/SURG PRIVATE ROOM

## 2018-12-18 PROCEDURE — 25500020 PHARM REV CODE 255: Performed by: HOSPITALIST

## 2018-12-18 PROCEDURE — A9585 GADOBUTROL INJECTION: HCPCS | Performed by: HOSPITALIST

## 2018-12-18 PROCEDURE — 82272 OCCULT BLD FECES 1-3 TESTS: CPT

## 2018-12-18 PROCEDURE — 83735 ASSAY OF MAGNESIUM: CPT

## 2018-12-18 PROCEDURE — 97165 OT EVAL LOW COMPLEX 30 MIN: CPT

## 2018-12-18 PROCEDURE — 25000003 PHARM REV CODE 250: Performed by: STUDENT IN AN ORGANIZED HEALTH CARE EDUCATION/TRAINING PROGRAM

## 2018-12-18 PROCEDURE — 36415 COLL VENOUS BLD VENIPUNCTURE: CPT

## 2018-12-18 PROCEDURE — S0030 INJECTION, METRONIDAZOLE: HCPCS | Performed by: STUDENT IN AN ORGANIZED HEALTH CARE EDUCATION/TRAINING PROGRAM

## 2018-12-18 PROCEDURE — 63600175 PHARM REV CODE 636 W HCPCS: Performed by: STUDENT IN AN ORGANIZED HEALTH CARE EDUCATION/TRAINING PROGRAM

## 2018-12-18 PROCEDURE — 80076 HEPATIC FUNCTION PANEL: CPT

## 2018-12-18 PROCEDURE — 63600175 PHARM REV CODE 636 W HCPCS: Performed by: HOSPITALIST

## 2018-12-18 PROCEDURE — 97162 PT EVAL MOD COMPLEX 30 MIN: CPT

## 2018-12-18 PROCEDURE — 85025 COMPLETE CBC W/AUTO DIFF WBC: CPT

## 2018-12-18 PROCEDURE — 80048 BASIC METABOLIC PNL TOTAL CA: CPT

## 2018-12-18 PROCEDURE — 99233 SBSQ HOSP IP/OBS HIGH 50: CPT | Mod: GC,,, | Performed by: HOSPITALIST

## 2018-12-18 RX ORDER — KETOROLAC TROMETHAMINE 15 MG/ML
15 INJECTION, SOLUTION INTRAMUSCULAR; INTRAVENOUS ONCE
Status: COMPLETED | OUTPATIENT
Start: 2018-12-18 | End: 2018-12-18

## 2018-12-18 RX ORDER — KETOROLAC TROMETHAMINE 15 MG/ML
15 INJECTION, SOLUTION INTRAMUSCULAR; INTRAVENOUS ONCE
Status: DISCONTINUED | OUTPATIENT
Start: 2018-12-18 | End: 2018-12-18

## 2018-12-18 RX ORDER — ACETAMINOPHEN 325 MG/1
650 TABLET ORAL EVERY 6 HOURS PRN
Status: DISCONTINUED | OUTPATIENT
Start: 2018-12-18 | End: 2018-12-21

## 2018-12-18 RX ORDER — GADOBUTROL 604.72 MG/ML
10 INJECTION INTRAVENOUS
Status: COMPLETED | OUTPATIENT
Start: 2018-12-18 | End: 2018-12-18

## 2018-12-18 RX ADMIN — ACETAMINOPHEN 650 MG: 325 TABLET ORAL at 04:12

## 2018-12-18 RX ADMIN — ASPIRIN 81 MG: 81 TABLET, COATED ORAL at 11:12

## 2018-12-18 RX ADMIN — CEFTRIAXONE SODIUM 1 G: 1 INJECTION, POWDER, FOR SOLUTION INTRAMUSCULAR; INTRAVENOUS at 02:12

## 2018-12-18 RX ADMIN — KETOROLAC TROMETHAMINE 15 MG: 15 INJECTION, SOLUTION INTRAMUSCULAR; INTRAVENOUS at 08:12

## 2018-12-18 RX ADMIN — COLCHICINE 0.6 MG: 0.6 TABLET, FILM COATED ORAL at 11:12

## 2018-12-18 RX ADMIN — METRONIDAZOLE 500 MG: 500 INJECTION, SOLUTION INTRAVENOUS at 01:12

## 2018-12-18 RX ADMIN — METRONIDAZOLE 500 MG: 500 INJECTION, SOLUTION INTRAVENOUS at 04:12

## 2018-12-18 RX ADMIN — ALLOPURINOL 100 MG: 100 TABLET ORAL at 11:12

## 2018-12-18 RX ADMIN — METRONIDAZOLE 500 MG: 500 INJECTION, SOLUTION INTRAVENOUS at 08:12

## 2018-12-18 RX ADMIN — ACETAMINOPHEN 650 MG: 325 TABLET ORAL at 11:12

## 2018-12-18 RX ADMIN — AMIODARONE HYDROCHLORIDE 100 MG: 100 TABLET ORAL at 11:12

## 2018-12-18 RX ADMIN — LEVOTHYROXINE SODIUM 125 MCG: 125 TABLET ORAL at 05:12

## 2018-12-18 RX ADMIN — KETOROLAC TROMETHAMINE 15 MG: 15 INJECTION, SOLUTION INTRAMUSCULAR; INTRAVENOUS at 05:12

## 2018-12-18 RX ADMIN — PANTOPRAZOLE SODIUM 40 MG: 40 TABLET, DELAYED RELEASE ORAL at 11:12

## 2018-12-18 RX ADMIN — GADOBUTROL 10 ML: 604.72 INJECTION INTRAVENOUS at 10:12

## 2018-12-18 NOTE — NURSING
Team notified patient returned from MRI and she is requesting to eat and also wants to know results. Also aware daughter wants to know results. No new orders.

## 2018-12-18 NOTE — TREATMENT PLAN
[facility] Follow-up Note     Awaiting MRI abdomen and IR recs.    Further recommendations based on MRI/IR intervention.    We will continue follow.     Dior Hoyos MD  Gastroenterology & Hepatology Fellow   Pager: [phone]

## 2018-12-18 NOTE — TREATMENT PLAN
[facility] Follow-up Note     MRI reviewed.     Plan for EUS/ERCP tomorrow.     Please keep NPO PMN.     Dior Hoyos MD  Gastroenterology & Hepatology Fellow   Pager: [phone]

## 2018-12-18 NOTE — NURSING
Primary physicain rounded and pulmonology physician rounded. Okay per md to remove telemetry to go for test. Patient remains NPO for MRI of abdomen. Leaving unit per wheelchair.

## 2018-12-18 NOTE — PLAN OF CARE
Problem: Physical Therapy Goal  Goal: Physical Therapy Goal  Goals to be met by: 10 days ()    Patient will increase functional independence with mobility by performin. Supine to sit with Set-up [place]  2. Sit to supine with Stand-by Assistance  3. Sit to stand transfer with Supervision  4. Gait  x 200 feet with Stand-by Assistance using rollator.  5. Lower extremity exercise program x3.0 reps per handout, with independence to improve muscular strength and endurance.       Outcome: Ongoing (interventions implemented as appropriate)  PT evaluation completed. POC initiated.    Belia Chavez, PT  2018

## 2018-12-18 NOTE — PLAN OF CARE
Problem: Adult Inpatient Plan of Care  Goal: Plan of Care Review  Outcome: Ongoing (interventions implemented as appropriate)  Patient alert and oriented. Did have mri today and she and daughter stated they did not expect the results given. Patient stated she prepared for this. Plan is for ercp tomorrow. No fall or occurrence , denied pain at this time. Did get to eat lunch . Sat up in chair for a couple hours. Assessment on going.

## 2018-12-18 NOTE — NURSING
Patient to remain NPO at this time in case MRI shows something to be corrected. Patient aware. bs stable. Will continue to monitor. Patient verbalized understanding.

## 2018-12-18 NOTE — NURSING
Patient c/o pain a 8/10 and medicated with tylenol 650 mg po once and IM5 paged and orders given to administer Toradol 15 ,g 1 ml IVP for pain. Daughter called and given an update on status.  MRI called and will  patient per escort for MRI of abdomen. Patient to be NPO until test is completed.  Patient lying in bed, eyes closed but arousable.  Will endorse to day shift nurse.

## 2018-12-18 NOTE — SUBJECTIVE & OBJECTIVE
Oncology Treatment Plan:   [No treatment plan]    Medications:  Continuous Infusions:  Scheduled Meds:   allopurinol  100 mg Oral Daily    amiodarone  100 mg Oral Daily    aspirin  81 mg Oral Daily    cefTRIAXone (ROCEPHIN) IVPB  1 g Intravenous Q24H    colchicine  0.6 mg Oral Daily    fluticasone  1 spray Each Nare Daily    levothyroxine  125 mcg Oral Before breakfast    metronidazole  500 mg Intravenous Q8H    pantoprazole  40 mg Oral Daily     PRN Meds:acetaminophen, dextrose 50%, dextrose 50%, glucagon (human recombinant), glucose, glucose, insulin aspart U-100, sodium chloride 0.9%     Review of patient's allergies indicates:   Allergen Reactions    Milk containing products Shortness Of Breath    Nuts [tree nut] Anaphylaxis    Fosamax [alendronate]      dizziness        Past Medical History:   Diagnosis Date    Absence of bladder continence 4/20/2015    Asthma     Asthma without status asthmaticus 6/28/2012    Atrial fibrillation     BMI 36.0-36.9,adult 8/25/2014    Bulging lumbar disc     Cataract     Cervical radiculopathy     Claudication 5/18/2017    Constipation 8/30/2013    Diabetes mellitus     pre diabetes     Diverticular disease 8/30/2013    GERD (gastroesophageal reflux disease) 6/28/2012    Hepatitis C     Hyperglycemia     Hypertension     Hypothyroidism     Osteoporosis     Pancreatic cyst     Vitamin D deficiency disease      Past Surgical History:   Procedure Laterality Date    BREAST CYST EXCISION Bilateral     COLONOSCOPY N/A 7/11/2013    [facility]    CYST REMOVAL      ESOPHAGOGASTRODUODENOSCOPY (EGD) N/A 1/21/2015    [facility] at [facility] END[facility]    EYE SURGERY      cataracts    HYSTERECTOMY      TOE SURGERY Bilateral     big toenails    ULTRASOUND-ENDOSCOPIC-UPPER N/A 1/15/2018    [facility] (2ND FLR)    ULTRASOUND-ENDOSCOPIC-UPPER N/A 8/14/2015    Performed by  [facility] at [facility] ENDO    ULTRASOUND-ENDOSCOPIC-UPPER N/A 1/21/2015    Performed by [facility] at [facility] ENDO    ULTRASOUND-ENDOSCOPIC-UPPER W/POSSIBLE FNA N/A 2/15/2016    Performed by [facility] at [facility] ENDO     Family History     Problem Relation (Age of Onset)    Asthma Sister, Other    Breast cancer Sister (55)    Diabetes Maternal Grandfather, Maternal Aunt    Lung cancer Father    Ovarian cancer Mother    Stomach cancer Sister    Throat cancer Brother        Tobacco Use    Smoking status: Never Smoker    Smokeless tobacco: Never Used   Substance and Sexual Activity    Alcohol use: No     Alcohol/week: 0.0 oz    Drug use: No    Sexual activity: Not Currently     Partners: Male       Review of Systems   Constitutional: Positive for activity change, appetite change, fatigue, fever and unexpected weight change. Negative for chills.   HENT: Negative for nosebleeds and trouble swallowing.    Eyes: Negative for visual disturbance.   Respiratory: Negative for shortness of breath.    Cardiovascular: Negative for chest pain and leg swelling.   Gastrointestinal: Positive for abdominal pain and constipation. Negative for abdominal distention, diarrhea, nausea and vomiting.   Genitourinary: Negative for difficulty urinating.   Musculoskeletal: Positive for back pain and gait problem.   Neurological: Positive for weakness. Negative for dizziness and light-headedness.   Hematological: Negative for adenopathy.   Psychiatric/Behavioral: Negative for confusion.     Objective:     Vital Signs (Most Recent):  Temp: 98.9 °F (37.2 °C) (12/18/18 1624)  Pulse: 91 (12/18/18 1624)  Resp: 16 (12/18/18 1624)  BP: 125/74 (12/18/18 1624)  SpO2: 96 % (12/18/18 1624) Vital Signs (24h Range):  Temp:  [96.9 °F (36.1 °C)-98.9 °F (37.2 °C)] 98.9 °F (37.2 °C)  Pulse:  [62-95] 91  Resp:  [16-20] 16  SpO2:  [92 %-97 %] 96 %  BP: (100-142)/(64-77) 125/74     Weight: 71.7 kg (158 lb 1.1 oz)  Body mass index is 33.04  kg/m².  Body surface area is 1.71 meters squared.      Intake/Output Summary (Last 24 hours) at 12/18/2018 1747  Last data filed at 12/18/2018 1334  Gross per 24 hour   Intake 800 ml   Output 400 ml   Net 400 ml       Physical Exam   Constitutional: She appears well-developed.   HENT:   Head: Normocephalic.   Eyes: EOM are normal. Pupils are equal, round, and reactive to light. No scleral icterus.   Neck: Normal range of motion. No tracheal deviation present.   Cardiovascular: Normal rate, regular rhythm and normal heart sounds. Exam reveals no gallop and no friction rub.   No murmur heard.  Pulmonary/Chest: Effort normal and breath sounds normal. No respiratory distress. She has no wheezes. She has no rales.   Abdominal: Soft. Bowel sounds are normal. She exhibits no distension and no mass. There is tenderness (RUQ, LUQ). There is guarding. There is no rebound.   Musculoskeletal: Normal range of motion. She exhibits no edema.   Lymphadenopathy:     She has no cervical adenopathy.   Neurological: She is alert.   Skin: Skin is warm and dry.   Psychiatric: She has a normal mood and affect.       Significant Labs:   CBC:   Recent Labs   Lab 12/16/18  2240 12/17/18  0732 12/18/18  0340   WBC 16.17* 14.21* 12.93*   HGB 11.3* 10.8* 11.4*   HCT 35.6* 34.7* 35.9*    211 195    and CMP:   Recent Labs   Lab 12/16/18  2240 12/17/18  0732 12/18/18  0340 12/18/18  1152   * 131* 135*  --    K 4.3 4.1 4.3  --    CL 92* 97 101  --    CO2 28 27 24  --    * 87 84  --    BUN 18 14 11  --    CREATININE 1.1 0.8 0.8  --    CALCIUM 8.8 8.5* 8.6*  --    PROT 7.9 7.4  --  7.6   ALBUMIN 2.4* 2.2*  --  2.2*   BILITOT 1.2* 1.4*  --  1.7*   ALKPHOS 326* 297*  --  295*   * 134*  --  100*   * 106*  --  90*   ANIONGAP 10 7* 10  --    EGFRNONAA 48.9* >60.0 >60.0  --        Diagnostic Results:  I have reviewed all pertinent imaging results/findings within the past 24 hours.     12/18/18 MRI abdomen    FINDINGS:  -  Lung bases: No infiltrates and no nodules.  There is mild bilateral dependent atelectatic change.  No pleural fluid.    - Heart/Pericardial structures: Enlarged nodes at the right cardiophrenic angle measure 1.3 and 1.2 cm in short axis.    - EG Junction: No hiatal hernia.    ABDOMEN:    - Liver: Normal in size, and contour.  A large 5.6 x 4.8 cm heterogeneous lesion demonstrating T2 hyperintensity with T1 hypointensity, peripheral enhancement, and peripheral diffusion restriction.  The left portal vein is narrowed, but patent.  The hepatic vein, main and right portal veins, splenic vein, and hepatic artery are patent.    - Spleen: Not enlarged and has no collateral vessel formation.  A 0.9 cm T2 hyperintense/T1 hypointense lesion without postcontrast enhancement is suggestive of a cyst.    - Stomach/Duodenum: Unremarkable.    - Pancreas: The pancreas is atrophic.  At the head and neck of the pancreas lie two T2 hypointense rounded lesions measuring 2.5 and 2.0 cm on either side of the common bile duct with narrowing of the duct.  These lesions demonstrate diffusion restriction with mild postcontrast enhancement.    - Adrenals: Unremarkable.    - Kidneys/urinary bladder: No hydronephrosis.  There are innumerable bilateral T2 hyperintense/T2 hypointense renal lesions without post-contrast enhancement, the largest measuring 1.3 cm at the superior left renal pole.  Larger lesions are compatible with cysts, smaller lesions are too small to characterize.    - Retroperitoneum: There are multiple enlarged periaortic nodes, perhaps the largest measuring 1.7 cm on just superior to the SMA origin (axial series 4, image 27).  These nodes are most evident on diffusion imaging.    BOWEL/MESENTERY:    No evidence of bowel obstruction or inflammatory process. Enlarged mesenteric node lying inferior to the left hepatic lobe measures 1.5 cm in short axis (axial series 4, image 31).    VASCULATURE: Aorta is normal in  caliber.    BONES: No evidence of bone marrow replacement process. There is dextroscoliosis of the lumbar spine.    EXTRAPERITONEAL SOFT TISSUES: Unremarkable.    MRCP:    Gallbladder demonstrates nonspecific gallbladder wall thickening without intraluminal stone, gallbladder distention, or significant pericholecystic fluid.  The central and suprapancreatic extrahepatic ducts are not well seen, appearing compressed by the 2 previously described pancreatic lesions.  The visualized intra pancreatic portion of the bile duct is not enlarged, with a caliber of 5 mm.  There is significant intrahepatic biliary ductal dilatation, measuring up to 1.2 cm on the left and 0.7 cm on the right.    Pancreatic duct has maximum caliber of 3 mm which is within normal limits. No ductal strictures are identified.      Impression       Large 5.6 cm central hepatic lesion.  MR characteristics are most suggestive of mass lesion such as hilar cholangiocarcinoma/[place] tumor or metastatic disease.  Hepatic abscess felt much less likely.    Two pancreatic head/neck mass lesions on either side of the common bile duct with significant upstream biliary ductal dilatation as described above.    Retroperitoneal, mesenteric, and anterior mediastinal lymphadenopathy, noting an enlarged 1.7 cm periaortic node just superior to the SMA origin, an enlarged 1.4 cm mesenteric node just inferior to the left hepatic lobe, and an enlarged 1.3 cm right cardiophrenic angle node.    Nonspecific diffuse gallbladder wall thickening.    Multiple additional findings as above.     12/17/18 CT abdomen/pelvis  COMPARISON:  CT abdomen pelvis 11/26/2018, ultrasound abdomen 12/10/2018.    FINDINGS:  Heart: Normal in Size.  No pericardial effusion.    Lungs: Lung bases demonstrate platelike atelectasis.  No large focal consolidation.  No pleural effusion.    Liver and bile ducts: Superior patent done extends beyond the field of view.  Morphologic changes of chronic  liver disease including lobar redistribution and minimally nodular contour.  Increased size of a nonspecific low-density mass within the right hepatic lobe measuring 5.1 x 4.2 x 4.8 cm (previously 3.6 x 3.1 x 3.4 cm).  There is mild peripheral enhancement of this lesion, with relatively poorly defined margins.  Smaller satellite hypodense lesion at the superior aspect of the large hypoattenuating lesion, new when compared with the prior exam.  There is significant dilation of the left intrahepatic bile duct ducts which are in close proximity to the large hepatic mass.  This lesion may also communicate with the common bile duct.  The superior portion of the common bile duct is dilated, measuring approximately 1.1 cm in diameter.  The inferior portion of the common bile duct is not well delineated, and may be obstructed by mass effect.    Gallbladder: There is irregular gallbladder wall thickening and/or gallbladder wall edema with mucosal hyperenhancement and relatively hyperdense fluid within the gallbladder lumen.  Findings are similar to slightly worse when compared with the prior examination.    Pancreas: There is a hypoattenuating mass lesion in the region of the pancreatic head/neck which is poorly defined and difficult to measure.  This mass measures roughly 3.1 x 2.2 x 2.9 cm (axial image 34 and coronal image 46).  Pancreatic duct is nondilated.  Peripancreatic vasculature is patent, noting that the hepatic artery may be encased by the pancreatic/peripancreatic mass.  This could be further characterized with a pancreatic protocol CT or MRI.    Spleen: Borderline enlarged, measuring approximately 12.5 cm in length.    Adrenals: Normal.    GI Tract/ Mesentery: No evidence of bowel obstruction or inflammation. Diverticulosis without evidence of diverticulitis.    Kidneys/ Ureters: Normal in size and location. No hydronephrosis or nephrolithiasis. No ureteral dilatation. Simple left renal cyst.  Bilateral  subcentimeter renal hypodensities too small to characterize however likely represent cysts.    Bladder: No evidence of wall thickening.    Retroperitoneum: Mildly prominent but subcentimeter retroperitoneal lymph nodes.    Peritoneal space/mesenteric: No ascites.  No free air.  There are multiple foci of abnormal soft tissue identified in the upper abdomen in the peripancreatic region and adjacent to the celiac artery.  Abnormal soft tissue versus enlarged lymph node is suspected in the upper aortocaval region (axial image 31).  Enlarged celiac axis lymph node measures up to 1.2 cm in short axis (axial image 24).    Abdominal wall: Normal.    Vasculature: Calcific atherosclerosis of the abdominal aorta.  No aneurysm.    Bones: No aggressive osseous lesion.  Stable degenerative changes and dextroscoliotic curvature of the spine.      Impression       1. Increased size of an ill-defined hypoattenuating lesion in the central liver in close approximation to significantly dilated left hepatic bile ducts as seen on the prior exam.  Differential for this finding includes cholangitic abscess versus progression of metastatic disease or cholangiocarcinoma.  Hepatocellular carcinoma is thought to be much less likely but difficult to entirely exclude given the patient's history of chronic liver disease and hepatitis C.  2. Mass lesion in the region of the pancreatic head/neck with additional foci of abnormal soft tissue and adenopathy in the upper abdomen.  Findings could reflect primary pancreatic malignancy or metastatic disease, noting that the main pancreatic duct is not dilated and the portal vein is patent.  Consider further characterization with pancreatic protocol CT or MRI versus ERCP/EUS.  3. Irregular gallbladder wall thickening and mucosal enhancement, nonspecific.  This finding is similar to slightly worse when compared with the prior CT on 11/26/2018.  4. Dilated upper common bile duct possibly secondary to mass  effect from aforementioned peripancreatic/pancreatic mass.  5. Additional findings described in the body of the report.  This report was flagged in Epic as abnormal.

## 2018-12-18 NOTE — ANESTHESIA PREPROCEDURE EVALUATION
12/18/2018  [facility]  Anesthesia Pre-Operative Evaluation         Patient Name: Alfredina Claudette Parks  YOB: 1940  MRN: [id number]    SUBJECTIVE:     Pre-operative evaluation for Procedure(s) (LRB):  ULTRASOUND, ENDOSCOPIC, UPPER GI TRACT (N/A)  ERCP (ENDOSCOPIC RETROGRADE CHOLANGIOPANCREATOGRAPHY) (N/A)     12/18/2018    Alfredina Claudette Parks is a 78 y.o. female w/ a significant PMHx of Cirrhosis 2/ 2 HCV, Asthma, Hypothyroidism, HTN, DM, A Fib not on AC, GERD, pancreatic cyst, and liver lesion of unknown etiology. She presented to the ER for an emergent evaluation due to diffuse lower abdominal pain on 12/16/2018. Imaging concerning for cholangitis abscess.    Patient now presents for the above procedure(s).      LDA:        Peripheral IV - Single Lumen 12/17/18 0120 Right Forearm (Active)   Site Assessment Clean;Dry;Intact;No redness;No swelling 12/18/2018  9:00 AM   Line Status Saline locked;Flushed 12/18/2018  9:00 AM   Dressing Status Clean;Dry;Intact 12/18/2018  9:00 AM   Dressing Intervention New dressing 12/17/2018  1:20 AM   Dressing Change Due 12/21/18 12/18/2018  9:00 AM   Site Change Due 12/21/18 12/18/2018  9:00 AM   Reason Not Rotated Not due 12/18/2018  9:00 AM   Number of days: 1            Peripheral IV - Single Lumen 12/17/18 0137 Left Hand (Active)   Site Assessment Clean;Dry;Intact;No redness;No swelling 12/18/2018  9:00 AM   Line Status Saline locked;Flushed 12/18/2018  9:00 AM   Dressing Status Clean;Dry;Intact 12/18/2018  9:00 AM   Dressing Change Due 12/21/18 12/18/2018  9:00 AM   Site Change Due 12/21/18 12/18/2018  9:00 AM   Reason Not Rotated Not due 12/18/2018  9:00 AM   Number of days: 1       Prev airway: None documented.    Drips: None documented.      Patient Active Problem List   Diagnosis    HTN (hypertension), benign     Hypothyroidism    S/P ablation operation for arrhythmia    PAF (paroxysmal atrial fibrillation)    Osteopenia, senile    Pancreatic mass    Edema of both legs    Pre-diabetes    Liver fibrosis    History of hepatitis C; S/p RX with SVR 12 documented 06/2018    Venous insufficiency    Acute cholangitis       Review of patient's allergies indicates:   Allergen Reactions    Milk containing products Shortness Of Breath    Nuts [tree nut] Anaphylaxis    Fosamax [alendronate]      dizziness       Current Inpatient Medications:   allopurinol  100 mg Oral Daily    amiodarone  100 mg Oral Daily    aspirin  81 mg Oral Daily    cefTRIAXone (ROCEPHIN) IVPB  1 g Intravenous Q24H    colchicine  0.6 mg Oral Daily    fluticasone  1 spray Each Nare Daily    levothyroxine  125 mcg Oral Before breakfast    metronidazole  500 mg Intravenous Q8H    pantoprazole  40 mg Oral Daily       No current facility-administered medications on file prior to encounter.      Current Outpatient Medications on File Prior to Encounter   Medication Sig Dispense Refill    albuterol (PROVENTIL) 2.5 mg /3 mL (0.083 %) nebulizer solution TAKE 3ML BY NEBULIZATION EVERY 6 HOURS AS NEEDED FOR WHEEZING. 1050 mL 3    albuterol (VENTOLIN HFA) 90 mcg/actuation inhaler INHALE 2 PUFFS PO Q 6 H PRN (Patient taking differently: Inhale 2 puffs into the lungs every 6 (six) hours as needed for Wheezing or Shortness of Breath. ) 18 g 6    allopurinol (ZYLOPRIM) 100 MG tablet Take 1 tablet (100 mg total) by mouth once daily. 90 tablet 3    amiodarone (PACERONE) 100 MG Tab Take 100 mg by mouth once daily  3    aspirin (ECOTRIN) 81 MG EC tablet Take 81 mg by mouth once daily.        carvedilol (COREG) 3.125 MG tablet Take 3.125 mg by mouth 2 (two) times daily with meals.      diclofenac sodium (VOLTAREN) 1 % Gel Apply 2 g topically as needed (shoulder pain).      fluticasone (FLONASE) 50 mcg/actuation nasal spray SHAKE LIQUID AND USE 1 SPRAY IN  EACH NOSTRIL EVERY DAY (Patient taking differently: SHAKE LIQUID AND USE 1 SPRAY IN EACH NOSTRIL EVERY DAY AS NEEDED FOR ALLERGIES) 48 mL 3    fluticasone-salmeterol 250-50 mcg/dose (ADVAIR DISKUS) 250-50 mcg/dose diskus inhaler Inhale 1 puff into the lungs 2 (two) times daily. Controller 180 each 3    furosemide (LASIX) 20 MG tablet TAKE 2 TABLETS(40 MG) BY MOUTH EVERY DAY (Patient taking differently: Take 20 mg by mouth twice daily) 60 tablet 0    levothyroxine (SYNTHROID) 125 MCG tablet Take 1 tablet (125 mcg total) by mouth once daily. 90 tablet 2    loratadine (CLARITIN) 10 mg tablet Take 10 mg by mouth once daily.      losartan (COZAAR) 25 MG tablet Take 25 mg by mouth once daily.      pantoprazole (PROTONIX) 40 MG tablet TAKE 1 TABLET BY MOUTH ONCE DAILY, 30 MINUTES BEFORE EATING 30 tablet 1    polyethylene glycol (GLYCOLAX) 17 gram/dose powder Take 17 g by mouth once daily. (Patient taking differently: Take 17 g by mouth daily as needed (constipation). ) 1700 g 3    solifenacin (VESICARE) 5 MG tablet Take 1 tablet (5 mg total) by mouth once daily. 30 tablet 11    spironolactone (ALDACTONE) 50 MG tablet Take 50 mg by mouth once daily.      colchicine 0.6 mg tablet Take 1 tablet (0.6 mg total) by mouth once daily. (Patient taking differently: Take 0.6 mg by mouth daily as needed (gout flare). ) 90 tablet 3    [DISCONTINUED] denosumab (PROLIA) 60 mg/mL Syrg Inject 1 mL (60 mg total) into the skin every 6 (six) months. 1 mL 3       Past Surgical History:   Procedure Laterality Date    BREAST CYST EXCISION Bilateral     COLONOSCOPY N/A 7/11/2013    [facility]    CYST REMOVAL      ESOPHAGOGASTRODUODENOSCOPY (EGD) N/A 1/21/2015    Performed by [facility] at [facility] ENDO    EYE SURGERY      cataracts    HYSTERECTOMY      TOE SURGERY Bilateral     big toenails    ULTRASOUND-ENDOSCOPIC-UPPER N/A 1/15/2018    [facility]  (2ND FLR)    ULTRASOUND-ENDOSCOPIC-UPPER N/A 8/14/2015    [facility]O    ULTRASOUND-ENDOSCOPIC-UPPER N/A 1/21/2015    [facility] at [facility] ENDO    ULTRASOUND-ENDOSCOPIC-UPPER W/POSSIBLE FNA N/A 2/15/2016    Per[facility] by [facility] at [facility] ENDO       Social History     Socioeconomic History    Marital status:      Spouse name: Not on file    Number of children: Not on file    Years of education: Not on file    Highest education level: Not on file   Social Needs    Financial resource strain: Not on file    Food insecurity - worry: Not on file    Food insecurity - inability: Not on file    Transportation needs - medical: Not on file    Transportation needs - non-medical: Not on file   Occupational History    Occupation: retired  at [employer]    Tobacco Use    Smoking status: Never Smoker    Smokeless tobacco: Never Used   Substance and Sexual Activity    Alcohol use: No     Alcohol/week: 0.0 oz    Drug use: No    Sexual activity: Not Currently     Partners: Male   Other Topics Concern    Not on file   Social History Narrative    Not on file       OBJECTIVE:     Vital Signs Range (Last 24H):  Temp:  [36.1 °C (96.9 °F)-37.7 °C (99.8 °F)]   Pulse:  [62-95]   Resp:  [17-20]   BP: (100-144)/(64-77)   SpO2:  [92 %-97 %]       Significant Labs:  Lab Results   Component Value Date    WBC 12.93 (H) 12/18/2018    HGB 11.4 (L) 12/18/2018    HCT 35.9 (L) 12/18/2018     12/18/2018    CHOL 165 05/19/2017    TRIG 124 05/19/2017    HDL 47 05/19/2017    ALT 90 (H) 12/18/2018     (H) 12/18/2018     (L) 12/18/2018    K 4.3 12/18/2018     12/18/2018    CREATININE 0.8 12/18/2018    BUN 11 12/18/2018    CO2 24 12/18/2018    TSH 0.538 06/12/2018    INR 1.1 12/16/2018    HGBA1C 5.2 12/17/2018       Diagnostic Studies: No relevant studies.    EKG:     Vent. Rate : 082 BPM     Atrial Rate : 082 BPM     P-R Int : 160 ms           QRS Dur : 084 ms      QT Int : 384 ms       P-R-T Axes : 016 -39 -01 degrees     QTc Int : 448 ms    Normal sinus rhythm  Left anterior fascicular block    Abnormal ECG  When compared with ECG of 10-DEC-2018 08:09,  T wave inversion now evident in Inferior leads  Confirmed by JORY BECKMAN MD ([id number]) on 12/17/2018 1:19:24 PM      2D ECHO:  Results for orders placed or performed in visit on 03/21/16   2D echo with color flow doppler   Result Value Ref Range    Right Vent (Diastole) 1.9 0.8 - 2.6    Septum (Diastole) 1.0 0.6 - 1.2    Left Ventricle (Diastole) 4.7 3.5 - 5.5    Posterior Wall (Diastole) 1.0 0.6 - 1.2    Aortic Valve (Diastole) 1.4 (A) 1.5 - 2.6    Aortic Root (Diastole) 2.9 1.3 - 3.7    Left Atrium (Diastole) 4.3 (A) 2.5 - 4    Septum (Systole)  0.5 - 1.1    Left Ventricle (Systole) 2.5 2.2 - 4    Posteriol Wall (Systole)  0.5 - 1.2    QEF 65 %    Aortic Valve Area  2.5 - 3.5 cm2    Aortic Peak Gradient 6 0 - 9.9 mm Hg    Aortic Mean Gradient 3 0 - 9.9 mm Hg    Aortic Peak Velocity 1.2 0 - 1.9 m/s    Aortic Pres. Half Time  599 - 999 m/sec    Mitral Valve Area 5 4 - 6 cm2    Mitral Pres. Half Time  30 - 60 m/sec    Mitral Valve E-A Ratio 0.7 (A) 0.75 - 999    Mitral Valve E-E prime 12 (A) 0 - 7    Pulmonary Artery Pressure  0 - 29 mm Hg         ASSESSMENT/PLAN:         Anesthesia Evaluation    I have reviewed the Patient Summary Reports.     I have reviewed the Medications.     Review of Systems  Anesthesia Hx:  Denies Family Hx of Anesthesia complications.   Denies Personal Hx of Anesthesia complications.   Hematology/Oncology:  Hematology Normal      Current/Recent Cancer. (pancreatic mass)   EENT/Dental:EENT/Dental Normal   Cardiovascular:   Hypertension Dysrhythmias atrial fibrillation    Pulmonary:   Denies COPD. Asthma mild    Hepatic/GI:   GERD, poorly controlled Liver Disease, (cirrhosis ) Hepatitis, C    Neurological:   TIA, Denies Seizures.    Endocrine:   Diabetes, type 2 Hypothyroidism         Physical Exam  General:  Morbid Obesity    Airway/Jaw/Neck:  Airway Findings: Mouth Opening: Normal Tongue: Normal  General Airway Assessment: Adult  Mallampati: II  Improves to II with phonation.  TM Distance: < 4 cm  Jaw/Neck Findings:  Neck ROM: Decreased Lateral Motion, to the right, to the left, Normal Extension, Painful  Neck Findings:  Girth Increased      Dental:  Dental Findings: Upper Dentures   Chest/Lungs:  Chest/Lungs Findings: Clear to auscultation     Heart/Vascular:  Heart Findings: Rate: Normal     Abdomen:  Abdomen Findings:  Normal       Mental Status:  Mental Status Findings:  Cooperative, Alert and Oriented         Anesthesia Plan  Type of Anesthesia, risks & benefits discussed:  Anesthesia Type:  general, MAC  Patient's Preference:   Intra-op Monitoring Plan: standard ASA monitors  Intra-op Monitoring Plan Comments:   Post Op Pain Control Plan: per primary service following discharge from PACU  Post Op Pain Control Plan Comments:   Induction:   IV  Beta Blocker:  Patient is not currently on a Beta-Blocker (No further documentation required).       Informed Consent: Patient representative understands risks and agrees with Anesthesia plan.  Questions answered. Anesthesia consent signed with patient representative.  ASA Score: 3     Day of Surgery Review of History & Physical:    H&P update referred to the provider.         Ready For Surgery From Anesthesia Perspective.

## 2018-12-18 NOTE — PT/OT/SLP EVAL
Occupational Therapy   Evaluation    Name: Alfredina Claudette Parks  MRN: [id number]  Admitting Diagnosis:  Pancreatic mass      Recommendations:     Discharge Recommendations: (home with HH)  Discharge Equipment Recommendations:  none  Barriers to discharge:  Decreased caregiver support    History:     Occupational Profile:  Living Environment: Pt lives alone in apartment with elevator   Previous level of function: Pt reports she had assist with self care tasks daily by paid aide 6 days a week  Equipment Used at Home:  none  Assistance upon Discharge: Pt with paid aide and sone family able to assist     Past Medical History:   Diagnosis Date    Absence of bladder continence 4/20/2015    Asthma     Asthma without status asthmaticus 6/28/2012    Atrial fibrillation     BMI 36.0-36.9,adult 8/25/2014    Bulging lumbar disc     Cataract     Cervical radiculopathy     Claudication 5/18/2017    Constipation 8/30/2013    Diabetes mellitus     pre diabetes     Diverticular disease 8/30/2013    GERD (gastroesophageal reflux disease) 6/28/2012    Hepatitis C     Hyperglycemia     Hypertension     Hypothyroidism     Osteoporosis     Pancreatic cyst     Vitamin D deficiency disease        Past Surgical History:   Procedure Laterality Date    BREAST CYST EXCISION Bilateral     COLONOSCOPY N/A 7/11/2013    [facility]O    CYST REMOVAL      ESOPHAGOGASTRODUODENOSCOPY (EGD) N/A 1/21/2015    [facility] ENDO    EYE SURGERY      cataracts    HYSTERECTOMY      TOE SURGERY Bilateral     big toenails    ULTRASOUND-ENDOSCOPIC-UPPER N/A 1/15/2018    Performed by [facility]il King [facility] at [facility] ENDO (2ND FLR)    ULTRASOUND-ENDOSCOPIC-UPPER N/A 8/14/2015    [facility] ENDO    ULTRASOUND-ENDOSCOPIC-UPPER N/A 1/21/2015    [facility] at [facility] ENDO    ULTRASOUND-ENDOSCOPIC-UPPER W/POSSIBLE FNA N/A 2/15/2016     Per[facility] ENDO       Subjective     Chief Complaint: poor activity tolerance   Patient/Family Comments/goals: returnt o home     Pain/Comfort:  · Pain Rating 1: 0/10    Patients cultural, spiritual, Denominational conflicts given the current situation: no    Objective:     Communicated with: RN prior to session.  Patient found with: all lines intact and telemetry upon OT entry to room.    General Precautions: Standard, fall   Orthopedic Precautions:N/A   Braces: N/A     Occupational Performance:    Bed Mobility:    · Pt min A for bed mobility     Functional Mobility/Transfers:  · Functional Mobility: Pt with CGA for in room mobility     Activities of Daily Living:  · Pt with min A for self care completion     Cognitive/Visual Perceptual:  Cognitive/Psychosocial Skills:     -       Oriented to: Person, Place, Time and Situation   -       Follows Commands/attention:Follows one-step commands  -       Communication: clear/fluent  -       Memory: No Deficits noted  -       Safety awareness/insight to disability: intact   -       Mood/Affect/Coping skills/emotional control: Appropriate to situation    Physical Exam:  Upper Extremity Range of Motion:     -       Right Upper Extremity: WFL  -       Left Upper Extremity: WFL  Upper Extremity Strength:    -       Right Upper Extremity: WFL  -       Left Upper Extremity: WFL    AMPAC 6 Click ADL:  AMPAC Total Score: 15    Treatment & Education:  Evaluation complete and goals set. Pt educated on safety, role of OT, importance of increased participation in self care for gains , expectations for participation, expectations for gains, POC, energy conservation, caregiver strain. White board updated.     Education:    Patient left supine with all lines intact    Assessment:     Alfredina Claudette Parks is a 78 y.o. female with a medical diagnosis of Pancreatic mass. Pt with poor endurance but with potential for gains.   She presents with the following  "performance deficits affecting function: weakness, impaired endurance, impaired self care skills, impaired functional mobilty, gait instability.      Rehab Prognosis: Fair; patient would benefit from acute skilled OT services to address these deficits and reach maximum level of function.         Clinical Decision Makin.  OT Mod:  "Pt evaluation falls under moderate complexity for evaluation coding due to identification of 3-5 performance deficits noted as stated above. Eval required Min/Mod assistance to complete on this date and detailed assessment(s) were utilized. Moreover, an expanded review of history and occupational profile obtained with additional review of cognitive, physical and psychosocial hx."     Plan:     Patient to be seen 3 x/week to address the above listed problems via self-care/home management, therapeutic activities, therapeutic exercises  · Plan of Care Expires: 19  · Plan of Care Reviewed with: patient    This Plan of care has been discussed with the patient who was involved in its development and understands and is in agreement with the identified goals and treatment plan    GOALS:   Multidisciplinary Problems     Occupational Therapy Goals        Problem: Occupational Therapy Goal    Goal Priority Disciplines Outcome Interventions   Occupational Therapy Goal     OT, PT/OT Ongoing (interventions implemented as appropriate)    Description:  Goals to be met by: 1/10    Patient will increase functional independence with ADLs by performing:    UE Dressing with [place].  LE Dressing with Contact Guard Assistance.  Grooming while seated with [place].  Toileting from toilet with Contact Guard Assistance for hygiene and clothing management.   Bathing from  edge of bed with Contact Guard Assistance.                      Time Tracking:     OT Date of Treatment: 18  OT Start Time: 1017  OT Stop Time: 1030  OT Total Time (min): 13 min    Billable Minutes:Evaluation " 13    Doris Lang, OT  12/18/2018

## 2018-12-18 NOTE — HPI
78 year old woman with medical history significant for HTN, DM, Afib, diverticulosis, known pancreatic cyst, HCV, h/o constipation, and h/o liver lesion who presented to the ED with persistent abdominal for the past 3 weeks.  9/10 crampy pain in her upper abdomen, bilaterally, with associated constipation, decreased appetite (food didn't taste good anymore), and estimated 10 lb weight loss during this period.  She was scheduled for a liver biopsy this Friday for the liver lesion.  Denies symptoms of pruritis, jaundice, or change in the color of her urine.      18 MRI abdomen significant for large 5.6cm hepatic lesion with characteristics suggestive of hilar cholangiocarcinoma/[place] tumor or metastatic disease.  Less likely hepatic abscess.  There are two pancreatic head/neck mass lesions on both sides of the CBD with associated biliary ductal dilation.  There is also retroperitoneal, mesenteric, and anterior mediastinal lymphadenopathy.  The left portal vein is narrowed but patent.  CA 19-9     Plan on AES evaluation for biopsy 18.  Heme onc consulted for suspected malignancy    Family history significant for mother who  of cervical cancer in her 30s and younger sister who was diagnosed with breast cancer at age 44.  Father and brother were both smokers who were diagnosed with lung and esophageal cancer, respectively.  Sister who  of breast cancer also had two daughters who are still alive today.    At baseline she lives in an apartment by herself with her daughter next door to her.  She has an aide help her with ADLs like taking a shower.  Her daughter prepares her food for her.  She uses a walker to get around.  Has had frequent falls this past year.  Former  at [employer], and retired over 20 years ago after she hurt her back.

## 2018-12-18 NOTE — NURSING
Was informed patient had a 6 beat run of v-tach and physician here (Dr Caballero)and aware she is asymptomatic. Denied pain or discomfort. Speaking with patient for plans for further testing .

## 2018-12-18 NOTE — PLAN OF CARE
Problem: Adult Inpatient Plan of Care  Goal: Plan of Care Review  Outcome: Ongoing (interventions implemented as appropriate)  Patient came in with complaints of abdominal pain. Aware of need for stool specimen and to let nurse if she has a bowel movement. Did not complain of nausea or vomiting. Tolerated iv antibiotics well. Vital signs stable. No fall or injury and aware to call for assistance to go to and from bathroom even though she uses walker. Stated she needs assistance with care. Assistance given. Refused nasal spray this am. Remains on telemetry. Daughter visited this pm.

## 2018-12-18 NOTE — PLAN OF CARE
Problem: Occupational Therapy Goal  Goal: Occupational Therapy Goal  Goals to be met by: 1/10    Patient will increase functional independence with ADLs by performing:    UE Dressing with [place].  LE Dressing with Contact Guard Assistance.  Grooming while seated with [place].  Toileting from toilet with Contact Guard Assistance for hygiene and clothing management.   Bathing from  edge of bed with Contact Guard Assistance.    Outcome: Ongoing (interventions implemented as appropriate)  Evaluation complete and goals set.  Cont with POC  Doris Lang OT  12/18/2018

## 2018-12-18 NOTE — PLAN OF CARE
Problem: Adult Inpatient Plan of Care  Goal: Plan of Care Review   12/18/18 0320   Plan of Care Review   Plan of Care Reviewed With patient       Patient alert and oriented and reports pain.  Orders received to administer tylenol 650 mg po every 8 hours for pain.  Patient reported effectiveness. Patient ambulates to toilet with walker and SBA. Patient remains on IV antibiotics and afebrile. Free from falls and injuries  POC ongoing and discharge date is TBD.

## 2018-12-18 NOTE — NURSING
"Patient reported lower abdominal pain and HA  rated pain a 8/10. Patient states, " I take tylenol at home and I just need something for my pain." Paged IM 5 and waiting for return of page.  "

## 2018-12-18 NOTE — PT/OT/SLP EVAL
Physical Therapy Evaluation    Patient Name:  Alfredina Claudette Parks   MRN:  [id number]    Recommendations:     Discharge Recommendations:  (HH PT)   Discharge Equipment Recommendations: none   Barriers to discharge: None    Assessment:     Alfredina Claudette Parks is a 78 y.o. female admitted with a medical diagnosis of Pancreatic mass.  She presents with the following impairments/functional limitations:  weakness, gait instability, impaired endurance, impaired balance, decreased lower extremity function, impaired cardiopulmonary response to activity, decreased safety awareness, visual deficits.  During today's session, pt with incr fatigue after testing on today. Ambulatory with rolling walker usage and cueing provided for improved posture. Pt near baseline with mobility and self care. Would benefit from discharge home c/ HH PT services.       Rehab Prognosis: Good; patient would benefit from acute skilled PT services to address these deficits and reach maximum level of function.    Recent Surgery: * No surgery found *      Plan:     During this hospitalization, patient to be seen 3 x/week to address the identified rehab impairments via gait training, therapeutic activities, therapeutic exercises and progress toward the following goals:    GOALS:   Multidisciplinary Problems     Physical Therapy Goals        Problem: Physical Therapy Goal    Goal Priority Disciplines Outcome Goal Variances Interventions   Physical Therapy Goal     PT, PT/OT Ongoing (interventions implemented as appropriate)     Description:  Goals to be met by: 10 days ()    Patient will increase functional independence with mobility by performin. Supine to sit with Set-up [place]  2. Sit to supine with Stand-by Assistance  3. Sit to stand transfer with Supervision  4. Gait  x 200 feet with Stand-by Assistance using rollator.  5. Lower extremity exercise program x3.0 reps per handout, with independence to improve muscular  strength and endurance.                         · Plan of Care Expires:  01/17/19    Subjective     Chief Complaint: fatigue with activity  Patient/Family Comments/goals: to return to home environment   Pain/Comfort:  · Pain Rating 1: 0/10    Patients cultural, spiritual, Yazidism conflicts given the current situation:      Living Environment:  Patient History:  · Home environment: lives at assisted living facility apartment on 3rd floor with elevator  · Assistance provided:  Aide, dgt  · Bathroom set up:  Tub/shower    Previous Level of Mobilty  · Transfers: (I)  · Gait: (I) with RW for household and community distances    Additional Roles and Hx of Patient  · Working: none  · Driving: none  · Hobbies:going to 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  Patient found with telemetry  upon PT entry to room.    General Precautions: Standard, fall   Orthopedic Precautions:N/A   Braces: N/A       Exams:  Cognitive Exam  Patient is oriented to Person, Place, Time and Situation and follows 100% of one-step commands    Fine Motor Coordination    -       Intact   Postural Exam Patient presented with the following abnormalities:    -       No postural abnormalities identified   Sensation    -       Intact   Skin Integrity/Edema     -       Skin integrity: Visible skin intact   R LE ROM WFL   R LE Strength  WFL   L LE ROM WFL   L LE Strength  WFL       Functional Mobility  Transfers Sit to Stand:  contact guard assistance with 4 wheeled walker for 1 trials     Gait Gait Distance: 20 ft with rollator  Assistance Level: contact guard assistance  Description: slowed laly with incr (B) UE usage for stability.         Balance   Static Sitting contact guard assistance   Dynamic Sitting contact guard assistance   Static Standing contact guard assistance   Dynamic Standing contact  guard assistance         Therapeutic Activities and Exercises:   PT educated pt on the following  - role of PT  - PT POC (including frequency and duration while in hospital)  - discharge recommendation (HH PT) and equipment needs (none)  - level of assistance currently req (1 person )and safety precautions with [facility] staff   All questions and concerns answered and addressed. White board updated with pertinent information. Nsg notified.       AM-PAC 6 CLICK MOBILITY  Total Score:17     Patient left up in chair with all lines intact and call button in reach.    GOALS:   Multidisciplinary Problems     Physical Therapy Goals        Problem: Physical Therapy Goal    Goal Priority Disciplines Outcome Goal Variances Interventions   Physical Therapy Goal     PT, PT/OT Ongoing (interventions implemented as appropriate)     Description:  Goals to be met by: 10 days ()    Patient will increase functional independence with mobility by performin. Supine to sit with Set-up [place]  2. Sit to supine with Stand-by Assistance  3. Sit to stand transfer with Supervision  4. Gait  x 200 feet with Stand-by Assistance using rollator.  5. Lower extremity exercise program x3.0 reps per handout, with independence to improve muscular strength and endurance.                         History:     Past Medical History:   Diagnosis Date    Absence of bladder continence 2015    Asthma     Asthma without status asthmaticus 2012    Atrial fibrillation     BMI 36.0-36.9,adult 2014    Bulging lumbar disc     Cataract     Cervical radiculopathy     Claudication 2017    Constipation 2013    Diabetes mellitus     pre diabetes     Diverticular disease 2013    GERD (gastroesophageal reflux disease) 2012    Hepatitis C     Hyperglycemia     Hypertension     Hypothyroidism     Osteoporosis     Pancreatic cyst     Vitamin D deficiency disease        Past Surgical History:   Procedure  Laterality Date    BREAST CYST EXCISION Bilateral     COLONOSCOPY N/A 7/11/2013    [facility]    CYST REMOVAL      ESOPHAGOGASTRODUODENOSCOPY (EGD) N/A 1/21/2015    [facility] at [facility]    EYE SURGERY      cataracts    HYSTERECTOMY      TOE SURGERY Bilateral     big toenails    ULTRASOUND-ENDOSCOPIC-UPPER N/A 1/15/2018    [facility]    ULTRASOUND-ENDOSCOPIC-UPPER N/A 8/14/2015    [facility]    ULTRASOUND-ENDOSCOPIC-UPPER N/A 1/21/2015    [facility] at [facility]    ULTRASOUND-ENDOSCOPIC-UPPER W/POSSIBLE FNA N/A 2/15/2016    Performed by [facility] at [facility]       Clinical Decision Making:     History  Co-morbidities and personal factors that may impact the plan of care Examination  Body Structures and Functions, activity limitations and participation restrictions that may impact the plan of care Clinical Presentation   Decision Making/ Complexity Score   Co-morbidities:   [] Time since onset of injury / illness / exacerbation  [x] Status of current condition  []Patient's cognitive status and safety concerns    [x] Multiple Medical Problems (see med hx)  Personal Factors:   [] Patient's age  [] Prior Level of function   [x] Patient's home situation (environment and family support)  [] Patient's level of motivation  [] Expected progression of patient      HISTORY:(criteria)    [] 45623 - no personal factors/history    [] 90060 - has 1-2 personal factor/comorbidity     [x] 72302 - has >3 personal factor/comorbidity     Body Regions:  [] Objective examination findings  [] Head     []  Neck  [] Trunk   [] Upper Extremity  [x] Lower Extremity    Body Systems:  [] For communication ability, affect, cognition, language, and learning style: the assessment of the ability to make needs known, consciousness, orientation (person, place, and time),  expected emotional /behavioral responses, and learning preferences (eg, learning barriers, education  needs)  [x] For the neuromuscular system: a general assessment of gross coordinated movement (eg, balance, gait, locomotion, transfers, and transitions) and motor function  (motor control and motor learning)  [] For the musculoskeletal system: the assessment of gross symmetry, gross range of motion, gross strength, height, and weight  [] For the integumentary system: the assessment of pliability(texture), presence of scar formation, skin color, and skin integrity  [] For cardiovascular/pulmonary system: the assessment of heart rate, respiratory rate, blood pressure, and edema     Activity limitations:    [] Patient's cognitive status and saf ety concerns          [x] Status of current condition      [] Weight bearing restriction  [] Cardiopulmunary Restriction    Participation Restrictions:   [] Goals and goal agreement with the patient     [] Rehab potential (prognosis) and probable outcome      Examination of Body System: (criteria)    [] 99538 - addressing 1-2 elements    [x] 52734 - addressing a total of 3 or more elements     [] 55555 -  Addressing a total of 4 or more elements         Clinical Presentation: (criteria)  Stable - 51166     On examination of body system using standardized tests and measures patient presents with 3 or more elements from any of the following: body structures and functions, activity limitations, and/or participation restrictions.  Leading to a clinical presentation that is considered stable and/or uncomplicated                              Clinical Decision Making  (Eval Complexity):  Low- 43049     Time Tracking:     PT Received On: 12/18/18  PT Start Time: 1017     PT Stop Time: 1030  PT Total Time (min): 13 min     Billable Minutes: Evaluation 13       Belia Chavez, PT  12/18/2018

## 2018-12-18 NOTE — PHARMACY MED REC
"Admission Medication Reconciliation - Pharmacy Consult Note    The home medication history was taken by Evelin Crump, Pharmacy Technician. Based on information gathered and subsequent review by the clinical pharmacist, the items below may need attention.     You may go to "Admission" then "Reconcile Home Medications" tabs to review and/or act upon these items.     Potentially problematic discrepancies with current MAR  o Patient IS taking the following which was not ordered upon admit  o Carvedilol 3.125 mg PO BID -- Held as patient has been mostly normotensive  o Furosemide 20 mg PO BID -- Held as patient has been mostly normotensive  o Losartan 25 mg PO daily -- Held as patient has been mostly normotensive  o Spironolactone 50 mg PO daily -- Held as patient has been mostly normotensive  o Fluticasone-salmeterol 250-50 mcg/dose inhale one puff BID -- if restarting change to fluticasone-vilanterol inpatient   o Solifenacin 5 mg PO daily  o Patient is taking a drug DIFFERENTLY than how ordered upon admit  o Colchicine 0.6 mg PO daily ordered -- takes PRN at home for gout flares     Please address this information as you see fit.  Feel free to contact us if you have any questions or require assistance.    Nicolette Epstein, PharmD  PGY-2 Internal Medicine Pharmacy Resident  EXT [phone]                .    .          "

## 2018-12-19 ENCOUNTER — ANESTHESIA (OUTPATIENT)
Dept: ENDOSCOPY | Facility: HOSPITAL | Age: 78
DRG: 423 | End: 2018-12-19
Payer: MEDICARE

## 2018-12-19 PROBLEM — K83.1 BILIARY OBSTRUCTION: Status: ACTIVE | Noted: 2018-12-19

## 2018-12-19 PROBLEM — Z71.89 GOALS OF CARE, COUNSELING/DISCUSSION: Status: ACTIVE | Noted: 2018-12-19

## 2018-12-19 LAB
ALBUMIN SERPL BCP-MCNC: 2 G/DL
ALP SERPL-CCNC: 283 U/L
ALT SERPL W/O P-5'-P-CCNC: 76 U/L
ANION GAP SERPL CALC-SCNC: 10 MMOL/L
AST SERPL-CCNC: 97 U/L
BASOPHILS # BLD AUTO: 0.05 K/UL
BASOPHILS NFR BLD: 0.4 %
BILIRUB SERPL-MCNC: 1.5 MG/DL
BUN SERPL-MCNC: 10 MG/DL
CALCIUM SERPL-MCNC: 8.3 MG/DL
CHLORIDE SERPL-SCNC: 103 MMOL/L
CO2 SERPL-SCNC: 22 MMOL/L
CREAT SERPL-MCNC: 0.8 MG/DL
DIFFERENTIAL METHOD: ABNORMAL
EOSINOPHIL # BLD AUTO: 0.1 K/UL
EOSINOPHIL NFR BLD: 0.4 %
ERYTHROCYTE [DISTWIDTH] IN BLOOD BY AUTOMATED COUNT: 15.9 %
EST. GFR  (AFRICAN AMERICAN): >60 ML/MIN/1.73 M^2
EST. GFR  (NON AFRICAN AMERICAN): >60 ML/MIN/1.73 M^2
GLUCOSE SERPL-MCNC: 99 MG/DL
HCT VFR BLD AUTO: 37 %
HCV RNA SERPL NAA+PROBE-LOG IU: <1.08 LOG (10) IU/ML
HCV RNA SERPL QL NAA+PROBE: NOT DETECTED IU/ML
HCV RNA SPEC NAA+PROBE-ACNC: <12 IU/ML
HGB BLD-MCNC: 11.5 G/DL
IMM GRANULOCYTES # BLD AUTO: 0.06 K/UL
IMM GRANULOCYTES NFR BLD AUTO: 0.5 %
LYMPHOCYTES # BLD AUTO: 1.8 K/UL
LYMPHOCYTES NFR BLD: 13.8 %
MAGNESIUM SERPL-MCNC: 1.4 MG/DL
MCH RBC QN AUTO: 30.6 PG
MCHC RBC AUTO-ENTMCNC: 31.1 G/DL
MCV RBC AUTO: 98 FL
MONOCYTES # BLD AUTO: 1.4 K/UL
MONOCYTES NFR BLD: 10.7 %
NEUTROPHILS # BLD AUTO: 9.8 K/UL
NEUTROPHILS NFR BLD: 74.2 %
NRBC BLD-RTO: 0 /100 WBC
OB PNL STL: NEGATIVE
PHOSPHATE SERPL-MCNC: 2.8 MG/DL
PLATELET # BLD AUTO: 197 K/UL
PMV BLD AUTO: 10.3 FL
POCT GLUCOSE: 99 MG/DL (ref 70–110)
POTASSIUM SERPL-SCNC: 3.8 MMOL/L
PROT SERPL-MCNC: 6.9 G/DL
RBC # BLD AUTO: 3.76 M/UL
SODIUM SERPL-SCNC: 135 MMOL/L
WBC # BLD AUTO: 13.23 K/UL

## 2018-12-19 PROCEDURE — 84100 ASSAY OF PHOSPHORUS: CPT

## 2018-12-19 PROCEDURE — 25000003 PHARM REV CODE 250: Performed by: INTERNAL MEDICINE

## 2018-12-19 PROCEDURE — 43277 ERCP EA DUCT/AMPULLA DILATE: CPT | Mod: 59,,, | Performed by: INTERNAL MEDICINE

## 2018-12-19 PROCEDURE — 63600175 PHARM REV CODE 636 W HCPCS: Performed by: NURSE ANESTHETIST, CERTIFIED REGISTERED

## 2018-12-19 PROCEDURE — 43242 EGD US FINE NEEDLE BX/ASPIR: CPT | Performed by: INTERNAL MEDICINE

## 2018-12-19 PROCEDURE — 43242 EGD US FINE NEEDLE BX/ASPIR: CPT | Mod: 51,,, | Performed by: INTERNAL MEDICINE

## 2018-12-19 PROCEDURE — C1726 CATH, BAL DIL, NON-VASCULAR: HCPCS | Performed by: INTERNAL MEDICINE

## 2018-12-19 PROCEDURE — 25000003 PHARM REV CODE 250: Performed by: STUDENT IN AN ORGANIZED HEALTH CARE EDUCATION/TRAINING PROGRAM

## 2018-12-19 PROCEDURE — 25000003 PHARM REV CODE 250: Performed by: HOSPITALIST

## 2018-12-19 PROCEDURE — D9220A PRA ANESTHESIA: Mod: CRNA,,, | Performed by: NURSE ANESTHETIST, CERTIFIED REGISTERED

## 2018-12-19 PROCEDURE — 25000242 PHARM REV CODE 250 ALT 637 W/ HCPCS: Performed by: STUDENT IN AN ORGANIZED HEALTH CARE EDUCATION/TRAINING PROGRAM

## 2018-12-19 PROCEDURE — 63600175 PHARM REV CODE 636 W HCPCS: Mod: JG | Performed by: INTERNAL MEDICINE

## 2018-12-19 PROCEDURE — 36415 COLL VENOUS BLD VENIPUNCTURE: CPT

## 2018-12-19 PROCEDURE — 43277 ERCP EA DUCT/AMPULLA DILATE: CPT | Performed by: INTERNAL MEDICINE

## 2018-12-19 PROCEDURE — 74328 X-RAY BILE DUCT ENDOSCOPY: CPT | Performed by: INTERNAL MEDICINE

## 2018-12-19 PROCEDURE — 43274 ERCP DUCT STENT PLACEMENT: CPT | Performed by: INTERNAL MEDICINE

## 2018-12-19 PROCEDURE — 85025 COMPLETE CBC W/AUTO DIFF WBC: CPT

## 2018-12-19 PROCEDURE — 07BB4ZX EXCISION OF MESENTERIC LYMPHATIC, PERCUTANEOUS ENDOSCOPIC APPROACH, DIAGNOSTIC: ICD-10-PCS | Performed by: INTERNAL MEDICINE

## 2018-12-19 PROCEDURE — D9220A PRA ANESTHESIA: Mod: ANES,,, | Performed by: ANESTHESIOLOGY

## 2018-12-19 PROCEDURE — 37000009 HC ANESTHESIA EA ADD 15 MINS: Performed by: INTERNAL MEDICINE

## 2018-12-19 PROCEDURE — 11000001 HC ACUTE MED/SURG PRIVATE ROOM

## 2018-12-19 PROCEDURE — 27000221 HC OXYGEN, UP TO 24 HOURS

## 2018-12-19 PROCEDURE — C1769 GUIDE WIRE: HCPCS | Performed by: INTERNAL MEDICINE

## 2018-12-19 PROCEDURE — 74328 X-RAY BILE DUCT ENDOSCOPY: CPT | Mod: 26,,, | Performed by: INTERNAL MEDICINE

## 2018-12-19 PROCEDURE — 27202125 HC BALLOON, EXTRACTION (ANY): Performed by: INTERNAL MEDICINE

## 2018-12-19 PROCEDURE — 25000003 PHARM REV CODE 250: Performed by: NURSE ANESTHETIST, CERTIFIED REGISTERED

## 2018-12-19 PROCEDURE — 93010 ELECTROCARDIOGRAM REPORT: CPT | Mod: ,,, | Performed by: INTERNAL MEDICINE

## 2018-12-19 PROCEDURE — 43274 ERCP DUCT STENT PLACEMENT: CPT | Mod: 59,,, | Performed by: INTERNAL MEDICINE

## 2018-12-19 PROCEDURE — BF45ZZZ ULTRASONOGRAPHY OF LIVER: ICD-10-PCS | Performed by: INTERNAL MEDICINE

## 2018-12-19 PROCEDURE — 88173 CYTOPATH EVAL FNA REPORT: CPT | Mod: 26,,, | Performed by: PATHOLOGY

## 2018-12-19 PROCEDURE — 0F798DZ DILATION OF COMMON BILE DUCT WITH INTRALUMINAL DEVICE, VIA NATURAL OR ARTIFICIAL OPENING ENDOSCOPIC: ICD-10-PCS | Performed by: INTERNAL MEDICINE

## 2018-12-19 PROCEDURE — 93005 ELECTROCARDIOGRAM TRACING: CPT

## 2018-12-19 PROCEDURE — 63600175 PHARM REV CODE 636 W HCPCS: Performed by: STUDENT IN AN ORGANIZED HEALTH CARE EDUCATION/TRAINING PROGRAM

## 2018-12-19 PROCEDURE — 88172 CYTP DX EVAL FNA 1ST EA SITE: CPT | Performed by: PATHOLOGY

## 2018-12-19 PROCEDURE — 27201674 HC SPHINCTERTOME: Performed by: INTERNAL MEDICINE

## 2018-12-19 PROCEDURE — 37000008 HC ANESTHESIA 1ST 15 MINUTES: Performed by: INTERNAL MEDICINE

## 2018-12-19 PROCEDURE — C2617 STENT, NON-COR, TEM W/O DEL: HCPCS | Performed by: INTERNAL MEDICINE

## 2018-12-19 PROCEDURE — 94761 N-INVAS EAR/PLS OXIMETRY MLT: CPT

## 2018-12-19 PROCEDURE — 80053 COMPREHEN METABOLIC PANEL: CPT

## 2018-12-19 PROCEDURE — 27202131 HC NEEDLE, FNB SINGLE (ANY): Performed by: INTERNAL MEDICINE

## 2018-12-19 PROCEDURE — 83735 ASSAY OF MAGNESIUM: CPT

## 2018-12-19 PROCEDURE — 99232 SBSQ HOSP IP/OBS MODERATE 35: CPT | Mod: GC,,, | Performed by: HOSPITALIST

## 2018-12-19 PROCEDURE — 0F768ZZ DILATION OF LEFT HEPATIC DUCT, VIA NATURAL OR ARTIFICIAL OPENING ENDOSCOPIC: ICD-10-PCS | Performed by: INTERNAL MEDICINE

## 2018-12-19 PROCEDURE — S0030 INJECTION, METRONIDAZOLE: HCPCS | Performed by: STUDENT IN AN ORGANIZED HEALTH CARE EDUCATION/TRAINING PROGRAM

## 2018-12-19 RX ORDER — OXYCODONE HYDROCHLORIDE 5 MG/1
TABLET ORAL
Status: DISPENSED
Start: 2018-12-19 | End: 2018-12-20

## 2018-12-19 RX ORDER — GABAPENTIN 100 MG/1
200 CAPSULE ORAL 3 TIMES DAILY
Status: DISCONTINUED | OUTPATIENT
Start: 2018-12-19 | End: 2018-12-22 | Stop reason: HOSPADM

## 2018-12-19 RX ORDER — INDOMETHACIN 50 MG/1
SUPPOSITORY RECTAL
Status: COMPLETED | OUTPATIENT
Start: 2018-12-19 | End: 2018-12-19

## 2018-12-19 RX ORDER — IBUPROFEN 400 MG/1
400 TABLET ORAL EVERY 8 HOURS
Status: DISCONTINUED | OUTPATIENT
Start: 2018-12-19 | End: 2018-12-20

## 2018-12-19 RX ORDER — METHOCARBAMOL 500 MG/1
500 TABLET, FILM COATED ORAL 3 TIMES DAILY PRN
Status: DISCONTINUED | OUTPATIENT
Start: 2018-12-19 | End: 2018-12-22 | Stop reason: HOSPADM

## 2018-12-19 RX ORDER — PROPOFOL 10 MG/ML
VIAL (ML) INTRAVENOUS
Status: DISCONTINUED | OUTPATIENT
Start: 2018-12-19 | End: 2018-12-19

## 2018-12-19 RX ORDER — LABETALOL HCL 20 MG/4 ML
SYRINGE (ML) INTRAVENOUS
Status: DISPENSED
Start: 2018-12-19 | End: 2018-12-20

## 2018-12-19 RX ORDER — SODIUM CHLORIDE 9 MG/ML
INJECTION, SOLUTION INTRAVENOUS CONTINUOUS
Status: DISCONTINUED | OUTPATIENT
Start: 2018-12-19 | End: 2018-12-20

## 2018-12-19 RX ORDER — SODIUM CHLORIDE 0.9 % (FLUSH) 0.9 %
3 SYRINGE (ML) INJECTION
Status: DISCONTINUED | OUTPATIENT
Start: 2018-12-19 | End: 2018-12-19 | Stop reason: HOSPADM

## 2018-12-19 RX ORDER — GABAPENTIN 100 MG/1
200 CAPSULE ORAL 3 TIMES DAILY PRN
Status: DISCONTINUED | OUTPATIENT
Start: 2018-12-19 | End: 2018-12-19

## 2018-12-19 RX ORDER — FENTANYL CITRATE 50 UG/ML
25 INJECTION, SOLUTION INTRAMUSCULAR; INTRAVENOUS EVERY 5 MIN PRN
Status: DISCONTINUED | OUTPATIENT
Start: 2018-12-19 | End: 2018-12-19 | Stop reason: HOSPADM

## 2018-12-19 RX ORDER — FENTANYL CITRATE 50 UG/ML
INJECTION, SOLUTION INTRAMUSCULAR; INTRAVENOUS
Status: DISCONTINUED | OUTPATIENT
Start: 2018-12-19 | End: 2018-12-19

## 2018-12-19 RX ORDER — OXYCODONE HYDROCHLORIDE 5 MG/1
5 TABLET ORAL EVERY 6 HOURS PRN
Status: DISCONTINUED | OUTPATIENT
Start: 2018-12-19 | End: 2018-12-22 | Stop reason: HOSPADM

## 2018-12-19 RX ORDER — POTASSIUM CHLORIDE 20 MEQ/1
40 TABLET, EXTENDED RELEASE ORAL ONCE
Status: DISCONTINUED | OUTPATIENT
Start: 2018-12-19 | End: 2018-12-20

## 2018-12-19 RX ORDER — PROPOFOL 10 MG/ML
VIAL (ML) INTRAVENOUS CONTINUOUS PRN
Status: DISCONTINUED | OUTPATIENT
Start: 2018-12-19 | End: 2018-12-19

## 2018-12-19 RX ORDER — LIDOCAINE HCL/PF 100 MG/5ML
SYRINGE (ML) INTRAVENOUS
Status: DISCONTINUED | OUTPATIENT
Start: 2018-12-19 | End: 2018-12-19

## 2018-12-19 RX ORDER — GLUCAGON 1 MG
KIT INJECTION
Status: COMPLETED | OUTPATIENT
Start: 2018-12-19 | End: 2018-12-19

## 2018-12-19 RX ORDER — ONDANSETRON 2 MG/ML
INJECTION INTRAMUSCULAR; INTRAVENOUS
Status: DISPENSED
Start: 2018-12-19 | End: 2018-12-20

## 2018-12-19 RX ORDER — MAGNESIUM SULFATE HEPTAHYDRATE 40 MG/ML
2 INJECTION, SOLUTION INTRAVENOUS ONCE
Status: COMPLETED | OUTPATIENT
Start: 2018-12-19 | End: 2018-12-19

## 2018-12-19 RX ADMIN — INDOMETHACIN 100 MG: 50 SUPPOSITORY RECTAL at 03:12

## 2018-12-19 RX ADMIN — LIDOCAINE HYDROCHLORIDE 100 MG: 20 INJECTION, SOLUTION INTRAVENOUS at 01:12

## 2018-12-19 RX ADMIN — ALLOPURINOL 100 MG: 100 TABLET ORAL at 09:12

## 2018-12-19 RX ADMIN — AMIODARONE HYDROCHLORIDE 100 MG: 100 TABLET ORAL at 09:12

## 2018-12-19 RX ADMIN — PANTOPRAZOLE SODIUM 40 MG: 40 TABLET, DELAYED RELEASE ORAL at 09:12

## 2018-12-19 RX ADMIN — IBUPROFEN 400 MG: 400 TABLET, FILM COATED ORAL at 09:12

## 2018-12-19 RX ADMIN — FENTANYL CITRATE 25 MCG: 50 INJECTION, SOLUTION INTRAMUSCULAR; INTRAVENOUS at 02:12

## 2018-12-19 RX ADMIN — OXYCODONE HYDROCHLORIDE 5 MG: 5 TABLET ORAL at 03:12

## 2018-12-19 RX ADMIN — METRONIDAZOLE 500 MG: 500 INJECTION, SOLUTION INTRAVENOUS at 05:12

## 2018-12-19 RX ADMIN — PROPOFOL 150 MCG/KG/MIN: 10 INJECTION, EMULSION INTRAVENOUS at 01:12

## 2018-12-19 RX ADMIN — OXYCODONE HYDROCHLORIDE 5 MG: 5 TABLET ORAL at 10:12

## 2018-12-19 RX ADMIN — OXYCODONE HYDROCHLORIDE 5 MG: 5 TABLET ORAL at 11:12

## 2018-12-19 RX ADMIN — LEVOTHYROXINE SODIUM 125 MCG: 125 TABLET ORAL at 05:12

## 2018-12-19 RX ADMIN — FLUTICASONE PROPIONATE 50 MCG: 50 SPRAY, METERED NASAL at 09:12

## 2018-12-19 RX ADMIN — PROPOFOL 20 MG: 10 INJECTION, EMULSION INTRAVENOUS at 01:12

## 2018-12-19 RX ADMIN — ASPIRIN 81 MG: 81 TABLET, COATED ORAL at 09:12

## 2018-12-19 RX ADMIN — FENTANYL CITRATE 25 MCG: 50 INJECTION, SOLUTION INTRAMUSCULAR; INTRAVENOUS at 01:12

## 2018-12-19 RX ADMIN — GLUCAGON HYDROCHLORIDE 0.5 MG: KIT at 02:12

## 2018-12-19 RX ADMIN — PROPOFOL 30 MG: 10 INJECTION, EMULSION INTRAVENOUS at 01:12

## 2018-12-19 RX ADMIN — Medication 20 MG: at 02:12

## 2018-12-19 RX ADMIN — MAGNESIUM SULFATE IN WATER 2 G: 40 INJECTION, SOLUTION INTRAVENOUS at 09:12

## 2018-12-19 RX ADMIN — GABAPENTIN 200 MG: 100 CAPSULE ORAL at 09:12

## 2018-12-19 RX ADMIN — SODIUM CHLORIDE: 0.9 INJECTION, SOLUTION INTRAVENOUS at 01:12

## 2018-12-19 NOTE — ASSESSMENT & PLAN NOTE
-Noted on CT on admission, initial concern for possible abscess/ascending cholangitis. MRI 12/18/18 showed findings most consistent with a mass lesion.  -AST/ALT/Alk phos somewhat elevated. Bilirubin 1.1  -AES to perform EUS/ERCP 12/19.

## 2018-12-19 NOTE — ANESTHESIA POSTPROCEDURE EVALUATION
"Anesthesia Post Evaluation    Patient: Alfredina Claudette Parks    Procedure(s) Performed: Procedure(s) (LRB):  ULTRASOUND, ENDOSCOPIC, UPPER GI TRACT (N/A)  ERCP (ENDOSCOPIC RETROGRADE CHOLANGIOPANCREATOGRAPHY) (N/A)    Final Anesthesia Type: general  Patient location during evaluation: PACU  Patient participation: Yes- Able to Participate  Level of consciousness: awake and alert  Post-procedure vital signs: reviewed and stable  Pain management: adequate  Airway patency: patent  PONV status at discharge: No PONV  Anesthetic complications: no      Cardiovascular status: blood pressure returned to baseline  Respiratory status: unassisted and spontaneous ventilation  Hydration status: euvolemic  Follow-up not needed.        Visit Vitals  /72 (BP Location: Right arm, Patient Position: Lying)   Pulse 93   Temp 36.5 °C (97.7 °F) (Oral)   Resp 14   Ht 4' 10" (1.473 m)   Wt 71.7 kg (158 lb 1.1 oz)   SpO2 (!) 94%   Breastfeeding? No   BMI 33.04 kg/m²       Pain/Shantel Score: Pain Rating Prior to Med Admin: 6 (12/19/2018  3:38 PM)  Pain Rating Post Med Admin: 2 (12/19/2018 12:30 PM)        "

## 2018-12-19 NOTE — SUBJECTIVE & OBJECTIVE
Interval History: see hospital course     Review of Systems  Objective:     Vital Signs (Most Recent):  Temp: 97 °F (36.1 °C) (12/19/18 1149)  Pulse: 80 (12/19/18 1149)  Resp: 16 (12/19/18 1149)  BP: (!) 170/86 (12/19/18 1149)  SpO2: (!) 93 % (12/19/18 1149) Vital Signs (24h Range):  Temp:  [97 °F (36.1 °C)-98.9 °F (37.2 °C)] 97 °F (36.1 °C)  Pulse:  [] 80  Resp:  [16-18] 16  SpO2:  [92 %-97 %] 93 %  BP: (120-179)/(55-86) 170/86     Weight: 71.7 kg (158 lb 1.1 oz)  Body mass index is 33.04 kg/m².    Intake/Output Summary (Last 24 hours) at 12/19/2018 1208  Last data filed at 12/19/2018 0551  Gross per 24 hour   Intake 1150 ml   Output 700 ml   Net 450 ml      Physical Exam   Constitutional: She is oriented to person, place, and time. She appears well-developed and well-nourished. No distress.   HENT:   Head: Normocephalic and atraumatic.   Eyes: EOM are normal. Pupils are equal, round, and reactive to light. Right eye exhibits no discharge. Left eye exhibits no discharge. No scleral icterus.   Cardiovascular: Normal rate, regular rhythm, normal heart sounds and intact distal pulses. Exam reveals no gallop and no friction rub.   No murmur heard.  Pulmonary/Chest: Effort normal and breath sounds normal. No stridor. No respiratory distress. She has no wheezes. She has no rales. She exhibits no tenderness.   Abdominal: Soft. Bowel sounds are normal. She exhibits no distension, no ascites and no mass. There is tenderness in the right upper quadrant. There is no rebound and no guarding. No hernia.   Neurological: She is alert and oriented to person, place, and time.   Skin: Skin is warm and dry. No rash noted. She is not diaphoretic. No erythema.       Significant Labs: All pertinent labs within the past 24 hours have been reviewed.    Significant Imaging: I have reviewed and interpreted all pertinent imaging results/findings within the past 24 hours.

## 2018-12-19 NOTE — NURSING
Patient c/o lower abdominal pain, rated pain a 8/10.  Patient exhibits nonverbal cues of moaning and rubbing lower abdomen [refer to MRI of abdomen].  IM5 paged and orders received to administer Toradol 15 mg IVP.  Patient made aware and medication to be administered.

## 2018-12-19 NOTE — PROGRESS NOTES
[facility] Hospital Medicine  Progress Note    Patient Name: Alfredina Claudette Parks  MRN: [id number]  Patient Class: IP- Inpatient   Admission Date: 12/16/2018  Length of Stay: 1 days  Attending Physician: Maciel Garcia IV, MD  Primary Care Provider: Veronica Frost MD    [facility] Medicine Team: [facility] 5 Steve Walters DO    Subjective:     Principal Problem:Pancreatic mass    HPI:  Mrs. Garcia is 78 y.o F patient who has a past medical hx significant for HTN, DM, A Fib (loop recorder placed on 02/2017), diverticulosis, pancreatic cyst, HCV (dx 4 years ago), chronic constipation, and liver lesion of unknown etiology, presents to the ER for an emergent evaluation due to diffuse lower abdominal pain. She has had this 9/10 crampy pain for the past 3 weeks which getting worse by movement. She was seen at [facility] the last week of November when it started and was admitted for 3-4 days. She is scheduled for a liver biopsy on this Friday. She returns to the ER due to intolerable pain. She states that the pain seems to be getting worse and the medications are not helping. She also states that she had black stools, but does admit to taking Pepto Bismol for abdominal pain several times. She reports associated symptoms of decreased appetite, nausea, constipation, and generalized weakness.  she denies jaundice, dark urine or itching. CT A/P showed increased size of an ill-defined lesion in the central liver, significantly dilated left hepatic bile ducts. Mass lesion in the region of the pancreatic head/neck with additional foci of abnormal soft tissue and adenopathy in the upper abdomen. Dilated upper common bile duct possibly secondary to mass effect from aforementioned peripancreatic/pancreatic mass. Vitals are normal.        Hospital Course:  12/18/2018 Pt reporting pain overnight, given Toradol IV. Vitals stable. MRI today consistent with liver mass with abscess thought to be much less  likely. AES to proceed with scope tomorrow to obtain tissue samples. Heme/Onc consulted.     Interval History: as 12/18/2018 above    Review of Systems   Constitutional: Positive for fatigue. Negative for chills and fever.   HENT: Negative for sore throat and trouble swallowing.    Eyes: Negative for visual disturbance.   Respiratory: Negative for cough and shortness of breath.    Cardiovascular: Negative for chest pain and palpitations.   Gastrointestinal: Positive for abdominal pain and nausea.   Genitourinary: Negative for difficulty urinating and dysuria.   Skin: Negative for rash and wound.   Neurological: Negative for dizziness and syncope.   Psychiatric/Behavioral: Negative for agitation.     Objective:     Vital Signs (Most Recent):  Temp: 98.9 °F (37.2 °C) (12/18/18 1624)  Pulse: 91 (12/18/18 1624)  Resp: 16 (12/18/18 1624)  BP: 125/74 (12/18/18 1624)  SpO2: 96 % (12/18/18 1624) Vital Signs (24h Range):  Temp:  [96.9 °F (36.1 °C)-98.9 °F (37.2 °C)] 98.9 °F (37.2 °C)  Pulse:  [62-95] 91  Resp:  [16-20] 16  SpO2:  [92 %-97 %] 96 %  BP: (100-142)/(64-77) 125/74     Weight: 71.7 kg (158 lb 1.1 oz)  Body mass index is 33.04 kg/m².    Intake/Output Summary (Last 24 hours) at 12/18/2018 1909  Last data filed at 12/18/2018 1800  Gross per 24 hour   Intake 810 ml   Output 400 ml   Net 410 ml      Physical Exam   Constitutional: She is oriented to person, place, and time. She appears well-developed and well-nourished. No distress.   HENT:   Head: Normocephalic and atraumatic.   Eyes: EOM are normal. Pupils are equal, round, and reactive to light. Right eye exhibits no discharge. Left eye exhibits no discharge. No scleral icterus.   Cardiovascular: Normal rate, regular rhythm, normal heart sounds and intact distal pulses. Exam reveals no gallop and no friction rub.   No murmur heard.  Pulmonary/Chest: Effort normal and breath sounds normal. No stridor. No respiratory distress. She has no wheezes. She has no rales. She  exhibits no tenderness.   Abdominal: Soft. Bowel sounds are normal. She exhibits no distension, no ascites and no mass. There is tenderness in the right upper quadrant. There is no rebound and no guarding. No hernia.   Neurological: She is alert and oriented to person, place, and time.   Skin: Skin is warm and dry. No rash noted. She is not diaphoretic. No erythema.       Significant Labs:   CBC:   Recent Labs   Lab 12/16/18  2240 12/17/18  0732 12/18/18  0340   WBC 16.17* 14.21* 12.93*   HGB 11.3* 10.8* 11.4*   HCT 35.6* 34.7* 35.9*    211 195     CMP:   Recent Labs   Lab 12/16/18  2240 12/17/18  0732 12/18/18  0340 12/18/18  1152   * 131* 135*  --    K 4.3 4.1 4.3  --    CL 92* 97 101  --    CO2 28 27 24  --    * 87 84  --    BUN 18 14 11  --    CREATININE 1.1 0.8 0.8  --    CALCIUM 8.8 8.5* 8.6*  --    PROT 7.9 7.4  --  7.6   ALBUMIN 2.4* 2.2*  --  2.2*   BILITOT 1.2* 1.4*  --  1.7*   ALKPHOS 326* 297*  --  295*   * 134*  --  100*   * 106*  --  90*   ANIONGAP 10 7* 10  --    EGFRNONAA 48.9* >60.0 >60.0  --      All pertinent labs within the past 24 hours have been reviewed.    Significant Imaging: I have reviewed all pertinent imaging results/findings within the past 24 hours.    Assessment/Plan:      * Pancreatic mass    - CT A/P showed mass lesion in the region of the pancreatic head/neck with additional foci of abnormal soft tissue and adenopathy in the upper abdomen. Dilated upper common bile duct possibly secondary to mass effect from aforementioned peripancreatic/pancreatic mass.  - CA 19-9 markedly elevated. AFP elevated  - AES to perform EUS/ERCP 12/19.   - Heme/Onc consulted to facilitate outpatient follow-up.              Liver mass    -Noted on CT on admission, initial concern for possible abscess/ascending cholangitis. MRI 12/18/18 showed findings most consistent with a mass lesion.  -AST/ALT/Alk phos somewhat elevated. Bilirubin 1.1  -AES to perform EUS/ERCP  12/19.       Acute cholangitis    - Wbc trending down, pt afebrile  - she received one dose of Cefepime and Metronidazole at the ED. Continuing ceftriaxone and metronidazole until after AES procedure tomorrow.   - AES was consulted for possible ERCP, appreciate their recs.           Hypothyroidism    - resume home dose synthroid.       HTN (hypertension), benign    - currently is normotensive.  - hold medications for now and continue monitoring.         VTE Risk Mitigation (From admission, onward)        Ordered     IP VTE HIGH RISK PATIENT  Once      12/17/18 0803     Place KULDEEP hose  Until discontinued      12/17/18 0803     Place sequential compression device  Until discontinued      12/17/18 0642              Steve Walters DO  Department of Hospital Medicine   [facility]

## 2018-12-19 NOTE — NURSING
Telemetry called stated patient had a 5-beat run of VTach as well as a 9-beat run. Patient asymptomatic; denies SOB/CP. Patient able to take medications whole; tolerated well. Med Team 5 notified; EKG ordered STAT. Family @ bedside. Will continue to monitor.

## 2018-12-19 NOTE — DISCHARGE INSTRUCTIONS
ERCP (Endoscopic Retrograde Cholangiopancreatography)     A balloon at the tip of a catheter opens above the stone. The stone is pulled out of the duct and leaves your body through stool.     ERCP stands for endoscopic retrograde cholangiopancreatography. This procedure is used to view the biliary and pancreatic ducts.  It is used to evaluate diseases that affect the ducts and to help locate and treat blockages that may be present.  How do I get ready for ERCP?  · Talk to your doctor about any health problems or allergies you have.  · Ask your doctor about the risks of ERCP. These include pancreatitis, infection, bleeding, and tearing the bowel.  · Be sure your doctor knows about all medicines you take. You may be told to stop taking some or all of them before the test. This includes:  · All prescription medicines  · Over-the-counter medicines that don't need a prescription  ·  Any street drugs you may use   · Herbs, vitamins, kelp, seaweed, cough syrups, and other supplements  · You may be asked to take antibiotics ahead of time.  · Avoid blood-thinning medicines for 1 week before ERCP.  · Do not eat or drink for 8 to 12 hours before ERCP.  · Have someone ready to take you home.  What happens during the procedure?  · You may be given medicine through an IV to help you relax.  · Your throat is numbed.  · A thin tube (endoscope) is placed into your throat. It is advanced from the throat through the upper digestive tract, to the common bile duct opening. The endoscope lets the doctor see the common bile and pancreatic ducts on a video screen.  · A cut may be made where the common bile duct opens to the duodenum to make it easier to remove stones.  · As blockages are located and removed, X-rays are taken.  · Contrast dye is injected through a catheter to make the duct show up better on the X-rays.  · An imaging technique that uses X-rays to obtain real-time moving images of internal organs is called fluoroscopy.  Fluoroscopy is used to watch and guide progress of the procedure.   · In some cases, a plastic tube (stent) is placed to hold the ducts open. This stent may be replaced or removed in 6 to 8 weeks. Or it may be left to fall out on its own and be passed in the stool.  What happens after ERCP?  Your doctor may discuss the test results right away or a return visit may be scheduled. You may go home the same day or spend the night in the hospital. Follow these tips:  · You can return to a normal routine the day after the ERCP.  · If a cut was made in the duct, avoid blood-thinning medicines such as aspirin for 5 to 7 days.  · Call your doctor right away if you have a fever or abdominal pain. These may be signs of an infection or torn bowel.   Date Last Reviewed: 6/19/2015 © 2000-2017 [employer]. [address] [place] [address]. All rights reserved. This information is not intended as a substitute for professional medical care. Always follow your healthcare professional's instructions.        Endoscopic Ultrasound (EUS)    An endoscopic ultrasound (EUS) is a test to look at the inside of your gastrointestinal (GI) tract. It's commonly used to look for cancers or growths in the esophagus, stomach, pancreas, liver, and rectum. It can help to stage cancer (see how advanced a cancer is). EUS may also be used to help diagnose certain diseases or to drain cysts or abscesses.  What is EUS?  EUS shows both ultrasound images and live video of the GI tract. During the test, a flexible tube called an endoscope (scope) is used. At the end of the scope is a tiny video camera and light. The video camera sends live images to a monitor. The scope also contains a very small ultrasound device. This uses sound waves to create images and send them to a monitor.  A needle is passed through the scope. The needle can be used take a small sample of tissue for testing. This is called a biopsy. The needle can be used to  take a sample of fluid. This is called fine-needle aspiration (FNA).  Risks and possible complications of EUS  Risks and possible complications include the following:  · Bleeding  · Infection  · A perforation (hole) in the digestive tract   · Risks of sedation or anesthesia   Before the test  Be prepared prior to the test:  · Tell your healthcare provider what medicine you take. This includes vitamins, herbs, and over-the-counter medicine. It also includes any blood thinners, such as warfarin, clopidogrel, ibuprofen, or daily aspirin. Ask your healthcare provider if you need to stop taking some or all of them before the test.  · You may be prescribed antibiotics to take before or after the test. This depends on the area being studied and what is done during the test. These medicines help prevent infection.  · Carefully follow the instructions for preparing for the test to make sure results are accurate. Instructions may include:  ¨ If youre having an EUS of the upper GI tract (esophagus, stomach, duodenum, pancreas, liver):  § Do not eat or drink for 6 hours before the test.  ¨ If youre having an EUS of the lower GI tract (rectum):  § Before the test, do bowel prep as instructed to clean your rectum of stool. This may involve a clear liquid diet and using a laxative (liquid or pills) the night before the test. Or it may mean doing one or more enemas the morning of the test.  § Do not eat or drink for 6 hours before the test.  · Be sure to arrive on time at the facility. Bring your identification and health insurance card. Leave valuables at home. If you have them, bring X-rays or other test results with you.  Let the healthcare provider know  For your safety, tell the healthcare provider if you:  · Take insulin. Your dose may need to be changed on the day of your test.  · Are allergic to latex.  · Have any other allergies.  · Are taking blood thinners.   During the test  An endoscopic ultrasound usually takes  place in a hospital. The procedure itself may take 1 to 2 hours. You will likely go home soon afterward. During the test:  · You lie on your left side on an exam table.  · An intravenous (IV) line will be put into a vein in your arm or hand. This line supplies fluids and medicines. To keep you comfortable during the test, you will be given a sedative medicine. This medicine prevents discomfort and will make you sleepy.  · If you are having an EUS of the upper GI tract, local anesthetic may be sprayed in your throat. This will help you be more comfortable as the healthcare provider inserts the scope. The healthcare provider then gently puts the flexible scope into your mouth or nose and down your throat.  · If youre having an EUS of the lower GI tract, the healthcare provider gently puts the flexible scope into your anus.  · During the test, the scope sends live video and ultrasound images from inside your body to nearby monitors. These are used to examine your GI tract. Specialized procedures, such as drainage, are done as needed.  · The healthcare provider may discuss the results with you soon after the test. Biopsy results take several  days.  · In most cases, you can go home within a few hours of the test. When you leave the facility, have an adult family member or friend drive you, even if you don't feel that sleepy.  After the test  Here is what to expect after the test:  · You may feel tired from the sedative. This should wear off by the end of the day.  · If you had an upper digestive endoscopy, your throat may feel sore for a day or two. Over-the-counter sore throat lozenges and spray should help.  · You can eat and drink normally as soon as the test is done.  When to call the healthcare provider  Call your healthcare provider if you notice any of the following:  · Fever of 100.4°F (38.0°C) or higher, or as advised by your healthcare provider  · Shortness of breath  · Vomiting blood, blood in stool, or  black stools  · Coughing or hoarse voice that wont go away   Date Last Reviewed: 7/1/2016  © 1229-9450 [employer]. [address] [place] [address], [place] [address]. All rights reserved. This information is not intended as a substitute for professional medical care. Always follow your healthcare professional's instructions.

## 2018-12-19 NOTE — [employer] PATIENT INSTRUCTIONS
Discharge Summary/Instructions after an Endoscopic Procedure  Patient Name: Mitzy Garcia  Patient MRN: [id number]  Patient YOB: 1940  Wednesday, December 19, 2018  Vidhya Mustafa MD  RESTRICTIONS:  During your procedure today, you received medications for sedation.  These   medications may affect your judgment, balance and coordination.  Therefore,   for 24 hours, you have the following restrictions:   - DO NOT drive a car, operate machinery, make legal/financial decisions,   sign important papers or drink alcohol.    ACTIVITY:  Today: no heavy lifting, straining or running due to procedural   sedation/anesthesia.  The following day: return to full activity including work.  DIET:  Eat and drink normally unless instructed otherwise.     TREATMENT FOR COMMON SIDE EFFECTS:  - Mild abdominal pain, nausea, belching, bloating or excessive gas:  rest,   eat lightly and use a heating pad.  - Sore Throat: treat with throat lozenges and/or gargle with warm salt   water.  - Because air was used during the procedure, expelling large amounts of air   from your rectum or belching is normal.  - If a bowel prep was taken, you may not have a bowel movement for 1-3 days.    This is normal.  SYMPTOMS TO WATCH FOR AND REPORT TO YOUR PHYSICIAN:  1. Abdominal pain or bloating, other than gas cramps.  2. Chest pain.  3. Back pain.  4. Signs of infection such as: chills or fever occurring within 24 hours   after the procedure.  5. Rectal bleeding, which would show as bright red, maroon, or black stools.   (A tablespoon of blood from the rectum is not serious, especially if   hemorrhoids are present.)  6. Vomiting.  7. Weakness or dizziness.  GO DIRECTLY TO THE NEAREST EMERGENCY ROOM IF YOU HAVE ANY OF THE FOLLOWING:      Difficulty breathing              Chills and/or fever over 101 F   Persistent vomiting and/or vomiting blood   Severe abdominal pain   Severe chest pain   Black, tarry stools   Bleeding- more than  one tablespoon   Any other symptom or condition that you feel may need urgent attention  Your doctor recommends these additional instructions:  If any biopsies were taken, your doctors clinic will contact you in 1 to 2   weeks with any results.  - Return patient to [facility] for ongoing care.   - Resume previous diet.   - Continue present medications.   - Return to referring physician.   - Repeat ERCP in 3 months to exchange stent.  For questions, problems or results please call your physician - Vidhya Mustafa MD at Work:  [phone].  [facility], EMERGENCY ROOM PHONE NUMBER: [phone]  IF A COMPLICATION OR EMERGENCY SITUATION ARISES AND YOU ARE UNABLE TO REACH   YOUR PHYSICIAN - GO DIRECTLY TO THE EMERGENCY ROOM.  Vidhya Mustafa MD  12/19/2018 3:44:00 PM  This report has been verified and signed electronically.  [employer]

## 2018-12-19 NOTE — SUBJECTIVE & OBJECTIVE
Interval History: as 12/18/2018 above    Review of Systems   Constitutional: Positive for fatigue. Negative for chills and fever.   HENT: Negative for sore throat and trouble swallowing.    Eyes: Negative for visual disturbance.   Respiratory: Negative for cough and shortness of breath.    Cardiovascular: Negative for chest pain and palpitations.   Gastrointestinal: Positive for abdominal pain and nausea.   Genitourinary: Negative for difficulty urinating and dysuria.   Skin: Negative for rash and wound.   Neurological: Negative for dizziness and syncope.   Psychiatric/Behavioral: Negative for agitation.     Objective:     Vital Signs (Most Recent):  Temp: 98.9 °F (37.2 °C) (12/18/18 1624)  Pulse: 91 (12/18/18 1624)  Resp: 16 (12/18/18 1624)  BP: 125/74 (12/18/18 1624)  SpO2: 96 % (12/18/18 1624) Vital Signs (24h Range):  Temp:  [96.9 °F (36.1 °C)-98.9 °F (37.2 °C)] 98.9 °F (37.2 °C)  Pulse:  [62-95] 91  Resp:  [16-20] 16  SpO2:  [92 %-97 %] 96 %  BP: (100-142)/(64-77) 125/74     Weight: 71.7 kg (158 lb 1.1 oz)  Body mass index is 33.04 kg/m².    Intake/Output Summary (Last 24 hours) at 12/18/2018 1909  Last data filed at 12/18/2018 1800  Gross per 24 hour   Intake 810 ml   Output 400 ml   Net 410 ml      Physical Exam   Constitutional: She is oriented to person, place, and time. She appears well-developed and well-nourished. No distress.   HENT:   Head: Normocephalic and atraumatic.   Eyes: EOM are normal. Pupils are equal, round, and reactive to light. Right eye exhibits no discharge. Left eye exhibits no discharge. No scleral icterus.   Cardiovascular: Normal rate, regular rhythm, normal heart sounds and intact distal pulses. Exam reveals no gallop and no friction rub.   No murmur heard.  Pulmonary/Chest: Effort normal and breath sounds normal. No stridor. No respiratory distress. She has no wheezes. She has no rales. She exhibits no tenderness.   Abdominal: Soft. Bowel sounds are normal. She exhibits no  distension, no ascites and no mass. There is tenderness in the right upper quadrant. There is no rebound and no guarding. No hernia.   Neurological: She is alert and oriented to person, place, and time.   Skin: Skin is warm and dry. No rash noted. She is not diaphoretic. No erythema.       Significant Labs:   CBC:   Recent Labs   Lab 12/16/18  2240 12/17/18  0732 12/18/18  0340   WBC 16.17* 14.21* 12.93*   HGB 11.3* 10.8* 11.4*   HCT 35.6* 34.7* 35.9*    211 195     CMP:   Recent Labs   Lab 12/16/18  2240 12/17/18  0732 12/18/18  0340 12/18/18  1152   * 131* 135*  --    K 4.3 4.1 4.3  --    CL 92* 97 101  --    CO2 28 27 24  --    * 87 84  --    BUN 18 14 11  --    CREATININE 1.1 0.8 0.8  --    CALCIUM 8.8 8.5* 8.6*  --    PROT 7.9 7.4  --  7.6   ALBUMIN 2.4* 2.2*  --  2.2*   BILITOT 1.2* 1.4*  --  1.7*   ALKPHOS 326* 297*  --  295*   * 134*  --  100*   * 106*  --  90*   ANIONGAP 10 7* 10  --    EGFRNONAA 48.9* >60.0 >60.0  --      All pertinent labs within the past 24 hours have been reviewed.    Significant Imaging: I have reviewed all pertinent imaging results/findings within the past 24 hours.

## 2018-12-19 NOTE — ASSESSMENT & PLAN NOTE
- Wbc trending down, pt afebrile  - she received one dose of Cefepime and Metronidazole at the ED. Continuing ceftriaxone and metronidazole until after [facility] procedure tomorrow.   - [facility] was consulted for possible ERCP, appreciate their recs.

## 2018-12-19 NOTE — CONSULTS
[facility]  Hematology/Oncology  Consult Note    Patient Name: Alfredina Claudette Parks  MRN: [id number]  Admission Date: 2018  Hospital Length of Stay: 1 days  Code Status: Full Code   Attending Provider: Maciel Garcia IV, MD  Consulting Provider: Julius Turner MD  Primary Care Physician: Veronica Frost MD  Principal Problem:Pancreatic mass    Inpatient consult to Hematology/Oncology  Consult performed by: Julius Turner MD  Consult ordered by: Steve Walters DO        Subjective:     HPI:  78 year old woman with medical history significant for HTN, DM, Afib, diverticulosis, known pancreatic cyst, HCV, h/o constipation, and h/o liver lesion who presented to the ED with persistent abdominal for the past 3 weeks.  9/10 crampy pain in her upper abdomen, bilaterally, with associated constipation, decreased appetite (food didn't taste good anymore), and estimated 10 lb weight loss during this period.  She was scheduled for a liver biopsy this Friday for the liver lesion.  Denies symptoms of pruritis, jaundice, or change in the color of her urine.      18 MRI abdomen significant for large 5.6cm hepatic lesion with characteristics suggestive of hilar cholangiocarcinoma/klatskin tumor or metastatic disease.  Less likely hepatic abscess.  There are two pancreatic head/neck mass lesions on both sides of the CBD with associated biliary ductal dilation.  There is also retroperitoneal, mesenteric, and anterior mediastinal lymphadenopathy.  The left portal vein is narrowed but patent.  CA 19-9 2096    Plan on AES evaluation for biopsy 18.  Heme onc consulted for suspected malignancy    Family history significant for mother who  of cervical cancer in her 30s and younger sister who was diagnosed with breast cancer at age 44.  Father and brother were both smokers who were diagnosed with lung and esophageal cancer, respectively.  Sister who  of breast cancer also had two  daughters who are still alive today.    At baseline she lives in an apartment by herself with her daughter next door to her.  She has an aide help her with ADLs like taking a shower.  Her daughter prepares her food for her.  She uses a walker to get around.  Has had frequent falls this past year.  Former  at [employer], and retired over 20 years ago after she hurt her back.    Oncology Treatment Plan:   [No treatment plan]    Medications:  Continuous Infusions:  Scheduled Meds:   allopurinol  100 mg Oral Daily    amiodarone  100 mg Oral Daily    aspirin  81 mg Oral Daily    cefTRIAXone (ROCEPHIN) IVPB  1 g Intravenous Q24H    colchicine  0.6 mg Oral Daily    fluticasone  1 spray Each Nare Daily    levothyroxine  125 mcg Oral Before breakfast    metronidazole  500 mg Intravenous Q8H    pantoprazole  40 mg Oral Daily     PRN Meds:acetaminophen, dextrose 50%, dextrose 50%, glucagon (human recombinant), glucose, glucose, insulin aspart U-100, sodium chloride 0.9%     Review of patient's allergies indicates:   Allergen Reactions    Milk containing products Shortness Of Breath    Nuts [tree nut] Anaphylaxis    Fosamax [alendronate]      dizziness        Past Medical History:   Diagnosis Date    Absence of bladder continence 4/20/2015    Asthma     Asthma without status asthmaticus 6/28/2012    Atrial fibrillation     BMI 36.0-36.9,adult 8/25/2014    Bulging lumbar disc     Cataract     Cervical radiculopathy     Claudication 5/18/2017    Constipation 8/30/2013    Diabetes mellitus     pre diabetes     Diverticular disease 8/30/2013    GERD (gastroesophageal reflux disease) 6/28/2012    Hepatitis C     Hyperglycemia     Hypertension     Hypothyroidism     Osteoporosis     Pancreatic cyst     Vitamin D deficiency disease      Past Surgical History:   Procedure Laterality Date    BREAST CYST EXCISION Bilateral     COLONOSCOPY N/A 7/11/2013    Performed by [facility]ga [facility] at  [facility] ENDO    CYST REMOVAL      ESOPHAGOGASTRODUODENOSCOPY (EGD) N/A 1/21/2015    Performed by Genaro Cordero MD at [facility] ENDO    EYE SURGERY      cataracts    HYSTERECTOMY      TOE SURGERY Bilateral     big toenails    ULTRASOUND-ENDOSCOPIC-UPPER N/A 1/15/2018    Performed [facility] at [facility]O (2ND FLR)    ULTRASOUND-ENDOSCOPIC-UPPER N/A 8/14/2015    Performed by [facility] at [facility] ENDO    ULTRASOUND-ENDOSCOPIC-UPPER N/A 1/21/2015    Performed by Gen[facility]o MD at [facility] ENDO    ULTRASOUND-ENDOSCOPIC-UPPER W/POSSIBLE FNA N/A 2/15/2016    Performed by Genaro [facility]ordero MD at [facility] ENDO     Family History     Problem Relation (Age of Onset)    Asthma Sister, Other    Breast cancer Sister (55)    Diabetes Maternal Grandfather, Maternal Aunt    Lung cancer Father    Ovarian cancer Mother    Stomach cancer Sister    Throat cancer Brother        Tobacco Use    Smoking status: Never Smoker    Smokeless tobacco: Never Used   Substance and Sexual Activity    Alcohol use: No     Alcohol/week: 0.0 oz    Drug use: No    Sexual activity: Not Currently     Partners: Male       Review of Systems   Constitutional: Positive for activity change, appetite change, fatigue, fever and unexpected weight change. Negative for chills.   HENT: Negative for nosebleeds and trouble swallowing.    Eyes: Negative for visual disturbance.   Respiratory: Negative for shortness of breath.    Cardiovascular: Negative for chest pain and leg swelling.   Gastrointestinal: Positive for abdominal pain and constipation. Negative for abdominal distention, diarrhea, nausea and vomiting.   Genitourinary: Negative for difficulty urinating.   Musculoskeletal: Positive for back pain and gait problem.   Neurological: Positive for weakness. Negative for dizziness and light-headedness.   Hematological: Negative for adenopathy.   Psychiatric/Behavioral: Negative for confusion.     Objective:     Vital Signs (Most Recent):  Temp: 98.9  °F (37.2 °C) (12/18/18 1624)  Pulse: 91 (12/18/18 1624)  Resp: 16 (12/18/18 1624)  BP: 125/74 (12/18/18 1624)  SpO2: 96 % (12/18/18 1624) Vital Signs (24h Range):  Temp:  [96.9 °F (36.1 °C)-98.9 °F (37.2 °C)] 98.9 °F (37.2 °C)  Pulse:  [62-95] 91  Resp:  [16-20] 16  SpO2:  [92 %-97 %] 96 %  BP: (100-142)/(64-77) 125/74     Weight: 71.7 kg (158 lb 1.1 oz)  Body mass index is 33.04 kg/m².  Body surface area is 1.71 meters squared.      Intake/Output Summary (Last 24 hours) at 12/18/2018 1747  Last data filed at 12/18/2018 1334  Gross per 24 hour   Intake 800 ml   Output 400 ml   Net 400 ml       Physical Exam   Constitutional: She appears well-developed.   HENT:   Head: Normocephalic.   Eyes: EOM are normal. Pupils are equal, round, and reactive to light. No scleral icterus.   Neck: Normal range of motion. No tracheal deviation present.   Cardiovascular: Normal rate, regular rhythm and normal heart sounds. Exam reveals no gallop and no friction rub.   No murmur heard.  Pulmonary/Chest: Effort normal and breath sounds normal. No respiratory distress. She has no wheezes. She has no rales.   Abdominal: Soft. Bowel sounds are normal. She exhibits no distension and no mass. There is tenderness (RUQ, LUQ). There is guarding. There is no rebound.   Musculoskeletal: Normal range of motion. She exhibits no edema.   Lymphadenopathy:     She has no cervical adenopathy.   Neurological: She is alert.   Skin: Skin is warm and dry.   Psychiatric: She has a normal mood and affect.       Significant Labs:   CBC:   Recent Labs   Lab 12/16/18  2240 12/17/18  0732 12/18/18  0340   WBC 16.17* 14.21* 12.93*   HGB 11.3* 10.8* 11.4*   HCT 35.6* 34.7* 35.9*    211 195    and CMP:   Recent Labs   Lab 12/16/18  2240 12/17/18  0732 12/18/18  0340 12/18/18  1152   * 131* 135*  --    K 4.3 4.1 4.3  --    CL 92* 97 101  --    CO2 28 27 24  --    * 87 84  --    BUN 18 14 11  --    CREATININE 1.1 0.8 0.8  --    CALCIUM 8.8 8.5*  8.6*  --    PROT 7.9 7.4  --  7.6   ALBUMIN 2.4* 2.2*  --  2.2*   BILITOT 1.2* 1.4*  --  1.7*   ALKPHOS 326* 297*  --  295*   * 134*  --  100*   * 106*  --  90*   ANIONGAP 10 7* 10  --    EGFRNONAA 48.9* >60.0 >60.0  --        Diagnostic Results:  I have reviewed all pertinent imaging results/findings within the past 24 hours.     12/18/18 MRI abdomen    FINDINGS:  - Lung bases: No infiltrates and no nodules.  There is mild bilateral dependent atelectatic change.  No pleural fluid.    - Heart/Pericardial structures: Enlarged nodes at the right cardiophrenic angle measure 1.3 and 1.2 cm in short axis.    - EG Junction: No hiatal hernia.    ABDOMEN:    - Liver: Normal in size, and contour.  A large 5.6 x 4.8 cm heterogeneous lesion demonstrating T2 hyperintensity with T1 hypointensity, peripheral enhancement, and peripheral diffusion restriction.  The left portal vein is narrowed, but patent.  The hepatic vein, main and right portal veins, splenic vein, and hepatic artery are patent.    - Spleen: Not enlarged and has no collateral vessel formation.  A 0.9 cm T2 hyperintense/T1 hypointense lesion without postcontrast enhancement is suggestive of a cyst.    - Stomach/Duodenum: Unremarkable.    - Pancreas: The pancreas is atrophic.  At the head and neck of the pancreas lie two T2 hypointense rounded lesions measuring 2.5 and 2.0 cm on either side of the common bile duct with narrowing of the duct.  These lesions demonstrate diffusion restriction with mild postcontrast enhancement.    - Adrenals: Unremarkable.    - Kidneys/urinary bladder: No hydronephrosis.  There are innumerable bilateral T2 hyperintense/T2 hypointense renal lesions without post-contrast enhancement, the largest measuring 1.3 cm at the superior left renal pole.  Larger lesions are compatible with cysts, smaller lesions are too small to characterize.    - Retroperitoneum: There are multiple enlarged periaortic nodes, perhaps the largest  measuring 1.7 cm on just superior to the SMA origin (axial series 4, image 27).  These nodes are most evident on diffusion imaging.    BOWEL/MESENTERY:    No evidence of bowel obstruction or inflammatory process. Enlarged mesenteric node lying inferior to the left hepatic lobe measures 1.5 cm in short axis (axial series 4, image 31).    VASCULATURE: Aorta is normal in caliber.    BONES: No evidence of bone marrow replacement process. There is dextroscoliosis of the lumbar spine.    EXTRAPERITONEAL SOFT TISSUES: Unremarkable.    MRCP:    Gallbladder demonstrates nonspecific gallbladder wall thickening without intraluminal stone, gallbladder distention, or significant pericholecystic fluid.  The central and suprapancreatic extrahepatic ducts are not well seen, appearing compressed by the 2 previously described pancreatic lesions.  The visualized intra pancreatic portion of the bile duct is not enlarged, with a caliber of 5 mm.  There is significant intrahepatic biliary ductal dilatation, measuring up to 1.2 cm on the left and 0.7 cm on the right.    Pancreatic duct has maximum caliber of 3 mm which is within normal limits. No ductal strictures are identified.      Impression       Large 5.6 cm central hepatic lesion.  MR characteristics are most suggestive of mass lesion such as hilar cholangiocarcinoma/Klatskin tumor or metastatic disease.  Hepatic abscess felt much less likely.    Two pancreatic head/neck mass lesions on either side of the common bile duct with significant upstream biliary ductal dilatation as described above.    Retroperitoneal, mesenteric, and anterior mediastinal lymphadenopathy, noting an enlarged 1.7 cm periaortic node just superior to the SMA origin, an enlarged 1.4 cm mesenteric node just inferior to the left hepatic lobe, and an enlarged 1.3 cm right cardiophrenic angle node.    Nonspecific diffuse gallbladder wall thickening.    Multiple additional findings as above.     12/17/18 CT  abdomen/pelvis  COMPARISON:  CT abdomen pelvis 11/26/2018, ultrasound abdomen 12/10/2018.    FINDINGS:  Heart: Normal in Size.  No pericardial effusion.    Lungs: Lung bases demonstrate platelike atelectasis.  No large focal consolidation.  No pleural effusion.    Liver and bile ducts: Superior patent done extends beyond the field of view.  Morphologic changes of chronic liver disease including lobar redistribution and minimally nodular contour.  Increased size of a nonspecific low-density mass within the right hepatic lobe measuring 5.1 x 4.2 x 4.8 cm (previously 3.6 x 3.1 x 3.4 cm).  There is mild peripheral enhancement of this lesion, with relatively poorly defined margins.  Smaller satellite hypodense lesion at the superior aspect of the large hypoattenuating lesion, new when compared with the prior exam.  There is significant dilation of the left intrahepatic bile duct ducts which are in close proximity to the large hepatic mass.  This lesion may also communicate with the common bile duct.  The superior portion of the common bile duct is dilated, measuring approximately 1.1 cm in diameter.  The inferior portion of the common bile duct is not well delineated, and may be obstructed by mass effect.    Gallbladder: There is irregular gallbladder wall thickening and/or gallbladder wall edema with mucosal hyperenhancement and relatively hyperdense fluid within the gallbladder lumen.  Findings are similar to slightly worse when compared with the prior examination.    Pancreas: There is a hypoattenuating mass lesion in the region of the pancreatic head/neck which is poorly defined and difficult to measure.  This mass measures roughly 3.1 x 2.2 x 2.9 cm (axial image 34 and coronal image 46).  Pancreatic duct is nondilated.  Peripancreatic vasculature is patent, noting that the hepatic artery may be encased by the pancreatic/peripancreatic mass.  This could be further characterized with a pancreatic protocol CT or  MRI.    Spleen: Borderline enlarged, measuring approximately 12.5 cm in length.    Adrenals: Normal.    GI Tract/ Mesentery: No evidence of bowel obstruction or inflammation. Diverticulosis without evidence of diverticulitis.    Kidneys/ Ureters: Normal in size and location. No hydronephrosis or nephrolithiasis. No ureteral dilatation. Simple left renal cyst.  Bilateral subcentimeter renal hypodensities too small to characterize however likely represent cysts.    Bladder: No evidence of wall thickening.    Retroperitoneum: Mildly prominent but subcentimeter retroperitoneal lymph nodes.    Peritoneal space/mesenteric: No ascites.  No free air.  There are multiple foci of abnormal soft tissue identified in the upper abdomen in the peripancreatic region and adjacent to the celiac artery.  Abnormal soft tissue versus enlarged lymph node is suspected in the upper aortocaval region (axial image 31).  Enlarged celiac axis lymph node measures up to 1.2 cm in short axis (axial image 24).    Abdominal wall: Normal.    Vasculature: Calcific atherosclerosis of the abdominal aorta.  No aneurysm.    Bones: No aggressive osseous lesion.  Stable degenerative changes and dextroscoliotic curvature of the spine.      Impression       1. Increased size of an ill-defined hypoattenuating lesion in the central liver in close approximation to significantly dilated left hepatic bile ducts as seen on the prior exam.  Differential for this finding includes cholangitic abscess versus progression of metastatic disease or cholangiocarcinoma.  Hepatocellular carcinoma is thought to be much less likely but difficult to entirely exclude given the patient's history of chronic liver disease and hepatitis C.  2. Mass lesion in the region of the pancreatic head/neck with additional foci of abnormal soft tissue and adenopathy in the upper abdomen.  Findings could reflect primary pancreatic malignancy or metastatic disease, noting that the main  pancreatic duct is not dilated and the portal vein is patent.  Consider further characterization with pancreatic protocol CT or MRI versus ERCP/EUS.  3. Irregular gallbladder wall thickening and mucosal enhancement, nonspecific.  This finding is similar to slightly worse when compared with the prior CT on 11/26/2018.  4. Dilated upper common bile duct possibly secondary to mass effect from aforementioned peripancreatic/pancreatic mass.  5. Additional findings described in the body of the report.  This report was flagged in Epic as abnormal.         Assessment/Plan:     * Pancreatic mass    Imaging concerning for UGI malignancy with metastases.  Differentials include pancreatic or cholangiocarcinoma as suggested by features on imaging. CA 19-9 ~2000. ECOG performance status at baseline 3 at best.  -Will follow up on pathology results  -If pancreatic, single agent gemcitabine would be an option although transportation is limited and capecitabine may be preferred  -Same regimens could be considered if cholangiocarcinoma, with exception sample could be sent for MSI-H, and pembrolizumab could be considered if positive.  Does not seem to be a good candidate for gem/cis or other combination therapies.  --Overall her baseline is poor and may not even be a candidate for single agent.  Expected treatment benefits are modest. However, if patient still desires opportunity to get treatment, can follow up with oncology in the outpatient setting to reassess performance status.         Thank you for your consult. I will follow-up with patient. Please contact us if you have any additional questions.    Julius Turner MD  Hematology/Oncology  [facility]    Patient currently an endoscopy.  I agree with the recommendations outlined above.  Will follow up tomorrow morning.

## 2018-12-19 NOTE — ASSESSMENT & PLAN NOTE
- CT A/P showed mass lesion in the region of the pancreatic head/neck with additional foci of abnormal soft tissue and adenopathy in the upper abdomen. Dilated upper common bile duct possibly secondary to mass effect from aforementioned peripancreatic/pancreatic mass.  - CA 19-9 markedly elevated. AFP elevated  - Heme/Onc consulted to facilitate outpatient follow-up.   - AES to perform EUS/ERCP 12/19

## 2018-12-19 NOTE — HOSPITAL COURSE
12/18/2018 Pt reporting pain overnight, given Toradol IV. Vitals stable. MRI today consistent with liver mass with abscess thought to be much less likely. AES to proceed with scope tomorrow to obtain tissue samples. Heme/Onc consulted.   12/19 Pt remains stable, denies pain or discomfort. AES to perform scope.   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 Heme/Onc met with patient, discussed outpatient follow-up for incurable cancer. Palliative care consulted.

## 2018-12-19 NOTE — PROGRESS NOTES
[facility] Hospital Medicine  Progress Note    Patient Name: Alfredina Claudette Parks  MRN: [id number]  Patient Class: IP- Inpatient   Admission Date: 12/16/2018  Length of Stay: 2 days  Attending Physician: Maciel Garcia IV, MD  Primary Care Provider: Veronica Frost MD    [facility] Medicine Team: [facility] HOSP MED 5 Miguel Angel Aguilar MD    Subjective:     Principal Problem:Pancreatic mass    HPI:  Mrs. Garcia is 78 y.o F patient who has a past medical hx significant for HTN, DM, A Fib (loop recorder placed on 02/2017), diverticulosis, pancreatic cyst, HCV (dx 4 years ago), chronic constipation, and liver lesion of unknown etiology, presents to the ER for an emergent evaluation due to diffuse lower abdominal pain. She has had this 9/10 crampy pain for the past 3 weeks which getting worse by movement. She was seen at [facility] the last week of November when it started and was admitted for 3-4 days. She is scheduled for a liver biopsy on this Friday. She returns to the ER due to intolerable pain. She states that the pain seems to be getting worse and the medications are not helping. She also states that she had black stools, but does admit to taking Pepto Bismol for abdominal pain several times. She reports associated symptoms of decreased appetite, nausea, constipation, and generalized weakness.  she denies jaundice, dark urine or itching. CT A/P showed increased size of an ill-defined lesion in the central liver, significantly dilated left hepatic bile ducts. Mass lesion in the region of the pancreatic head/neck with additional foci of abnormal soft tissue and adenopathy in the upper abdomen. Dilated upper common bile duct possibly secondary to mass effect from aforementioned peripancreatic/pancreatic mass. Vitals are normal.      Hospital Course:  12/18/2018 Pt reporting pain overnight, given Toradol IV. Vitals stable. MRI today consistent with liver mass with abscess thought to be much less likely. AES  to proceed with scope tomorrow to obtain tissue samples. Heme/Onc consulted. 12/19 Pt remains stable, denies pain or discomfort. AES to perform scope.     Interval History: see hospital course     Review of Systems  Objective:     Vital Signs (Most Recent):  Temp: 97 °F (36.1 °C) (12/19/18 1149)  Pulse: 80 (12/19/18 1149)  Resp: 16 (12/19/18 1149)  BP: (!) 170/86 (12/19/18 1149)  SpO2: (!) 93 % (12/19/18 1149) Vital Signs (24h Range):  Temp:  [97 °F (36.1 °C)-98.9 °F (37.2 °C)] 97 °F (36.1 °C)  Pulse:  [] 80  Resp:  [16-18] 16  SpO2:  [92 %-97 %] 93 %  BP: (120-179)/(55-86) 170/86     Weight: 71.7 kg (158 lb 1.1 oz)  Body mass index is 33.04 kg/m².    Intake/Output Summary (Last 24 hours) at 12/19/2018 1208  Last data filed at 12/19/2018 0551  Gross per 24 hour   Intake 1150 ml   Output 700 ml   Net 450 ml      Physical Exam   Constitutional: She is oriented to person, place, and time. She appears well-developed and well-nourished. No distress.   HENT:   Head: Normocephalic and atraumatic.   Eyes: EOM are normal. Pupils are equal, round, and reactive to light. Right eye exhibits no discharge. Left eye exhibits no discharge. No scleral icterus.   Cardiovascular: Normal rate, regular rhythm, normal heart sounds and intact distal pulses. Exam reveals no gallop and no friction rub.   No murmur heard.  Pulmonary/Chest: Effort normal and breath sounds normal. No stridor. No respiratory distress. She has no wheezes. She has no rales. She exhibits no tenderness.   Abdominal: Soft. Bowel sounds are normal. She exhibits no distension, no ascites and no mass. There is tenderness in the right upper quadrant. There is no rebound and no guarding. No hernia.   Neurological: She is alert and oriented to person, place, and time.   Skin: Skin is warm and dry. No rash noted. She is not diaphoretic. No erythema.       Significant Labs: All pertinent labs within the past 24 hours have been reviewed.    Significant Imaging: I have  reviewed and interpreted all pertinent imaging results/findings within the past 24 hours.    Assessment/Plan:      * Pancreatic mass    - CT A/P showed mass lesion in the region of the pancreatic head/neck with additional foci of abnormal soft tissue and adenopathy in the upper abdomen. Dilated upper common bile duct possibly secondary to mass effect from aforementioned peripancreatic/pancreatic mass.  - CA 19-9 markedly elevated. AFP elevated  - Heme/Onc consulted to facilitate outpatient follow-up.   - AES to perform EUS/ERCP 12/19           Liver mass    -Noted on CT on admission, initial concern for possible abscess/ascending cholangitis. MRI 12/18/18 showed findings most consistent with a mass lesion.  -AST/ALT/Alk phos somewhat elevated. Bilirubin 1.1  -AES to perform EUS/ERCP 12/19.       Acute cholangitis    - Wbc trending down, pt afebrile  - she received one dose of Cefepime and Metronidazole at the ED.  - Continuing ceftriaxone and metronidazole   - AES following           Atrial fibrillation    - on home amiodarone   - on ASA  - continue to monitor        Hypothyroidism    - resume home dose synthroid.       HTN (hypertension), benign    Currently is normotensive.  - hold medications for now and continue monitoring         VTE Risk Mitigation (From admission, onward)        Ordered     IP VTE HIGH RISK PATIENT  Once      12/17/18 0803     Place KULDEEP hose  Until discontinued      12/17/18 0803     Place sequential compression device  Until discontinued      12/17/18 0642              Miguel Angel Aguilar MD  Department of Hospital Medicine   [facility]

## 2018-12-19 NOTE — PLAN OF CARE
Problem: Adult Inpatient Plan of Care  Goal: Plan of Care Review  Outcome: Ongoing (interventions implemented as appropriate)   12/19/18 0432   Plan of Care Review   Plan of Care Reviewed With patient   Progress no change     Patient alert and oriented x 4 and c/o pain and rated pain a 8/10, orders received to administer Toradol 15 mg IVP and relief noted.  Patient NPO after MN for AES [advanced endoscopy services] to  obtain tissue samples.  MRI reveals 2 pancreatic hepatic lesion.  CA 19-9 reveals a level of 2096.0.  Stool for OCB collected and results [negative].   Patient ambulates to bathroom with rolling walker and SBA..  POC ongoing

## 2018-12-19 NOTE — NURSING
Patient ambulated to bathroom with 1-person assistance and walker. Telemetry called and informed Nurse patient had 14 beat run of VTach. Patient ambulated back to bed denies SOB and CP. O2 initiated @ 2L O2 @ 97%. BP @ 179/77 P 94. Dr. Perla (Med Team 5) notified; orders to continue with medication administration. Will continue to monitor.

## 2018-12-19 NOTE — ASSESSMENT & PLAN NOTE
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  Differentials include pancreatic or cholangiocarcinoma as suggested by features on imaging. CA 19-9 ~2000. ECOG performance status at baseline 3 at best.  -Will follow up on pathology results  -If pancreatic, single agent gemcitabine would be an option although transportation is limited and capecitabine may be preferred  -Same regimens could be considered if cholangiocarcinoma, with exception sample could be sent for MSI-H, and pembrolizumab could be considered if positive.  Does not seem to be a good candidate for gem/cis or other combination therapies.  --Overall her baseline is poor and may not even be a candidate for single agent.  Expected treatment benefits are modest. However, if patient still desires opportunity to get treatment, can follow up with oncology in the outpatient setting to reassess performance status.

## 2018-12-19 NOTE — ASSESSMENT & PLAN NOTE
- CT A/P showed mass lesion in the region of the pancreatic head/neck with additional foci of abnormal soft tissue and adenopathy in the upper abdomen. Dilated upper common bile duct possibly secondary to mass effect from aforementioned peripancreatic/pancreatic mass.  - CA 19-9 markedly elevated. AFP elevated  - AES to perform EUS/ERCP 12/19.   - Heme/Onc consulted to facilitate outpatient follow-up.

## 2018-12-19 NOTE — H&P
History & Physical - Short Stay  Gastroenterology      SUBJECTIVE:     Procedure: EUS and ERCP    Chief Complaint/Indication for Procedure: pancreas mass    History of Present Illness:  Patient is a 78 y.o. female with pancreas and liver masses coming for EUS +/- ERCP.     PTA Medications   Medication Sig    albuterol (PROVENTIL) 2.5 mg /3 mL (0.083 %) nebulizer solution TAKE 3ML BY NEBULIZATION EVERY 6 HOURS AS NEEDED FOR WHEEZING.    albuterol (VENTOLIN HFA) 90 mcg/actuation inhaler INHALE 2 PUFFS PO Q 6 H PRN (Patient taking differently: Inhale 2 puffs into the lungs every 6 (six) hours as needed for Wheezing or Shortness of Breath. )    allopurinol (ZYLOPRIM) 100 MG tablet Take 1 tablet (100 mg total) by mouth once daily.    amiodarone (PACERONE) 100 MG Tab Take 100 mg by mouth once daily    aspirin (ECOTRIN) 81 MG EC tablet Take 81 mg by mouth once daily.      carvedilol (COREG) 3.125 MG tablet Take 3.125 mg by mouth 2 (two) times daily with meals.    diclofenac sodium (VOLTAREN) 1 % Gel Apply 2 g topically as needed (shoulder pain).    fluticasone (FLONASE) 50 mcg/actuation nasal spray SHAKE LIQUID AND USE 1 SPRAY IN EACH NOSTRIL EVERY DAY (Patient taking differently: SHAKE LIQUID AND USE 1 SPRAY IN EACH NOSTRIL EVERY DAY AS NEEDED FOR ALLERGIES)    fluticasone-salmeterol 250-50 mcg/dose (ADVAIR DISKUS) 250-50 mcg/dose diskus inhaler Inhale 1 puff into the lungs 2 (two) times daily. Controller    furosemide (LASIX) 20 MG tablet TAKE 2 TABLETS(40 MG) BY MOUTH EVERY DAY (Patient taking differently: Take 20 mg by mouth twice daily)    levothyroxine (SYNTHROID) 125 MCG tablet Take 1 tablet (125 mcg total) by mouth once daily.    loratadine (CLARITIN) 10 mg tablet Take 10 mg by mouth once daily.    losartan (COZAAR) 25 MG tablet Take 25 mg by mouth once daily.    pantoprazole (PROTONIX) 40 MG tablet TAKE 1 TABLET BY MOUTH ONCE DAILY, 30 MINUTES BEFORE EATING    polyethylene glycol (GLYCOLAX) 17  gram/dose powder Take 17 g by mouth once daily. (Patient taking differently: Take 17 g by mouth daily as needed (constipation). )    solifenacin (VESICARE) 5 MG tablet Take 1 tablet (5 mg total) by mouth once daily.    spironolactone (ALDACTONE) 50 MG tablet Take 50 mg by mouth once daily.    colchicine 0.6 mg tablet Take 1 tablet (0.6 mg total) by mouth once daily. (Patient taking differently: Take 0.6 mg by mouth daily as needed (gout flare). )       Review of patient's allergies indicates:   Allergen Reactions    Milk containing products Shortness Of Breath    Nuts [tree nut] Anaphylaxis    Fosamax [alendronate]      dizziness        Past Medical History:   Diagnosis Date    Absence of bladder continence 4/20/2015    Asthma     Asthma without status asthmaticus 6/28/2012    Atrial fibrillation     BMI 36.0-36.9,adult 8/25/2014    Bulging lumbar disc     Cataract     Cervical radiculopathy     Claudication 5/18/2017    Constipation 8/30/2013    Diabetes mellitus     pre diabetes     Diverticular disease 8/30/2013    GERD (gastroesophageal reflux disease) 6/28/2012    Hepatitis C     Hyperglycemia     Hypertension     Hypothyroidism     Osteoporosis     Pancreatic cyst     Vitamin D deficiency disease      Past Surgical History:   Procedure Laterality Date    BREAST CYST EXCISION Bilateral     COLONOSCOPY N/A 7/11/2013    [facility]    CYST REMOVAL      ESOPHAGOGASTRODUODENOSCOPY (EGD) N/A 1/21/2015    [facility] by [facility] at [facility] END[facility]    EYE SURGERY      cataracts    HYSTERECTOMY      TOE SURGERY Bilateral     big toenails    ULTRASOUND-ENDOSCOPIC-UPPER N/A 1/15/2018    [facility] (2ND F[facility])    ULTRASOUND-ENDOSCOPIC-UPPER N/A 8/14/2015    [facility] at [facility] END[facility]    ULTRASOUND-ENDOSCOPIC-UPPER N/A 1/21/2015    [facility] at [facility] END[facility]     ULTRASOUND-ENDOSCOPIC-UPPER W/POSSIBLE FNA N/A 2/15/2016    [facility] at [facility] END[facility]     Family History   Problem Relation Age of Onset    Ovarian cancer Mother     Lung cancer Father     Stomach cancer Sister     Asthma Sister     Throat cancer Brother     Diabetes Maternal Grandfather     Diabetes Maternal Aunt     Breast cancer Sister 55    Asthma Other     Emphysema Neg Hx      Social History     Tobacco Use    Smoking status: Never Smoker    Smokeless tobacco: Never Used   Substance Use Topics    Alcohol use: No     Alcohol/week: 0.0 oz    Drug use: No            OBJECTIVE:     Vital Signs (Most Recent)  Temp: 97 °F (36.1 °C) (12/19/18 1149)  Pulse: 80 (12/19/18 1149)  Resp: 16 (12/19/18 1149)  BP: (!) 170/86 (12/19/18 1149)  SpO2: (!) 93 % (12/19/18 1149)         ASSESSMENT/PLAN:     Patient is a 78 y.o. female with pancreas and liver masses coming for EUS +/- ERCP.     Plan: EUS and ERCP    Anesthesia Plan: Moderate Sedation    ASA Grade: ASA 2 - Patient with mild systemic disease with no functional limitations

## 2018-12-19 NOTE — ASSESSMENT & PLAN NOTE
- Wbc trending down, pt afebrile  - she received one dose of Cefepime and Metronidazole at the ED.  - Continuing ceftriaxone and metronidazole   - AES following

## 2018-12-19 NOTE — TRANSFER OF CARE
"Anesthesia Transfer of Care Note    Patient: Alfredina Claudette Parks    Procedure(s) Performed: Procedure(s) (LRB):  ULTRASOUND, ENDOSCOPIC, UPPER GI TRACT (N/A)  ERCP (ENDOSCOPIC RETROGRADE CHOLANGIOPANCREATOGRAPHY) (N/A)    Patient location: PACU    Anesthesia Type: general    Transport from OR: Transported from OR on 2-3 L/min O2 by NC with adequate spontaneous ventilation    Post pain: adequate analgesia    Post assessment: no apparent anesthetic complications and tolerated procedure well    Post vital signs: stable    Level of consciousness: lethargic    Nausea/Vomiting: no nausea/vomiting    Complications: none    Transfer of care protocol was followed      Last vitals:   Visit Vitals  BP (!) 137/91 (BP Location: Left arm, Patient Position: Lying)   Pulse 87   Temp 36.5 °C (97.7 °F) (Oral)   Resp 18   Ht 4' 10" (1.473 m)   Wt 71.7 kg (158 lb 1.1 oz)   SpO2 98%   Breastfeeding? No   BMI 33.04 kg/m²     "

## 2018-12-20 PROBLEM — C22.1 CHOLANGIOCARCINOMA: Status: ACTIVE | Noted: 2018-12-18

## 2018-12-20 PROBLEM — Z51.5 PALLIATIVE CARE ENCOUNTER: Status: ACTIVE | Noted: 2018-12-20

## 2018-12-20 LAB
ALBUMIN SERPL BCP-MCNC: 1.9 G/DL
ALP SERPL-CCNC: 258 U/L
ALT SERPL W/O P-5'-P-CCNC: 61 U/L
ANION GAP SERPL CALC-SCNC: 9 MMOL/L
AST SERPL-CCNC: 65 U/L
BASOPHILS # BLD AUTO: 0.04 K/UL
BASOPHILS NFR BLD: 0.3 %
BILIRUB SERPL-MCNC: 1.7 MG/DL
BUN SERPL-MCNC: 11 MG/DL
CALCIUM SERPL-MCNC: 8.3 MG/DL
CHLORIDE SERPL-SCNC: 104 MMOL/L
CO2 SERPL-SCNC: 23 MMOL/L
CREAT SERPL-MCNC: 0.8 MG/DL
DIFFERENTIAL METHOD: ABNORMAL
EOSINOPHIL # BLD AUTO: 0.1 K/UL
EOSINOPHIL NFR BLD: 0.5 %
ERYTHROCYTE [DISTWIDTH] IN BLOOD BY AUTOMATED COUNT: 16 %
EST. GFR  (AFRICAN AMERICAN): >60 ML/MIN/1.73 M^2
EST. GFR  (NON AFRICAN AMERICAN): >60 ML/MIN/1.73 M^2
GLUCOSE SERPL-MCNC: 95 MG/DL
HCT VFR BLD AUTO: 35.9 %
HGB BLD-MCNC: 11.6 G/DL
IMM GRANULOCYTES # BLD AUTO: 0.08 K/UL
IMM GRANULOCYTES NFR BLD AUTO: 0.6 %
LYMPHOCYTES # BLD AUTO: 1.6 K/UL
LYMPHOCYTES NFR BLD: 11.3 %
MAGNESIUM SERPL-MCNC: 1.7 MG/DL
MCH RBC QN AUTO: 30.2 PG
MCHC RBC AUTO-ENTMCNC: 32.3 G/DL
MCV RBC AUTO: 94 FL
MONOCYTES # BLD AUTO: 1.2 K/UL
MONOCYTES NFR BLD: 8.6 %
NEUTROPHILS # BLD AUTO: 11.1 K/UL
NEUTROPHILS NFR BLD: 78.7 %
NRBC BLD-RTO: 0 /100 WBC
PHOSPHATE SERPL-MCNC: 3.4 MG/DL
PLATELET # BLD AUTO: 186 K/UL
PMV BLD AUTO: 10.2 FL
POTASSIUM SERPL-SCNC: 3.9 MMOL/L
PROT SERPL-MCNC: 6.7 G/DL
RBC # BLD AUTO: 3.84 M/UL
SODIUM SERPL-SCNC: 136 MMOL/L
WBC # BLD AUTO: 14.06 K/UL

## 2018-12-20 PROCEDURE — 25000003 PHARM REV CODE 250: Performed by: STUDENT IN AN ORGANIZED HEALTH CARE EDUCATION/TRAINING PROGRAM

## 2018-12-20 PROCEDURE — 83735 ASSAY OF MAGNESIUM: CPT

## 2018-12-20 PROCEDURE — 36415 COLL VENOUS BLD VENIPUNCTURE: CPT

## 2018-12-20 PROCEDURE — 80053 COMPREHEN METABOLIC PANEL: CPT

## 2018-12-20 PROCEDURE — 84100 ASSAY OF PHOSPHORUS: CPT

## 2018-12-20 PROCEDURE — 25000242 PHARM REV CODE 250 ALT 637 W/ HCPCS: Performed by: STUDENT IN AN ORGANIZED HEALTH CARE EDUCATION/TRAINING PROGRAM

## 2018-12-20 PROCEDURE — 85025 COMPLETE CBC W/AUTO DIFF WBC: CPT

## 2018-12-20 PROCEDURE — 99232 SBSQ HOSP IP/OBS MODERATE 35: CPT | Mod: GC,,, | Performed by: HOSPITALIST

## 2018-12-20 PROCEDURE — 99222 1ST HOSP IP/OBS MODERATE 55: CPT | Mod: ,,, | Performed by: INTERNAL MEDICINE

## 2018-12-20 PROCEDURE — 25000003 PHARM REV CODE 250: Performed by: HOSPITALIST

## 2018-12-20 PROCEDURE — 11000001 HC ACUTE MED/SURG PRIVATE ROOM

## 2018-12-20 PROCEDURE — 63600175 PHARM REV CODE 636 W HCPCS: Performed by: HOSPITALIST

## 2018-12-20 PROCEDURE — 99222 1ST HOSP IP/OBS MODERATE 55: CPT | Mod: GC,,, | Performed by: INTERNAL MEDICINE

## 2018-12-20 RX ORDER — CARVEDILOL 3.12 MG/1
3.12 TABLET ORAL 2 TIMES DAILY WITH MEALS
Status: DISCONTINUED | OUTPATIENT
Start: 2018-12-20 | End: 2018-12-22 | Stop reason: HOSPADM

## 2018-12-20 RX ORDER — AMOXICILLIN AND CLAVULANATE POTASSIUM 875; 125 MG/1; MG/1
1 TABLET, FILM COATED ORAL EVERY 12 HOURS
Status: DISCONTINUED | OUTPATIENT
Start: 2018-12-20 | End: 2018-12-22 | Stop reason: HOSPADM

## 2018-12-20 RX ORDER — AMOXICILLIN 250 MG
1 CAPSULE ORAL DAILY
Status: DISCONTINUED | OUTPATIENT
Start: 2018-12-20 | End: 2018-12-22 | Stop reason: HOSPADM

## 2018-12-20 RX ORDER — ENOXAPARIN SODIUM 100 MG/ML
40 INJECTION SUBCUTANEOUS EVERY 24 HOURS
Status: DISCONTINUED | OUTPATIENT
Start: 2018-12-20 | End: 2018-12-22 | Stop reason: HOSPADM

## 2018-12-20 RX ORDER — MAGNESIUM SULFATE HEPTAHYDRATE 40 MG/ML
2 INJECTION, SOLUTION INTRAVENOUS ONCE
Status: COMPLETED | OUTPATIENT
Start: 2018-12-20 | End: 2018-12-20

## 2018-12-20 RX ORDER — FUROSEMIDE 40 MG/1
40 TABLET ORAL DAILY
Status: DISCONTINUED | OUTPATIENT
Start: 2018-12-20 | End: 2018-12-22 | Stop reason: HOSPADM

## 2018-12-20 RX ADMIN — MAGNESIUM SULFATE IN WATER 2 G: 40 INJECTION, SOLUTION INTRAVENOUS at 10:12

## 2018-12-20 RX ADMIN — GABAPENTIN 200 MG: 100 CAPSULE ORAL at 09:12

## 2018-12-20 RX ADMIN — GABAPENTIN 200 MG: 100 CAPSULE ORAL at 02:12

## 2018-12-20 RX ADMIN — STANDARDIZED SENNA CONCENTRATE AND DOCUSATE SODIUM 1 TABLET: 8.6; 5 TABLET, FILM COATED ORAL at 10:12

## 2018-12-20 RX ADMIN — OXYCODONE HYDROCHLORIDE 5 MG: 5 TABLET ORAL at 09:12

## 2018-12-20 RX ADMIN — FLUTICASONE PROPIONATE 50 MCG: 50 SPRAY, METERED NASAL at 09:12

## 2018-12-20 RX ADMIN — POTASSIUM BICARBONATE 25 MEQ: 25 TABLET, EFFERVESCENT ORAL at 10:12

## 2018-12-20 RX ADMIN — METHOCARBAMOL TABLETS 500 MG: 500 TABLET, COATED ORAL at 09:12

## 2018-12-20 RX ADMIN — AMIODARONE HYDROCHLORIDE 100 MG: 100 TABLET ORAL at 09:12

## 2018-12-20 RX ADMIN — PANTOPRAZOLE SODIUM 40 MG: 40 TABLET, DELAYED RELEASE ORAL at 09:12

## 2018-12-20 RX ADMIN — CARVEDILOL 3.12 MG: 3.12 TABLET, FILM COATED ORAL at 05:12

## 2018-12-20 RX ADMIN — AMOXICILLIN AND CLAVULANATE POTASSIUM 1 TABLET: 875; 125 TABLET, FILM COATED ORAL at 10:12

## 2018-12-20 RX ADMIN — ALLOPURINOL 100 MG: 100 TABLET ORAL at 09:12

## 2018-12-20 RX ADMIN — IBUPROFEN 400 MG: 400 TABLET, FILM COATED ORAL at 06:12

## 2018-12-20 RX ADMIN — ASPIRIN 81 MG: 81 TABLET, COATED ORAL at 09:12

## 2018-12-20 RX ADMIN — AMOXICILLIN AND CLAVULANATE POTASSIUM 1 TABLET: 875; 125 TABLET, FILM COATED ORAL at 09:12

## 2018-12-20 RX ADMIN — FUROSEMIDE 40 MG: 40 TABLET ORAL at 05:12

## 2018-12-20 RX ADMIN — LEVOTHYROXINE SODIUM 125 MCG: 125 TABLET ORAL at 06:12

## 2018-12-20 NOTE — PROGRESS NOTES
Pt. Interviewed and examined.  ERCP/EUS  Performed yesterday, 31 X 19 mm pancreatic mass with extensive regional adenopathy. FNA prelim - carcinoma.    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   Her performance status at baseline is poor and it is unclear if systemic therapy will be appropriate.    I discussed my thoughts with the patient and she understands that she has an incurable malignancy    Palliative care consult would be appropriate.    We can see back in clinic for final plans.

## 2018-12-20 NOTE — ASSESSMENT & PLAN NOTE
- CT A/P showed mass lesion in the region of the pancreatic head/neck with additional foci of abnormal soft tissue and adenopathy in the upper abdomen. Dilated upper common bile duct possibly secondary to mass effect from aforementioned peripancreatic/pancreatic mass.  - CA 19-9 markedly elevated. AFP elevated.  -EUS/ERCP concerning for cholangiocarcinoma, final pathology pending.   - Heme/Onc consulted to facilitate outpatient follow-up.

## 2018-12-20 NOTE — HPI
Ms Garcia is a 79 yo woman with past medical history hypertension, atrial fibrillation, diabetes, diverticulosis, hepatitis C, who presented to the emergency room on December 16th for worsening abdominal pain and was found on imaging to have lesions concerning for cancer.  Patient had an EUS with ERCP, based on pathology and imaging findings this appears to be metastatic biliary cancer.

## 2018-12-20 NOTE — ASSESSMENT & PLAN NOTE
-Liver lesion noted on CT on admission, initial concern for possible abscess/ascending cholangitis. MRI 12/18/18 showed findings most consistent with a mass lesion.  -AST/ALT/Alk phos somewhat elevated. Bilirubin 1.1  -EUS/ERCP 12/19 demonstrating Biliary Strictures appearing malignant requiring plastic stent x2 and Pancreatic mass concerning for metastatic cholangiocarcinoma. FNA of pancreatic mass sent for pathology.  - Pt to follow up with Heme/Onc as an outpatient.   -Palliative care consulted, noting patient would likely benefit from home-based palliative care team or advanced illness management (AIM) program; Paliative care to coordinate with  to determine if the [facility] agency pt currently uses provide these services.

## 2018-12-20 NOTE — ASSESSMENT & PLAN NOTE
Ms Garcia is a 77 yo woman with atrial fibrillation, diabetes, hypertension, who has been diagnosed on this hospital admission with metastatic biliary cancer.    1.  Goals of care:  The patient's daughter (Tari) would like for her mother to return home with follow-up in the Oncology Clinic to discuss what palliative treatment options might be available.  Recommend also coordinating follow-up with outpatient palliative care consult at the [facility] with Dr. Laura Richey.  Patient would likely also benefit from a home-based palliative care team or advanced illness management (AIM) program; I will coordinate with my  to determine if the [facility] agency she currently uses provide these services.  2.  Pain:  Patient reports her abdominal pain is well controlled with oxycodone p.r.n..  Has only used 3 times in the last 24 hr.  Her upper endoscopy occurred yesterday, unclear if pain is residual from this procedure.  Recommend continued use of as needed medications for now of oxycodone and Tylenol, with close follow-up to determine if long-acting medications would be beneficial.

## 2018-12-20 NOTE — TREATMENT PLAN
AES Follow-up Note     S/P EUS with FNA and ERCP with biliary stent placement for biliary strictures.  Patient was seen and examined. Labs reviewed.   Stable abdominal discomfort. Tolerating diet.    Continues to be afebrile.   No sx/sx of post-ERCP pancreatitis.  Spoke with patient, family and primary team regarding overall condition -> possibly discharge home with hospice.  AES will sign off. Please call if any questions/concerns.     Dior Hoyos MD  Gastroenterology & Hepatology Fellow   Pager: [phone]

## 2018-12-20 NOTE — ASSESSMENT & PLAN NOTE
- Wbc trending down, pt afebrile  - she received one dose of Cefepime and Metronidazole at the ED.  - No gildardo pus noted on EUS/ERCP, but significant ductal dilatation and obstruction requiring stenting.   - D/C ceftriaxone and metronidazole, transition to Augmentin PO for total antibotic therapy of 7 days post EUS/ERCP (Day one 11/19, Stop date of 12/26/2018)

## 2018-12-20 NOTE — PT/OT/SLP PROGRESS
Occupational Therapy      Patient Name:  Alfredina Claudette Parks   MRN:  [id number]    Patient not seen today secondary to Other (Comment)(Pt eating lunch.  Assisted pt c set-up.). Will follow-up tomorrow.    KOFI Luis  12/20/2018

## 2018-12-20 NOTE — SUBJECTIVE & OBJECTIVE
Interval History:  Met with the patient at bedside, also present was Dr. Walters from [facility], joining by speaker phone was patient's daughter, Tari.  Patient's daughter understands the diagnosis of cancer, she is interested in following up with the outpatient oncology clinic to determine what treatment options are available.  I did confirm that she understood that this cancer is incurable, and that all treatment options would be in an attempt to improve symptoms or potentially prolong life for a few months.  Also relayed that if the patient was deemed to week or too ill at baseline to receive palliative chemotherapy then this would not be pursued.      Patient's daughter notes that they currently are receiving help in the home through [facility] [employer] agency, which appears to be in agency providing health aides, unclear if they are also providing home health as part of these services.    Currently the team thinks that if she does well on the transition to oral antibiotics she may be able to be discharged to home tomorrow.    Patient reports continued pain in the lower abdomen.  Denies dysuria or any problems with bowel movements, reports her bowel movements have been regular.  She also reports tenderness in the abdomen.  Denies nausea or vomiting.  Patient reports that the oxycodone relieves her pain well, she has trouble  reporting for how long.    Patient denies other problems including dyspnea, insomnia, and agitation.    The patient is not .  She only has 1 child, Tari.  Tari reports that 1 of her friends had a parent on hospice care, she has some understanding of the role of hospice at end of life in providing symptom management.    Past Medical History:   Diagnosis Date    Absence of bladder continence 4/20/2015    Asthma     Asthma without status asthmaticus 6/28/2012    Atrial fibrillation     BMI 36.0-36.9,adult 8/25/2014    Bulging lumbar disc     Cataract     Cervical radiculopathy     Claudication  5/18/2017    Constipation 8/30/2013    Diabetes mellitus     pre diabetes     Diverticular disease 8/30/2013    GERD (gastroesophageal reflux disease) 6/28/2012    Hepatitis C     Hyperglycemia     Hypertension     Hypothyroidism     Osteoporosis     Pancreatic cyst     Vitamin D deficiency disease        Past Surgical History:   Procedure Laterality Date    BREAST CYST EXCISION Bilateral     COLONOSCOPY N/A 7/11/2013    Performed [facility]    CYST REMOVAL      ESOPHAGOGASTRODUODENOSCOPY (EGD) N/A 1/21/2015    Performed by Genaro Cordero [facility] at [facility]    EYE SURGERY      cataracts    HYSTERECTOMY      TOE SURGERY Bilateral     big toenails    ULTRASOUND-ENDOSCOPIC-UPPER N/A 1/15/2018    Performed by [facility]il King [facility] at [facility] END[facility] (2ND FLR)    ULTRASOUND-ENDOSCOPIC-UPPER N/A 8/14/2015    Performed by [facility] at [facility]    ULTRASOUND-ENDOSCOPIC-UPPER N/A 1/21/2015    Performed by [facility] at [facility]    ULTRASOUND-ENDOSCOPIC-UPPER W/POSSIBLE FNA N/A 2/15/2016    Performed by Genaro [facility]ordero [facility] at [facility]       Review of patient's allergies indicates:   Allergen Reactions    Milk containing products Shortness Of Breath    Nuts [tree nut] Anaphylaxis    Fosamax [alendronate]      dizziness       Medications:  Continuous Infusions:  Scheduled Meds:   allopurinol  100 mg Oral Daily    amiodarone  100 mg Oral Daily    amoxicillin-clavulanate 875-125mg  1 tablet Oral Q12H    aspirin  81 mg Oral Daily    fluticasone  1 spray Each Nare Daily    gabapentin  200 mg Oral TID    levothyroxine  125 mcg Oral Before breakfast    pantoprazole  40 mg Oral Daily    senna-docusate 8.6-50 mg  1 tablet Oral Daily     PRN Meds:acetaminophen, dextrose 50%, dextrose 50%, glucagon (human recombinant), glucose, glucose, insulin aspart U-100, methocarbamol, oxyCODONE, sodium chloride 0.9%    Family History     Problem Relation (Age of Onset)    Asthma  Sister, Other    Breast cancer Sister (55)    Diabetes Maternal Grandfather, Maternal Aunt    Lung cancer Father    Ovarian cancer Mother    Stomach cancer Sister    Throat cancer Brother        Tobacco Use    Smoking status: Never Smoker    Smokeless tobacco: Never Used   Substance and Sexual Activity    Alcohol use: No     Alcohol/week: 0.0 oz    Drug use: No    Sexual activity: Not Currently     Partners: Male       Review of Systems   Gastrointestinal: Positive for abdominal pain.   All other systems reviewed and are negative.    Objective:     Vital Signs (Most Recent):  Temp: 97.9 °F (36.6 °C) (12/20/18 1141)  Pulse: 91 (12/20/18 1141)  Resp: 20 (12/20/18 1141)  BP: 127/66 (12/20/18 1141)  SpO2: (!) 94 % (12/20/18 1141) Vital Signs (24h Range):  Temp:  [96.8 °F (36 °C)-97.9 °F (36.6 °C)] 97.9 °F (36.6 °C)  Pulse:  [] 91  Resp:  [14-24] 20  SpO2:  [91 %-98 %] 94 %  BP: (121-189)/() 127/66     Weight: 71.7 kg (158 lb 1.1 oz)  Body mass index is 33.04 kg/m².    Review of Symptoms  Symptom Assessment (ESAS 0-10 scale)   ESAS 0 1 2 3 4 5 6 7 8 9 10   Pain      x        Dyspnea x             Anxiety x             Nausea x             Depression  x             Anorexia x             Fatigue x             Insomnia x             Restlessness  x             Agitation x               Constipation     X --  ___+   Diarrhea           X --  ___+    Bowel Management Plan (BMP): Yes    Comments: senna-docusate 1 tab daily    OME in 24 hours: Oxycodone 5 mg po x3 = oxycodone po 15 mg = 22.5 OME    Performance Status: 40    ECOG Performance Status Grade: 3 - Confined to bed or chair 50% of waking hours    Physical Exam   Constitutional: No distress.   -American woman whose Body mass index is 33.04 kg/m². Reclining in bed with O2 via NC in place.   Cardiovascular: Normal rate and normal heart sounds. An irregularly irregular rhythm present.   Abdominal: Soft. Bowel sounds are increased. There is  generalized tenderness (to moderate-deep palpation). There is no rigidity and no guarding.   Musculoskeletal: She exhibits no edema or tenderness (in b/L LE).   Neurological: She is alert.   Oriented to self and location.  Oriented to month and day of week, but is not oriented to the day (19th) or the year (2020).   Skin: Skin is warm and dry. No rash noted. She is not diaphoretic.   Nursing note and vitals reviewed.      Significant Labs: All pertinent labs within the past 24 hours have been reviewed.  CBC:   Recent Labs   Lab 12/20/18  0609   WBC 14.06*   HGB 11.6*   HCT 35.9*   MCV 94        BMP:  Recent Labs   Lab 12/20/18  0609   GLU 95      K 3.9      CO2 23   BUN 11   CREATININE 0.8   CALCIUM 8.3*   MG 1.7     LFT:  Lab Results   Component Value Date    AST 65 (H) 12/20/2018    ALKPHOS 258 (H) 12/20/2018    BILITOT 1.7 (H) 12/20/2018     Albumin:   Albumin   Date Value Ref Range Status   12/20/2018 1.9 (L) 3.5 - 5.2 g/dL Final     Protein:   Total Protein   Date Value Ref Range Status   12/20/2018 6.7 6.0 - 8.4 g/dL Final     Lactic acid:   Lab Results   Component Value Date    LACTATE 1.6 12/16/2018       Significant Imaging: I have reviewed all pertinent imaging results/findings within the past 24 hours.    Advanced Directives::  Living Will: No  LaPOST: No  Do Not Resuscitate Status: No  Medical Power of : No    Decision-Making Capacity: Family answered questions    Living Arrangements: Lives alone with her daughter nearby and [facility] services coming into home to provide support.

## 2018-12-20 NOTE — PROGRESS NOTES
[facility] Hospital Medicine  Progress Note    Patient Name: Alfredina Claudette Parks  MRN: [id number]  Patient Class: IP- Inpatient   Admission Date: 12/16/2018  Length of Stay: 3 days  Attending Physician: Maciel Garcia IV, MD  Primary Care Provider: Veronica Frost MD    [facility] Medicine Team: [facility] HOSP MED 5 Steve Walters DO    Subjective:     Principal Problem:Pancreatic mass    HPI:  Mrs. Garcia is 78 y.o F patient who has a past medical hx significant for HTN, DM, A Fib (loop recorder placed on 02/2017), diverticulosis, pancreatic cyst, HCV (dx 4 years ago), chronic constipation, and liver lesion of unknown etiology, presents to the ER for an emergent evaluation due to diffuse lower abdominal pain. She has had this 9/10 crampy pain for the past 3 weeks which getting worse by movement. She was seen at [facility] the last week of November when it started and was admitted for 3-4 days. She is scheduled for a liver biopsy on this Friday. She returns to the ER due to intolerable pain. She states that the pain seems to be getting worse and the medications are not helping. She also states that she had black stools, but does admit to taking Pepto Bismol for abdominal pain several times. She reports associated symptoms of decreased appetite, nausea, constipation, and generalized weakness.  she denies jaundice, dark urine or itching. CT A/P showed increased size of an ill-defined lesion in the central liver, significantly dilated left hepatic bile ducts. Mass lesion in the region of the pancreatic head/neck with additional foci of abnormal soft tissue and adenopathy in the upper abdomen. Dilated upper common bile duct possibly secondary to mass effect from aforementioned peripancreatic/pancreatic mass. Vitals are normal.        Hospital Course:  12/18/2018 Pt reporting pain overnight, given Toradol IV. Vitals stable. MRI today consistent with liver mass with abscess thought to be much less  likely. AES to proceed with scope tomorrow to obtain tissue samples. Heme/Onc consulted.   12/19 Pt remains stable, denies pain or discomfort. AES to perform scope.   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 Heme/Onc met with patient, discussed outpatient follow-up for incurable cancer. Palliative care consulted.     Interval History: as 12/20/2018 above    Review of Systems   Constitutional: Positive for fatigue. Negative for chills and fever.   HENT: Negative for sore throat and trouble swallowing.    Eyes: Negative for visual disturbance.   Respiratory: Negative for cough and shortness of breath.    Cardiovascular: Negative for chest pain and palpitations.   Gastrointestinal: Positive for abdominal pain. Negative for blood in stool and nausea.   Genitourinary: Negative for difficulty urinating and dysuria.   Skin: Negative for rash and wound.   Neurological: Negative for dizziness and syncope.   Psychiatric/Behavioral: Negative for agitation.     Objective:     Vital Signs (Most Recent):  Temp: 97.9 °F (36.6 °C) (12/20/18 1141)  Pulse: 91 (12/20/18 1141)  Resp: 20 (12/20/18 1141)  BP: 127/66 (12/20/18 1141)  SpO2: (!) 94 % (12/20/18 1141) Vital Signs (24h Range):  Temp:  [96.8 °F (36 °C)-97.9 °F (36.6 °C)] 97.9 °F (36.6 °C)  Pulse:  [] 91  Resp:  [14-24] 20  SpO2:  [91 %-97 %] 94 %  BP: (121-189)/() 127/66     Weight: 71.7 kg (158 lb 1.1 oz)  Body mass index is 33.04 kg/m².    Intake/Output Summary (Last 24 hours) at 12/20/2018 1358  Last data filed at 12/20/2018 1034  Gross per 24 hour   Intake 600 ml   Output 300 ml   Net 300 ml      Physical Exam   Constitutional: She is oriented to person, place, and time. She appears well-developed and well-nourished. No distress.   HENT:   Head: Normocephalic and atraumatic.   Eyes: EOM are normal. Pupils are equal, round, and reactive to light. Right eye exhibits no discharge. Left eye exhibits no discharge. No scleral  icterus.   Cardiovascular: Normal rate, regular rhythm, normal heart sounds and intact distal pulses. Exam reveals no gallop and no friction rub.   No murmur heard.  Pulmonary/Chest: Effort normal and breath sounds normal. No stridor. No respiratory distress. She has no wheezes. She has no rales. She exhibits no tenderness.   Abdominal: Soft. Bowel sounds are normal. She exhibits no distension, no ascites and no mass. There is tenderness in the right upper quadrant. There is no rebound and no guarding. No hernia.   Neurological: She is alert and oriented to person, place, and time.   Skin: Skin is warm and dry. No rash noted. She is not diaphoretic. No erythema.       Significant Labs:   CBC:   Recent Labs   Lab 12/19/18  0526 12/20/18  0609   WBC 13.23* 14.06*   HGB 11.5* 11.6*   HCT 37.0 35.9*    186     CMP:   Recent Labs   Lab 12/19/18  0526 12/20/18  0609   * 136   K 3.8 3.9    104   CO2 22* 23   GLU 99 95   BUN 10 11   CREATININE 0.8 0.8   CALCIUM 8.3* 8.3*   PROT 6.9 6.7   ALBUMIN 2.0* 1.9*   BILITOT 1.5* 1.7*   ALKPHOS 283* 258*   AST 97* 65*   ALT 76* 61*   ANIONGAP 10 9   EGFRNONAA >60.0 >60.0     All pertinent labs within the past 24 hours have been reviewed.    Significant Imaging: I have reviewed all pertinent imaging results/findings within the past 24 hours.    Assessment/Plan:      * Pancreatic mass    - CT A/P showed mass lesion in the region of the pancreatic head/neck with additional foci of abnormal soft tissue and adenopathy in the upper abdomen. Dilated upper common bile duct possibly secondary to mass effect from aforementioned peripancreatic/pancreatic mass.  - CA 19-9 markedly elevated. AFP elevated.  -EUS/ERCP concerning for cholangiocarcinoma, final pathology pending.   - Heme/Onc consulted to facilitate outpatient follow-up.              Cholangiocarcinoma    -Liver lesion noted on CT on admission, initial concern for possible abscess/ascending cholangitis. MRI 12/18/18  showed findings most consistent with a mass lesion.  -AST/ALT/Alk phos somewhat elevated. Bilirubin 1.1  -EUS/ERCP 12/19 demonstrating Biliary Strictures appearing malignant requiring plastic stent x2 and Pancreatic mass concerning for metastatic cholangiocarcinoma. FNA of pancreatic mass sent for pathology.  - Pt to follow up with Heme/Onc as an outpatient.   -Palliative care consulted, noting patient would likely benefit from home-based palliative care team or advanced illness management (AIM) program; Paliative care to coordinate with  to determine if the [employer] agency pt currently uses provide these services.     Acute cholangitis    - Wbc trending down, pt afebrile  - she received one dose of Cefepime and Metronidazole at the ED.  - No gildardo pus noted on EUS/ERCP, but significant ductal dilatation and obstruction requiring stenting.   - D/C ceftriaxone and metronidazole, transition to Augmentin PO for total antibotic therapy of 7 days post EUS/ERCP (Day one 11/19, Stop date of 12/26/2018)               Atrial fibrillation    - on home amiodarone   - on ASA  - continue to monitor        Hypothyroidism    - resume home dose synthroid.       HTN (hypertension), benign    Currently is normotensive.  - hold medications for now and continue monitoring         VTE Risk Mitigation (From admission, onward)        Ordered     enoxaparin injection 40 mg  Daily      12/20/18 1303     IP VTE HIGH RISK PATIENT  Once      12/17/18 0803     Place KULDEEP hose  Until discontinued      12/17/18 0803     Place sequential compression device  Until discontinued      12/17/18 0642              Steve Walters DO  Department of Hospital Medicine   [facility]

## 2018-12-20 NOTE — SUBJECTIVE & OBJECTIVE
Interval History: as 12/20/2018 above    Review of Systems   Constitutional: Positive for fatigue. Negative for chills and fever.   HENT: Negative for sore throat and trouble swallowing.    Eyes: Negative for visual disturbance.   Respiratory: Negative for cough and shortness of breath.    Cardiovascular: Negative for chest pain and palpitations.   Gastrointestinal: Positive for abdominal pain. Negative for blood in stool and nausea.   Genitourinary: Negative for difficulty urinating and dysuria.   Skin: Negative for rash and wound.   Neurological: Negative for dizziness and syncope.   Psychiatric/Behavioral: Negative for agitation.     Objective:     Vital Signs (Most Recent):  Temp: 97.9 °F (36.6 °C) (12/20/18 1141)  Pulse: 91 (12/20/18 1141)  Resp: 20 (12/20/18 1141)  BP: 127/66 (12/20/18 1141)  SpO2: (!) 94 % (12/20/18 1141) Vital Signs (24h Range):  Temp:  [96.8 °F (36 °C)-97.9 °F (36.6 °C)] 97.9 °F (36.6 °C)  Pulse:  [] 91  Resp:  [14-24] 20  SpO2:  [91 %-97 %] 94 %  BP: (121-189)/() 127/66     Weight: 71.7 kg (158 lb 1.1 oz)  Body mass index is 33.04 kg/m².    Intake/Output Summary (Last 24 hours) at 12/20/2018 1358  Last data filed at 12/20/2018 1034  Gross per 24 hour   Intake 600 ml   Output 300 ml   Net 300 ml      Physical Exam   Constitutional: She is oriented to person, place, and time. She appears well-developed and well-nourished. No distress.   HENT:   Head: Normocephalic and atraumatic.   Eyes: EOM are normal. Pupils are equal, round, and reactive to light. Right eye exhibits no discharge. Left eye exhibits no discharge. No scleral icterus.   Cardiovascular: Normal rate, regular rhythm, normal heart sounds and intact distal pulses. Exam reveals no gallop and no friction rub.   No murmur heard.  Pulmonary/Chest: Effort normal and breath sounds normal. No stridor. No respiratory distress. She has no wheezes. She has no rales. She exhibits no tenderness.   Abdominal: Soft. Bowel sounds are  normal. She exhibits no distension, no ascites and no mass. There is tenderness in the right upper quadrant. There is no rebound and no guarding. No hernia.   Neurological: She is alert and oriented to person, place, and time.   Skin: Skin is warm and dry. No rash noted. She is not diaphoretic. No erythema.       Significant Labs:   CBC:   Recent Labs   Lab 12/19/18  0526 12/20/18  0609   WBC 13.23* 14.06*   HGB 11.5* 11.6*   HCT 37.0 35.9*    186     CMP:   Recent Labs   Lab 12/19/18  0526 12/20/18  0609   * 136   K 3.8 3.9    104   CO2 22* 23   GLU 99 95   BUN 10 11   CREATININE 0.8 0.8   CALCIUM 8.3* 8.3*   PROT 6.9 6.7   ALBUMIN 2.0* 1.9*   BILITOT 1.5* 1.7*   ALKPHOS 283* 258*   AST 97* 65*   ALT 76* 61*   ANIONGAP 10 9   EGFRNONAA >60.0 >60.0     All pertinent labs within the past 24 hours have been reviewed.    Significant Imaging: I have reviewed all pertinent imaging results/findings within the past 24 hours.

## 2018-12-20 NOTE — CONSULTS
[facility]  Palliative Medicine  Consult Note    Patient Name: Alfredina Claudette Parks  MRN: [id number]  Admission Date: 12/16/2018  Hospital Length of Stay: 3 days  Code Status: Full Code   Attending Provider: Maciel Garcia IV, MD  Consulting Provider: Jase Horta MD  Primary Care Physician: Veronica Frost MD  Principal Problem:Pancreatic mass    Patient information was obtained from patient, relative(s) and past medical records.      Inpatient consult to Palliative Care  Consult performed by: Jase Horta MD  Consult ordered by: Steve Walters DO  Reason for consult: Goals of care  Assessment/Recommendations: Per note in chart from today        Assessment/Plan:     Palliative care encounter    Ms Garcia is a 79 yo woman with atrial fibrillation, diabetes, hypertension, who has been diagnosed on this hospital admission with metastatic biliary cancer.    1.  Goals of care:  The patient's daughter (Tari) would like for her mother to return home with follow-up in the Oncology Clinic to discuss what palliative treatment options might be available.  Recommend also coordinating follow-up with outpatient palliative care consult at the [facility] with Dr. Laura Richey.  Patient would likely also benefit from a home-based palliative care team or advanced illness management (AIM) program; I will coordinate with my  to determine if the [employer] agency she currently uses provide these services.  2.  Pain:  Patient reports her abdominal pain is well controlled with oxycodone p.r.n..  Has only used 3 times in the last 24 hr.  Her upper endoscopy occurred yesterday, unclear if pain is residual from this procedure.  Recommend continued use of as needed medications for now of oxycodone and Tylenol, with close follow-up to determine if long-acting medications would be beneficial.     Addendum 12/20/2018 2:21 PM:    I spoke with the  on the palliative medicine team.   [facility] [facility] agency provides personal care attendants, but they are not a home health agency.  If the patient requires home health for PT or OT, please coordinate that upon discharge.  If any recommendations are provided for home health equipment, please help coordinate this at discharge (pt for example does not have hospital bed at home).    Along with follow-up in [facility]. Richey as noted above, also recommend home-based palliative care visit with [facility].  I have filled out a referral form and fax did to the agency containing the patient is information.  Upon discharge, please have case management place the referral to [employer] for insurance coverage of the home-based palliative care visit.    Thank you for your consult. I will follow-up with patient. Please contact us if you have any additional questions.     Jase Horta MD  Palliative Medicine Consult Service  Pager: [phone]     Portions of this note were completed using medical dictation software. Please excuse typographical or syntax errors that were missed on review.     Subjective:     HPI:   Ms Garcia is a 79 yo woman with past medical history hypertension, atrial fibrillation, diabetes, diverticulosis, hepatitis C, who presented to the emergency room on December 16th for worsening abdominal pain and was found on imaging to have lesions concerning for cancer.  Patient had an EUS with ERCP, based on pathology and imaging findings this appears to be metastatic biliary cancer.    Hospital Course:  No notes on file    Interval History:  Met with the patient at bedside, also present was Dr. Walters from [facility], joining by speaker phone was patient's daughter, Tari.  Patient's daughter understands the diagnosis of cancer, she is interested in following up with the outpatient oncology clinic to determine what treatment options are available.  I did confirm that she understood that  this cancer is incurable, and that all treatment options would be in an attempt to improve symptoms or potentially prolong life for a few months.  Also relayed that if the patient was deemed to week or too ill at baseline to receive palliative chemotherapy then this would not be pursued.      Patient's daughter notes that they currently are receiving help in the home through [facility] agency, which appears to be in agency providing health aides, unclear if they are also providing home health as part of these services.    Currently the team thinks that if she does well on the transition to oral antibiotics she may be able to be discharged to home tomorrow.    Patient reports continued pain in the lower abdomen.  Denies dysuria or any problems with bowel movements, reports her bowel movements have been regular.  She also reports tenderness in the abdomen.  Denies nausea or vomiting.  Patient reports that the oxycodone relieves her pain well, she has trouble  reporting for how long.    Patient denies other problems including dyspnea, insomnia, and agitation.    The patient is not .  She only has 1 child, Tari.  Tari reports that 1 of her friends had a parent on hospice care, she has some understanding of the role of hospice at end of life in providing symptom management.    Past Medical History:   Diagnosis Date    Absence of bladder continence 4/20/2015    Asthma     Asthma without status asthmaticus 6/28/2012    Atrial fibrillation     BMI 36.0-36.9,adult 8/25/2014    Bulging lumbar disc     Cataract     Cervical radiculopathy     Claudication 5/18/2017    Constipation 8/30/2013    Diabetes mellitus     pre diabetes     Diverticular disease 8/30/2013    GERD (gastroesophageal reflux disease) 6/28/2012    Hepatitis C     Hyperglycemia     Hypertension     Hypothyroidism     Osteoporosis     Pancreatic cyst     Vitamin D deficiency disease        Past Surgical History:   Procedure Laterality  Date    BREAST CYST EXCISION Bilateral     COLONOSCOPY N/A 7/11/2013    [facility]    CYST REMOVAL      ESOPHAGOGASTRODUODENOSCOPY (EGD) N/A 1/21/2015    [facility] at [facility]    EYE SURGERY      cataracts    HYSTERECTOMY      TOE SURGERY Bilateral     big toenails    ULTRASOUND-ENDOSCOPIC-UPPER N/A 1/15/2018    [facility] (2ND F[facility])    ULTRASOUND-ENDOSCOPIC-UPPER N/A 8/14/2015    [facility] at [facility]    ULTRASOUND-ENDOSCOPIC-UPPER N/A 1/21/2015    [facility] at [facility]    ULTRASOUND-ENDOSCOPIC-UPPER W/POSSIBLE FNA N/A 2/15/2016    Per[facility] at [facility]       Review of patient's allergies indicates:   Allergen Reactions    Milk containing products Shortness Of Breath    Nuts [tree nut] Anaphylaxis    Fosamax [alendronate]      dizziness       Medications:  Continuous Infusions:  Scheduled Meds:   allopurinol  100 mg Oral Daily    amiodarone  100 mg Oral Daily    amoxicillin-clavulanate 875-125mg  1 tablet Oral Q12H    aspirin  81 mg Oral Daily    fluticasone  1 spray Each Nare Daily    gabapentin  200 mg Oral TID    levothyroxine  125 mcg Oral Before breakfast    pantoprazole  40 mg Oral Daily    senna-docusate 8.6-50 mg  1 tablet Oral Daily     PRN Meds:acetaminophen, dextrose 50%, dextrose 50%, glucagon (human recombinant), glucose, glucose, insulin aspart U-100, methocarbamol, oxyCODONE, sodium chloride 0.9%    Family History     Problem Relation (Age of Onset)    Asthma Sister, Other    Breast cancer Sister (55)    Diabetes Maternal Grandfather, Maternal Aunt    Lung cancer Father    Ovarian cancer Mother    Stomach cancer Sister    Throat cancer Brother        Tobacco Use    Smoking status: Never Smoker    Smokeless tobacco: Never Used   Substance and Sexual Activity    Alcohol use: No     Alcohol/week: 0.0 oz    Drug  use: No    Sexual activity: Not Currently     Partners: Male       Review of Systems   Gastrointestinal: Positive for abdominal pain.   All other systems reviewed and are negative.    Objective:     Vital Signs (Most Recent):  Temp: 97.9 °F (36.6 °C) (12/20/18 1141)  Pulse: 91 (12/20/18 1141)  Resp: 20 (12/20/18 1141)  BP: 127/66 (12/20/18 1141)  SpO2: (!) 94 % (12/20/18 1141) Vital Signs (24h Range):  Temp:  [96.8 °F (36 °C)-97.9 °F (36.6 °C)] 97.9 °F (36.6 °C)  Pulse:  [] 91  Resp:  [14-24] 20  SpO2:  [91 %-98 %] 94 %  BP: (121-189)/() 127/66     Weight: 71.7 kg (158 lb 1.1 oz)  Body mass index is 33.04 kg/m².    Review of Symptoms  Symptom Assessment (ESAS 0-10 scale)   ESAS 0 1 2 3 4 5 6 7 8 9 10   Pain      x        Dyspnea x             Anxiety x             Nausea x             Depression  x             Anorexia x             Fatigue x             Insomnia x             Restlessness  x             Agitation x               Constipation     X --  ___+   Diarrhea           X --  ___+    Bowel Management Plan (BMP): Yes    Comments: senna-docusate 1 tab daily    OME in 24 hours: Oxycodone 5 mg po x3 = oxycodone po 15 mg = 22.5 OME    Performance Status: 40    ECOG Performance Status Grade: 3 - Confined to bed or chair 50% of waking hours    Physical Exam   Constitutional: No distress.   -American woman whose Body mass index is 33.04 kg/m². Reclining in bed with O2 via NC in place.   Cardiovascular: Normal rate and normal heart sounds. An irregularly irregular rhythm present.   Abdominal: Soft. Bowel sounds are increased. There is generalized tenderness (to moderate-deep palpation). There is no rigidity and no guarding.   Musculoskeletal: She exhibits no edema or tenderness (in b/L LE).   Neurological: She is alert.   Oriented to self and location.  Oriented to month and day of week, but is not oriented to the day (19th) or the year (2020).   Skin: Skin is warm and dry. No rash noted. She is  not diaphoretic.   Nursing note and vitals reviewed.      Significant Labs: All pertinent labs within the past 24 hours have been reviewed.  CBC:   Recent Labs   Lab 12/20/18  0609   WBC 14.06*   HGB 11.6*   HCT 35.9*   MCV 94        BMP:  Recent Labs   Lab 12/20/18  0609   GLU 95      K 3.9      CO2 23   BUN 11   CREATININE 0.8   CALCIUM 8.3*   MG 1.7     LFT:  Lab Results   Component Value Date    AST 65 (H) 12/20/2018    ALKPHOS 258 (H) 12/20/2018    BILITOT 1.7 (H) 12/20/2018     Albumin:   Albumin   Date Value Ref Range Status   12/20/2018 1.9 (L) 3.5 - 5.2 g/dL Final     Protein:   Total Protein   Date Value Ref Range Status   12/20/2018 6.7 6.0 - 8.4 g/dL Final     Lactic acid:   Lab Results   Component Value Date    LACTATE 1.6 12/16/2018       Significant Imaging: I have reviewed all pertinent imaging results/findings within the past 24 hours.    Advanced Directives::  Living Will: No  LaPOST: No  Do Not Resuscitate Status: No  Medical Power of : No    Decision-Making Capacity: Family answered questions    Living Arrangements: Lives alone with her daughter nearby and [employer] services coming into home to provide support.      > 50% of 50 min visit spent in chart review, face to face discussion of goals of care,  symptom assessment, coordination of care and emotional support.    Jase Horta MD  Palliative Medicine  [facility]

## 2018-12-20 NOTE — PLAN OF CARE
12/20/18 0957   Discharge Reassessment   Assessment Type Discharge Planning Reassessment   Provided patient/caregiver education on the expected discharge date and the discharge plan Yes   Do you have any problems affording any of your prescribed medications? No   Discharge Plan A Home with family   Discharge Plan B Home Health;Home with family

## 2018-12-20 NOTE — PLAN OF CARE
Problem: Adult Inpatient Plan of Care  Goal: Plan of Care Review  Outcome: Ongoing (interventions implemented as appropriate)   12/20/18 9821   Plan of Care Review   Plan of Care Reviewed With patient     Patient alert and oriented and able to make needs known.  Patient c/o pain and medicated with prescribed pain med's. Minimal relief noted. Ambulates with walker and assist. Noted SOB on exertion and O2 88%.  O2 at 2L BNC and patient  SPAO2 increased to 95%.  Patient is concerned about diagnosis and will ask the primary team to evaluate for psychotherapy services and a spiritual visit to address any spiritual concerns.  Patent requests for all visitors to stop at the nurses station before entering.  Patient  Would like to be aware of visitors she chooses to have visit her.   Calm and cooperative with care but tearful during nurse assessment.  Patient allowed to verbalize her feelings and concerns and thoughts about diagnosis and prognosis.  POC ongoing

## 2018-12-21 DIAGNOSIS — C22.1 CHOLANGIOCARCINOMA: Primary | ICD-10-CM

## 2018-12-21 PROBLEM — K83.09 ACUTE CHOLANGITIS: Status: RESOLVED | Noted: 2018-12-17 | Resolved: 2018-12-21

## 2018-12-21 PROBLEM — K83.09 ASCENDING CHOLANGITIS: Status: ACTIVE | Noted: 2018-12-21

## 2018-12-21 LAB
ALBUMIN SERPL BCP-MCNC: 1.9 G/DL
ALP SERPL-CCNC: 273 U/L
ALT SERPL W/O P-5'-P-CCNC: 55 U/L
ANION GAP SERPL CALC-SCNC: 9 MMOL/L
AST SERPL-CCNC: 77 U/L
BASOPHILS # BLD AUTO: 0.05 K/UL
BASOPHILS NFR BLD: 0.4 %
BILIRUB SERPL-MCNC: 1.7 MG/DL
BUN SERPL-MCNC: 13 MG/DL
CALCIUM SERPL-MCNC: 8.2 MG/DL
CHLORIDE SERPL-SCNC: 100 MMOL/L
CO2 SERPL-SCNC: 25 MMOL/L
CREAT SERPL-MCNC: 0.8 MG/DL
DIFFERENTIAL METHOD: ABNORMAL
EOSINOPHIL # BLD AUTO: 0.2 K/UL
EOSINOPHIL NFR BLD: 1.2 %
ERYTHROCYTE [DISTWIDTH] IN BLOOD BY AUTOMATED COUNT: 16 %
EST. GFR  (AFRICAN AMERICAN): >60 ML/MIN/1.73 M^2
EST. GFR  (NON AFRICAN AMERICAN): >60 ML/MIN/1.73 M^2
GLUCOSE SERPL-MCNC: 87 MG/DL
HCT VFR BLD AUTO: 36.3 %
HGB BLD-MCNC: 11.2 G/DL
IMM GRANULOCYTES # BLD AUTO: 0.05 K/UL
IMM GRANULOCYTES NFR BLD AUTO: 0.4 %
LYMPHOCYTES # BLD AUTO: 2.1 K/UL
LYMPHOCYTES NFR BLD: 17.1 %
MAGNESIUM SERPL-MCNC: 1.7 MG/DL
MCH RBC QN AUTO: 30.4 PG
MCHC RBC AUTO-ENTMCNC: 30.9 G/DL
MCV RBC AUTO: 98 FL
MONOCYTES # BLD AUTO: 1.3 K/UL
MONOCYTES NFR BLD: 10.4 %
NEUTROPHILS # BLD AUTO: 8.6 K/UL
NEUTROPHILS NFR BLD: 70.5 %
NRBC BLD-RTO: 0 /100 WBC
PHOSPHATE SERPL-MCNC: 2.8 MG/DL
PLATELET # BLD AUTO: 168 K/UL
PMV BLD AUTO: 10.6 FL
POCT GLUCOSE: 110 MG/DL (ref 70–110)
POTASSIUM SERPL-SCNC: 4.4 MMOL/L
PROT SERPL-MCNC: 6.5 G/DL
RBC # BLD AUTO: 3.69 M/UL
SODIUM SERPL-SCNC: 134 MMOL/L
WBC # BLD AUTO: 12.14 K/UL

## 2018-12-21 PROCEDURE — 85025 COMPLETE CBC W/AUTO DIFF WBC: CPT

## 2018-12-21 PROCEDURE — 63600175 PHARM REV CODE 636 W HCPCS: Performed by: HOSPITALIST

## 2018-12-21 PROCEDURE — 99232 SBSQ HOSP IP/OBS MODERATE 35: CPT | Mod: GC,,, | Performed by: HOSPITALIST

## 2018-12-21 PROCEDURE — 25000003 PHARM REV CODE 250: Performed by: STUDENT IN AN ORGANIZED HEALTH CARE EDUCATION/TRAINING PROGRAM

## 2018-12-21 PROCEDURE — 99232 SBSQ HOSP IP/OBS MODERATE 35: CPT | Mod: ,,, | Performed by: INTERNAL MEDICINE

## 2018-12-21 PROCEDURE — 97530 THERAPEUTIC ACTIVITIES: CPT

## 2018-12-21 PROCEDURE — 80053 COMPREHEN METABOLIC PANEL: CPT

## 2018-12-21 PROCEDURE — 11000001 HC ACUTE MED/SURG PRIVATE ROOM

## 2018-12-21 PROCEDURE — 25000003 PHARM REV CODE 250: Performed by: HOSPITALIST

## 2018-12-21 PROCEDURE — 84100 ASSAY OF PHOSPHORUS: CPT

## 2018-12-21 PROCEDURE — 36415 COLL VENOUS BLD VENIPUNCTURE: CPT

## 2018-12-21 PROCEDURE — 83735 ASSAY OF MAGNESIUM: CPT

## 2018-12-21 PROCEDURE — 63600175 PHARM REV CODE 636 W HCPCS: Performed by: STUDENT IN AN ORGANIZED HEALTH CARE EDUCATION/TRAINING PROGRAM

## 2018-12-21 RX ORDER — MAGNESIUM SULFATE HEPTAHYDRATE 40 MG/ML
2 INJECTION, SOLUTION INTRAVENOUS ONCE
Status: COMPLETED | OUTPATIENT
Start: 2018-12-21 | End: 2018-12-21

## 2018-12-21 RX ORDER — GABAPENTIN 100 MG/1
200 CAPSULE ORAL 3 TIMES DAILY
Qty: 180 CAPSULE | Refills: 11 | Status: SHIPPED | OUTPATIENT
Start: 2018-12-21 | End: 2019-12-21

## 2018-12-21 RX ORDER — AMOXICILLIN 250 MG
1 CAPSULE ORAL DAILY
COMMUNITY
Start: 2018-12-22

## 2018-12-21 RX ORDER — ACETAMINOPHEN 325 MG/1
650 TABLET ORAL EVERY 6 HOURS SCHEDULED
Status: DISCONTINUED | OUTPATIENT
Start: 2018-12-21 | End: 2018-12-22 | Stop reason: HOSPADM

## 2018-12-21 RX ORDER — AMOXICILLIN AND CLAVULANATE POTASSIUM 875; 125 MG/1; MG/1
1 TABLET, FILM COATED ORAL EVERY 12 HOURS
Qty: 10 TABLET | Refills: 0 | Status: SHIPPED | OUTPATIENT
Start: 2018-12-21 | End: 2018-12-26

## 2018-12-21 RX ORDER — OXYCODONE HYDROCHLORIDE 5 MG/1
5 TABLET ORAL EVERY 6 HOURS PRN
Qty: 60 TABLET | Refills: 0 | Status: SHIPPED | OUTPATIENT
Start: 2018-12-21

## 2018-12-21 RX ADMIN — GABAPENTIN 200 MG: 100 CAPSULE ORAL at 08:12

## 2018-12-21 RX ADMIN — OXYCODONE HYDROCHLORIDE 5 MG: 5 TABLET ORAL at 05:12

## 2018-12-21 RX ADMIN — FUROSEMIDE 40 MG: 40 TABLET ORAL at 08:12

## 2018-12-21 RX ADMIN — GABAPENTIN 200 MG: 100 CAPSULE ORAL at 05:12

## 2018-12-21 RX ADMIN — STANDARDIZED SENNA CONCENTRATE AND DOCUSATE SODIUM 1 TABLET: 8.6; 5 TABLET, FILM COATED ORAL at 08:12

## 2018-12-21 RX ADMIN — AMOXICILLIN AND CLAVULANATE POTASSIUM 1 TABLET: 875; 125 TABLET, FILM COATED ORAL at 08:12

## 2018-12-21 RX ADMIN — ASPIRIN 81 MG: 81 TABLET, COATED ORAL at 08:12

## 2018-12-21 RX ADMIN — ENOXAPARIN SODIUM 40 MG: 100 INJECTION SUBCUTANEOUS at 05:12

## 2018-12-21 RX ADMIN — ACETAMINOPHEN 650 MG: 325 TABLET ORAL at 05:12

## 2018-12-21 RX ADMIN — CARVEDILOL 3.12 MG: 3.12 TABLET, FILM COATED ORAL at 05:12

## 2018-12-21 RX ADMIN — METHOCARBAMOL TABLETS 500 MG: 500 TABLET, COATED ORAL at 09:12

## 2018-12-21 RX ADMIN — LEVOTHYROXINE SODIUM 125 MCG: 125 TABLET ORAL at 05:12

## 2018-12-21 RX ADMIN — ALLOPURINOL 100 MG: 100 TABLET ORAL at 08:12

## 2018-12-21 RX ADMIN — AMIODARONE HYDROCHLORIDE 100 MG: 100 TABLET ORAL at 08:12

## 2018-12-21 RX ADMIN — PANTOPRAZOLE SODIUM 40 MG: 40 TABLET, DELAYED RELEASE ORAL at 08:12

## 2018-12-21 RX ADMIN — FLUTICASONE PROPIONATE 50 MCG: 50 SPRAY, METERED NASAL at 08:12

## 2018-12-21 RX ADMIN — CARVEDILOL 3.12 MG: 3.12 TABLET, FILM COATED ORAL at 08:12

## 2018-12-21 RX ADMIN — MAGNESIUM SULFATE IN WATER 2 G: 40 INJECTION, SOLUTION INTRAVENOUS at 08:12

## 2018-12-21 NOTE — PROGRESS NOTES
[facility]  Palliative Medicine  Progress Note    Patient Name: Alfredina Claudette Parks  MRN: [id number]  Admission Date: 12/16/2018  Hospital Length of Stay: 4 days  Code Status: Full Code   Attending Provider: Juana Perez MD  Consulting Provider: Jase Horta MD  Primary Care Physician: Veronica Frost MD  Principal Problem:Cholangiocarcinoma    Patient information was obtained from patient and relative(s).      Assessment/Plan:     Palliative care encounter    Ms Garcia is a 79 yo woman with atrial fibrillation, diabetes, hypertension, who has been diagnosed on this hospital admission with metastatic biliary cancer.    1.  Goals of care:  The patient's daughter (Tari) would like for her mother to return home with follow-up in the Oncology Clinic to discuss what palliative treatment options might be available.  -- Primary team has placed referral to outpt Palliative Medicine visit with Dr Laura Richey at [facility], date to be coordinated with daughter.  -- I have placed a referral to [facility] to follow up with the patient at home for Home-Based Palliative Care. I have notified their office, and provided the patient's information and MRN; case discussed with representative. Dtr and pt are aware of the referral.    2.  Pain:  Over last 2 days has used oxycodone 5 mg x3 every 24 hours for abdominal pain. Possibly related to recent procedure, but may be chronic due to cancer. Recommend discharge with oral oxycodone PRN with follow up with Home-Based Palliative Care to determine if transition to long-acting regimen with breakthrough will be needed.         I will follow-up with patient if she remains in house on Monday. Please contact us if you have any additional questions.     Jase Horta MD  Palliative Medicine Consult Service  Pager: [phone]     Portions of this note were completed using medical dictation software. Please excuse typographical or syntax errors  that were missed on review.     Subjective:     Chief Complaint:   Chief Complaint   Patient presents with    Abdominal Pain     Pt reports lower abdominal pain since Monday. Was seen then and told her pancreas and liver were cause. Scheduled for biopsy on Friday. Pt reports constipation as well.       HPI:   Ms Garcia is a 79 yo woman with past medical history hypertension, atrial fibrillation, diabetes, diverticulosis, hepatitis C, who presented to the emergency room on December 16th for worsening abdominal pain and was found on imaging to have lesions concerning for cancer.  Patient had an EUS with ERCP, based on pathology and imaging findings this appears to be metastatic biliary cancer.    Hospital Course:  No notes on file    Interval History:  I met with the patient at bedside, also present was the patient's daughter, Tari.  Patient reports oxycodone continues to resolve her pain, however the daughter notes that after oxycodone this morning the patient was very drowsy.  Patient also reports having insomnia last night, thinks potentially related to pain. Patient reports not eating well, but attributes this to poor quality of food, denies nausea.    The daughter has questions about patient's blood pressure regimen on discharge. We also spent time discussing the patient's pain regimen upon discharge, noting that if full tab oxycodone causes too much drowsiness can take half tablet instead.    Medications:  Continuous Infusions:  Scheduled Meds:   allopurinol  100 mg Oral Daily    amiodarone  100 mg Oral Daily    amoxicillin-clavulanate 875-125mg  1 tablet Oral Q12H    aspirin  81 mg Oral Daily    carvedilol  3.125 mg Oral BID WM    enoxaparin  40 mg Subcutaneous Daily    fluticasone  1 spray Each Nare Daily    furosemide  40 mg Oral Daily    gabapentin  200 mg Oral TID    levothyroxine  125 mcg Oral Before breakfast    pantoprazole  40 mg Oral Daily    senna-docusate 8.6-50 mg  1 tablet Oral Daily      PRN Meds:acetaminophen, dextrose 50%, dextrose 50%, methocarbamol, oxyCODONE, sodium chloride 0.9%    Objective:     Vital Signs (Most Recent):  Temp: 99.7 °F (37.6 °C) (12/21/18 1438)  Pulse: 90 (12/21/18 1438)  Resp: 18 (12/21/18 1438)  BP: 122/72 (12/21/18 1438)  SpO2: (!) 91 % (12/21/18 1438) Vital Signs (24h Range):  Temp:  [97.6 °F (36.4 °C)-100.6 °F (38.1 °C)] 99.7 °F (37.6 °C)  Pulse:  [72-96] 90  Resp:  [16-20] 18  SpO2:  [87 %-96 %] 91 %  BP: (101-136)/(59-88) 122/72     Weight: 72 kg (158 lb 11.7 oz)  Body mass index is 33.17 kg/m².    Review of Symptoms    OME in 24 hours: oxycodone 5 mg po x3 = 15 mg po oxycod = 22.5 OME    Performance Status: 60    ECOG Performance Status Grade: 2 - Ambulates, capable of self care only    Physical Exam   Constitutional: No distress.   -American woman whose Body mass index is 33.17 kg/m². Sitting in chair at side of bed.   Neurological: She is alert.   Drowsy during exam, occasionally falls asleep but wakens to voice.   Nursing note and vitals reviewed.      Significant Labs: All pertinent labs within the past 24 hours have been reviewed.  CBC:   Recent Labs   Lab 12/21/18  0347   WBC 12.14   HGB 11.2*   HCT 36.3*   MCV 98        BMP:  Recent Labs   Lab 12/21/18  0347   GLU 87   *   K 4.4      CO2 25   BUN 13   CREATININE 0.8   CALCIUM 8.2*   MG 1.7     LFT:  Lab Results   Component Value Date    AST 77 (H) 12/21/2018    ALKPHOS 273 (H) 12/21/2018    BILITOT 1.7 (H) 12/21/2018     Albumin:   Albumin   Date Value Ref Range Status   12/21/2018 1.9 (L) 3.5 - 5.2 g/dL Final     Protein:   Total Protein   Date Value Ref Range Status   12/21/2018 6.5 6.0 - 8.4 g/dL Final     Lactic acid:   Lab Results   Component Value Date    LACTATE 1.6 12/16/2018       Significant Imaging: I have reviewed all pertinent imaging results/findings within the past 24 hours.      > 50% of 25 min visit spent in chart review, face to face discussion of goals of care,   symptom assessment, coordination of care and emotional support.    Jase Horta MD  Palliative Medicine  [facility]

## 2018-12-21 NOTE — CLINICAL REVIEW
Follow up appointment arranged for 1/2/19 if she desires to see us outpatient. She may cancel at any time.  Will follow up pathology and sign off.  Julius Turner MD  Hematology Oncology Fellow PGY5  Pager [phone]

## 2018-12-21 NOTE — PLAN OF CARE
SW faxed HH referral to [facility] via  for review. SW will continue to follow.      12/21/18 1226   Post-Acute Status   Post-Acute Authorization Home Health/Hospice   Home Health/Hospice Status Referrals Sent     Nona Gaines LMSW   - [facility]  Ext.[phone]

## 2018-12-21 NOTE — PT/OT/SLP PROGRESS
"Physical Therapy Treatment    Patient Name:  Alfredina Claudette Parks   MRN:  [id number]    Recommendations:     Discharge Recommendations:  (home with home health)   Discharge Equipment Recommendations: none   Barriers to discharge: None    Assessment:     Alfredina Claudette Parks is a 78 y.o. female admitted with a medical diagnosis of Cholangiocarcinoma.  She presents with the following impairments/functional limitations:  weakness, impaired endurance, impaired self care skills, impaired functional mobilty, gait instability, impaired balance. Pt tolerates session poorly this day with focus on transfers and attempting to initiate gait training. Pt progression limited this day d/t patient lethargy/somnolence. Pt easily aroused with verbal cues but has difficulty keeping eyes open and remaining upright without support. Pt attempts single stand from bedside chair this day with posterior lean and LOB noted. Pt returned to sitting for safety. Pt reports "feeling drunk and nauseous". Pts NSG notified of pts status and c/o's. Session ended d/t pt inability to actively participate safely. Pt will continue to benefit from therapy services to improve impairments listed above.     Rehab Prognosis: Good; patient would benefit from acute skilled PT services to address these deficits and reach maximum level of function.    Recent Surgery: Procedure(s) (LRB):  ULTRASOUND, ENDOSCOPIC, UPPER GI TRACT (N/A)  ERCP (ENDOSCOPIC RETROGRADE CHOLANGIOPANCREATOGRAPHY) (N/A) 2 Days Post-Op    Plan:     During this hospitalization, patient to be seen 3 x/week to address the identified rehab impairments via gait training, therapeutic activities, therapeutic exercises and progress toward the following goals:    · Plan of Care Expires:  01/17/19    Subjective     Chief Complaint: abdominal pain, nausea, "feels drunk"  Patient/Family Comments/goals: "I didn't sleep, I'm very tired. When I move I feel like I'm drunk."   Pain/Comfort:  · Pain " Rating 1: other (see comments)(c/o unrated abdominal pain and nausea )  · Pain Addressed 1: Nurse notified      Objective:     Communicated with NSG prior to session.  Patient found all lines intact, call button in reach and UIC with telemetry  upon PTA entry to room.     General Precautions: Standard, fall   Orthopedic Precautions:N/A   Braces: N/A     Functional Mobility:  · Transfers:     · Sit to Stand:  moderate assistance with 4 wheeled walker  · Gait: Pt unable to ambulate d/t lethargy/somnolence. Pt stands from bedside chair with immediate LOB posteriorly noted. Pt returned to sitting for safety.   · Balance: Pt sits with Min A for trunk support. Pt attempts single stand requiring Mod A to stand and Mod A for balance with pt leaning posteriorly with LOB and return to sitting for safety.       AM-PAC 6 CLICK MOBILITY  Turning over in bed (including adjusting bedclothes, sheets and blankets)?: 3  Sitting down on and standing up from a chair with arms (e.g., wheelchair, bedside commode, etc.): 3  Moving from lying on back to sitting on the side of the bed?: 3  Moving to and from a bed to a chair (including a wheelchair)?: 3  Need to walk in hospital room?: 3  Climbing 3-5 steps with a railing?: 2  Basic Mobility Total Score: 17       Therapeutic Activities and Exercises:   Pt assisted with functional mobility as noted above.   Pts NSG notified of pts status and c/o's. NSG to assess pt.     Patient left up in chair with all lines intact, call button in reach and NSG notified.    GOALS:   Multidisciplinary Problems     Physical Therapy Goals        Problem: Physical Therapy Goal    Goal Priority Disciplines Outcome Goal Variances Interventions   Physical Therapy Goal     PT, PT/OT Ongoing (interventions implemented as appropriate)     Description:  Goals to be met by: 10 days ()    Patient will increase functional independence with mobility by performin. Supine to sit with Set-up [place]  2. Sit  to supine with Stand-by Assistance  3. Sit to stand transfer with Supervision  4. Gait  x 200 feet with Stand-by Assistance using rollator.  5. Lower extremity exercise program x3.0 reps per handout, with independence to improve muscular strength and endurance.                         Time Tracking:     PT Received On: 12/21/18  PT Start Time: 1053     PT Stop Time: 1107  PT Total Time (min): 14 min     Billable Minutes: Therapeutic Activity 14    Treatment Type: Treatment  PT/PTA: PTA     PTA Visit Number: 1     Hiram Kilgore, TONJA  12/21/2018

## 2018-12-21 NOTE — PLAN OF CARE
Problem: Physical Therapy Goal  Goal: Physical Therapy Goal  Goals to be met by: 10 days ()    Patient will increase functional independence with mobility by performin. Supine to sit with Set-up [place]  2. Sit to supine with Stand-by Assistance  3. Sit to stand transfer with Supervision  4. Gait  x 200 feet with Stand-by Assistance using rollator.  5. Lower extremity exercise program x3.0 reps per handout, with independence to improve muscular strength and endurance.        Outcome: Ongoing (interventions implemented as appropriate)  Goals remain appropriate.

## 2018-12-21 NOTE — PROGRESS NOTES
[facility] Hospital Medicine  Progress Note    Patient Name: Alfredina Claudette Parks  MRN: [id number]  Patient Class: IP- Inpatient   Admission Date: 12/16/2018  Length of Stay: 4 days  Attending Physician: Juana Perez MD  Primary Care Provider: Veronica Frost MD    [facility] Medicine Team: [facility] HOSP MED 5 Steve Walters DO    Subjective:     Principal Problem:Cholangiocarcinoma    HPI:  Mrs. Garcia is 78 y.o F patient who has a past medical hx significant for HTN, DM, A Fib (loop recorder placed on 02/2017), diverticulosis, pancreatic cyst, HCV (dx 4 years ago), chronic constipation, and liver lesion of unknown etiology, presents to the ER for an emergent evaluation due to diffuse lower abdominal pain. She has had this 9/10 crampy pain for the past 3 weeks which getting worse by movement. She was seen at [facility] the last week of November when it started and was admitted for 3-4 days. She is scheduled for a liver biopsy on this Friday. She returns to the ER due to intolerable pain. She states that the pain seems to be getting worse and the medications are not helping. She also states that she had black stools, but does admit to taking Pepto Bismol for abdominal pain several times. She reports associated symptoms of decreased appetite, nausea, constipation, and generalized weakness.  she denies jaundice, dark urine or itching. CT A/P showed increased size of an ill-defined lesion in the central liver, significantly dilated left hepatic bile ducts. Mass lesion in the region of the pancreatic head/neck with additional foci of abnormal soft tissue and adenopathy in the upper abdomen. Dilated upper common bile duct possibly secondary to mass effect from aforementioned peripancreatic/pancreatic mass. Vitals are normal.        Hospital Course:  12/18/2018 Pt reporting pain overnight, given Toradol IV. Vitals stable. MRI today consistent with liver mass with abscess thought to be much less  likely. AES to proceed with scope tomorrow to obtain tissue samples. Heme/Onc consulted.   12/19 Pt remains stable, denies pain or discomfort. AES to perform scope.   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 Heme/Onc met with patient, discussed outpatient follow-up for incurable cancer. Palliative care consulted.     No new subjective & objective note has been filed under this hospital service since the last note was generated.    Assessment/Plan:      * Cholangiocarcinoma    -Liver lesion noted on CT on admission, initial concern for possible abscess/ascending cholangitis. MRI 12/18/18 showed findings most consistent with a mass lesion.  -AST/ALT/Alk phos somewhat elevated. Bilirubin 1.1  -EUS/ERCP 12/19 demonstrating Biliary Strictures appearing malignant requiring plastic stent x2 and Pancreatic mass concerning for metastatic cholangiocarcinoma. FNA of pancreatic mass sent for pathology.  - Pt to follow up with Heme/Onc as an outpatient.   -Palliative care consulted, noting patient would likely benefit from home-based palliative care team or advanced illness management (AIM) program; Paliative care to coordinate with  to determine if the [employer] agency pt currently uses provide these services.     Acute cholangitis    - Wbc trending down  - she received one dose of Cefepime and Metronidazole at the ED.  - No gildardo pus noted on EUS/ERCP, but significant ductal dilatation and obstruction requiring stenting.   - D/C ceftriaxone and metronidazole, transition to Augmentin PO for total antibotic therapy of 7 days post EUS/ERCP (Day one 11/19, Stop date of 12/26/2018)  -Fever of 100.6 this afternoon, repeat vitals showed temp of 99.7  -Pt more somnolent today, likely 2/2 opiod use. Will monitor overnight to ensure no worsening infection prior to discharge home.                Pancreatic mass    - CT A/P showed mass lesion in the region of the pancreatic head/neck with  additional foci of abnormal soft tissue and adenopathy in the upper abdomen. Dilated upper common bile duct possibly secondary to mass effect from aforementioned peripancreatic/pancreatic mass.  - CA 19-9 markedly elevated. AFP elevated.  -EUS/ERCP concerning for cholangiocarcinoma, final pathology pending.   - Heme/Onc consulted to facilitate outpatient follow-up.              Atrial fibrillation    - on home amiodarone   - on ASA  - continue to monitor        Hypothyroidism    - resume home dose synthroid.       HTN (hypertension), benign    Currently is normotensive.  - Restarted Coreg 12/20, holding other home antihypertensives for now and continue monitoring         VTE Risk Mitigation (From admission, onward)        Ordered     enoxaparin injection 40 mg  Daily      12/20/18 1303     IP VTE HIGH RISK PATIENT  Once      12/17/18 0803     Place sequential compression device  Until discontinued      12/17/18 0642              Steve Walters DO  Department of Hospital Medicine   [facility]

## 2018-12-21 NOTE — SUBJECTIVE & OBJECTIVE
Interval History:  I met with the patient at bedside, also present was the patient's daughter, Tari.  Patient reports oxycodone continues to resolve her pain, however the daughter notes that after oxycodone this morning the patient was very drowsy.  Patient also reports having insomnia last night, thinks potentially related to pain. Patient reports not eating well, but attributes this to poor quality of food, denies nausea.    The daughter has questions about patient's blood pressure regimen on discharge. We also spent time discussing the patient's pain regimen upon discharge, noting that if full tab oxycodone causes too much drowsiness can take half tablet instead.    Medications:  Continuous Infusions:  Scheduled Meds:   allopurinol  100 mg Oral Daily    amiodarone  100 mg Oral Daily    amoxicillin-clavulanate 875-125mg  1 tablet Oral Q12H    aspirin  81 mg Oral Daily    carvedilol  3.125 mg Oral BID WM    enoxaparin  40 mg Subcutaneous Daily    fluticasone  1 spray Each Nare Daily    furosemide  40 mg Oral Daily    gabapentin  200 mg Oral TID    levothyroxine  125 mcg Oral Before breakfast    pantoprazole  40 mg Oral Daily    senna-docusate 8.6-50 mg  1 tablet Oral Daily     PRN Meds:acetaminophen, dextrose 50%, dextrose 50%, methocarbamol, oxyCODONE, sodium chloride 0.9%    Objective:     Vital Signs (Most Recent):  Temp: 99.7 °F (37.6 °C) (12/21/18 1438)  Pulse: 90 (12/21/18 1438)  Resp: 18 (12/21/18 1438)  BP: 122/72 (12/21/18 1438)  SpO2: (!) 91 % (12/21/18 1438) Vital Signs (24h Range):  Temp:  [97.6 °F (36.4 °C)-100.6 °F (38.1 °C)] 99.7 °F (37.6 °C)  Pulse:  [72-96] 90  Resp:  [16-20] 18  SpO2:  [87 %-96 %] 91 %  BP: (101-136)/(59-88) 122/72     Weight: 72 kg (158 lb 11.7 oz)  Body mass index is 33.17 kg/m².    Review of Symptoms    OME in 24 hours: oxycodone 5 mg po x3 = 15 mg po oxycod = 22.5 OME    Performance Status: 60    ECOG Performance Status Grade: 2 - Ambulates, capable of self care  only    Physical Exam   Constitutional: No distress.   -American woman whose Body mass index is 33.17 kg/m². Sitting in chair at side of bed.   Neurological: She is alert.   Drowsy during exam, occasionally falls asleep but wakens to voice.   Nursing note and vitals reviewed.      Significant Labs: All pertinent labs within the past 24 hours have been reviewed.  CBC:   Recent Labs   Lab 12/21/18  0347   WBC 12.14   HGB 11.2*   HCT 36.3*   MCV 98        BMP:  Recent Labs   Lab 12/21/18  0347   GLU 87   *   K 4.4      CO2 25   BUN 13   CREATININE 0.8   CALCIUM 8.2*   MG 1.7     LFT:  Lab Results   Component Value Date    AST 77 (H) 12/21/2018    ALKPHOS 273 (H) 12/21/2018    BILITOT 1.7 (H) 12/21/2018     Albumin:   Albumin   Date Value Ref Range Status   12/21/2018 1.9 (L) 3.5 - 5.2 g/dL Final     Protein:   Total Protein   Date Value Ref Range Status   12/21/2018 6.5 6.0 - 8.4 g/dL Final     Lactic acid:   Lab Results   Component Value Date    LACTATE 1.6 12/16/2018       Significant Imaging: I have reviewed all pertinent imaging results/findings within the past 24 hours.

## 2018-12-21 NOTE — ASSESSMENT & PLAN NOTE
- Wbc trending down  - she received one dose of Cefepime and Metronidazole at the ED.  - No gildardo pus noted on EUS/ERCP, but significant ductal dilatation and obstruction requiring stenting.   - D/C ceftriaxone and metronidazole, transition to Augmentin PO for total antibotic therapy of 7 days post EUS/ERCP (Day one 11/19, Stop date of 12/26/2018)  -Fever of 100.6 this afternoon, repeat vitals showed temp of 99.7  -Pt more somnolent today, likely 2/2 opiod use. Will monitor overnight to ensure no worsening infection prior to discharge home.

## 2018-12-21 NOTE — PLAN OF CARE
Problem: Pain Acute  Goal: Optimal Pain Control  Outcome: Ongoing (interventions implemented as appropriate)  Meds given as ordered. No signs or symptoms of distress/discomfort noted. Easy to arouse but is very tired from Prn pain meds given this AM. Md notified.

## 2018-12-21 NOTE — PLAN OF CARE
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]  Yes    Palliative care encounter [Z51.5]  Not Applicable    Goals of care, counseling/discussion [Z71.89]  Not Applicable    Biliary obstruction [K83.1]  Yes    History of hepatitis C; S/p RX with SVR 12 documented 06/2018 [Z86.19]  Yes    Pre-diabetes [R73.03]  Yes    Pancreatic mass [K86.9]  Yes    Atrial fibrillation [I48.91]  Yes    S/P ablation operation for arrhythmia [Z98.890, Z86.79]  Not Applicable    HTN (hypertension), benign [I10]  Yes    Hypothyroidism [E03.9]  Yes      Resolved Hospital Problems    Diagnosis Date Resolved POA    Acute cholangitis [K83.09] 12/21/2018 Yes       Future Appointments   Date Time Provider Department Center   1/2/2019 10:30 AM LAB, HEMONC CANCER BLDG [facility] LAB HO [facility]   1/2/2019 11:30 AM Julius Turner MD [facility] HEM ONC [facility]   1/10/2019 11:20 AM Veronica Frost MD [facility] IM [facility]   1/17/2019  9:30 AM Jamil King MD [facility] GASTRO [address]   1/29/2019  8:45 AM Wolf Martin DPM [facility] PODIATR [place]   5/30/2019 11:00 AM LAB, [facility]DR[facility]           I have seen and examined this patient face to face today. My clinical findings that support the need for the home health skilled services and home bound status are the following:  Medical restrictions requiring assistance of another human to leave home due to  Unstable ambulation.    Allergies:  Review of patient's allergies indicates:   Allergen Reactions    Milk containing products Shortness Of Breath    Nuts [tree nut] Anaphylaxis    Fosamax  [alendronate]      dizziness       Diet: regular diet    Activities: activity as tolerated    Nursing:   SN to complete comprehensive assessment including routine vital signs. Instruct on disease process and s/s of complications to report to MD. Review/verify medication list sent home with the patient at time of discharge  and instruct patient/caregiver as needed. Frequency may be adjusted depending on start of care date.    Notify MD if SBP > 160 or < 90; DBP > 90 or < 50; HR > 120 or < 50; Temp > 101; Other:         CONSULTS:    Physical Therapy to evaluate and treat. Evaluate for home safety and equipment needs; Establish/upgrade home exercise program. Perform / instruct on therapeutic exercises, gait training, transfer training, and Range of Motion.  Occupational Therapy to evaluate and treat. Evaluate home environment for safety and equipment needs. Perform/Instruct on transfers, ADL training, ROM, and therapeutic exercises.    MISCELLANEOUS CARE:  Advanced illness management (AIM) program    WOUND CARE ORDERS  n/a      Medications: Review discharge medications with patient and family and provide education.      Current Discharge Medication List      START taking these medications    Details   amoxicillin-clavulanate 875-125mg (AUGMENTIN) 875-125 mg per tablet Take 1 tablet by mouth every 12 (twelve) hours. for 5 days  Qty: 10 tablet, Refills: 0      gabapentin (NEURONTIN) 100 MG capsule Take 2 capsules (200 mg total) by mouth 3 (three) times daily.  Qty: 180 capsule, Refills: 11      oxyCODONE (ROXICODONE) 5 MG immediate release tablet Take 1 tablet (5 mg total) by mouth every 6 (six) hours as needed.  Qty: 60 tablet, Refills: 0      senna-docusate 8.6-50 mg (PERICOLACE) 8.6-50 mg per tablet Take 1 tablet by mouth once daily.         CONTINUE these medications which have NOT CHANGED    Details   albuterol (PROVENTIL) 2.5 mg /3 mL (0.083 %) nebulizer solution TAKE 3ML BY NEBULIZATION EVERY 6 HOURS AS NEEDED FOR  WHEEZING.  Qty: 1050 mL, Refills: 3    Comments: **Patient requests 90 days supply**  Associated Diagnoses: Chronic asthma, severe persistent, uncomplicated      albuterol (VENTOLIN HFA) 90 mcg/actuation inhaler INHALE 2 PUFFS PO Q 6 H PRN  Qty: 18 g, Refills: 6      allopurinol (ZYLOPRIM) 100 MG tablet Take 1 tablet (100 mg total) by mouth once daily.  Qty: 90 tablet, Refills: 3    Associated Diagnoses: Acute idiopathic gout, unspecified site      amiodarone (PACERONE) 100 MG Tab Take 100 mg by mouth once daily  Refills: 3      aspirin (ECOTRIN) 81 MG EC tablet Take 81 mg by mouth once daily.        carvedilol (COREG) 3.125 MG tablet Take 3.125 mg by mouth 2 (two) times daily with meals.      diclofenac sodium (VOLTAREN) 1 % Gel Apply 2 g topically as needed (shoulder pain).      fluticasone (FLONASE) 50 mcg/actuation nasal spray SHAKE LIQUID AND USE 1 SPRAY IN EACH NOSTRIL EVERY DAY  Qty: 48 mL, Refills: 3    Comments: **Patient requests 90 days supply**      fluticasone-salmeterol 250-50 mcg/dose (ADVAIR DISKUS) 250-50 mcg/dose diskus inhaler Inhale 1 puff into the lungs 2 (two) times daily. Controller  Qty: 180 each, Refills: 3      furosemide (LASIX) 20 MG tablet TAKE 2 TABLETS(40 MG) BY MOUTH EVERY DAY  Qty: 60 tablet, Refills: 0      levothyroxine (SYNTHROID) 125 MCG tablet Take 1 tablet (125 mcg total) by mouth once daily.  Qty: 90 tablet, Refills: 2      loratadine (CLARITIN) 10 mg tablet Take 10 mg by mouth once daily.      pantoprazole (PROTONIX) 40 MG tablet TAKE 1 TABLET BY MOUTH ONCE DAILY, 30 MINUTES BEFORE EATING  Qty: 30 tablet, Refills: 1    Comments: **Patient requests 90 days supply**      polyethylene glycol (GLYCOLAX) 17 gram/dose powder Take 17 g by mouth once daily.  Qty: 1700 g, Refills: 3    Associated Diagnoses: Constipation, unspecified constipation type      solifenacin (VESICARE) 5 MG tablet Take 1 tablet (5 mg total) by mouth once daily.  Qty: 30 tablet, Refills: 11      colchicine 0.6  mg tablet Take 1 tablet (0.6 mg total) by mouth once daily.  Qty: 90 tablet, Refills: 3    Comments: Patient on multiple medications and with gout this is the only medication without interaction she can take for acute attacks         STOP taking these medications       losartan (COZAAR) 25 MG tablet Comments:   Reason for Stopping:         spironolactone (ALDACTONE) 50 MG tablet Comments:   Reason for Stopping:               I certify that this patient is confined to her home and needs physical therapy and occupational therapy.

## 2018-12-22 VITALS
HEIGHT: 58 IN | WEIGHT: 162.06 LBS | HEART RATE: 85 BPM | BODY MASS INDEX: 34.02 KG/M2 | SYSTOLIC BLOOD PRESSURE: 127 MMHG | OXYGEN SATURATION: 95 % | TEMPERATURE: 96 F | DIASTOLIC BLOOD PRESSURE: 64 MMHG | RESPIRATION RATE: 17 BRPM

## 2018-12-22 LAB
ALBUMIN SERPL BCP-MCNC: 1.8 G/DL
ALP SERPL-CCNC: 321 U/L
ALT SERPL W/O P-5'-P-CCNC: 54 U/L
ANION GAP SERPL CALC-SCNC: 9 MMOL/L
AST SERPL-CCNC: 95 U/L
BACTERIA BLD CULT: NORMAL
BACTERIA BLD CULT: NORMAL
BASOPHILS # BLD AUTO: 0.05 K/UL
BASOPHILS NFR BLD: 0.4 %
BILIRUB SERPL-MCNC: 1.5 MG/DL
BUN SERPL-MCNC: 16 MG/DL
CALCIUM SERPL-MCNC: 8.5 MG/DL
CHLORIDE SERPL-SCNC: 97 MMOL/L
CO2 SERPL-SCNC: 26 MMOL/L
CREAT SERPL-MCNC: 1 MG/DL
DIFFERENTIAL METHOD: ABNORMAL
EOSINOPHIL # BLD AUTO: 0.1 K/UL
EOSINOPHIL NFR BLD: 1.2 %
ERYTHROCYTE [DISTWIDTH] IN BLOOD BY AUTOMATED COUNT: 16.3 %
EST. GFR  (AFRICAN AMERICAN): >60 ML/MIN/1.73 M^2
EST. GFR  (NON AFRICAN AMERICAN): 54.9 ML/MIN/1.73 M^2
GLUCOSE SERPL-MCNC: 98 MG/DL
HCT VFR BLD AUTO: 35.8 %
HGB BLD-MCNC: 11.3 G/DL
IMM GRANULOCYTES # BLD AUTO: 0.06 K/UL
IMM GRANULOCYTES NFR BLD AUTO: 0.5 %
LYMPHOCYTES # BLD AUTO: 2.1 K/UL
LYMPHOCYTES NFR BLD: 19.1 %
MAGNESIUM SERPL-MCNC: 1.7 MG/DL
MCH RBC QN AUTO: 30 PG
MCHC RBC AUTO-ENTMCNC: 31.6 G/DL
MCV RBC AUTO: 95 FL
MONOCYTES # BLD AUTO: 1.3 K/UL
MONOCYTES NFR BLD: 11.2 %
NEUTROPHILS # BLD AUTO: 7.6 K/UL
NEUTROPHILS NFR BLD: 67.6 %
NRBC BLD-RTO: 0 /100 WBC
PHOSPHATE SERPL-MCNC: 2.8 MG/DL
PLATELET # BLD AUTO: 162 K/UL
PMV BLD AUTO: 10.9 FL
POTASSIUM SERPL-SCNC: 4.1 MMOL/L
PROT SERPL-MCNC: 6.7 G/DL
RBC # BLD AUTO: 3.77 M/UL
SODIUM SERPL-SCNC: 132 MMOL/L
WBC # BLD AUTO: 11.18 K/UL

## 2018-12-22 PROCEDURE — 85025 COMPLETE CBC W/AUTO DIFF WBC: CPT

## 2018-12-22 PROCEDURE — 83735 ASSAY OF MAGNESIUM: CPT

## 2018-12-22 PROCEDURE — 25000003 PHARM REV CODE 250: Performed by: STUDENT IN AN ORGANIZED HEALTH CARE EDUCATION/TRAINING PROGRAM

## 2018-12-22 PROCEDURE — 99238 HOSP IP/OBS DSCHRG MGMT 30/<: CPT | Mod: GC,,, | Performed by: HOSPITALIST

## 2018-12-22 PROCEDURE — 84100 ASSAY OF PHOSPHORUS: CPT

## 2018-12-22 PROCEDURE — 80053 COMPREHEN METABOLIC PANEL: CPT

## 2018-12-22 PROCEDURE — 25000003 PHARM REV CODE 250: Performed by: HOSPITALIST

## 2018-12-22 PROCEDURE — 36415 COLL VENOUS BLD VENIPUNCTURE: CPT

## 2018-12-22 RX ORDER — ACETAMINOPHEN 325 MG/1
650 TABLET ORAL EVERY 6 HOURS
Refills: 0 | Status: ON HOLD | COMMUNITY
Start: 2018-12-22 | End: 2019-01-09 | Stop reason: SDUPTHER

## 2018-12-22 RX ADMIN — ALLOPURINOL 100 MG: 100 TABLET ORAL at 09:12

## 2018-12-22 RX ADMIN — AMIODARONE HYDROCHLORIDE 100 MG: 100 TABLET ORAL at 09:12

## 2018-12-22 RX ADMIN — STANDARDIZED SENNA CONCENTRATE AND DOCUSATE SODIUM 1 TABLET: 8.6; 5 TABLET, FILM COATED ORAL at 09:12

## 2018-12-22 RX ADMIN — FLUTICASONE PROPIONATE 50 MCG: 50 SPRAY, METERED NASAL at 09:12

## 2018-12-22 RX ADMIN — GABAPENTIN 200 MG: 100 CAPSULE ORAL at 09:12

## 2018-12-22 RX ADMIN — GABAPENTIN 200 MG: 100 CAPSULE ORAL at 04:12

## 2018-12-22 RX ADMIN — CARVEDILOL 3.12 MG: 3.12 TABLET, FILM COATED ORAL at 07:12

## 2018-12-22 RX ADMIN — FUROSEMIDE 40 MG: 40 TABLET ORAL at 09:12

## 2018-12-22 RX ADMIN — ASPIRIN 81 MG: 81 TABLET, COATED ORAL at 09:12

## 2018-12-22 RX ADMIN — ACETAMINOPHEN 650 MG: 325 TABLET ORAL at 05:12

## 2018-12-22 RX ADMIN — AMOXICILLIN AND CLAVULANATE POTASSIUM 1 TABLET: 875; 125 TABLET, FILM COATED ORAL at 09:12

## 2018-12-22 RX ADMIN — ACETAMINOPHEN 650 MG: 325 TABLET ORAL at 12:12

## 2018-12-22 RX ADMIN — CARVEDILOL 3.12 MG: 3.12 TABLET, FILM COATED ORAL at 04:12

## 2018-12-22 RX ADMIN — PANTOPRAZOLE SODIUM 40 MG: 40 TABLET, DELAYED RELEASE ORAL at 09:12

## 2018-12-22 RX ADMIN — ACETAMINOPHEN 650 MG: 325 TABLET ORAL at 04:12

## 2018-12-22 RX ADMIN — LEVOTHYROXINE SODIUM 125 MCG: 125 TABLET ORAL at 05:12

## 2018-12-22 NOTE — DISCHARGE SUMMARY
[facility] Hospital Medicine  Discharge Summary      Patient Name: Alfredina Claudette Parks  MRN: [id number]  Admission Date: 12/16/2018  Hospital Length of Stay: 5 days  Discharge Date and Time:  12/22/2018 6:00 PM  Attending Physician: Juana Perez MD   Discharging Provider: Steve Walters DO  Primary Care Provider: Veronica Frost MD    [facility] Medicine Team: [facility] [place], [place] HOSP MED 5 Steve Walters DO    HPI: Mrs. Garcia is 78 y.o F patient who has a past medical hx significant for HTN, DM, A Fib (loop recorder placed on 02/2017), diverticulosis, pancreatic cyst, HCV (dx 4 years ago), chronic constipation, and liver lesion of unknown etiology, presents to the ER for an emergent evaluation due to diffuse lower abdominal pain. She has had this 9/10 crampy pain for the past 3 weeks which getting worse by movement. She was seen at [facility] the last week of November when it started and was admitted for 3-4 days. She is scheduled for a liver biopsy on this Friday. She returns to the ER due to intolerable pain. She states that the pain seems to be getting worse and the medications are not helping. She also states that she had black stools, but does admit to taking Pepto Bismol for abdominal pain several times. She reports associated symptoms of decreased appetite, nausea, constipation, and generalized weakness.  she denies jaundice, dark urine or itching. CT A/P showed increased size of an ill-defined lesion in the central liver, significantly dilated left hepatic bile ducts. Mass lesion in the region of the pancreatic head/neck with additional foci of abnormal soft tissue and adenopathy in the upper abdomen. Dilated upper common bile duct possibly secondary to mass effect from aforementioned peripancreatic/pancreatic mass. Vitals are normal.        Procedure(s) (LRB):  ULTRASOUND, ENDOSCOPIC, UPPER GI TRACT (N/A)  ERCP (ENDOSCOPIC RETROGRADE CHOLANGIOPANCREATOGRAPHY) (N/A)      Hospital Course:  "12/18/2018 Pt reporting pain overnight, given Toradol IV. Vitals stable. MRI today consistent with liver mass with abscess thought to be much less likely. AES to proceed with scope tomorrow to obtain tissue samples. Heme/Onc consulted.   12/19 Pt remains stable, denies pain or discomfort. AES to perform scope.   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 Heme/Onc met with patient, discussed outpatient follow-up for incurable cancer. Palliative care consulted.   12/21/2018 Pt somnolent, will fever noted in afternoon for one reading. Will monitor overnight and if stable and Home health arranged, pt will discharge tomorrow  12/22/2018 Pt more alert, afebrile with no acute events overnight. VS stable. Pt reports drowsiness when taking oxycodone, pt instructed to use her walker when ambulating and provided bedside commode to minimize fall risk. Pt stable, and amenable to discharge.      /64   Pulse 85   Temp 96.3 °F (35.7 °C) (Oral)   Resp 17   Ht 4' 10" (1.473 m)   Wt 73.5 kg (162 lb 0.6 oz)   SpO2 95%   Breastfeeding? No   BMI 33.87 kg/m²     Physical Exam   Constitutional: She is oriented to person, place, and time and well-developed, well-nourished, and in no distress. No distress.   HENT:   Head: Normocephalic and atraumatic.   Mouth/Throat: Oropharynx is clear and moist. No oropharyngeal exudate.   Cardiovascular: Normal rate, regular rhythm, normal heart sounds and intact distal pulses.   Pulmonary/Chest: Effort normal and breath sounds normal. No respiratory distress. She has no wheezes. She has no rales. She exhibits no tenderness.   Abdominal: Soft. Bowel sounds are normal. She exhibits no distension and no mass. There is tenderness. There is no rebound and no guarding.   Musculoskeletal: She exhibits no edema, tenderness or deformity.   Neurological: She is alert and oriented to person, place, and time.   Skin: Skin is warm and dry. No rash noted. She is not " diaphoretic. No erythema. No pallor.     Consults:   Consults (From admission, onward)        Status Ordering Provider     Inpatient consult to [facility] ([facility])  Once     Provider:  (Not yet assigned)    Completed ANURADHA ARMENTA     Inpatient consult to Hematology/Oncology  Once     Provider:  (Not yet assigned)    Completed DANNY CAMILO     Inpatient consult to Interventional Radiology  Once     Provider:  (Not yet assigned)    Completed DANNY CAMILO     Inpatient consult to Palliative Care  Once     Provider:  (Not yet assigned)    Completed DANNY CAMILO     IP consult to case management  Once     Provider:  (Not yet assigned)    Acknowledged SUE RUBI          Final Active Diagnoses:    Diagnosis Date Noted POA    PRINCIPAL PROBLEM:  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] 12/18/2018 Yes    Palliative care encounter [Z51.5] 12/20/2018 Not Applicable    Goals of care, counseling/discussion [Z71.89] 12/19/2018 Not Applicable    Biliary obstruction [K83.1] 12/19/2018 Yes    Acute cholangitis [K83.09] 12/17/2018 Yes    History of hepatitis C; S/p RX with SVR 12 documented 06/2018 [Z86.19] 06/07/2018 Yes    Pre-diabetes [R73.03] 04/06/2017 Yes    Pancreatic mass [K86.9] 01/21/2015 Yes    Atrial fibrillation [I48.91] 06/28/2013 Yes    S/P ablation operation for arrhythmia [Z98.890, Z86.79] 12/31/2012 Not Applicable    HTN (hypertension), benign [I10] 06/28/2012 Yes    Hypothyroidism [E03.9] 06/28/2012 Yes      Problems Resolved During this Admission:      Discharged Condition: stable    Disposition: Home or Self Care    Follow Up:  Follow-up Information     Laura Richey MD .    Specialties:  Hospice and Palliative Medicine, Geriatric Medicine  Contact information:  [address] [place] [id number]  [phone]                 Patient Instructions:      COMMODE FOR HOME USE     Order Specific Question Answer Comments   Type: Heavy duty    Height: 4'  "10" (1.473 m)    Weight: 73.5 kg (162 lb 0.6 oz)    Does patient have medical equipment at home? none    Length of need (1-99 months): 99      Ambulatory Referral to Palliative Care   Referral Priority: Routine Referral Type: Consultation   Referred to Provider: HIMANSHU MARTINEZ Specialty: Hospice and Palliative Medicine   Number of Visits Requested: 1     Notify your health care provider if you experience any of the following:  temperature >100.4     Notify your health care provider if you experience any of the following:  persistent nausea and vomiting or diarrhea     Notify your health care provider if you experience any of the following:  severe uncontrolled pain     Notify your health care provider if you experience any of the following:  redness, tenderness, or signs of infection (pain, swelling, redness, odor or green/yellow discharge around incision site)     Notify your health care provider if you experience any of the following:  difficulty breathing or increased cough     Notify your health care provider if you experience any of the following:  severe persistent headache     Notify your health care provider if you experience any of the following:  worsening rash     Notify your health care provider if you experience any of the following:  persistent dizziness, light-headedness, or visual disturbances     Notify your health care provider if you experience any of the following:  increased confusion or weakness     Activity as tolerated     Medications:  Reconciled Home Medications:      Medication List      START taking these medications    acetaminophen 325 MG tablet  Commonly known as:  TYLENOL  Take 2 tablets (650 mg total) by mouth every 6 (six) hours.     amoxicillin-clavulanate 875-125mg 875-125 mg per tablet  Commonly known as:  AUGMENTIN  Take 1 tablet by mouth every 12 (twelve) hours. for 5 days     gabapentin 100 MG capsule  Commonly known as:  NEURONTIN  Take 2 capsules (200 mg total) " by mouth 3 (three) times daily.     oxyCODONE 5 MG immediate release tablet  Commonly known as:  ROXICODONE  Take 1 tablet (5 mg total) by mouth every 6 (six) hours as needed.     senna-docusate 8.6-50 mg 8.6-50 mg per tablet  Commonly known as:  PERICOLACE  Take 1 tablet by mouth once daily.        CHANGE how you take these medications    * albuterol 2.5 mg /3 mL (0.083 %) nebulizer solution  Commonly known as:  PROVENTIL  TAKE 3ML BY NEBULIZATION EVERY 6 HOURS AS NEEDED FOR WHEEZING.  What changed:  Another medication with the same name was changed. Make sure you understand how and when to take each.     * albuterol 90 mcg/actuation inhaler  Commonly known as:  VENTOLIN HFA  INHALE 2 PUFFS PO Q 6 H PRN  What changed:    · how much to take  · how to take this  · when to take this  · reasons to take this  · additional instructions     colchicine 0.6 mg tablet  Commonly known as:  COLCRYS  Take 1 tablet (0.6 mg total) by mouth once daily.  What changed:    · when to take this  · reasons to take this     fluticasone 50 mcg/actuation nasal spray  Commonly known as:  FLONASE  SHAKE LIQUID AND USE 1 SPRAY IN EACH NOSTRIL EVERY DAY  What changed:  See the new instructions.     furosemide 20 MG tablet  Commonly known as:  LASIX  TAKE 2 TABLETS(40 MG) BY MOUTH EVERY DAY  What changed:  See the new instructions.     polyethylene glycol 17 gram/dose powder  Commonly known as:  GLYCOLAX  Take 17 g by mouth once daily.  What changed:    · when to take this  · reasons to take this         * This list has 2 medication(s) that are the same as other medications prescribed for you. Read the directions carefully, and ask your doctor or other care provider to review them with you.            CONTINUE taking these medications    allopurinol 100 MG tablet  Commonly known as:  ZYLOPRIM  Take 1 tablet (100 mg total) by mouth once daily.     amiodarone 100 MG Tab  Commonly known as:  PACERONE  Take 100 mg by mouth once daily     aspirin 81  MG EC tablet  Commonly known as:  ECOTRIN  Take 81 mg by mouth once daily.     carvedilol 3.125 MG tablet  Commonly known as:  COREG  Take 3.125 mg by mouth 2 (two) times daily with meals.     diclofenac sodium 1 % Gel  Commonly known as:  VOLTAREN  Apply 2 g topically as needed (shoulder pain).     fluticasone-salmeterol 250-50 mcg/dose 250-50 mcg/dose diskus inhaler  Commonly known as:  ADVAIR DISKUS  Inhale 1 puff into the lungs 2 (two) times daily. Controller     levothyroxine 125 MCG tablet  Commonly known as:  SYNTHROID  Take 1 tablet (125 mcg total) by mouth once daily.     loratadine 10 mg tablet  Commonly known as:  CLARITIN  Take 10 mg by mouth once daily.     pantoprazole 40 MG tablet  Commonly known as:  PROTONIX  TAKE 1 TABLET BY MOUTH ONCE DAILY, 30 MINUTES BEFORE EATING     solifenacin 5 MG tablet  Commonly known as:  VESICARE  Take 1 tablet (5 mg total) by mouth once daily.        STOP taking these medications    losartan 25 MG tablet  Commonly known as:  COZAAR     spironolactone 50 MG tablet  Commonly known as:  ALDACTONE            Significant Diagnostic Studies: Labs:   CMP   Recent Labs   Lab 12/21/18  0347 12/22/18  0341   * 132*   K 4.4 4.1    97   CO2 25 26   GLU 87 98   BUN 13 16   CREATININE 0.8 1.0   CALCIUM 8.2* 8.5*   PROT 6.5 6.7   ALBUMIN 1.9* 1.8*   BILITOT 1.7* 1.5*   ALKPHOS 273* 321*   AST 77* 95*   ALT 55* 54*   ANIONGAP 9 9   ESTGFRAFRICA >60.0 >60.0   EGFRNONAA >60.0 54.9*    and CBC   Recent Labs   Lab 12/21/18  0347 12/22/18  0341   WBC 12.14 11.18   HGB 11.2* 11.3*   HCT 36.3* 35.8*    162     Upper EUS: Narrative   Performed by: [facility]   Patient Name: Mitzy Garcia  Procedure Date: 12/19/2018 1:25 PM  MRN: [id number]  Account Number: [id number]  YOB: 1940  Age: 78  Room: Hospital 4  Gender: Female  Attending MD: Vidhya Mustafa MD  Procedure:            Upper EUS  Indications:          Suspected mass in liver on CT  scan  Providers:            Vidhya Mustafa MD, Lillie Nelson, JES,                         Teresa Kebede, JES, Ila Ornelas CRNA  Patient Profile:      Refer to note in patient chart for documentation of                         history and physical.  Referring MD:         Vidhya Mustafa MD  Complications:        No immediate complications.  Medicines:            Monitored Anesthesia Care  Procedure:            After obtaining informed consent, the endoscope was                         passed under direct vision. Throughout the                         procedure, the patient's blood pressure, pulse, and                         oxygen saturations were monitored continuously. The                         Olympus scope [id number] ([id number]) was introduced                         through the mouth, and advanced to the second part                         of duodenum. The upper EUS was accomplished without                         difficulty. The patient tolerated the procedure                         well.  Findings:       ENDOSONOGRAPHIC FINDING: :       The esophagus, stomach and duodenum were visualized        endosonographically.       There was no sign of significant endosonographic abnormality        involving the celiac trunk.       An irregular mass was identified in the peripancreatic head region.        The mass was hypoechoic. The mass measured 31 mm by 19 mm in maximal        cross-sectional diameter. The endosonographic borders were        poorly-defined. There was sonographic evidence suggesting invasion        into the duodenum (manifested by invasion).       Many lymph nodes were visualized with the ultrasound probe in the        celiac region (level 20), peripancreatic region, pablito hepatis        region and upper-abdominal periaortic region. The nodes were oval,        hypoechoic and had well defined margins. The first node measured 22        mm by 17 mm. Fine needle aspiration for  cytology was performed.        Color Doppler imaging was utilized prior to needle puncture to        confirm a lack of significant vascular structures within the needle        path. Three passes were made with the 25 gauge needle using a        transgastric approach and using a transduodenal approach. A stylet        was used. A cytologist was present and performed a preliminary        cytologic examination. Preliminary cytology is suspicious for        adenocarcinoma (final results are pending).       There was dilation in the upper third of the main bile duct.  Impression:           - The celiac trunk was endosonographically normal.                        - A mass was identified in the peripancreatic head                         region. The rest of the pancreas is normal and                         shows no PD dilation. This is likely                         cholangiocarcinoma tracking distall to the                         peripancreatic region.                        - Many lymph nodes were visualized and measured in                         the celiac region (level 20), peripancreatic                         region, pablito hepatis region and upper-abdominal                         periaortic region. Fine needle aspiration performed.                        - There was dilation in the upper third of the main                         bile duct.  Recommendation:       - [facility] patient [facility] for [facility] care.                        - Resume previous diet.                        - Continue present medications.                        - Await cytology results.                        - Proceed with ERCP.  Attending Participation:       I personally performed the entire procedure.  Vidhya Mustafa MD  12/19/2018 3:38:54 PM  This report has been verified and signed electronically.  Number of Addenda: 0  Note Initiated On: 12/19/2018 1:25 PM  Estimated Blood Loss: Estimated blood loss: none.       This  report has been verified and signed electronically.     ERCP: ERCP   Order: [id number]   Status:  Final result   Visible to patient:  No (Not Released) Next appt:  01/02/2019 at 10:30 AM in Lab (LAB, [facility])      Narrative   Performed by: [facility]   Patient Name: Mitzy Garcia  Procedure Date: 12/19/2018 12:59 PM  MRN: [id number]  Account Number: [id number]  YOB: 1940  Age: 78  Room: [place] [address]  Gender: Female  Attending MD: Vidhya Mustafa MD  Procedure:            ERCP  Indications:          Malignant Bismuth type IV stricture (type II with                         extension to the bifurcations of both the right and                         left hepatic ducts)  Providers:            Vidhya Mustafa MD, Lillie Nelson RN,                         Teresa Kebede, JES, Ila Ornelas CRNA  Patient Profile:      Refer to note in patient chart for documentation of                         history and physical. . Deep cannulation successful                         today. . Stenting of a stricture successful today.  Referring MD:         Vidhya uMstafa MD  Complications:        No immediate complications.  Medicines:            Monitored Anesthesia Care  Procedure:            After obtaining informed consent, the scope was                         passed under direct vision. Throughout the                         procedure, the patient's blood pressure, pulse, and                         oxygen saturations were monitored continuously. The                         Olympus scope [id number] ([id number]) was introduced                         through the mouth, and advanced to the duodenum and                         used to inject contrast into the bile duct. The                         ERCP was accomplished without difficulty. The                         patient tolerated the procedure well. The procedure                         was determined to be ASGE Complexity Level  3.  Findings:       The  film was normal. The esophagus was successfully intubated        under direct vision. The scope was advanced to a normal major        papilla in the descending duodenum without detailed examination of        the pharynx, larynx and associated structures, and upper GI tract.        The upper GI tract was grossly normal. The bile duct was deeply        cannulated. Contrast was injected. I personally interpreted the bile        duct images. The flow of contrast through the ducts was poor. Image        quality was adequate. Contrast extended to the entire biliary tree.        The middle third of the main bile duct and left and right hepatic        ducts and all intrahepatic branches contained multiple moderate        stenoses. A 6 mm biliary sphincterotomy was made with a traction        (standard) sphincterotome using ERBE electrocautery. There was no        post-sphincterotomy bleeding. Dilation of the left main hepatic duct        with a 4 mm balloon dilator was successful. Two 7 Fr by 12 cm        plastic stents with two external flaps and two internal flaps were        placed into the common bile duct. Bile flowed through the stents.        The stents were in good position. The total fluoroscopy exposure        time was 6 minutes and 58 seconds.  Impression:           - Multiple moderate biliary strictures were found                         in the middle third of the main bile duct and left                         and right hepatic ducts. The strictures were                         malignant appearing.                        - A biliary sphincterotomy was performed.                        - The left main hepatic duct was successfully                         dilated.                        - Two plastic stents were placed into the RASD and                         LHD.  Recommendation:       - Return patient to [facility] for [facility] care.                        - Resume previous  diet.                        - Continue present medications.                        - Return to referring physician.                        - Repeat ERCP in 3 months to exchange stent.  Attending Participation:       I personally performed the entire procedure.  Vidhya Mustafa MD  12/19/2018 3:44:00 PM  This report has been verified and signed electronically.  Number of Addenda: 0  Note Initiated On: 12/19/2018 12:59 PM  Estimated Blood Loss: Estimated blood loss: none.       This report has been verified and signed electronically.           Cytology Report 12/19: SPECIMEN  1) FNA maritza hepatus Lymph Node (EUS)  FINAL PATHOLOGIC DIAGNOSIS  LYMPH NODE, MARITZA HEPATIS, EUS GUIDED FINE-NEEDLE ASPIRATION WITH PATHOLOGIST ASSISTED  ADEQUACY (CYTOLOGY):  - Positive for malignancy, poorly differentiated carcinoma favor adenocarcinoma.    Pending Diagnostic Studies:     Procedure Component Value Units Date/Time    FL ERCP Biliary And Pancreatic [[id number]] Resulted:  12/19/18 1259    Order Status:  Sent Lab Status:  In process Updated:  12/19/18 1519    FL ERCP Biliary And Pancreatic [[id number]]     Order Status:  Sent Lab Status:  No result     US Endoscopic Ultrasound [[id number]] Resulted:  12/19/18 1259    Order Status:  Sent Lab Status:  In process Updated:  12/19/18 1519    US Endoscopic Ultrasound [[id number]]     Order Status:  Sent Lab Status:  No result             Indwelling Lines/Drains at time of discharge:   Lines/Drains/Airways          None          Time spent on the discharge of patient: 35 minutes  Patient was seen and examined on the date of discharge and determined to be suitable for discharge.         Steve Walters DO  Department of Hospital Medicine  [facility]

## 2018-12-22 NOTE — PLAN OF CARE
Pt to be discharged home today.  Put in call to [facility] for home health, [phone], referral for home health was put through [facility]. PHN  Stated patient would be set up with [facility] and CM, contacted them, they stated would be out to see patient tomorrow.  Pt will also need a BSC, PHN stated could go through [facility].  Pt ready to discharge home when DME is delivered.  Pt's family is providing transportation home.

## 2018-12-22 NOTE — PLAN OF CARE
ADITHYA called PHN to order a BSC for home use.  ADITHYA spoke to David who stated he would have a nurse call back.    2:40 PM  ADITHYA received call back from KEY Araujo, concerning order for BSC.  Gayle states she will use [facility].  ADITHYA called [facility], about order.  PHN will fax to [facility].

## 2018-12-23 NOTE — NURSING
Discharge instructions reviewed with patient and her daughter.  Prescription provided.  IV removed intact.

## 2018-12-27 ENCOUNTER — TELEPHONE (OUTPATIENT)
Dept: INTERNAL MEDICINE | Facility: CLINIC | Age: 78
End: 2018-12-27

## 2018-12-27 DIAGNOSIS — K83.09 ACUTE CHOLANGITIS: ICD-10-CM

## 2018-12-27 DIAGNOSIS — R60.0 EDEMA OF BOTH LEGS: Primary | ICD-10-CM

## 2018-12-27 PROBLEM — K59.00 CONSTIPATED: Status: ACTIVE | Noted: 2018-11-30

## 2018-12-27 PROBLEM — E08.01: Status: ACTIVE | Noted: 2018-04-17

## 2018-12-27 PROBLEM — J45.901 ASTHMA EXACERBATION: Status: ACTIVE | Noted: 2018-04-17

## 2018-12-27 PROBLEM — K81.9 CHOLECYSTITIS: Status: ACTIVE | Noted: 2018-11-26

## 2018-12-27 PROBLEM — E02 SUBCLINICAL IODINE-DEFICIENCY HYPOTHYROIDISM: Status: ACTIVE | Noted: 2018-12-27

## 2018-12-27 PROBLEM — I48.0 PAF (PAROXYSMAL ATRIAL FIBRILLATION): Status: ACTIVE | Noted: 2018-04-17

## 2018-12-27 PROBLEM — R10.9 ABDOMINAL PAIN: Status: ACTIVE | Noted: 2018-11-30

## 2018-12-27 PROBLEM — I10 ESSENTIAL HYPERTENSION: Status: ACTIVE | Noted: 2018-04-19

## 2018-12-27 NOTE — TELEPHONE ENCOUNTER
----- Message from Dione Chavez sent at 12/27/2018 10:17 AM CST -----  Contact: Krystyna PT with [facility]            Name of Who is Calling: Krystyna PT with [facility]      What is the request in detail: Krystyna needs an order for a wheelchair for the pt faxed to [facility] at [phone]. Please contact to further discuss and advise.        [facility] N      What Number to Call Back if not in [facility]: [phone]

## 2018-12-30 ENCOUNTER — NURSE TRIAGE (OUTPATIENT)
Dept: ADMINISTRATIVE | Facility: CLINIC | Age: 78
End: 2018-12-30

## 2018-12-30 ENCOUNTER — HOSPITAL ENCOUNTER (INPATIENT)
Facility: HOSPITAL | Age: 78
LOS: 11 days | Discharge: HOSPICE | DRG: 444 | End: 2019-01-10
Attending: EMERGENCY MEDICINE | Admitting: HOSPITALIST
Payer: MEDICARE

## 2018-12-30 DIAGNOSIS — Z51.5 PALLIATIVE CARE ENCOUNTER: ICD-10-CM

## 2018-12-30 DIAGNOSIS — R52 PAIN: ICD-10-CM

## 2018-12-30 DIAGNOSIS — K83.09 ACUTE CHOLANGITIS: Primary | ICD-10-CM

## 2018-12-30 DIAGNOSIS — Z71.89 GOALS OF CARE, COUNSELING/DISCUSSION: ICD-10-CM

## 2018-12-30 LAB
BASOPHILS # BLD AUTO: 0.05 K/UL
BASOPHILS NFR BLD: 0.3 %
BUN SERPL-MCNC: 21 MG/DL (ref 6–30)
CHLORIDE SERPL-SCNC: 93 MMOL/L (ref 95–110)
CREAT SERPL-MCNC: 0.8 MG/DL (ref 0.5–1.4)
DIFFERENTIAL METHOD: ABNORMAL
EOSINOPHIL # BLD AUTO: 0.1 K/UL
EOSINOPHIL NFR BLD: 0.4 %
ERYTHROCYTE [DISTWIDTH] IN BLOOD BY AUTOMATED COUNT: 20.3 %
GLUCOSE SERPL-MCNC: 112 MG/DL (ref 70–110)
HCT VFR BLD AUTO: 33 %
HCT VFR BLD CALC: 37 %PCV (ref 36–54)
HGB BLD-MCNC: 10.9 G/DL
IMM GRANULOCYTES # BLD AUTO: 0.11 K/UL
IMM GRANULOCYTES NFR BLD AUTO: 0.6 %
LYMPHOCYTES # BLD AUTO: 2.4 K/UL
LYMPHOCYTES NFR BLD: 13 %
MCH RBC QN AUTO: 29.9 PG
MCHC RBC AUTO-ENTMCNC: 33 G/DL
MCV RBC AUTO: 91 FL
MONOCYTES # BLD AUTO: 1.4 K/UL
MONOCYTES NFR BLD: 7.8 %
NEUTROPHILS # BLD AUTO: 14.1 K/UL
NEUTROPHILS NFR BLD: 77.9 %
NRBC BLD-RTO: 0 /100 WBC
PLATELET # BLD AUTO: 337 K/UL
PMV BLD AUTO: 11.2 FL
POC IONIZED CALCIUM: 0.99 MMOL/L (ref 1.06–1.42)
POC TCO2 (MEASURED): 27 MMOL/L (ref 23–29)
POTASSIUM BLD-SCNC: 3.1 MMOL/L (ref 3.5–5.1)
RBC # BLD AUTO: 3.64 M/UL
SAMPLE: ABNORMAL
SODIUM BLD-SCNC: 134 MMOL/L (ref 136–145)
WBC # BLD AUTO: 18.14 K/UL

## 2018-12-30 PROCEDURE — 63600175 PHARM REV CODE 636 W HCPCS: Performed by: EMERGENCY MEDICINE

## 2018-12-30 PROCEDURE — 99285 EMERGENCY DEPT VISIT HI MDM: CPT | Mod: 25

## 2018-12-30 PROCEDURE — 99285 EMERGENCY DEPT VISIT HI MDM: CPT | Mod: ,,, | Performed by: EMERGENCY MEDICINE

## 2018-12-30 PROCEDURE — 96374 THER/PROPH/DIAG INJ IV PUSH: CPT

## 2018-12-30 PROCEDURE — 96361 HYDRATE IV INFUSION ADD-ON: CPT

## 2018-12-30 PROCEDURE — 96376 TX/PRO/DX INJ SAME DRUG ADON: CPT

## 2018-12-30 PROCEDURE — 80053 COMPREHEN METABOLIC PANEL: CPT

## 2018-12-30 PROCEDURE — 85025 COMPLETE CBC W/AUTO DIFF WBC: CPT

## 2018-12-30 PROCEDURE — 99285 PR EMERGENCY DEPT VISIT,LEVEL V: ICD-10-PCS | Mod: ,,, | Performed by: EMERGENCY MEDICINE

## 2018-12-30 PROCEDURE — 96375 TX/PRO/DX INJ NEW DRUG ADDON: CPT

## 2018-12-30 PROCEDURE — 25000003 PHARM REV CODE 250: Performed by: EMERGENCY MEDICINE

## 2018-12-30 PROCEDURE — 12000002 HC ACUTE/MED SURGE SEMI-PRIVATE ROOM

## 2018-12-30 PROCEDURE — 83690 ASSAY OF LIPASE: CPT

## 2018-12-30 RX ORDER — SODIUM CHLORIDE 9 MG/ML
500 INJECTION, SOLUTION INTRAVENOUS
Status: COMPLETED | OUTPATIENT
Start: 2018-12-30 | End: 2018-12-31

## 2018-12-30 RX ORDER — MORPHINE SULFATE 4 MG/ML
4 INJECTION, SOLUTION INTRAMUSCULAR; INTRAVENOUS
Status: COMPLETED | OUTPATIENT
Start: 2018-12-30 | End: 2018-12-30

## 2018-12-30 RX ORDER — ONDANSETRON 2 MG/ML
4 INJECTION INTRAMUSCULAR; INTRAVENOUS
Status: COMPLETED | OUTPATIENT
Start: 2018-12-30 | End: 2018-12-30

## 2018-12-30 RX ADMIN — MORPHINE SULFATE 4 MG: 4 INJECTION INTRAVENOUS at 10:12

## 2018-12-30 RX ADMIN — SODIUM CHLORIDE 500 ML: 0.9 INJECTION, SOLUTION INTRAVENOUS at 10:12

## 2018-12-30 RX ADMIN — ONDANSETRON HYDROCHLORIDE 4 MG: 2 INJECTION INTRAMUSCULAR; INTRAVENOUS at 10:12

## 2018-12-31 ENCOUNTER — ANESTHESIA (OUTPATIENT)
Dept: ENDOSCOPY | Facility: HOSPITAL | Age: 78
DRG: 444 | End: 2018-12-31
Payer: MEDICARE

## 2018-12-31 ENCOUNTER — ANESTHESIA EVENT (OUTPATIENT)
Dept: ENDOSCOPY | Facility: HOSPITAL | Age: 78
DRG: 444 | End: 2018-12-31
Payer: MEDICARE

## 2018-12-31 PROBLEM — C25.9 PANCREATIC CANCER: Status: ACTIVE | Noted: 2018-12-31

## 2018-12-31 LAB
ALBUMIN SERPL BCP-MCNC: 1.8 G/DL
ALP SERPL-CCNC: 414 U/L
ALT SERPL W/O P-5'-P-CCNC: 84 U/L
ANION GAP SERPL CALC-SCNC: 10 MMOL/L
AST SERPL-CCNC: 178 U/L
BILIRUB SERPL-MCNC: 2.3 MG/DL
BILIRUB UR QL STRIP: NEGATIVE
BUN SERPL-MCNC: 22 MG/DL
CALCIUM SERPL-MCNC: 8.4 MG/DL
CHLORIDE SERPL-SCNC: 95 MMOL/L
CLARITY UR REFRACT.AUTO: CLEAR
CO2 SERPL-SCNC: 26 MMOL/L
COLOR UR AUTO: ABNORMAL
CREAT SERPL-MCNC: 0.8 MG/DL
EST. GFR  (AFRICAN AMERICAN): >60 ML/MIN/1.73 M^2
EST. GFR  (NON AFRICAN AMERICAN): >60 ML/MIN/1.73 M^2
ESTIMATED AVG GLUCOSE: 85 MG/DL
GLUCOSE SERPL-MCNC: 109 MG/DL
GLUCOSE UR QL STRIP: NEGATIVE
HBA1C MFR BLD HPLC: 4.6 %
HGB UR QL STRIP: NEGATIVE
HYALINE CASTS UR QL AUTO: 16 /LPF
KETONES UR QL STRIP: NEGATIVE
LACTATE SERPL-SCNC: 1.5 MMOL/L
LEUKOCYTE ESTERASE UR QL STRIP: NEGATIVE
LIPASE SERPL-CCNC: 11 U/L
MICROSCOPIC COMMENT: ABNORMAL
NITRITE UR QL STRIP: NEGATIVE
PH UR STRIP: 5 [PH] (ref 5–8)
POCT GLUCOSE: 107 MG/DL (ref 70–110)
POCT GLUCOSE: 87 MG/DL (ref 70–110)
POTASSIUM SERPL-SCNC: 3.2 MMOL/L
PROT SERPL-MCNC: 6.8 G/DL
PROT UR QL STRIP: NEGATIVE
RBC #/AREA URNS AUTO: 9 /HPF (ref 0–4)
SODIUM SERPL-SCNC: 131 MMOL/L
SP GR UR STRIP: >=1.03 (ref 1–1.03)
SQUAMOUS #/AREA URNS AUTO: 0 /HPF
URN SPEC COLLECT METH UR: ABNORMAL
WBC #/AREA URNS AUTO: 2 /HPF (ref 0–5)

## 2018-12-31 PROCEDURE — 25000003 PHARM REV CODE 250: Performed by: NURSE ANESTHETIST, CERTIFIED REGISTERED

## 2018-12-31 PROCEDURE — 63600175 PHARM REV CODE 636 W HCPCS: Performed by: STUDENT IN AN ORGANIZED HEALTH CARE EDUCATION/TRAINING PROGRAM

## 2018-12-31 PROCEDURE — 99223 1ST HOSP IP/OBS HIGH 75: CPT | Mod: 25,GC,, | Performed by: INTERNAL MEDICINE

## 2018-12-31 PROCEDURE — 25000242 PHARM REV CODE 250 ALT 637 W/ HCPCS: Performed by: STUDENT IN AN ORGANIZED HEALTH CARE EDUCATION/TRAINING PROGRAM

## 2018-12-31 PROCEDURE — 99223 PR INITIAL HOSPITAL CARE,LEVL III: ICD-10-PCS | Mod: AI,GC,, | Performed by: HOSPITALIST

## 2018-12-31 PROCEDURE — 83605 ASSAY OF LACTIC ACID: CPT

## 2018-12-31 PROCEDURE — 27000221 HC OXYGEN, UP TO 24 HOURS

## 2018-12-31 PROCEDURE — 25000003 PHARM REV CODE 250: Performed by: EMERGENCY MEDICINE

## 2018-12-31 PROCEDURE — 63600175 PHARM REV CODE 636 W HCPCS: Performed by: NURSE ANESTHETIST, CERTIFIED REGISTERED

## 2018-12-31 PROCEDURE — 27201089 HC SNARE, DISP (ANY): Performed by: INTERNAL MEDICINE

## 2018-12-31 PROCEDURE — 83036 HEMOGLOBIN GLYCOSYLATED A1C: CPT

## 2018-12-31 PROCEDURE — 25500020 PHARM REV CODE 255: Performed by: EMERGENCY MEDICINE

## 2018-12-31 PROCEDURE — D9220A PRA ANESTHESIA: Mod: ANES,,, | Performed by: ANESTHESIOLOGY

## 2018-12-31 PROCEDURE — 37000009 HC ANESTHESIA EA ADD 15 MINS: Performed by: INTERNAL MEDICINE

## 2018-12-31 PROCEDURE — D9220A PRA ANESTHESIA: Mod: CRNA,,, | Performed by: NURSE ANESTHETIST, CERTIFIED REGISTERED

## 2018-12-31 PROCEDURE — 87040 BLOOD CULTURE FOR BACTERIA: CPT

## 2018-12-31 PROCEDURE — 94761 N-INVAS EAR/PLS OXIMETRY MLT: CPT

## 2018-12-31 PROCEDURE — 99223 PR INITIAL HOSPITAL CARE,LEVL III: ICD-10-PCS | Mod: 25,GC,, | Performed by: INTERNAL MEDICINE

## 2018-12-31 PROCEDURE — 63600175 PHARM REV CODE 636 W HCPCS

## 2018-12-31 PROCEDURE — 63600175 PHARM REV CODE 636 W HCPCS: Performed by: ANESTHESIOLOGY

## 2018-12-31 PROCEDURE — 25000003 PHARM REV CODE 250: Performed by: STUDENT IN AN ORGANIZED HEALTH CARE EDUCATION/TRAINING PROGRAM

## 2018-12-31 PROCEDURE — 20600001 HC STEP DOWN PRIVATE ROOM

## 2018-12-31 PROCEDURE — 43276 ERCP STENT EXCHANGE W/DILATE: CPT | Performed by: INTERNAL MEDICINE

## 2018-12-31 PROCEDURE — 37000008 HC ANESTHESIA 1ST 15 MINUTES: Performed by: INTERNAL MEDICINE

## 2018-12-31 PROCEDURE — 25000242 PHARM REV CODE 250 ALT 637 W/ HCPCS

## 2018-12-31 PROCEDURE — 99223 1ST HOSP IP/OBS HIGH 75: CPT | Mod: AI,GC,, | Performed by: HOSPITALIST

## 2018-12-31 PROCEDURE — 94640 AIRWAY INHALATION TREATMENT: CPT

## 2018-12-31 PROCEDURE — 81001 URINALYSIS AUTO W/SCOPE: CPT

## 2018-12-31 PROCEDURE — 63600175 PHARM REV CODE 636 W HCPCS: Performed by: EMERGENCY MEDICINE

## 2018-12-31 PROCEDURE — 82962 GLUCOSE BLOOD TEST: CPT | Performed by: INTERNAL MEDICINE

## 2018-12-31 DEVICE — ZIMMON PANCREATIC STENT
Type: IMPLANTABLE DEVICE | Site: BILE DUCT | Status: FUNCTIONAL
Brand: ZIMMON

## 2018-12-31 RX ORDER — PANTOPRAZOLE SODIUM 40 MG/1
40 TABLET, DELAYED RELEASE ORAL DAILY
Status: DISCONTINUED | OUTPATIENT
Start: 2018-12-31 | End: 2019-01-10 | Stop reason: HOSPADM

## 2018-12-31 RX ORDER — IPRATROPIUM BROMIDE AND ALBUTEROL SULFATE 2.5; .5 MG/3ML; MG/3ML
3 SOLUTION RESPIRATORY (INHALATION) ONCE
Status: COMPLETED | OUTPATIENT
Start: 2018-12-31 | End: 2018-12-31

## 2018-12-31 RX ORDER — GLUCAGON 1 MG
1 KIT INJECTION
Status: DISCONTINUED | OUTPATIENT
Start: 2018-12-31 | End: 2019-01-10 | Stop reason: HOSPADM

## 2018-12-31 RX ORDER — HYDROMORPHONE HYDROCHLORIDE 1 MG/ML
1 INJECTION, SOLUTION INTRAMUSCULAR; INTRAVENOUS; SUBCUTANEOUS EVERY 4 HOURS PRN
Status: DISCONTINUED | OUTPATIENT
Start: 2018-12-31 | End: 2019-01-10 | Stop reason: HOSPADM

## 2018-12-31 RX ORDER — IPRATROPIUM BROMIDE AND ALBUTEROL SULFATE 2.5; .5 MG/3ML; MG/3ML
SOLUTION RESPIRATORY (INHALATION)
Status: COMPLETED
Start: 2018-12-31 | End: 2018-12-31

## 2018-12-31 RX ORDER — SODIUM CHLORIDE 9 MG/ML
500 INJECTION, SOLUTION INTRAVENOUS
Status: COMPLETED | OUTPATIENT
Start: 2018-12-31 | End: 2018-12-31

## 2018-12-31 RX ORDER — DEXTROSE MONOHYDRATE, SODIUM CHLORIDE, AND POTASSIUM CHLORIDE 50; 2.98; 4.5 G/1000ML; G/1000ML; G/1000ML
INJECTION, SOLUTION INTRAVENOUS CONTINUOUS
Status: DISPENSED | OUTPATIENT
Start: 2018-12-31 | End: 2018-12-31

## 2018-12-31 RX ORDER — FENTANYL CITRATE 50 UG/ML
25 INJECTION, SOLUTION INTRAMUSCULAR; INTRAVENOUS EVERY 5 MIN PRN
Status: DISCONTINUED | OUTPATIENT
Start: 2018-12-31 | End: 2018-12-31 | Stop reason: HOSPADM

## 2018-12-31 RX ORDER — CARVEDILOL 3.12 MG/1
3.12 TABLET ORAL 2 TIMES DAILY WITH MEALS
Status: DISCONTINUED | OUTPATIENT
Start: 2018-12-31 | End: 2019-01-10 | Stop reason: HOSPADM

## 2018-12-31 RX ORDER — HYDROMORPHONE HYDROCHLORIDE 1 MG/ML
0.2 INJECTION, SOLUTION INTRAMUSCULAR; INTRAVENOUS; SUBCUTANEOUS EVERY 5 MIN PRN
Status: DISCONTINUED | OUTPATIENT
Start: 2018-12-31 | End: 2018-12-31 | Stop reason: HOSPADM

## 2018-12-31 RX ORDER — ALLOPURINOL 100 MG/1
100 TABLET ORAL DAILY
Status: DISCONTINUED | OUTPATIENT
Start: 2018-12-31 | End: 2019-01-10 | Stop reason: HOSPADM

## 2018-12-31 RX ORDER — SODIUM CHLORIDE 9 MG/ML
INJECTION, SOLUTION INTRAVENOUS CONTINUOUS PRN
Status: DISCONTINUED | OUTPATIENT
Start: 2018-12-31 | End: 2018-12-31

## 2018-12-31 RX ORDER — POLYETHYLENE GLYCOL 3350 17 G/17G
17 POWDER, FOR SOLUTION ORAL DAILY
Status: DISCONTINUED | OUTPATIENT
Start: 2018-12-31 | End: 2019-01-07

## 2018-12-31 RX ORDER — SODIUM CHLORIDE 0.9 % (FLUSH) 0.9 %
5 SYRINGE (ML) INJECTION
Status: DISCONTINUED | OUTPATIENT
Start: 2018-12-31 | End: 2019-01-10 | Stop reason: HOSPADM

## 2018-12-31 RX ORDER — PROPOFOL 10 MG/ML
VIAL (ML) INTRAVENOUS CONTINUOUS PRN
Status: DISCONTINUED | OUTPATIENT
Start: 2018-12-31 | End: 2018-12-31

## 2018-12-31 RX ORDER — PROPOFOL 10 MG/ML
VIAL (ML) INTRAVENOUS
Status: DISCONTINUED | OUTPATIENT
Start: 2018-12-31 | End: 2018-12-31

## 2018-12-31 RX ORDER — MEPERIDINE HYDROCHLORIDE 25 MG/ML
12.5 INJECTION INTRAMUSCULAR; INTRAVENOUS; SUBCUTANEOUS ONCE AS NEEDED
Status: DISCONTINUED | OUTPATIENT
Start: 2018-12-31 | End: 2018-12-31 | Stop reason: HOSPADM

## 2018-12-31 RX ORDER — GABAPENTIN 100 MG/1
200 CAPSULE ORAL 3 TIMES DAILY
Status: DISCONTINUED | OUTPATIENT
Start: 2018-12-31 | End: 2019-01-10 | Stop reason: HOSPADM

## 2018-12-31 RX ORDER — DIPHENHYDRAMINE HYDROCHLORIDE 50 MG/ML
25 INJECTION INTRAMUSCULAR; INTRAVENOUS EVERY 6 HOURS PRN
Status: DISCONTINUED | OUTPATIENT
Start: 2018-12-31 | End: 2018-12-31 | Stop reason: HOSPADM

## 2018-12-31 RX ORDER — FENTANYL CITRATE 50 UG/ML
INJECTION, SOLUTION INTRAMUSCULAR; INTRAVENOUS
Status: COMPLETED
Start: 2018-12-31 | End: 2018-12-31

## 2018-12-31 RX ORDER — LEVOTHYROXINE SODIUM 125 UG/1
125 TABLET ORAL DAILY
Status: DISCONTINUED | OUTPATIENT
Start: 2018-12-31 | End: 2018-12-31

## 2018-12-31 RX ORDER — AMOXICILLIN 250 MG
1 CAPSULE ORAL DAILY
Status: DISCONTINUED | OUTPATIENT
Start: 2018-12-31 | End: 2019-01-10 | Stop reason: HOSPADM

## 2018-12-31 RX ORDER — FENTANYL CITRATE 50 UG/ML
INJECTION, SOLUTION INTRAMUSCULAR; INTRAVENOUS
Status: DISCONTINUED | OUTPATIENT
Start: 2018-12-31 | End: 2018-12-31

## 2018-12-31 RX ORDER — OXYCODONE HYDROCHLORIDE 5 MG/1
5 TABLET ORAL EVERY 4 HOURS PRN
Status: DISCONTINUED | OUTPATIENT
Start: 2018-12-31 | End: 2019-01-08

## 2018-12-31 RX ORDER — SOLIFENACIN SUCCINATE 5 MG/1
5 TABLET, FILM COATED ORAL DAILY
Status: DISCONTINUED | OUTPATIENT
Start: 2018-12-31 | End: 2019-01-10 | Stop reason: HOSPADM

## 2018-12-31 RX ORDER — IBUPROFEN 200 MG
24 TABLET ORAL
Status: DISCONTINUED | OUTPATIENT
Start: 2018-12-31 | End: 2019-01-10 | Stop reason: HOSPADM

## 2018-12-31 RX ORDER — ONDANSETRON 2 MG/ML
4 INJECTION INTRAMUSCULAR; INTRAVENOUS ONCE AS NEEDED
Status: COMPLETED | OUTPATIENT
Start: 2018-12-31 | End: 2018-12-31

## 2018-12-31 RX ORDER — ONDANSETRON 2 MG/ML
INJECTION INTRAMUSCULAR; INTRAVENOUS
Status: COMPLETED
Start: 2018-12-31 | End: 2018-12-31

## 2018-12-31 RX ORDER — FLUTICASONE PROPIONATE 50 MCG
1 SPRAY, SUSPENSION (ML) NASAL DAILY
Status: DISCONTINUED | OUTPATIENT
Start: 2018-12-31 | End: 2019-01-10 | Stop reason: HOSPADM

## 2018-12-31 RX ORDER — AMIODARONE HYDROCHLORIDE 100 MG/1
100 TABLET ORAL DAILY
Status: DISCONTINUED | OUTPATIENT
Start: 2018-12-31 | End: 2019-01-10 | Stop reason: HOSPADM

## 2018-12-31 RX ORDER — MORPHINE SULFATE 4 MG/ML
4 INJECTION, SOLUTION INTRAMUSCULAR; INTRAVENOUS
Status: COMPLETED | OUTPATIENT
Start: 2018-12-31 | End: 2018-12-31

## 2018-12-31 RX ORDER — ASPIRIN 81 MG/1
81 TABLET ORAL DAILY
Status: DISCONTINUED | OUTPATIENT
Start: 2018-12-31 | End: 2019-01-10 | Stop reason: HOSPADM

## 2018-12-31 RX ORDER — IBUPROFEN 200 MG
16 TABLET ORAL
Status: DISCONTINUED | OUTPATIENT
Start: 2018-12-31 | End: 2019-01-10 | Stop reason: HOSPADM

## 2018-12-31 RX ORDER — LIDOCAINE HCL/PF 100 MG/5ML
SYRINGE (ML) INTRAVENOUS
Status: DISCONTINUED | OUTPATIENT
Start: 2018-12-31 | End: 2018-12-31

## 2018-12-31 RX ADMIN — MORPHINE SULFATE 4 MG: 4 INJECTION INTRAVENOUS at 05:12

## 2018-12-31 RX ADMIN — FENTANYL CITRATE 25 MCG: 50 INJECTION, SOLUTION INTRAMUSCULAR; INTRAVENOUS at 12:12

## 2018-12-31 RX ADMIN — PIPERACILLIN AND TAZOBACTAM 4.5 G: 4; .5 INJECTION, POWDER, LYOPHILIZED, FOR SOLUTION INTRAVENOUS; PARENTERAL at 05:12

## 2018-12-31 RX ADMIN — AMIODARONE HYDROCHLORIDE 100 MG: 100 TABLET ORAL at 09:12

## 2018-12-31 RX ADMIN — STANDARDIZED SENNA CONCENTRATE AND DOCUSATE SODIUM 1 TABLET: 8.6; 5 TABLET, FILM COATED ORAL at 09:12

## 2018-12-31 RX ADMIN — PROPOFOL 150 MCG/KG/MIN: 10 INJECTION, EMULSION INTRAVENOUS at 12:12

## 2018-12-31 RX ADMIN — SOLIFENACIN SUCCINATE 5 MG: 5 TABLET, FILM COATED ORAL at 04:12

## 2018-12-31 RX ADMIN — CARVEDILOL 3.12 MG: 3.12 TABLET, FILM COATED ORAL at 09:12

## 2018-12-31 RX ADMIN — PANTOPRAZOLE SODIUM 40 MG: 40 TABLET, DELAYED RELEASE ORAL at 09:12

## 2018-12-31 RX ADMIN — ALLOPURINOL 100 MG: 100 TABLET ORAL at 09:12

## 2018-12-31 RX ADMIN — ONDANSETRON 4 MG: 2 INJECTION INTRAMUSCULAR; INTRAVENOUS at 02:12

## 2018-12-31 RX ADMIN — LEVOTHYROXINE SODIUM 125 MCG: 25 TABLET ORAL at 09:12

## 2018-12-31 RX ADMIN — FENTANYL CITRATE 25 MCG: 50 INJECTION INTRAMUSCULAR; INTRAVENOUS at 02:12

## 2018-12-31 RX ADMIN — SODIUM CHLORIDE 500 ML: 0.9 INJECTION, SOLUTION INTRAVENOUS at 05:12

## 2018-12-31 RX ADMIN — POLYETHYLENE GLYCOL 3350 17 G: 17 POWDER, FOR SOLUTION ORAL at 09:12

## 2018-12-31 RX ADMIN — FLUTICASONE PROPIONATE 50 MCG: 50 SPRAY, METERED NASAL at 09:12

## 2018-12-31 RX ADMIN — DEXTROSE MONOHYDRATE, SODIUM CHLORIDE, AND POTASSIUM CHLORIDE: 50; 4.5; 2.98 INJECTION, SOLUTION INTRAVENOUS at 09:12

## 2018-12-31 RX ADMIN — IPRATROPIUM BROMIDE AND ALBUTEROL SULFATE 3 ML: .5; 3 SOLUTION RESPIRATORY (INHALATION) at 02:12

## 2018-12-31 RX ADMIN — GABAPENTIN 200 MG: 100 CAPSULE ORAL at 08:12

## 2018-12-31 RX ADMIN — CARVEDILOL 3.12 MG: 3.12 TABLET, FILM COATED ORAL at 04:12

## 2018-12-31 RX ADMIN — LIDOCAINE HYDROCHLORIDE 30 MG: 20 INJECTION, SOLUTION INTRAVENOUS at 12:12

## 2018-12-31 RX ADMIN — SODIUM CHLORIDE: 9 INJECTION, SOLUTION INTRAVENOUS at 12:12

## 2018-12-31 RX ADMIN — HYDROMORPHONE HYDROCHLORIDE 1 MG: 1 INJECTION, SOLUTION INTRAMUSCULAR; INTRAVENOUS; SUBCUTANEOUS at 04:12

## 2018-12-31 RX ADMIN — PROPOFOL 30 MG: 10 INJECTION, EMULSION INTRAVENOUS at 12:12

## 2018-12-31 RX ADMIN — IPRATROPIUM BROMIDE AND ALBUTEROL SULFATE 3 ML: 2.5; .5 SOLUTION RESPIRATORY (INHALATION) at 02:12

## 2018-12-31 RX ADMIN — GABAPENTIN 200 MG: 100 CAPSULE ORAL at 09:12

## 2018-12-31 RX ADMIN — PROPOFOL 20 MG: 10 INJECTION, EMULSION INTRAVENOUS at 12:12

## 2018-12-31 RX ADMIN — PIPERACILLIN AND TAZOBACTAM 4.5 G: 4; .5 INJECTION, POWDER, LYOPHILIZED, FOR SOLUTION INTRAVENOUS; PARENTERAL at 08:12

## 2018-12-31 RX ADMIN — GABAPENTIN 200 MG: 100 CAPSULE ORAL at 04:12

## 2018-12-31 RX ADMIN — ASPIRIN 81 MG: 81 TABLET, COATED ORAL at 09:12

## 2018-12-31 RX ADMIN — IOHEXOL 75 ML: 350 INJECTION, SOLUTION INTRAVENOUS at 12:12

## 2018-12-31 RX ADMIN — PIPERACILLIN AND TAZOBACTAM 4.5 G: 4; .5 INJECTION, POWDER, LYOPHILIZED, FOR SOLUTION INTRAVENOUS; PARENTERAL at 04:12

## 2018-12-31 NOTE — ASSESSMENT & PLAN NOTE
- she has a spike of fever at 100.4. Currently afebrile.  - 1L of fluid was given. Started on D5 1/2 NS 125ml/hr for 8 hours (total 1L).  - WBC was high, f/u with blood culture and lactate.  - started on Zosyn.  - AES was consulted as patient has 2 biliary stents which placed on 12/19.

## 2018-12-31 NOTE — ANESTHESIA PREPROCEDURE EVALUATION
12/31/2018  Alfredina Claudette Parks is a 78 y.o., female.    Anesthesia Evaluation         Review of Systems  Anesthesia Hx:  No problems with previous Anesthesia   Social:  Non-Smoker    Cardiovascular:   Exercise tolerance: good Denies Hypertension.  Denies CAD.   Dysrhythmias atrial fibrillation  Denies Angina.  Functional Capacity Can you climb two flights of stairs? ==> Yes    Pulmonary:   Asthma Denies Recent URI.  Denies Sleep Apnea.    Renal/:  Renal/ Normal     Hepatic/GI:   Denies PUD. Denies Hiatal Hernia. GERD Denies Liver Disease. Hepatitis, C    Neurological:   TIA, Denies CVA. Denies Seizures.    Endocrine:   Denies Diabetes. Hypothyroidism        Physical Exam  General:  Well nourished    Airway/Jaw/Neck:  Airway Findings: Mouth Opening: Normal Tongue: Normal  General Airway Assessment: Adult  Mallampati: I  TM Distance: Normal, at least 6 cm  Jaw/Neck Findings:  Neck ROM: Normal ROM  Neck Findings:     Eyes/Ears/Nose:  EYES/EARS/NOSE FINDINGS: Normal   Dental:  Dental Findings: In tact   Chest/Lungs:  Chest/Lungs Findings: Clear to auscultation     Heart/Vascular:  Heart Findings: Rate: Normal  Rhythm: Regular Rhythm  Sounds: Normal        Mental Status:  Mental Status Findings:  Alert and Oriented         Anesthesia Plan  Type of Anesthesia, risks & benefits discussed:  Anesthesia Type:  general  Patient's Preference: Proceed with anesthesia understanding that the risks are very small but could be serious or life threatening.  Intra-op Monitoring Plan: standard ASA monitors  Intra-op Monitoring Plan Comments:   Post Op Pain Control Plan:   Post Op Pain Control Plan Comments:   Induction:   IV  Beta Blocker:  Patient is not currently on a Beta-Blocker (No further documentation required).       Informed Consent: Patient understands risks and agrees with Anesthesia plan.  Questions  answered. Anesthesia consent signed with patient.  ASA Score: 3     Day of Surgery Review of History & Physical: I have interviewed and examined the patient. I have reviewed the patient's H&P dated:            Ready For Surgery From Anesthesia Perspective.

## 2018-12-31 NOTE — ASSESSMENT & PLAN NOTE
Patient recently diagnosed with stage IV pancreatic cancer. She is on oxycodone 5 mg Q6h at home. She came because of worsening of abdominal pain and nausea. Palliative care and AES were consulted.    - f/u with oncology as an outpatient at time of discharge.  - pain control on oxycodone and hydromorphone.

## 2018-12-31 NOTE — ASSESSMENT & PLAN NOTE
- she has loop recorder which was placed on 02/2017.  - resume home dose Amiodarone and Carvedilol.

## 2018-12-31 NOTE — ED NOTES
Alfredina Claudette Parks, an 78 y.o. female presents to the ED  C/o abdominal pain that became worse tonight after eating - she denies n/v/d, sick  Contacts. Daughter reports recent dx of pancreatic cancer for which they are seeking comfort care and pain control . Denies sob and cp. Received flu shot this year,.     Chief Complaint   Patient presents with    Abdominal Pain     chronic abd pain worsening since Friday, hx of pancreatic cancer, family reports fever onset today, no chemo/radiation     Review of patient's allergies indicates:   Allergen Reactions    Milk containing products Shortness Of Breath    Nuts [tree nut] Anaphylaxis    Fosamax [alendronate]      dizziness     Past Medical History:   Diagnosis Date    Absence of bladder continence 4/20/2015    Asthma     Asthma without status asthmaticus 6/28/2012    Atrial fibrillation     BMI 36.0-36.9,adult 8/25/2014    Bulging lumbar disc     Cataract     Cervical radiculopathy     Claudication 5/18/2017    Constipation 8/30/2013    Diabetes mellitus     pre diabetes     Diverticular disease 8/30/2013    GERD (gastroesophageal reflux disease) 6/28/2012    Hepatitis C     Hyperglycemia     Hypertension     Hypothyroidism     Osteoporosis     Pancreatic cyst     Vitamin D deficiency disease

## 2018-12-31 NOTE — H&P
[facility] Hospital Medicine  History & Physical    Patient Name: Alfredina Claudette Parks  MRN: [id number]  Admission Date: 12/30/2018  Attending Physician: Alfredo Jones MD   Primary Care Provider: Veronica Frost MD    [facility] Medicine Team: [facility] MED 4 Ori Engel MD     Patient information was obtained from patient and ER records.     Subjective:     Principal Problem:Pancreatic cancer    Chief Complaint:   Chief Complaint   Patient presents with    Abdominal Pain     chronic abd pain worsening since Friday, hx of pancreatic cancer, family reports fever onset today, no chemo/radiation        HPI: Mrs. Garcia is a 78-year-old female with known stage IV pancreatic cancer (diagnosed on 12/21/2018) presents to the ED with 2 days of worsening abdominal pain with nausea and 1 day of fever.  She is pending follow-up with Oncology and palliative care. She was recently admitted, MRI showed liver and pancreatic masses, ERCP was performed on 12/19 and pathology confirmed poorly differentiated carcinoma most likely adeno. 2 stents were placed and plan was to f/u after 3 months to exchange the stent. At the moment she is a full code.  She denies vomiting, diarrhea, cough, shortness of breath, chest pain, or dysuria.  Daughter gave her oxycodone and gabapentin without resolution of symptoms. Patient lives alone in an apartment and her daughter lives across the street.  A ten point review of systems was completed and is negative except as documented above.  Patient denies any other acute medical complaint.    In the ED, she received 1L of fluid and pain medication. She has high WBC and a spike of fever at 100.4, one dose of Zosyn was given. Palliative care and AES were consulted.    Past Medical History:   Diagnosis Date    Absence of bladder continence 4/20/2015    Asthma     Asthma without status asthmaticus 6/28/2012    Atrial fibrillation     BMI 36.0-36.9,adult 8/25/2014     Bulging lumbar disc     Cataract     Cervical radiculopathy     Claudication 5/18/2017    Constipation 8/30/2013    Diabetes mellitus     pre diabetes     Diverticular disease 8/30/2013    GERD (gastroesophageal reflux disease) 6/28/2012    Hepatitis C     Hyperglycemia     Hypertension     Hypothyroidism     Osteoporosis     Pancreatic cyst     Vitamin D deficiency disease        Past Surgical History:   Procedure Laterality Date    BREAST CYST EXCISION Bilateral     COLONOSCOPY N/A 7/11/2013    Performed by [facility]ga [facility] at [facility] ENDO    CYST REMOVAL      ERCP (ENDOSCOPIC RETROGRADE CHOLANGIOPANCREATOGRAPHY) N/A 12/19/2018    Performed [facility] at [facility] (2ND F[facility])    ESOPHAGOGASTRODUODENOSCOPY (EGD) N/A 1/21/2015    Performed by [facility] at [facility]    EYE SURGERY      cataracts    HYSTERECTOMY      TOE SURGERY Bilateral     big toenails    ULTRASOUND, ENDOSCOPIC, UPPER GI TRACT N/A 12/19/2018    Performed by [facility]a [facility]afa [facility] at [facility] ENDO (2ND FLR)    ULTRASOUND-ENDOSCOPIC-UPPER N/A 1/15/2018    Performed by Jamil King MD at [facility] ENDO (2ND FLR)    ULTRASOUND-ENDOSCOPIC-UPPER N/A 8/14/2015    Performed by [facility] at [facility]    ULTRASOUND-ENDOSCOPIC-UPPER N/A 1/21/2015    Performed by [facility] at [facility]    ULTRASOUND-ENDOSCOPIC-UPPER W/POSSIBLE FNA N/A 2/15/2016    Performed by Genaro [facility]o MD at [facility]       Review of patient's allergies indicates:   Allergen Reactions    Milk containing products Shortness Of Breath    Nuts [tree nut] Anaphylaxis    Fosamax [alendronate]      dizziness       No current facility-administered medications on file prior to encounter.      Current Outpatient Medications on File Prior to Encounter   Medication Sig    acetaminophen (TYLENOL) 325 MG tablet Take 2 tablets (650 mg total) by mouth every 6 (six) hours.    albuterol (PROVENTIL) 2.5 mg /3 mL (0.083 %) nebulizer  solution TAKE 3ML BY NEBULIZATION EVERY 6 HOURS AS NEEDED FOR WHEEZING.    albuterol (VENTOLIN HFA) 90 mcg/actuation inhaler INHALE 2 PUFFS PO Q 6 H PRN (Patient taking differently: Inhale 2 puffs into the lungs every 6 (six) hours as needed for Wheezing or Shortness of Breath. )    allopurinol (ZYLOPRIM) 100 MG tablet Take 1 tablet (100 mg total) by mouth once daily.    amiodarone (PACERONE) 100 MG Tab Take 100 mg by mouth once daily    aspirin (ECOTRIN) 81 MG EC tablet Take 81 mg by mouth once daily.      carvedilol (COREG) 3.125 MG tablet Take 3.125 mg by mouth 2 (two) times daily with meals.    colchicine 0.6 mg tablet Take 1 tablet (0.6 mg total) by mouth once daily. (Patient taking differently: Take 0.6 mg by mouth daily as needed (gout flare). )    diclofenac sodium (VOLTAREN) 1 % Gel Apply 2 g topically as needed (shoulder pain).    fluticasone (FLONASE) 50 mcg/actuation nasal spray SHAKE LIQUID AND USE 1 SPRAY IN EACH NOSTRIL EVERY DAY (Patient taking differently: SHAKE LIQUID AND USE 1 SPRAY IN EACH NOSTRIL EVERY DAY AS NEEDED FOR ALLERGIES)    fluticasone-salmeterol 250-50 mcg/dose (ADVAIR DISKUS) 250-50 mcg/dose diskus inhaler Inhale 1 puff into the lungs 2 (two) times daily. Controller    furosemide (LASIX) 20 MG tablet TAKE 2 TABLETS(40 MG) BY MOUTH EVERY DAY (Patient taking differently: Take 20 mg by mouth twice daily)    gabapentin (NEURONTIN) 100 MG capsule Take 2 capsules (200 mg total) by mouth 3 (three) times daily.    levothyroxine (SYNTHROID) 125 MCG tablet Take 1 tablet (125 mcg total) by mouth once daily.    loratadine (CLARITIN) 10 mg tablet Take 10 mg by mouth once daily.    oxyCODONE (ROXICODONE) 5 MG immediate release tablet Take 1 tablet (5 mg total) by mouth every 6 (six) hours as needed.    pantoprazole (PROTONIX) 40 MG tablet TAKE 1 TABLET BY MOUTH ONCE DAILY, 30 MINUTES BEFORE EATING    polyethylene glycol (GLYCOLAX) 17 gram/dose powder Take 17 g by mouth once daily.  (Patient taking differently: Take 17 g by mouth daily as needed (constipation). )    senna-docusate 8.6-50 mg (PERICOLACE) 8.6-50 mg per tablet Take 1 tablet by mouth once daily.    solifenacin (VESICARE) 5 MG tablet Take 1 tablet (5 mg total) by mouth once daily.    [DISCONTINUED] denosumab (PROLIA) 60 mg/mL Syrg Inject 1 mL (60 mg total) into the skin every 6 (six) months.     Family History     Problem Relation (Age of Onset)    Asthma Sister, Other    Breast cancer Sister (55)    Diabetes Maternal Grandfather, Maternal Aunt    Lung cancer Father    Ovarian cancer Mother    Stomach cancer Sister    Throat cancer Brother        Tobacco Use    Smoking status: Never Smoker    Smokeless tobacco: Never Used   Substance and Sexual Activity    Alcohol use: No     Alcohol/week: 0.0 oz    Drug use: No    Sexual activity: Not Currently     Partners: Male     Review of Systems   Constitutional: Positive for fever. Negative for activity change, appetite change and fatigue.   HENT: Negative for ear discharge, mouth sores, rhinorrhea and sneezing.    Eyes: Negative for photophobia.   Respiratory: Negative for cough, choking, shortness of breath and wheezing.    Cardiovascular: Positive for leg swelling. Negative for chest pain.   Gastrointestinal: Positive for abdominal pain, diarrhea and nausea. Negative for vomiting.        Severe RUQ and lower abdominal pain   Endocrine: Negative for polydipsia, polyphagia and polyuria.   Genitourinary: Negative for dysuria, flank pain, hematuria and urgency.   Musculoskeletal: Negative for back pain, joint swelling, myalgias, neck pain and neck stiffness.   Skin: Negative for color change, pallor, rash and wound.   Allergic/Immunologic: Negative for immunocompromised state.   Neurological: Negative for seizures, facial asymmetry, weakness, numbness and headaches.   Psychiatric/Behavioral: Negative for agitation, behavioral problems and confusion.     Objective:     Vital Signs  (Most Recent):  Temp: 98.2 °F (36.8 °C) (12/31/18 0535)  Pulse: 71 (12/31/18 0556)  Resp: 18 (12/30/18 2311)  BP: 112/63 (12/31/18 0431)  SpO2: 100 % (12/31/18 0556) Vital Signs (24h Range):  Temp:  [98.2 °F (36.8 °C)-100.4 °F (38 °C)] 98.2 °F (36.8 °C)  Pulse:  [64-82] 71  Resp:  [18] 18  SpO2:  [92 %-100 %] 100 %  BP: (105-137)/(52-79) 112/63     Weight: 68 kg (150 lb)  Body mass index is 32.46 kg/m².    Physical Exam   Constitutional: She is oriented to person, place, and time. She appears well-developed and well-nourished. No distress.   HENT:   Head: Normocephalic and atraumatic.   Mouth/Throat: No oropharyngeal exudate.   Eyes: Conjunctivae and EOM are normal. Pupils are equal, round, and reactive to light. Right eye exhibits no discharge. Left eye exhibits no discharge. No scleral icterus.   Neck: Normal range of motion.   Cardiovascular: Exam reveals no gallop.   No murmur heard.  Pulmonary/Chest: Effort normal and breath sounds normal. No respiratory distress. She has no wheezes. She has no rales.   Abdominal: Soft. Bowel sounds are normal. She exhibits no mass. There is tenderness. There is no guarding.   Musculoskeletal: Normal range of motion. She exhibits edema. She exhibits no tenderness or deformity.   Bilateral lower limb edema   Neurological: She is alert and oriented to person, place, and time.   Skin: Skin is warm and dry. Capillary refill takes less than 2 seconds. She is not diaphoretic.   Psychiatric: She has a normal mood and affect. Her behavior is normal.         CRANIAL NERVES     CN III, IV, VI   Pupils are equal, round, and reactive to light.  Extraocular motions are normal.        Significant Labs: All pertinent labs within the past 24 hours have been reviewed.    Significant Imaging: I have reviewed and interpreted all pertinent imaging results/findings within the past 24 hours.    Assessment/Plan:     * Pancreatic cancer    Patient recently diagnosed with stage IV pancreatic cancer.  She is on oxycodone 5 mg Q6h at home. She came because of worsening of abdominal pain and nausea. Palliative care and AES were consulted.    - f/u with oncology as an outpatient at time of discharge.  - pain control on oxycodone and hydromorphone.         Acute cholangitis    - she has a spike of fever at 100.4. Currently afebrile.  - 1L of fluid was given. Started on D5 1/2 NS 125ml/hr for 8 hours (total 1L).  - WBC was high, f/u with blood culture and lactate.  - started on Zosyn.  - AES was consulted as patient has 2 biliary stents which placed on 12/19.       Hypothyroid    Resume home dose levothyroxine.       A-fib    - she has loop recorder which was placed on 02/2017.  - resume home dose Amiodarone and Carvedilol.           VTE Risk Mitigation (From admission, onward)        Ordered     IP VTE HIGH RISK PATIENT  Once      12/31/18 0552     Place sequential compression device  Until discontinued      12/31/18 0552             Ori Engel MD  Department of Hospital Medicine   [facility]

## 2018-12-31 NOTE — HPI
Mrs. Garcia is a 78-year-old female with known stage IV pancreatic cancer (diagnosed on 12/21/2018) presents to the ED with 2 days of worsening abdominal pain with nausea and 1 day of fever.  She is pending follow-up with Oncology and palliative care. She was recently admitted, MRI showed liver and pancreatic masses, ERCP was performed on 12/19 and pathology confirmed poorly differentiated carcinoma most likely adeno. 2 stents were placed and plan was to f/u after 3 months to exchange the stent. At the moment she is a full code.  She denies vomiting, diarrhea, cough, shortness of breath, chest pain, or dysuria.  Daughter gave her oxycodone and gabapentin without resolution of symptoms. Patient lives alone in an apartment and her daughter lives across the street.  A ten point review of systems was completed and is negative except as documented above.  Patient denies any other acute medical complaint.    In the ED, she received 1L of fluid and pain medication. She has high WBC and a spike of fever at 100.4, one dose of Zosyn was given. Palliative care and AES were consulted.

## 2018-12-31 NOTE — ANESTHESIA POSTPROCEDURE EVALUATION
"Anesthesia Post Evaluation    Patient: Alfredina Claudette Parks    Procedure(s) Performed: Procedure(s) (LRB):  ERCP (ENDOSCOPIC RETROGRADE CHOLANGIOPANCREATOGRAPHY) (N/A)    Final Anesthesia Type: general  Patient location during evaluation: PACU  Patient participation: Yes- Able to Participate  Level of consciousness: awake and alert  Post-procedure vital signs: reviewed and stable  Pain management: adequate  Airway patency: patent  PONV status at discharge: No PONV  Anesthetic complications: no      Cardiovascular status: blood pressure returned to baseline  Respiratory status: unassisted  Hydration status: euvolemic  Follow-up not needed.        Visit Vitals  /64 (BP Location: Left arm, Patient Position: Lying)   Pulse 85   Temp 36.8 °C (98.2 °F) (Temporal)   Resp 20   Ht 4' 9" (1.448 m)   Wt 68 kg (150 lb)   SpO2 96%   Breastfeeding? No   BMI 32.46 kg/m²       Pain/Shantel Score: Pain Rating Prior to Med Admin: 7 (12/31/2018  2:20 PM)  Shantel Score: 9 (12/31/2018  3:30 PM)        "

## 2018-12-31 NOTE — CARE UPDATE
Palliative Medicine APRN at bedside.  Patient is unavailable and no family is present.  Palliative medicine  consult pending at this time.  Palliative medicine will continue to follow.

## 2018-12-31 NOTE — CONSULTS
[facility]  Palliative Medicine  Consult Note    Patient Name: Alfredina Claudette Parks  MRN: [id number]  Admission Date: 12/30/2018  Hospital Length of Stay: 0 days  Code Status: Full Code   Attending Provider: Alfredo Jones MD  Consulting Provider: KIRK Fuentes  Primary Care Physician: Veronica Frost MD  Principal Problem:Acute cholangitis        Inpatient consult to Palliative Care  Consult performed by: KIRK Perez  Consult ordered by: Ori Engel MD  Reason for consult: goals of care   Assessment/Recommendations: Palliative care consult received.  Chart reviewed and patient discussed with primary team.   Full consult to follow.    Thank you for consult and opportunity to participate in Ms. Garcia care.   PAU Dill, ACNS-BC

## 2018-12-31 NOTE — SUBJECTIVE & OBJECTIVE
Past Medical History:   Diagnosis Date    Absence of bladder continence 4/20/2015    Asthma     Asthma without status asthmaticus 6/28/2012    Atrial fibrillation     BMI 36.0-36.9,adult 8/25/2014    Bulging lumbar disc     Cataract     Cervical radiculopathy     Claudication 5/18/2017    Constipation 8/30/2013    Diabetes mellitus     pre diabetes     Diverticular disease 8/30/2013    GERD (gastroesophageal reflux disease) 6/28/2012    Hepatitis C     Hyperglycemia     Hypertension     Hypothyroidism     Osteoporosis     Pancreatic cyst     Vitamin D deficiency disease        Past Surgical History:   Procedure Laterality Date    BREAST CYST EXCISION Bilateral     COLONOSCOPY N/A 7/11/2013    [facility] by [facility]    CYST REMOVAL      ERCP (ENDOSCOPIC RETROGRADE CHOLANGIOPANCREATOGRAPHY) N/A 12/19/2018    Performed by [facility]hya [facility]afa [facility] at [facility] (2ND FLR)    ESOPHAGOGASTRODUODENOSCOPY (EGD) N/A 1/21/2015    Performed by Genaro Cordero MD at [facility]    EYE SURGERY      cataracts    HYSTERECTOMY      TOE SURGERY Bilateral     big toenails    ULTRASOUND, ENDOSCOPIC, UPPER GI TRACT N/A 12/19/2018    Performed by [facility]hya Mustafa MD at [facility] ENDO (2ND FLR)    ULTRASOUND-ENDOSCOPIC-UPPER N/A 1/15/2018    Performed by Jamil King MD at [facility] END[facility] (2ND FLR)    ULTRASOUND-ENDOSCOPIC-UPPER N/A 8/14/2015    [facility] by [facility]    ULTRASOUND-ENDOSCOPIC-UPPER N/A 1/21/2015    Performed by [facility]o [facility] at [facility]    ULTRASOUND-ENDOSCOPIC-UPPER W/POSSIBLE FNA N/A 2/15/2016    Performed by Genaro Cordero MD at [facility]       Review of patient's allergies indicates:   Allergen Reactions    Milk containing products Shortness Of Breath    Nuts [tree nut] Anaphylaxis    Fosamax [alendronate]      dizziness     Family History     Problem Relation (Age of Onset)    Asthma Sister, Other    Breast cancer Sister (55)     Diabetes Maternal Grandfather, Maternal Aunt    Lung cancer Father    Ovarian cancer Mother    Stomach cancer Sister    Throat cancer Brother        Tobacco Use    Smoking status: Never Smoker    Smokeless tobacco: Never Used   Substance and Sexual Activity    Alcohol use: No     Alcohol/week: 0.0 oz    Drug use: No    Sexual activity: Not Currently     Partners: Male     Review of Systems   Constitutional: Positive for activity change, appetite change and fever. Negative for chills and fatigue.   HENT: Negative for mouth sores, nosebleeds and trouble swallowing.    Eyes: Negative for pain and redness.   Respiratory: Negative for cough and shortness of breath.    Cardiovascular: Negative for chest pain and palpitations.   Gastrointestinal: Positive for abdominal distention, abdominal pain and nausea.   Genitourinary: Negative for dysuria and hematuria.   Musculoskeletal: Negative for arthralgias and joint swelling.   Skin: Negative for color change and rash.   Neurological: Positive for weakness. Negative for seizures and facial asymmetry.   Psychiatric/Behavioral: Negative for agitation and confusion.     Objective:     Vital Signs (Most Recent):  Temp: 98.1 °F (36.7 °C) (12/31/18 0640)  Pulse: 72 (12/31/18 0640)  Resp: 19 (12/31/18 0640)  BP: 133/81 (12/31/18 0640)  SpO2: 99 % (12/31/18 0642) Vital Signs (24h Range):  Temp:  [98.1 °F (36.7 °C)-100.4 °F (38 °C)] 98.1 °F (36.7 °C)  Pulse:  [64-82] 72  Resp:  [18-19] 19  SpO2:  [88 %-100 %] 99 %  BP: (105-137)/(52-81) 133/81     Weight: 68 kg (150 lb) (12/30/18 2143)  Body mass index is 32.46 kg/m².    No intake or output data in the 24 hours ending 12/31/18 0859    Lines/Drains/Airways     Peripheral Intravenous Line                 Peripheral IV - Single Lumen 12/31/18 0520 Left Antecubital less than 1 day                Physical Exam   Constitutional: She is oriented to person, place, and time. No distress.   Nontoxic appearing but chronically ill appearing    HENT:   Head: Normocephalic and atraumatic.   Eyes: Conjunctivae are normal. No scleral icterus.   Neck: Neck supple.   Cardiovascular: Normal rate and intact distal pulses.   Pulmonary/Chest: Effort normal. No stridor. No respiratory distress.   Abdominal: Soft. She exhibits no distension. There is tenderness. There is no rebound and no guarding.   mildy TTP in the epigastrium without rigidity or rebound   Musculoskeletal: She exhibits no tenderness or deformity.   Neurological: She is alert and oriented to person, place, and time.   Skin: Skin is warm and dry. No rash noted.   Psychiatric: She has a normal mood and affect.   Vitals reviewed.      Significant Labs:  Amylase: No results for input(s): AMYLASE in the last 48 hours.  Blood Culture: No results for input(s): LABBLOO in the last 48 hours.  CBC:   Recent Labs   Lab 12/30/18 2205 12/30/18 2352   WBC 18.14*  --    HGB 10.9*  --    HCT 33.0* 37     --      BMP:   Recent Labs   Lab 12/30/18  2353      *   K 3.2*   CL 95   CO2 26   BUN 22   CREATININE 0.8   CALCIUM 8.4*     CMP:   Recent Labs   Lab 12/30/18  2353      CALCIUM 8.4*   ALBUMIN 1.8*   PROT 6.8   *   K 3.2*   CO2 26   CL 95   BUN 22   CREATININE 0.8   ALKPHOS 414*   ALT 84*   *   BILITOT 2.3*     Coagulation: No results for input(s): PT, INR, APTT in the last 48 hours.  CRP: No results for input(s): CRP in the last 48 hours.  ESR: No results for input(s): SEDRATE in the last 48 hours.  H.Pylori Ab IgG: No results for input(s): HPYLORIIGG in the last 48 hours.  Lipase:   Recent Labs   Lab 12/30/18  2353   LIPASE 11     Liver Function Test:   Recent Labs   Lab 12/30/18  2353   ALT 84*   *   ALKPHOS 414*   BILITOT 2.3*   PROT 6.8   ALBUMIN 1.8*     Peritoneal Fluid Cultures: No results for input(s): AEROBICCULTU, LABGRAM in the last 48 hours.    Significant Imaging:  Imaging results within the past 24 hours have been reviewed.     CT reviewed.

## 2018-12-31 NOTE — TELEPHONE ENCOUNTER
"    Reason for Disposition   [1] SEVERE pain (e.g., excruciating) AND [2] present > 1 hour    Answer Assessment - Initial Assessment Questions  1. SYMPTOMS: "Do you have any symptoms?"      Severe abdominal pain, pancreatic cancer  2. SEVERITY: If symptoms are present, ask "Are they mild, moderate or severe?"      severe    Protocols used:  ABDOMINAL PAIN - FEMALE-A-,  MEDICATION QUESTION CALL-A-    Ms. Ortiz (patient's daughter) called to report that her mother is in severe pain and balled up in the fetal position. It is not yet time for another dose of Roxicet. They are supposed to get a referral to palliative care soon. Advised Ms. Ortiz to bring her mother to the ED now for pain management. She verbalized understanding.   "

## 2018-12-31 NOTE — CONSULTS
[facility]  [facility]  Consult Note    Patient Name: Alfredina Claudette Parks  MRN: [id number]  Admission Date: 12/30/2018  Hospital Length of Stay: 0 days  Code Status: Full Code   Attending Provider: Alfredo Jones MD   Consulting Provider: Lorenzo Biswas MD  Primary Care Physician: Veronica Frost MD  Principal Problem:Acute cholangitis    Inpatient consult to [facility] ([facility])  Consult performed by: Lorenzo Biswas MD  Consult ordered by: Ori Engel MD        Subjective:     HPI:  This is a 77 y/o female hx of known stage IV pancreatic cancer (diagnosed on 12/21/2018), s/p recent ERCP 12/19 with 2 biliary stents (7 Fr plastic) presented for worsening central abdominal pain associated with feeling warm and fevers at home.   Hx mainly obtained from daughter at bedside.  AES is consulted in concerns for cholangitis given fevers, and abdominal pain.  Pt with recent admission approx 1 week ago and had ERCP with stenting, was doing well at home, having some mild chronic central abd pains, but day of admit her central pain worsened and she started feeling very warm. No cough, no SOB, no CP, no HA, no urinary changes.  On presentation, fevers to 100.4F. Tbili slighly up from baseline. Elevated WBC. CT abdomen showed no acute changes from prior and biliary stents in situ.          Past Medical History:   Diagnosis Date    Absence of bladder continence 4/20/2015    Asthma     Asthma without status asthmaticus 6/28/2012    Atrial fibrillation     BMI 36.0-36.9,adult 8/25/2014    Bulging lumbar disc     Cataract     Cervical radiculopathy     Claudication 5/18/2017    Constipation 8/30/2013    Diabetes mellitus     pre diabetes     Diverticular disease 8/30/2013    GERD (gastroesophageal reflux disease) 6/28/2012    Hepatitis C     Hyperglycemia     Hypertension     Hypothyroidism     Osteoporosis     Pancreatic cyst     Vitamin D deficiency disease         Past Surgical History:   Procedure Laterality Date    BREAST CYST EXCISION Bilateral     COLONOSCOPY N/A 7/11/2013    [facility]formed by [facility]    CYST REMOVAL      ERCP (ENDOSCOPIC RETROGRADE CHOLANGIOPANCREATOGRAPHY) N/A 12/19/2018    Performed by [facility]afa [facility] at [facility] (2ND FLR)    ESOPHAGOGASTRODUODENOSCOPY (EGD) N/A 1/21/2015    Per[facility] by [facility] at [facility]    EYE SURGERY      cataracts    HYSTERECTOMY      TOE SURGERY Bilateral     big toenails    ULTRASOUND, ENDOSCOPIC, UPPER GI TRACT N/A 12/19/2018    Performed by [facility]hya [facility]afa MD at [facility] END[facility] (2ND FLR)    ULTRASOUND-ENDOSCOPIC-UPPER N/A 1/15/2018    Performed by Jamil King MD at [facility] ENDO (2ND FLR)    ULTRASOUND-ENDOSCOPIC-UPPER N/A 8/14/2015    Performed by [facility] MD at [facility]    ULTRASOUND-ENDOSCOPIC-UPPER N/A 1/21/2015    Performed by [facility] MD at [facility]    ULTRASOUND-ENDOSCOPIC-UPPER W/POSSIBLE FNA N/A 2/15/2016    Performed by Gen[facility]o MD at [facility]       Review of patient's allergies indicates:   Allergen Reactions    Milk containing products Shortness Of Breath    Nuts [tree nut] Anaphylaxis    Fosamax [alendronate]      dizziness     Family History     Problem Relation (Age of Onset)    Asthma Sister, Other    Breast cancer Sister (55)    Diabetes Maternal Grandfather, Maternal Aunt    Lung cancer Father    Ovarian cancer Mother    Stomach cancer Sister    Throat cancer Brother        Tobacco Use    Smoking status: Never Smoker    Smokeless tobacco: Never Used   Substance and Sexual Activity    Alcohol use: No     Alcohol/week: 0.0 oz    Drug use: No    Sexual activity: Not Currently     Partners: Male     Review of Systems   Constitutional: Positive for activity change, appetite change and fever. Negative for chills and fatigue.   HENT: Negative for mouth sores, nosebleeds and trouble swallowing.    Eyes: Negative for pain  and redness.   Respiratory: Negative for cough and shortness of breath.    Cardiovascular: Negative for chest pain and palpitations.   Gastrointestinal: Positive for abdominal distention, abdominal pain and nausea.   Genitourinary: Negative for dysuria and hematuria.   Musculoskeletal: Negative for arthralgias and joint swelling.   Skin: Negative for color change and rash.   Neurological: Positive for weakness. Negative for seizures and facial asymmetry.   Psychiatric/Behavioral: Negative for agitation and confusion.     Objective:     Vital Signs (Most Recent):  Temp: 98.1 °F (36.7 °C) (12/31/18 0640)  Pulse: 72 (12/31/18 0640)  Resp: 19 (12/31/18 0640)  BP: 133/81 (12/31/18 0640)  SpO2: 99 % (12/31/18 0642) Vital Signs (24h Range):  Temp:  [98.1 °F (36.7 °C)-100.4 °F (38 °C)] 98.1 °F (36.7 °C)  Pulse:  [64-82] 72  Resp:  [18-19] 19  SpO2:  [88 %-100 %] 99 %  BP: (105-137)/(52-81) 133/81     Weight: 68 kg (150 lb) (12/30/18 2143)  Body mass index is 32.46 kg/m².    No intake or output data in the 24 hours ending 12/31/18 0859    Lines/Drains/Airways     Peripheral Intravenous Line                 Peripheral IV - Single Lumen 12/31/18 0520 Left Antecubital less than 1 day                Physical Exam   Constitutional: She is oriented to person, place, and time. No distress.   Nontoxic appearing but chronically ill appearing   HENT:   Head: Normocephalic and atraumatic.   Eyes: Conjunctivae are normal. No scleral icterus.   Neck: Neck supple.   Cardiovascular: Normal rate and intact distal pulses.   Pulmonary/Chest: Effort normal. No stridor. No respiratory distress.   Abdominal: Soft. She exhibits no distension. There is tenderness. There is no rebound and no guarding.   mildy TTP in the epigastrium without rigidity or rebound   Musculoskeletal: She exhibits no tenderness or deformity.   Neurological: She is alert and oriented to person, place, and time.   Skin: Skin is warm and dry. No rash noted.   Psychiatric:  She has a normal mood and affect.   Vitals reviewed.      Significant Labs:  Amylase: No results for input(s): AMYLASE in the last 48 hours.  Blood Culture: No results for input(s): LABBLOO in the last 48 hours.  CBC:   Recent Labs   Lab 12/30/18  2205 12/30/18  2352   WBC 18.14*  --    HGB 10.9*  --    HCT 33.0* 37     --      BMP:   Recent Labs   Lab 12/30/18  2353      *   K 3.2*   CL 95   CO2 26   BUN 22   CREATININE 0.8   CALCIUM 8.4*     CMP:   Recent Labs   Lab 12/30/18  2353      CALCIUM 8.4*   ALBUMIN 1.8*   PROT 6.8   *   K 3.2*   CO2 26   CL 95   BUN 22   CREATININE 0.8   ALKPHOS 414*   ALT 84*   *   BILITOT 2.3*     Coagulation: No results for input(s): PT, INR, APTT in the last 48 hours.  CRP: No results for input(s): CRP in the last 48 hours.  ESR: No results for input(s): SEDRATE in the last 48 hours.  H.Pylori Ab IgG: No results for input(s): HPYLORIIGG in the last 48 hours.  Lipase:   Recent Labs   Lab 12/30/18  2353   LIPASE 11     Liver Function Test:   Recent Labs   Lab 12/30/18  2353   ALT 84*   *   ALKPHOS 414*   BILITOT 2.3*   PROT 6.8   ALBUMIN 1.8*     Peritoneal Fluid Cultures: No results for input(s): AEROBICCULTU, LABGRAM in the last 48 hours.    Significant Imaging:  Imaging results within the past 24 hours have been reviewed.     CT reviewed.    Assessment/Plan:     * Acute cholangitis    Mild rise in Tbili, now with fevers and abdominal pain and recent placemeent of 2 biliary stents.  Concern for possible stent occlusion.    Recs:  We will plan ERCP today   Keep NPO now  Hold anticoagulation.  Trend LFTs.  Recommend oncology and palliative consultations for further assessment of GOC         Thank you for your consult. I will follow-up with patient. Please contact us if you have any additional questions.    Lorenzo Biswas MD  Gastroenterology  [facility]

## 2018-12-31 NOTE — SUBJECTIVE & OBJECTIVE
Past Medical History:   Diagnosis Date    Absence of bladder continence 4/20/2015    Asthma     Asthma without status asthmaticus 6/28/2012    Atrial fibrillation     BMI 36.0-36.9,adult 8/25/2014    Bulging lumbar disc     Cataract     Cervical radiculopathy     Claudication 5/18/2017    Constipation 8/30/2013    Diabetes mellitus     pre diabetes     Diverticular disease 8/30/2013    GERD (gastroesophageal reflux disease) 6/28/2012    Hepatitis C     Hyperglycemia     Hypertension     Hypothyroidism     Osteoporosis     Pancreatic cyst     Vitamin D deficiency disease        Past Surgical History:   Procedure Laterality Date    BREAST CYST EXCISION Bilateral     COLONOSCOPY N/A 7/11/2013    [facility] by [facility] at [facility]    CYST REMOVAL      ERCP (ENDOSCOPIC RETROGRADE CHOLANGIOPANCREATOGRAPHY) N/A 12/19/2018    Performed by [facility]a [facility]afa MD at [facility] (2ND FLR)    ESOPHAGOGASTRODUODENOSCOPY (EGD) N/A 1/21/2015    [facility] by [facility] at [facility]    EYE SURGERY      cataracts    HYSTERECTOMY      TOE SURGERY Bilateral     big toenails    ULTRASOUND, ENDOSCOPIC, UPPER GI TRACT N/A 12/19/2018    Performed by Vidhya Mustafa MD at [facility] ENDO (2ND FLR)    ULTRASOUND-ENDOSCOPIC-UPPER N/A 1/15/2018    Performed by Jamil King MD at [facility] ENDO (2ND FLR)    ULTRASOUND-ENDOSCOPIC-UPPER N/A 8/14/2015    Per[facility] by [facility] at [facility] END[facility]    ULTRASOUND-ENDOSCOPIC-UPPER N/A 1/21/2015    [facility] by [facility] at [facility]    ULTRASOUND-ENDOSCOPIC-UPPER W/POSSIBLE FNA N/A 2/15/2016    Performed by Genrao Cordero MD at [facility]       Review of patient's allergies indicates:   Allergen Reactions    Milk containing products Shortness Of Breath    Nuts [tree nut] Anaphylaxis    Fosamax [alendronate]      dizziness       No current facility-administered medications on file prior to encounter.      Current Outpatient Medications on  File Prior to Encounter   Medication Sig    acetaminophen (TYLENOL) 325 MG tablet Take 2 tablets (650 mg total) by mouth every 6 (six) hours.    albuterol (PROVENTIL) 2.5 mg /3 mL (0.083 %) nebulizer solution TAKE 3ML BY NEBULIZATION EVERY 6 HOURS AS NEEDED FOR WHEEZING.    albuterol (VENTOLIN HFA) 90 mcg/actuation inhaler INHALE 2 PUFFS PO Q 6 H PRN (Patient taking differently: Inhale 2 puffs into the lungs every 6 (six) hours as needed for Wheezing or Shortness of Breath. )    allopurinol (ZYLOPRIM) 100 MG tablet Take 1 tablet (100 mg total) by mouth once daily.    amiodarone (PACERONE) 100 MG Tab Take 100 mg by mouth once daily    aspirin (ECOTRIN) 81 MG EC tablet Take 81 mg by mouth once daily.      carvedilol (COREG) 3.125 MG tablet Take 3.125 mg by mouth 2 (two) times daily with meals.    colchicine 0.6 mg tablet Take 1 tablet (0.6 mg total) by mouth once daily. (Patient taking differently: Take 0.6 mg by mouth daily as needed (gout flare). )    diclofenac sodium (VOLTAREN) 1 % Gel Apply 2 g topically as needed (shoulder pain).    fluticasone (FLONASE) 50 mcg/actuation nasal spray SHAKE LIQUID AND USE 1 SPRAY IN EACH NOSTRIL EVERY DAY (Patient taking differently: SHAKE LIQUID AND USE 1 SPRAY IN EACH NOSTRIL EVERY DAY AS NEEDED FOR ALLERGIES)    fluticasone-salmeterol 250-50 mcg/dose (ADVAIR DISKUS) 250-50 mcg/dose diskus inhaler Inhale 1 puff into the lungs 2 (two) times daily. Controller    furosemide (LASIX) 20 MG tablet TAKE 2 TABLETS(40 MG) BY MOUTH EVERY DAY (Patient taking differently: Take 20 mg by mouth twice daily)    gabapentin (NEURONTIN) 100 MG capsule Take 2 capsules (200 mg total) by mouth 3 (three) times daily.    levothyroxine (SYNTHROID) 125 MCG tablet Take 1 tablet (125 mcg total) by mouth once daily.    loratadine (CLARITIN) 10 mg tablet Take 10 mg by mouth once daily.    oxyCODONE (ROXICODONE) 5 MG immediate release tablet Take 1 tablet (5 mg total) by mouth every 6 (six)  hours as needed.    pantoprazole (PROTONIX) 40 MG tablet TAKE 1 TABLET BY MOUTH ONCE DAILY, 30 MINUTES BEFORE EATING    polyethylene glycol (GLYCOLAX) 17 gram/dose powder Take 17 g by mouth once daily. (Patient taking differently: Take 17 g by mouth daily as needed (constipation). )    senna-docusate 8.6-50 mg (PERICOLACE) 8.6-50 mg per tablet Take 1 tablet by mouth once daily.    solifenacin (VESICARE) 5 MG tablet Take 1 tablet (5 mg total) by mouth once daily.    [DISCONTINUED] denosumab (PROLIA) 60 mg/mL Syrg Inject 1 mL (60 mg total) into the skin every 6 (six) months.     Family History     Problem Relation (Age of Onset)    Asthma Sister, Other    Breast cancer Sister (55)    Diabetes Maternal Grandfather, Maternal Aunt    Lung cancer Father    Ovarian cancer Mother    Stomach cancer Sister    Throat cancer Brother        Tobacco Use    Smoking status: Never Smoker    Smokeless tobacco: Never Used   Substance and Sexual Activity    Alcohol use: No     Alcohol/week: 0.0 oz    Drug use: No    Sexual activity: Not Currently     Partners: Male     Review of Systems   Constitutional: Positive for fever. Negative for activity change, appetite change and fatigue.   HENT: Negative for ear discharge, mouth sores, rhinorrhea and sneezing.    Eyes: Negative for photophobia.   Respiratory: Negative for cough, choking, shortness of breath and wheezing.    Cardiovascular: Positive for leg swelling. Negative for chest pain.   Gastrointestinal: Positive for abdominal pain, diarrhea and nausea. Negative for vomiting.        Severe RUQ and lower abdominal pain   Endocrine: Negative for polydipsia, polyphagia and polyuria.   Genitourinary: Negative for dysuria, flank pain, hematuria and urgency.   Musculoskeletal: Negative for back pain, joint swelling, myalgias, neck pain and neck stiffness.   Skin: Negative for color change, pallor, rash and wound.   Allergic/Immunologic: Negative for immunocompromised state.    Neurological: Negative for seizures, facial asymmetry, weakness, numbness and headaches.   Psychiatric/Behavioral: Negative for agitation, behavioral problems and confusion.     Objective:     Vital Signs (Most Recent):  Temp: 98.2 °F (36.8 °C) (12/31/18 0535)  Pulse: 71 (12/31/18 0556)  Resp: 18 (12/30/18 2311)  BP: 112/63 (12/31/18 0431)  SpO2: 100 % (12/31/18 0556) Vital Signs (24h Range):  Temp:  [98.2 °F (36.8 °C)-100.4 °F (38 °C)] 98.2 °F (36.8 °C)  Pulse:  [64-82] 71  Resp:  [18] 18  SpO2:  [92 %-100 %] 100 %  BP: (105-137)/(52-79) 112/63     Weight: 68 kg (150 lb)  Body mass index is 32.46 kg/m².    Physical Exam   Constitutional: She is oriented to person, place, and time. She appears well-developed and well-nourished. No distress.   HENT:   Head: Normocephalic and atraumatic.   Mouth/Throat: No oropharyngeal exudate.   Eyes: Conjunctivae and EOM are normal. Pupils are equal, round, and reactive to light. Right eye exhibits no discharge. Left eye exhibits no discharge. No scleral icterus.   Neck: Normal range of motion.   Cardiovascular: Exam reveals no gallop.   No murmur heard.  Pulmonary/Chest: Effort normal and breath sounds normal. No respiratory distress. She has no wheezes. She has no rales.   Abdominal: Soft. Bowel sounds are normal. She exhibits no mass. There is tenderness. There is no guarding.   Musculoskeletal: Normal range of motion. She exhibits edema. She exhibits no tenderness or deformity.   Bilateral lower limb edema   Neurological: She is alert and oriented to person, place, and time.   Skin: Skin is warm and dry. Capillary refill takes less than 2 seconds. She is not diaphoretic.   Psychiatric: She has a normal mood and affect. Her behavior is normal.         CRANIAL NERVES     CN III, IV, VI   Pupils are equal, round, and reactive to light.  Extraocular motions are normal.        Significant Labs: All pertinent labs within the past 24 hours have been reviewed.    Significant Imaging:  I have reviewed and interpreted all pertinent imaging results/findings within the past 24 hours.

## 2018-12-31 NOTE — NURSING TRANSFER
Nursing Transfer Note      12/31/2018     Transfer to: 8069    Transfer via: Stretcher    Transfer with: IV Pump; Cardiac Monitor in place; Oxygen at 2L NC    Transported by: Patient Escort    Medicines sent: N/A    Chart send with patient: Yes    Notified: Report called to JES Maurice    Patient reassessed at: 1530

## 2018-12-31 NOTE — PROGRESS NOTES
Dr. Zuñiga called to bedside for patient with sudden change. Increased WOB noted with difficulty breathing per patient and increase in BP. Also complaints of nausea and abdominal pain. Respiratory therapist at bedside administering breathing treatment.

## 2018-12-31 NOTE — NURSING TRANSFER
Nursing Transfer Note      12/31/2018     Transfer from ED to room 8092    Transfer via stretcher    Transfer with IV, cardiac monitor, personal belongings, oxygen    Transported by 2 pt transport    Medicines sent: zosyn (via IV)    Chart send with patient: yes    Notified: yes    Patient reassessed at: 12/21/18 @ 0640am    Upon arrival to floor: pt oriented to room/unit policies, vital signs taken, placed on oxygen, height and weight taken, discussed POC briefly to include IV fluids/abt, oxygen prn, NPO until further orders received from MD; pt verbalized understanding and had no further questions at this time.

## 2018-12-31 NOTE — ED PROVIDER NOTES
Encounter Date: 12/30/2018       History     Chief Complaint   Patient presents with    Abdominal Pain     chronic abd pain worsening since Friday, hx of pancreatic cancer, family reports fever onset today, no chemo/radiation     78-year-old female with known stage IV pancreatic cancer presents to the ED with 2 days of worsening abdominal pain and 1 day of fever.  Patient was recently hospitalized around the 21st of December when she received the diagnosis.  She is pending follow-up with Oncology and palliative care.  At the moment she is a full code.  She denies nausea, vomiting, diarrhea, cough, shortness of breath, chest pain, or dysuria.  Daughter gave her oxycodone and gabapentin without resolution of symptoms. Patient lives alone in an apartment and her daughter lives across the street.  A ten point review of systems was completed and is negative except as documented above.  Patient denies any other acute medical complaint.  The patients PMH, PSH, medications, allergies, and triage vital signs were reviewed just prior to their medical evaluation.          Review of patient's allergies indicates:   Allergen Reactions    Milk containing products Shortness Of Breath    Nuts [tree nut] Anaphylaxis    Fosamax [alendronate]      dizziness     Past Medical History:   Diagnosis Date    Absence of bladder continence 4/20/2015    Asthma     Asthma without status asthmaticus 6/28/2012    Atrial fibrillation     BMI 36.0-36.9,adult 8/25/2014    Bulging lumbar disc     Cataract     Cervical radiculopathy     Claudication 5/18/2017    Constipation 8/30/2013    Diabetes mellitus     pre diabetes     Diverticular disease 8/30/2013    GERD (gastroesophageal reflux disease) 6/28/2012    Hepatitis C     Hyperglycemia     Hypertension     Hypothyroidism     Osteoporosis     Pancreatic cyst     Vitamin D deficiency disease      Past Surgical History:   Procedure Laterality Date    BREAST CYST EXCISION  Bilateral     COLONOSCOPY N/A 7/11/2013    Performed by [facility]isha [facility]iega [facility] at [facility]    CYST REMOVAL      ERCP (ENDOSCOPIC RETROGRADE CHOLANGIOPANCREATOGRAPHY) N/A 12/19/2018    Performed by [facility]hya [facility]afa [facility] at [facility] (2ND FLR)    ESOPHAGOGASTRODUODENOSCOPY (EGD) N/A 1/21/2015    Performed by Genaro Cordero MD at [facility]    EYE SURGERY      cataracts    HYSTERECTOMY      TOE SURGERY Bilateral     big toenails    ULTRASOUND, ENDOSCOPIC, UPPER GI TRACT N/A 12/19/2018    Performed by Vidhya Mustafa MD at [facility] (2ND FLR)    ULTRASOUND-ENDOSCOPIC-UPPER N/A 1/15/2018    Performed by Jamil King MD at [facility] (2ND FLR)    ULTRASOUND-ENDOSCOPIC-UPPER N/A 8/14/2015    Performed by [facility]o [facility] at [facility]    ULTRASOUND-ENDOSCOPIC-UPPER N/A 1/21/2015    Performed by Gen[facility]ordero MD at [facility]    ULTRASOUND-ENDOSCOPIC-UPPER W/POSSIBLE FNA N/A 2/15/2016    Performed by Genaro Cordero MD at [facility]     Family History   Problem Relation Age of Onset    Ovarian cancer Mother     Lung cancer Father     Stomach cancer Sister     Asthma Sister     Throat cancer Brother     Diabetes Maternal Grandfather     Diabetes Maternal Aunt     Breast cancer Sister 55    Asthma Other     Emphysema Neg Hx      Social History     Tobacco Use    Smoking status: Never Smoker    Smokeless tobacco: Never Used   Substance Use Topics    Alcohol use: No     Alcohol/week: 0.0 oz    Drug use: No     Review of Systems   Constitutional: Positive for fever.   HENT: Negative for postnasal drip, rhinorrhea, sinus pain and sore throat.    Eyes: Negative for visual disturbance.   Respiratory: Negative for cough and shortness of breath.    Cardiovascular: Negative for chest pain.   Gastrointestinal: Positive for abdominal pain. Negative for diarrhea, nausea and vomiting.   Genitourinary: Negative for dysuria.   Musculoskeletal: Negative for neck pain.   Skin: Negative for rash.    Neurological: Negative for headaches.       Physical Exam     Initial Vitals [12/30/18 2143]   BP Pulse Resp Temp SpO2   (!) 105/52 73 18 (!) 100.4 °F (38 °C) 95 %      MAP       --         Physical Exam    Constitutional: She appears well-developed and well-nourished. She is not diaphoretic. No distress.   HENT:   Head: Normocephalic and atraumatic.   Right Ear: External ear normal.   Left Ear: External ear normal.   Nose: Nose normal.   Eyes: Conjunctivae are normal. Right eye exhibits no discharge. Left eye exhibits no discharge. No scleral icterus.   Neck: Normal range of motion. Neck supple.   nontender   Cardiovascular: Normal rate, normal heart sounds and intact distal pulses. Exam reveals no gallop and no friction rub.    No murmur heard.  Irregular irregular   Pulmonary/Chest: No respiratory distress. She has no wheezes. She has no rhonchi. She has no rales.   Abdominal: Soft. She exhibits no distension. There is tenderness. There is no rebound and no guarding.   Diffuse ttp, worse in ruq and suad   Musculoskeletal: She exhibits no edema or tenderness.   Neurological: She is alert and oriented to person, place, and time. She has normal strength. GCS score is 15. GCS eye subscore is 4. GCS verbal subscore is 5. GCS motor subscore is 6.   Skin: Skin is warm and dry. No rash noted. No erythema.   Psychiatric: She has a normal mood and affect. Her behavior is normal. Judgment and thought content normal.         ED Course   Procedures  Labs Reviewed   CBC W/ AUTO DIFFERENTIAL - Abnormal; Notable for the following components:       Result Value    WBC 18.14 (*)     RBC 3.64 (*)     Hemoglobin 10.9 (*)     Hematocrit 33.0 (*)     RDW 20.3 (*)     Immature Granulocytes 0.6 (*)     Gran # (ANC) 14.1 (*)     Immature Grans (Abs) 0.11 (*)     Mono # 1.4 (*)     Gran% 77.9 (*)     Lymph% 13.0 (*)     All other components within normal limits   URINALYSIS, REFLEX TO URINE CULTURE - Abnormal; Notable for the following  components:    Specific Gravity, UA >=1.030 (*)     All other components within normal limits    Narrative:     Preferred Collection Type->Urine, Clean Catch  Received a cup of urine.   COMPREHENSIVE METABOLIC PANEL - Abnormal; Notable for the following components:    Sodium 131 (*)     Potassium 3.2 (*)     Calcium 8.4 (*)     Albumin 1.8 (*)     Total Bilirubin 2.3 (*)     Alkaline Phosphatase 414 (*)      (*)     ALT 84 (*)     All other components within normal limits   URINALYSIS MICROSCOPIC - Abnormal; Notable for the following components:    RBC, UA 9 (*)     Hyaline Casts, UA 16 (*)     All other components within normal limits    Narrative:     Preferred Collection Type->Urine, Clean Catch  Received a cup of urine.   ISTAT PROCEDURE - Abnormal; Notable for the following components:    POC Glucose 112 (*)     POC Sodium 134 (*)     POC Potassium 3.1 (*)     POC Chloride 93 (*)     POC Ionized Calcium 0.99 (*)     All other components within normal limits   CULTURE, BLOOD   CULTURE, BLOOD   LIPASE   LACTIC ACID, PLASMA          Imaging Results          CT Abdomen Pelvis With Contrast (Final result)  Result time 12/31/18 04:19:37    Final result by Tien Newell MD (12/31/18 04:19:37)                 Impression:      No new acute abnormality.    Persistent ill-defined hypoattenuating lesion in the central liver with associated intrahepatic biliary ductal dilation as described previously, slightly enlarged when compared with prior CT.  Interval placement of 2 biliary stents which terminate in the duodenum.    Stable mass lesion in the region of the pancreatic head/neck, possibly a peripancreatic mass as there is no significant pancreatic ductal dilation.  Recommend correlation with recent biopsy and EUS findings.  Additional soft tissue lesions in the upper abdomen and retroperitoneal lymphadenopathy.  Overall similar to prior CT and MRI.    Status post ERCP with biliary stent placement.  Stable  intrahepatic biliary duct dilatation.    Nonspecific diffuse gallbladder wall thickening, similar to prior studies.    Additional findings as above.    Electronically signed by resident: Jayce Matthews  Date:    12/31/2018  Time:    00:36    Electronically signed by: Tien Newell MD  Date:    12/31/2018  Time:    04:19             Narrative:    EXAMINATION:  CT ABDOMEN PELVIS WITH CONTRAST    CLINICAL HISTORY:  Abdominal pain, unspecified;    TECHNIQUE:  Low dose axial images, sagittal and coronal reformations were obtained from the lung bases to the pubic symphysis following the IV administration of 75 mL of Omnipaque 350 .  Oral contrast was not given. Postcontrast images were also obtained in the delayed phase.    COMPARISON:  MRI abdomen from 12/18/2018 and CT abdomen/pelvis from 12/17/2018.    FINDINGS:  Heart: Normal in size. No pericardial effusion.    Lung Bases: Bilateral band like opacities seen at the lung bases, left greater than right.  Findings likely represent atelectasis or scarring.  No significant pleural fluid.    Liver: Evaluation is limited by suboptimal contrast timing.  Nodular contour.  Low-density mass lesion re-identified within the right hepatic lobe measuring approximately 6.0 x 4.3 x 5.1 cm, previously 5.1 x 4.2 x 4.8 cm.  There is persistent dilatation of left and right intrahepatic bile ducts in close proximity to the large hepatics mass.  Interval placement of biliary stents.  Portal vein not well characterized secondary to suboptimal bolus timing.    Gallbladder: Irregular gallbladder wall thickening with edema and mucosal hyperenhancement, similar to most recent CT.    Bile Ducts: Stable intrahepatic biliary duct dilatation with biliary duct stents in place.    Pancreas: Ill-defined hypoattenuating mass re-identified in the region of the pancreatic neck/head, although better characterized on prior MRI.  No pancreatic ductal dilation, suggesting that this may represent a  peripancreatic mass.    Spleen: Mildly enlarged.    Adrenals: Unremarkable.    Kidneys/ Ureters: Normal in size and location.  Stable simple left renal cyst.  Bilateral subcentimeter renal hypodensities which are too small to fully characterize, likely cysts.  Normal concentration of contrast. No hydronephrosis or nephrolithiasis. No ureteral dilatation.    Bladder: No evidence of wall thickening.    GI Tract/Mesentery: Scattered diverticulosis without evidence of acute diverticulitis.  No evidence of bowel obstruction or inflammation.    Peritoneal Space/mesenteric: No ascites. No free air. Multiple foci of abnormal soft tissue re-identified in the upper abdomen and peripancreatic region adjacent to the celiac artery.  Enlarged celiac axis lymph node re-identified measuring 1.2 cm in short axis.    Abdominal wall:  Unremarkable.    Vasculature: Atherosclerosis without aneurysm.    Bones: No acute fracture.  Stable degenerative changes and dextro scoliosis of the thoracolumbar spine.                                 Medical Decision Making:   History:   Old Medical Records: I decided to obtain old medical records.  Old Records Summarized: records from previous admission(s).  Clinical Tests:   Lab Tests: Ordered and Reviewed  Radiological Study: Ordered and Reviewed  ED Management:  78-year-old female presents with stage IV pancreatic cancer, abdominal pain, and fever.  Vitals confirm fever.  Mid epigastric and right upper quadrant tenderness to palpation. Rest of physical exam benign.  Labs with elevated white blood cell count and total bilirubin.  CT with no acute pathology.  Discussed with medicine who was concerned for cholangitis.  Added labs and started on antibiotics.  She has been stable at bedside and her pain has been well controlled.  No evidence of septic shock.  Lactic acid 1.5.  Blood culture sent prior to antibiotics.  No indication for 30 cc/kilos fluid bolus.  Discussed with daughter by phone.     Other:   I have discussed this case with another health care provider.       <> Summary of the Discussion: IM                      Clinical Impression:   Acute cholangitis      Disposition:   Disposition: Admitted  Condition: Stable                        Modesto Agrawal MD  12/31/18 9323

## 2018-12-31 NOTE — ED NOTES
Patient moved to ED room 3  via ems stretcher, patient assisted onto stretcher and changed into a gown. Patient placed on cardiac monitor, continuous pulse oximetry and automatic blood pressure cuff. Bed placed in low locked position, side rails up x 2, call light is within reach of patient or family, orientation to room and explanation of wait provided to family and patient, alarms set and turned on for monitor and pulse ox, awaiting MD evaluation and orders, will continue to monitor.

## 2018-12-31 NOTE — PLAN OF CARE
Problem: Adult Inpatient Plan of Care  Goal: Plan of Care Review  Outcome: Ongoing (interventions implemented as appropriate)  Pt had ERCP today. Resting quietly in room. Pain medication given once for c/o headache and abdominal pain. Will continue to monitor.

## 2018-12-31 NOTE — HPI
This is a 79 y/o female hx of known stage IV pancreatic cancer (diagnosed on 12/21/2018), s/p recent ERCP 12/19 with 2 biliary stents (7 Fr plastic) presented for worsening central abdominal pain associated with feeling warm and fevers at home.   Hx mainly obtained from daughter at bedside.  AES is consulted in concerns for cholangitis given fevers, and abdominal pain.  Pt with recent admission approx 1 week ago and had ERCP with stenting, was doing well at home, having some mild chronic central abd pains, but day of admit her central pain worsened and she started feeling very warm. No cough, no SOB, no CP, no HA, no urinary changes.  On presentation, fevers to 100.4F. Tbili slighly up from baseline. Elevated WBC. CT abdomen showed no acute changes from prior and biliary stents in situ.

## 2018-12-31 NOTE — ASSESSMENT & PLAN NOTE
Mild rise in Tbili, now with fevers and abdominal pain and recent placemeent of 2 biliary stents.  Concern for possible stent occlusion.    Recs:  We will plan ERCP today   Keep NPO now  Hold anticoagulation.  Trend LFTs.  Recommend oncology and palliative consultations for further assessment of GOC

## 2018-12-31 NOTE — TRANSFER OF CARE
"Anesthesia Transfer of Care Note    Patient: Alfredina Claudette Parks    Procedure(s) Performed: Procedure(s) (LRB):  ERCP (ENDOSCOPIC RETROGRADE CHOLANGIOPANCREATOGRAPHY) (N/A)    Patient location: PACU    Anesthesia Type: general    Transport from OR: Transported from OR on 2-3 L/min O2 by NC with adequate spontaneous ventilation    Post pain: adequate analgesia    Post assessment: no apparent anesthetic complications and tolerated procedure well    Post vital signs: stable    Level of consciousness: responds to stimulation and sedated    Nausea/Vomiting: no nausea/vomiting    Complications: none    Transfer of care protocol was followed      Last vitals:   Visit Vitals  BP (!) 131/59 (BP Location: Left arm, Patient Position: Lying)   Pulse 62   Temp 36.7 °C (98.1 °F) (Temporal)   Resp 20   Ht 4' 9" (1.448 m)   Wt 68 kg (150 lb)   SpO2 97%   Breastfeeding? No   BMI 32.46 kg/m²     "

## 2018-12-31 NOTE — CONSULTS
[facility]  Palliative Medicine  Consult Note    Patient Name: Alfredina Claudette Parks  MRN: [id number]  Admission Date: 12/30/2018  Hospital Length of Stay: 0 days  Code Status: Full Code   Attending Provider: Alfredo Jones MD  Consulting Provider: KIRK Fuentes  Primary Care Physician: Veronica Frost MD  Principal Problem:Acute cholangitis        Inpatient consult to Palliative Care  Consult performed by: KIRK Perez  Consult ordered by: Steve Walters DO  Reason for consult: goals of care   Assessment/Recommendations: Palliative care consult received.  Patient discussed with IM4 resident.   Full consult to follow.   Thank you for consult and opportunity to participate in Ms. Hernandez's care.   PAU Dill, ACNS-BC, OCN

## 2019-01-01 PROBLEM — E83.42 HYPOMAGNESEMIA: Status: ACTIVE | Noted: 2019-01-01

## 2019-01-01 PROBLEM — Z51.5 PALLIATIVE CARE ENCOUNTER: Status: ACTIVE | Noted: 2019-01-01

## 2019-01-01 LAB
ALBUMIN SERPL BCP-MCNC: 1.8 G/DL
ALBUMIN SERPL BCP-MCNC: 1.8 G/DL
ALP SERPL-CCNC: 425 U/L
ALT SERPL W/O P-5'-P-CCNC: 86 U/L
ANION GAP SERPL CALC-SCNC: 10 MMOL/L
AST SERPL-CCNC: 195 U/L
BASOPHILS # BLD AUTO: 0.05 K/UL
BASOPHILS NFR BLD: 0.3 %
BILIRUB DIRECT SERPL-MCNC: 2.2 MG/DL
BILIRUB SERPL-MCNC: 3 MG/DL
BUN SERPL-MCNC: 22 MG/DL
CALCIUM SERPL-MCNC: 8.5 MG/DL
CHLORIDE SERPL-SCNC: 100 MMOL/L
CO2 SERPL-SCNC: 26 MMOL/L
CREAT SERPL-MCNC: 0.9 MG/DL
DIFFERENTIAL METHOD: ABNORMAL
EOSINOPHIL # BLD AUTO: 0.1 K/UL
EOSINOPHIL NFR BLD: 0.7 %
ERYTHROCYTE [DISTWIDTH] IN BLOOD BY AUTOMATED COUNT: 19.8 %
EST. GFR  (AFRICAN AMERICAN): >60 ML/MIN/1.73 M^2
EST. GFR  (NON AFRICAN AMERICAN): >60 ML/MIN/1.73 M^2
GLUCOSE SERPL-MCNC: 89 MG/DL
HCT VFR BLD AUTO: 33.5 %
HGB BLD-MCNC: 10.5 G/DL
IMM GRANULOCYTES # BLD AUTO: 0.07 K/UL
IMM GRANULOCYTES NFR BLD AUTO: 0.5 %
LYMPHOCYTES # BLD AUTO: 2.1 K/UL
LYMPHOCYTES NFR BLD: 14.3 %
MAGNESIUM SERPL-MCNC: 1.5 MG/DL
MCH RBC QN AUTO: 31.1 PG
MCHC RBC AUTO-ENTMCNC: 31.3 G/DL
MCV RBC AUTO: 99 FL
MONOCYTES # BLD AUTO: 1.6 K/UL
MONOCYTES NFR BLD: 11 %
NEUTROPHILS # BLD AUTO: 10.9 K/UL
NEUTROPHILS NFR BLD: 73.2 %
NRBC BLD-RTO: 0 /100 WBC
PHOSPHATE SERPL-MCNC: 3.6 MG/DL
PLATELET # BLD AUTO: 246 K/UL
PMV BLD AUTO: 10.2 FL
POTASSIUM SERPL-SCNC: 3.9 MMOL/L
PROT SERPL-MCNC: 6.8 G/DL
RBC # BLD AUTO: 3.38 M/UL
SODIUM SERPL-SCNC: 136 MMOL/L
WBC # BLD AUTO: 14.92 K/UL

## 2019-01-01 PROCEDURE — 99233 SBSQ HOSP IP/OBS HIGH 50: CPT | Mod: GC,,, | Performed by: HOSPITALIST

## 2019-01-01 PROCEDURE — 25000242 PHARM REV CODE 250 ALT 637 W/ HCPCS: Performed by: STUDENT IN AN ORGANIZED HEALTH CARE EDUCATION/TRAINING PROGRAM

## 2019-01-01 PROCEDURE — 83735 ASSAY OF MAGNESIUM: CPT

## 2019-01-01 PROCEDURE — 25000003 PHARM REV CODE 250: Performed by: STUDENT IN AN ORGANIZED HEALTH CARE EDUCATION/TRAINING PROGRAM

## 2019-01-01 PROCEDURE — 99233 PR SUBSEQUENT HOSPITAL CARE,LEVL III: ICD-10-PCS | Mod: GC,,, | Performed by: HOSPITALIST

## 2019-01-01 PROCEDURE — 63600175 PHARM REV CODE 636 W HCPCS: Performed by: STUDENT IN AN ORGANIZED HEALTH CARE EDUCATION/TRAINING PROGRAM

## 2019-01-01 PROCEDURE — 36415 COLL VENOUS BLD VENIPUNCTURE: CPT

## 2019-01-01 PROCEDURE — 85025 COMPLETE CBC W/AUTO DIFF WBC: CPT

## 2019-01-01 PROCEDURE — 97166 OT EVAL MOD COMPLEX 45 MIN: CPT

## 2019-01-01 PROCEDURE — 80069 RENAL FUNCTION PANEL: CPT

## 2019-01-01 PROCEDURE — 25000003 PHARM REV CODE 250: Performed by: INTERNAL MEDICINE

## 2019-01-01 PROCEDURE — 20600001 HC STEP DOWN PRIVATE ROOM

## 2019-01-01 PROCEDURE — 80076 HEPATIC FUNCTION PANEL: CPT

## 2019-01-01 RX ORDER — ONDANSETRON 4 MG/1
4 TABLET, FILM COATED ORAL EVERY 6 HOURS PRN
Status: DISCONTINUED | OUTPATIENT
Start: 2019-01-01 | End: 2019-01-10 | Stop reason: HOSPADM

## 2019-01-01 RX ORDER — MAGNESIUM SULFATE HEPTAHYDRATE 40 MG/ML
2 INJECTION, SOLUTION INTRAVENOUS ONCE
Status: COMPLETED | OUTPATIENT
Start: 2019-01-01 | End: 2019-01-01

## 2019-01-01 RX ADMIN — AMIODARONE HYDROCHLORIDE 100 MG: 100 TABLET ORAL at 08:01

## 2019-01-01 RX ADMIN — HYDROMORPHONE HYDROCHLORIDE 1 MG: 1 INJECTION, SOLUTION INTRAMUSCULAR; INTRAVENOUS; SUBCUTANEOUS at 07:01

## 2019-01-01 RX ADMIN — FLUTICASONE PROPIONATE 50 MCG: 50 SPRAY, METERED NASAL at 10:01

## 2019-01-01 RX ADMIN — MAGNESIUM SULFATE IN WATER 2 G: 40 INJECTION, SOLUTION INTRAVENOUS at 10:01

## 2019-01-01 RX ADMIN — PANTOPRAZOLE SODIUM 40 MG: 40 TABLET, DELAYED RELEASE ORAL at 08:01

## 2019-01-01 RX ADMIN — PIPERACILLIN AND TAZOBACTAM 4.5 G: 4; .5 INJECTION, POWDER, LYOPHILIZED, FOR SOLUTION INTRAVENOUS; PARENTERAL at 08:01

## 2019-01-01 RX ADMIN — ONDANSETRON 4 MG: 4 TABLET, FILM COATED ORAL at 12:01

## 2019-01-01 RX ADMIN — ASPIRIN 81 MG: 81 TABLET, COATED ORAL at 08:01

## 2019-01-01 RX ADMIN — CARVEDILOL 3.12 MG: 3.12 TABLET, FILM COATED ORAL at 08:01

## 2019-01-01 RX ADMIN — STANDARDIZED SENNA CONCENTRATE AND DOCUSATE SODIUM 1 TABLET: 8.6; 5 TABLET, FILM COATED ORAL at 08:01

## 2019-01-01 RX ADMIN — ALLOPURINOL 100 MG: 100 TABLET ORAL at 08:01

## 2019-01-01 RX ADMIN — GABAPENTIN 200 MG: 100 CAPSULE ORAL at 04:01

## 2019-01-01 RX ADMIN — SOLIFENACIN SUCCINATE 5 MG: 5 TABLET, FILM COATED ORAL at 10:01

## 2019-01-01 RX ADMIN — LEVOTHYROXINE SODIUM 125 MCG: 25 TABLET ORAL at 05:01

## 2019-01-01 RX ADMIN — GABAPENTIN 200 MG: 100 CAPSULE ORAL at 08:01

## 2019-01-01 RX ADMIN — POLYETHYLENE GLYCOL 3350 17 G: 17 POWDER, FOR SOLUTION ORAL at 08:01

## 2019-01-01 RX ADMIN — HYDROMORPHONE HYDROCHLORIDE 1 MG: 1 INJECTION, SOLUTION INTRAMUSCULAR; INTRAVENOUS; SUBCUTANEOUS at 05:01

## 2019-01-01 RX ADMIN — CARVEDILOL 3.12 MG: 3.12 TABLET, FILM COATED ORAL at 04:01

## 2019-01-01 RX ADMIN — PIPERACILLIN AND TAZOBACTAM 4.5 G: 4; .5 INJECTION, POWDER, LYOPHILIZED, FOR SOLUTION INTRAVENOUS; PARENTERAL at 05:01

## 2019-01-01 RX ADMIN — PIPERACILLIN AND TAZOBACTAM 4.5 G: 4; .5 INJECTION, POWDER, LYOPHILIZED, FOR SOLUTION INTRAVENOUS; PARENTERAL at 12:01

## 2019-01-01 NOTE — ASSESSMENT & PLAN NOTE
- she has a spike of fever at 100.4. Currently afebrile.  - 1L of fluid was given. Started on D5 1/2 NS 125ml/hr for 8 hours (total 1L).  - WBC was high, f/u with blood culture and lactate.  - started on Zosyn.  - AES was consulted as patient has 2 biliary stents which placed on 12/19  -  ERCP showed Two visibly occluded stents from the biliary tree. A single localized biliary stricture was found in the left main hepatic duct. The stricture was malignant appearing with malignant invasion and could not be traversed. Two biliary stents removed, one placed in the common bile duct. GI recommending repeat ERCP in 6 weeks.  - Will continue IV zosyn at this time

## 2019-01-01 NOTE — HPI
Mrs. Garcia is a 78-year-old lady with PMHX of Afib, diabetes, hypertension and dx 12/121/18 with stage IV pancreatic cancer.  She presented to [facility] ED with c/o   worsening abdominal pain x2 days.  Pain not relieved with oxycodone and gabapentin.  No reports of vomiting, diarrhea, shortness of breath.      Mrs. Garcia is known to palliative care from previous admission (12/20/18) .  Goals at that time were to follow up in the oncology clinic to determine treatment options and to be seen in the oncology palliative care clinic.      Palliative care consulted today for goals of care and advanced care planning.

## 2019-01-01 NOTE — PLAN OF CARE
Problem: Occupational Therapy Goal  Goal: Occupational Therapy Goal  Goals to be met by:1/8/19    Patient will increase functional independence with ADLs by performing:    Feeding with Set-up Assistance.  UE Dressing with Stand-by Assistance.  LE Dressing with Contact Guard Assistance.  Grooming while standing at bedside with Minimal Assistance.  Toileting from bedside commode with Minimal Assistance for hygiene and clothing management.   Supine to sit with Contact Guard Assistance to prepare for functional activities  Step transfer with Contact Guard Assistance with AD as needed to prepare for household and community mobility.   Toilet transfer to bedside commode with Minimal Assistance.    Outcome: Ongoing (interventions implemented as appropriate)  OT POC implemented     Comments: Cori Ndiaye OTR/L

## 2019-01-01 NOTE — ASSESSMENT & PLAN NOTE
Palliative care spoke with patient previously, pt discharged previously with HH/CHUY and f/u with Dr. Richey. Palliative care to follow along during this admission.

## 2019-01-01 NOTE — PLAN OF CARE
Problem: Adult Inpatient Plan of Care  Goal: Plan of Care Review  Outcome: Ongoing (interventions implemented as appropriate)  POC reviewed at bedside with patient. Questions and concerns addressed. VSS. CM = R. IV Abx adm as ordered. Safety maintained. Bed in low and locked position. Call light within reach. Side rails up x2, Frequent rounds.

## 2019-01-01 NOTE — ASSESSMENT & PLAN NOTE
-Stage 4 cancer diagnosed during previous visit 12/21. Malignant appearance of strictures occluding recently placed stent suggesting aggressive course  -Outpatient oncology follow-up previously  scheduled for 1/2/19  -Will consult Oncology to discuss prognosis with patient

## 2019-01-01 NOTE — SUBJECTIVE & OBJECTIVE
Interval History: as above    Review of Systems   Constitutional: Negative for activity change, appetite change, fatigue and fever.   HENT: Negative for ear discharge, mouth sores, rhinorrhea and sneezing.    Eyes: Negative for photophobia.   Respiratory: Negative for cough, choking, shortness of breath and wheezing.    Cardiovascular: Positive for leg swelling. Negative for chest pain.   Gastrointestinal: Positive for abdominal pain and nausea. Negative for diarrhea and vomiting.   Endocrine: Negative for polydipsia, polyphagia and polyuria.   Genitourinary: Negative for dysuria, flank pain, hematuria and urgency.   Musculoskeletal: Negative for back pain, joint swelling, myalgias, neck pain and neck stiffness.   Skin: Negative for color change, pallor, rash and wound.   Allergic/Immunologic: Negative for immunocompromised state.   Neurological: Negative for seizures, facial asymmetry, weakness, numbness and headaches.   Psychiatric/Behavioral: Negative for agitation, behavioral problems and confusion.     Objective:     Vital Signs (Most Recent):  Temp: 97.9 °F (36.6 °C) (01/01/19 1219)  Pulse: 80 (01/01/19 1219)  Resp: 18 (01/01/19 1219)  BP: (!) 105/52 (01/01/19 1219)  SpO2: (!) 92 % (01/01/19 1219) Vital Signs (24h Range):  Temp:  [97.4 °F (36.3 °C)-99.4 °F (37.4 °C)] 97.9 °F (36.6 °C)  Pulse:  [] 80  Resp:  [14-20] 18  SpO2:  [92 %-97 %] 92 %  BP: (105-148)/(52-77) 105/52     Weight: 68 kg (150 lb)  Body mass index is 32.46 kg/m².    Intake/Output Summary (Last 24 hours) at 1/1/2019 1454  Last data filed at 1/1/2019 1300  Gross per 24 hour   Intake 590 ml   Output 600 ml   Net -10 ml      Physical Exam   Constitutional: She is oriented to person, place, and time. She appears well-developed and well-nourished. No distress.   HENT:   Head: Normocephalic and atraumatic.   Mouth/Throat: No oropharyngeal exudate.   Eyes: Conjunctivae and EOM are normal. Pupils are equal, round, and reactive to light. Right  eye exhibits no discharge. Left eye exhibits no discharge. No scleral icterus.   Neck: Normal range of motion.   Cardiovascular: Exam reveals no gallop.   No murmur heard.  Pulmonary/Chest: Effort normal and breath sounds normal. No respiratory distress. She has no wheezes. She has no rales.   Abdominal: Soft. Bowel sounds are normal. She exhibits no mass. There is tenderness. There is no guarding.   Musculoskeletal: Normal range of motion. She exhibits edema. She exhibits no tenderness or deformity.   Bilateral lower limb edema   Neurological: She is alert and oriented to person, place, and time.   Skin: Skin is warm and dry. Capillary refill takes less than 2 seconds. She is not diaphoretic.   Psychiatric: She has a normal mood and affect. Her behavior is normal.       Significant Labs:   CBC:   Recent Labs   Lab 12/30/18  2205 12/30/18  2352 01/01/19  0529   WBC 18.14*  --  14.92*   HGB 10.9*  --  10.5*   HCT 33.0* 37 33.5*     --  246     CMP:   Recent Labs   Lab 12/30/18  2353 01/01/19  0529   * 136   K 3.2* 3.9   CL 95 100   CO2 26 26    89   BUN 22 22   CREATININE 0.8 0.9   CALCIUM 8.4* 8.5*   PROT 6.8  --    ALBUMIN 1.8* 1.8*   BILITOT 2.3*  --    ALKPHOS 414*  --    *  --    ALT 84*  --    ANIONGAP 10 10   EGFRNONAA >60.0 >60.0     Lactic Acid:   Recent Labs   Lab 12/31/18  0521   LACTATE 1.5       Significant Imaging: I have reviewed all pertinent imaging results/findings within the past 24 hours.

## 2019-01-01 NOTE — PT/OT/SLP EVAL
Occupational Therapy   Evaluation    Name: Alfredina Claudette Parks  MRN: [id number]  Admitting Diagnosis:  Acute cholangitis 1 Day Post-Op    Recommendations:     Discharge Recommendations: nursing facility, skilled  Discharge Equipment Recommendations:  none  Barriers to discharge:  None    History:     Occupational Profile:  Living Environment: Pt lives alone in a 1st floor apartment with 0 JOSELIN an elevator access. Pt has a ramp to get to the elevator. Bathroom set up: Tub shower combo with grab bars  Previous level of function: Mod I in all ADLs/ IADLs; Pt uses rollator at baseline. Pt has assistance from [facility] for self care tasks and nursing care in the home every morning. Pt has an aid that assist pt during the evening. Pt is only alone during the night. Pt's daughter reported that pt has been increasingly week for the past 2 weeks requiring more assistance with ADLs.   Roles and Routines: Homemaker  Equipment Used at Home:  rollator, bedside commode, nebulizer, bath bench  Assistance upon Discharge: Pt will have assistance from daughter that stays across the street upon discharge.     Past Medical History:   Diagnosis Date    Absence of bladder continence 4/20/2015    Asthma     Asthma without status asthmaticus 6/28/2012    Atrial fibrillation     BMI 36.0-36.9,adult 8/25/2014    Bulging lumbar disc     Cataract     Cervical radiculopathy     Claudication 5/18/2017    Constipation 8/30/2013    Diabetes mellitus     pre diabetes     Diverticular disease 8/30/2013    GERD (gastroesophageal reflux disease) 6/28/2012    Hepatitis C     Hyperglycemia     Hypertension     Hypothyroidism     Osteoporosis     Pancreatic cyst     Vitamin D deficiency disease        Past Surgical History:   Procedure Laterality Date    BREAST CYST EXCISION Bilateral     COLONOSCOPY N/A 7/11/2013    [facility] at [facility] ENDO    CYST REMOVAL      ERCP (ENDOSCOPIC RETROGRADE  CHOLANGIOPANCREATOGRAPHY) N/A 12/19/2018    Performed by Vidhya Mustafa MD at [facility] ENDO (2ND FLR)    ESOPHAGOGASTRODUODENOSCOPY (EGD) N/A 1/21/2015    Performed by [facility] at [facility]    EYE SURGERY      cataracts    HYSTERECTOMY      TOE SURGERY Bilateral     big toenails    ULTRASOUND, ENDOSCOPIC, UPPER GI TRACT N/A 12/19/2018    Performed by Vidhya Mustafa MD at [facility] ENDO (2ND FLR)    ULTRASOUND-ENDOSCOPIC-UPPER N/A 1/15/2018    Performed by Jamil King MD at [facility] ENDO (2ND FLR)    ULTRASOUND-ENDOSCOPIC-UPPER N/A 8/14/2015    Performed by [facility] at [facility]    ULTRASOUND-ENDOSCOPIC-UPPER N/A 1/21/2015    Performed by [facility] at [facility]    ULTRASOUND-ENDOSCOPIC-UPPER W/POSSIBLE FNA N/A 2/15/2016    Performed by Genaro Cordero MD at [facility]       Subjective     Chief Complaint: Lethargy, pain, and weakness   Patient/Family Comments/goals: Pt and daughter agreeable to OT POC.     Pain/Comfort:  · Pain Rating 1: 8/10  · Location - Side 1: Bilateral  · Location - Orientation 1: lower  · Location 1: abdomen  · Pain Addressed 1: Pre-medicate for activity, Distraction  · Pain Rating Post-Intervention 1: (Pt did not rate)  · Pain Addressed 2: Cessation of Activity    Patients cultural, spiritual, Congregation conflicts given the current situation: no    Objective:     Communicated with: RN prior to session.  Patient found sitting UIC with: all lines intact, call button in reach, RN notified and daughter present and oxygen, peripheral IV upon OT entry to room.    General Precautions: Standard, fall   Orthopedic Precautions:N/A   Braces: N/A     Occupational Performance:    Bed Mobility:    · NT 2/2 pt sitting UIC upon OT arrival     Functional Mobility/Transfers:  · NT 2/2 pt's refusal due to increased pain, tremors, and weakness. Pt presented with mild BUE and BLE tremors while in sitting. Pt's daughter reported that it was a new side affect from her medication.      Activities of Daily Living:  · Feeding:  stand by assistance to grasp cup from tray, bring to mouth, and drink    Cognitive/Visual Perceptual:  Cognitive/Psychosocial Skills:     -       Oriented to: Person, Place, Time and Situation   -       Follows Commands/attention:Follows multistep  commands  -       Communication: clear/fluent  -       Memory: No Deficits noted  -       Safety awareness/insight to disability: intact   -       Mood/Affect/Coping skills/emotional control: Lethargic  Visual/Perceptual:      -Intact no deficits noted    Physical Exam:  Balance:    -       NT  Skin integrity: Visible skin intact  Edema:  None noted  Sensation:    -       Intact  Upper Extremity Range of Motion:     -       Right Upper Extremity: WFL  -       Left Upper Extremity: WFL  Upper Extremity Strength:    -       Right Upper Extremity: WFL  -       Left Upper Extremity: WFL  Fine Motor Coordination:    -       Intact    AMPAC 6 Click ADL:  AMPAC Total Score: 15    Treatment & Education:  Educated pt and daughter on the following:  - OT POC/ Role of OT  - Difference between placements post acute  - Importance of UE and LE movement while in chair  - Self care safety/ independence  Education:    Patient left up in chair with all lines intact and call button in reachwith daughter present     Assessment:     Alfredina Claudette Parks is a 78 y.o. female with a medical diagnosis of Acute cholangitis.  She presents with the following performance deficits affecting function: weakness, impaired endurance, impaired self care skills, pain. Upon entering, pt presented as lethargic while UIC. Pt tolerated OT evaluation fairly well despite her increased weakness. Pt's daughter actively participated in pt's POC. Pt's daughter is in agreeance with pt receiving more skilled OT services in a skilled nursing facility to increase pt's self care independence to remain living alone at home. Pt has good social support but requires more  "assistance for self care tasks and functional mobility than at baseline. Pt limited to a chair level eval this date 2/2 pt's lethargic state, pain, and weakness. Pt will benefit from skilled OT services to further assess dressing, functional transfers, and functional mobility to increase her self care independence.     Rehab Prognosis: Good; patient would benefit from acute skilled OT services to address these deficits and reach maximum level of function.         Clinical Decision Makin.  OT Mod:  "Pt evaluation falls under moderate complexity for evaluation coding due to identification of 3-5 performance deficits noted as stated above. Eval required Min/Mod assistance to complete on this date and detailed assessment(s) were utilized. Moreover, an expanded review of history and occupational profile obtained with additional review of cognitive, physical and psychosocial hx."     Plan:     Patient to be seen 4 x/week to address the above listed problems via self-care/home management, therapeutic activities, therapeutic exercises  · Plan of Care Expires: 19  · Plan of Care Reviewed with: patient, daughter, caregiver    This Plan of care has been discussed with the patient who was involved in its development and understands and is in agreement with the identified goals and treatment plan    GOALS:   Multidisciplinary Problems     Occupational Therapy Goals        Problem: Occupational Therapy Goal    Goal Priority Disciplines Outcome Interventions   Occupational Therapy Goal     OT, PT/OT Ongoing (interventions implemented as appropriate)    Description:  Goals to be met by:19    Patient will increase functional independence with ADLs by performing:    Feeding with Set-up Assistance.  UE Dressing with Stand-by Assistance.  LE Dressing with Contact Guard Assistance.  Grooming while standing at bedside with Minimal Assistance.  Toileting from bedside commode with Minimal Assistance for hygiene and " clothing management.   Supine to sit with Contact Guard Assistance to prepare for functional activities  Step transfer with Contact Guard Assistance with AD as needed to prepare for household and community mobility.   Toilet transfer to bedside commode with Minimal Assistance.                      Time Tracking:     OT Date of Treatment: 01/01/19  OT Start Time: 1052  OT Stop Time: 1111  OT Total Time (min): 19 min    Billable Minutes:Evaluation 19    Cori Ndiaye, JULIO  1/1/2019

## 2019-01-01 NOTE — ASSESSMENT & PLAN NOTE
Palliative care consulted for goals of care and advanced care planning.  Patient discussed with IM 4 resident.       Impression. Brady Garcia is is a 79 yo lady with hx of afib, DM and HTN. Known to palliative care from recent hospital admission - diagnosed with stage IV biliary - pancreatic cancer/ s/p biliary stent placement.  Presented with complaints of pain and fever  With concerns for cholangitis or stent occlusion. GI-AES. ERCP today. From previous admission, cancer treatment options limited to palliative approach with hope of relieving symptoms. Current clinical condition is appropriate for transition to hospice.  Patient and family have not been amenable.     Advanced Care Planning:   - No advanced directives have been received.  - next of kin for medical decisions: daughter Tari [phone]  - Resuscitation status: Full code per primary team.   - Disease understanding: patient and daughter have understanding the that the cancer is advanced and possible treatment options would not be curable.  Both understand treatment may help with control of symptoms and would most probably prolong her life for a few months.     Goals of Care:  During previous encounter, palliative care has initiated discussion regarding hospice.  Ms Garcia had understanding of the role of hospice at the end of life.  At this time she was not amenable.  Recommendations given for home palliative care and referral sent to [facility] for home palliative care.  Ms. Garcia is unavailable at this time - off the floor for procedure.  No family is present.  Palliative care will attempt to reach family by telephone.     Plan/Recommendation  - Palliative care will continue to follow for goals of care development and advanced care planning.

## 2019-01-01 NOTE — CONSULTS
[facility]  Palliative Medicine  Consult Note    Patient Name: Alfredina Claudette Parks  MRN: [id number]  Admission Date: 12/30/2018  Hospital Length of Stay: 1 days  Code Status: Full Code   Attending Provider: Alfredo Jones MD  Consulting Provider: KIRK Fuentes  Primary Care Physician: Veronica Frost MD  Principal Problem:Acute cholangitis    Patient information was obtained from past medical records and ER records.      Consults  Assessment/Plan:     Palliative care encounter    Palliative care consulted for goals of care and advanced care planning.  Patient discussed with IM 4 resident.       Impression. Brady horan is a 79 yo lady with hx of afib, DM and HTN. Known to palliative care from recent hospital admission - diagnosed with stage IV biliary - pancreatic cancer/ s/p biliary stent placement.  Presented with complaints of pain and fever  With concerns for cholangitis or stent occlusion. GI-AES. ERCP today. From previous admission, cancer treatment options limited to palliative approach with hope of relieving symptoms. Current clinical condition is appropriate for transition to hospice.  Patient and family have not been amenable.     Advanced Care Planning:   - No advanced directives have been received.  - next of kin for medical decisions: daughter Tari [phone]  - Resuscitation status: Full code per primary team.   - Disease understanding: patient and daughter have understanding the that the cancer is advanced and possible treatment options would not be curable.  Both understand treatment may help with control of symptoms and would most probably prolong her life for a few months.     Goals of Care:  During previous encounter, palliative care has initiated discussion regarding hospice.  Ms Garcia had understanding of the role of hospice at the end of life.  At this time she was not amenable.  Recommendations given for home palliative care and referral sent to  [facility] for home palliative care.  Ms. Garcia is unavailable at this time - off the floor for procedure.  No family is present.  Palliative care will attempt to reach family by telephone.     Plan/Recommendation  - Palliative care will continue to follow for goals of care development and advanced care planning.                           Thank you for your consult. I will follow-up with patient. Please contact us if you have any additional questions.    Subjective:     HPI:    Mrs. Garcia is a 78-year-old lady with PMHX of Afib, diabetes, hypertension and dx 12/121/18 with stage IV pancreatic cancer.  She presented to [facility] ED with c/o   worsening abdominal pain x2 days.  Pain not relieved with oxycodone and gabapentin.  No reports of vomiting, diarrhea, shortness of breath.      Mrs. Garcia is known to palliative care from previous admission (12/20/18) .  Goals at that time were to follow up in the oncology clinic to determine treatment options and to be seen in the oncology palliative care clinic.      Palliative care consulted today for goals of care and advanced care planning.              Hospital Course:  No notes on file    Interval History:     Past Medical History:   Diagnosis Date    Absence of bladder continence 4/20/2015    Asthma     Asthma without status asthmaticus 6/28/2012    Atrial fibrillation     BMI 36.0-36.9,adult 8/25/2014    Bulging lumbar disc     Cataract     Cervical radiculopathy     Claudication 5/18/2017    Constipation 8/30/2013    Diabetes mellitus     pre diabetes     Diverticular disease 8/30/2013    GERD (gastroesophageal reflux disease) 6/28/2012    Hepatitis C     Hyperglycemia     Hypertension     Hypothyroidism     Osteoporosis     Pancreatic cyst     Vitamin D deficiency disease        Past Surgical History:   Procedure Laterality Date    BREAST CYST EXCISION Bilateral     COLONOSCOPY N/A 7/11/2013    Performed [facility] at [facility]  ENDO    CYST REMOVAL      ERCP (ENDOSCOPIC RETROGRADE CHOLANGIOPANCREATOGRAPHY) N/A 12/19/2018    Performed by [facility]a [facility]afa MD at [facility] ENDO (2ND FLR)    ESOPHAGOGASTRODUODENOSCOPY (EGD) N/A 1/21/2015    Performed by [facility] at [facility]    EYE SURGERY      cataracts    HYSTERECTOMY      TOE SURGERY Bilateral     big toenails    ULTRASOUND, ENDOSCOPIC, UPPER GI TRACT N/A 12/19/2018    Performed by Vidhya Mustafa MD at [facility] ENDO (2ND FLR)    ULTRASOUND-ENDOSCOPIC-UPPER N/A 1/15/2018    Performed by Jamil King MD at [facility] ENDO (2ND FLR)    ULTRASOUND-ENDOSCOPIC-UPPER N/A 8/14/2015    Performed by [facility] MD at [facility]    ULTRASOUND-ENDOSCOPIC-UPPER N/A 1/21/2015    Performed by [facility] MD at [facility]    ULTRASOUND-ENDOSCOPIC-UPPER W/POSSIBLE FNA N/A 2/15/2016    Performed by Genaro Cordero MD at [facility]       Review of patient's allergies indicates:   Allergen Reactions    Milk containing products Shortness Of Breath    Nuts [tree nut] Anaphylaxis    Fosamax [alendronate]      dizziness       Medications:  Continuous Infusions:  Scheduled Meds:   allopurinol  100 mg Oral Daily    amiodarone  100 mg Oral Daily    aspirin  81 mg Oral Daily    carvedilol  3.125 mg Oral BID WM    fluticasone  1 spray Each Nare Daily    gabapentin  200 mg Oral TID    levothyroxine  125 mcg Oral Before breakfast    pantoprazole  40 mg Oral Daily    piperacillin-tazobactam (ZOSYN) IVPB  4.5 g Intravenous Q8H    polyethylene glycol  17 g Oral Daily    senna-docusate 8.6-50 mg  1 tablet Oral Daily    solifenacin  5 mg Oral Daily     PRN Meds:dextrose 50%, dextrose 50%, glucagon (human recombinant), glucose, glucose, HYDROmorphone, oxyCODONE, sodium chloride 0.9%    Family History     Problem Relation (Age of Onset)    Asthma Sister, Other    Breast cancer Sister (55)    Diabetes Maternal Grandfather, Maternal Aunt    Lung cancer Father    Ovarian cancer Mother    Stomach  cancer Sister    Throat cancer Brother        Tobacco Use    Smoking status: Never Smoker    Smokeless tobacco: Never Used   Substance and Sexual Activity    Alcohol use: No     Alcohol/week: 0.0 oz    Drug use: No    Sexual activity: Not Currently     Partners: Male       Review of Systems   Constitutional: Positive for fatigue.   Gastrointestinal: Positive for abdominal pain and nausea. Negative for vomiting.   Genitourinary: Negative for dysuria.   Neurological: Positive for weakness.     Objective:     Vital Signs (Most Recent):  Temp: 98.4 °F (36.9 °C) (01/01/19 0453)  Pulse: 86 (01/01/19 0600)  Resp: 16 (01/01/19 0453)  BP: 131/67 (01/01/19 0453)  SpO2: 95 % (01/01/19 0453) Vital Signs (24h Range):  Temp:  [97.4 °F (36.3 °C)-99.4 °F (37.4 °C)] 98.4 °F (36.9 °C)  Pulse:  [] 86  Resp:  [14-30] 16  SpO2:  [88 %-99 %] 95 %  BP: (106-253)/() 131/67     Weight: 68 kg (150 lb)  Body mass index is 32.46 kg/m².    Review of Symptoms  Symptom Assessment (ESAS 0-10 scale)   ESAS 0 1 2 3 4 5 6 7 8 9 10   Pain              Dyspnea              Anxiety              Nausea              Depression               Anorexia              Fatigue              Insomnia              Restlessness               Agitation              CAM / Delirium __ --  ___+   Constipation     __ --  ___+   Diarrhea           __ --  ___+  Bowel Management Plan (BMP): Yes    Comments: unable to assess    Pain Assessment: unable to assess     OME in 24 hours: 0    Performance Status: 40    ECOG Performance Status Grade: 2 - Ambulates, capable of self care only    Physical Exam   Nursing note and vitals reviewed.      Significant Labs: All pertinent labs within the past 24 hours have been reviewed.  CBC:   Recent Labs   Lab 01/01/19 0529   WBC 14.92*   HGB 10.5*   HCT 33.5*   MCV 99*        BMP:  Recent Labs   Lab 01/01/19 0529   GLU 89      K 3.9      CO2 26   BUN 22   CREATININE 0.9   CALCIUM 8.5*   MG 1.5*      LFT:  Lab Results   Component Value Date     (H) 12/30/2018    ALKPHOS 414 (H) 12/30/2018    BILITOT 2.3 (H) 12/30/2018     Albumin:   Albumin   Date Value Ref Range Status   01/01/2019 1.8 (L) 3.5 - 5.2 g/dL Final     Protein:   Total Protein   Date Value Ref Range Status   12/30/2018 6.8 6.0 - 8.4 g/dL Final     Lactic acid:   Lab Results   Component Value Date    LACTATE 1.5 12/31/2018    LACTATE 1.6 12/16/2018       Significant Imaging: I have reviewed all pertinent imaging results/findings within the past 24 hours.    Advanced Directives::  Living Will: No  LaPOST: No  Do Not Resuscitate Status: No  Medical Power of : No    Decision-Making Capacity:     Living Arrangements: Lives alone    Psychosocial/Cultural: not , one daughter Tari, who lives across the street, has home services through [facility] [facility]     Spiritual:     F- Naya and Belief:  [employer]       I - Importance:   .  C - Community:     A - Address in Care:       > 50% of 35 min visit spent in chart review, face to face discussion of goals of care,  symptom assessment, coordination of care and emotional support.    Estefania Belle, CNS  Palliative Medicine  [facility]

## 2019-01-01 NOTE — HOSPITAL COURSE
01/01/2019 ERCP yesterday, pt resting comfortably in bed this AM. Noting migratory abdominal pain, well controlled at time of interview.   01/02/2019 No acute events overnight. Pt and family discussed possible treatment options with oncology; oncology unsure of benefit to palliative chemotherapy.   01/03/2019 Pt notes increased pain and nausea this AM, but controlled with PRN medications.   01/04/2019 No acute events overnight. Pt  Noting pain and nausea, controlled with PRN medications.  Family awaiting acceptance at [facility]  1/6/19 Worsening abdominal pain and nausea. Leukocytosis despite IV zosyn.   01/07/2019 CT abdomen and percutaneous biliary drain.  01/08/2019: Family expressed interest in inpatient hospice  01/09/2019  [facility]

## 2019-01-01 NOTE — PLAN OF CARE
Problem: Adult Inpatient Plan of Care  Goal: Plan of Care Review  Outcome: Ongoing (interventions implemented as appropriate)  Pt AAO x 4. POC reviewed with pt and daughter. Pt c/o nausea x 1 today which was relieved by Zofran as needed. Pt up to chair today. Will continue to monitor.

## 2019-01-01 NOTE — SUBJECTIVE & OBJECTIVE
Interval History:     Past Medical History:   Diagnosis Date    Absence of bladder continence 4/20/2015    Asthma     Asthma without status asthmaticus 6/28/2012    Atrial fibrillation     BMI 36.0-36.9,adult 8/25/2014    Bulging lumbar disc     Cataract     Cervical radiculopathy     Claudication 5/18/2017    Constipation 8/30/2013    Diabetes mellitus     pre diabetes     Diverticular disease 8/30/2013    GERD (gastroesophageal reflux disease) 6/28/2012    Hepatitis C     Hyperglycemia     Hypertension     Hypothyroidism     Osteoporosis     Pancreatic cyst     Vitamin D deficiency disease        Past Surgical History:   Procedure Laterality Date    BREAST CYST EXCISION Bilateral     COLONOSCOPY N/A 7/11/2013    Performed by [facility]isha [facility]ga [facility] at [facility]    CYST REMOVAL      ERCP (ENDOSCOPIC RETROGRADE CHOLANGIOPANCREATOGRAPHY) N/A 12/19/2018    Performed by [facility]hya Mustafa [facility] at [facility] END[facility] (2ND FLR)    ESOPHAGOGASTRODUODENOSCOPY (EGD) N/A 1/21/2015    Performed by [facility] at [facility]    EYE SURGERY      cataracts    HYSTERECTOMY      TOE SURGERY Bilateral     big toenails    ULTRASOUND, ENDOSCOPIC, UPPER GI TRACT N/A 12/19/2018    Performed by Vidhya Mustafa MD at [facility] ENDO (2ND FLR)    ULTRASOUND-ENDOSCOPIC-UPPER N/A 1/15/2018    Performed by Jamil King MD at [facility] ENDO (2ND FLR)    ULTRASOUND-ENDOSCOPIC-UPPER N/A 8/14/2015    Per[facility] at [facility]    ULTRASOUND-ENDOSCOPIC-UPPER N/A 1/21/2015    Performed by [facility] at [facility]    ULTRASOUND-ENDOSCOPIC-UPPER W/POSSIBLE FNA N/A 2/15/2016    Performed by Genaro [facility]ordero MD at [facility]       Review of patient's allergies indicates:   Allergen Reactions    Milk containing products Shortness Of Breath    Nuts [tree nut] Anaphylaxis    Fosamax [alendronate]      dizziness       Medications:  Continuous Infusions:  Scheduled Meds:   allopurinol  100 mg Oral Daily     amiodarone  100 mg Oral Daily    aspirin  81 mg Oral Daily    carvedilol  3.125 mg Oral BID WM    fluticasone  1 spray Each Nare Daily    gabapentin  200 mg Oral TID    levothyroxine  125 mcg Oral Before breakfast    pantoprazole  40 mg Oral Daily    piperacillin-tazobactam (ZOSYN) IVPB  4.5 g Intravenous Q8H    polyethylene glycol  17 g Oral Daily    senna-docusate 8.6-50 mg  1 tablet Oral Daily    solifenacin  5 mg Oral Daily     PRN Meds:dextrose 50%, dextrose 50%, glucagon (human recombinant), glucose, glucose, HYDROmorphone, oxyCODONE, sodium chloride 0.9%    Family History     Problem Relation (Age of Onset)    Asthma Sister, Other    Breast cancer Sister (55)    Diabetes Maternal Grandfather, Maternal Aunt    Lung cancer Father    Ovarian cancer Mother    Stomach cancer Sister    Throat cancer Brother        Tobacco Use    Smoking status: Never Smoker    Smokeless tobacco: Never Used   Substance and Sexual Activity    Alcohol use: No     Alcohol/week: 0.0 oz    Drug use: No    Sexual activity: Not Currently     Partners: Male       Review of Systems   Constitutional: Positive for fatigue.   Gastrointestinal: Positive for abdominal pain and nausea. Negative for vomiting.   Genitourinary: Negative for dysuria.   Neurological: Positive for weakness.     Objective:     Vital Signs (Most Recent):  Temp: 98.4 °F (36.9 °C) (01/01/19 0453)  Pulse: 86 (01/01/19 0600)  Resp: 16 (01/01/19 0453)  BP: 131/67 (01/01/19 0453)  SpO2: 95 % (01/01/19 0453) Vital Signs (24h Range):  Temp:  [97.4 °F (36.3 °C)-99.4 °F (37.4 °C)] 98.4 °F (36.9 °C)  Pulse:  [] 86  Resp:  [14-30] 16  SpO2:  [88 %-99 %] 95 %  BP: (106-253)/() 131/67     Weight: 68 kg (150 lb)  Body mass index is 32.46 kg/m².    Review of Symptoms  Symptom Assessment (ESAS 0-10 scale)   ESAS 0 1 2 3 4 5 6 7 8 9 10   Pain              Dyspnea              Anxiety              Nausea              Depression               Anorexia               Fatigue              Insomnia              Restlessness               Agitation              CAM / Delirium __ --  ___+   Constipation     __ --  ___+   Diarrhea           __ --  ___+  Bowel Management Plan (BMP): Yes    Comments: unable to assess    Pain Assessment: unable to assess     OME in 24 hours: 0    Performance Status: 40    ECOG Performance Status Grade: 2 - Ambulates, capable of self care only    Physical Exam   Nursing note and vitals reviewed.      Significant Labs: All pertinent labs within the past 24 hours have been reviewed.  CBC:   Recent Labs   Lab 01/01/19  0529   WBC 14.92*   HGB 10.5*   HCT 33.5*   MCV 99*        BMP:  Recent Labs   Lab 01/01/19  0529   GLU 89      K 3.9      CO2 26   BUN 22   CREATININE 0.9   CALCIUM 8.5*   MG 1.5*     LFT:  Lab Results   Component Value Date     (H) 12/30/2018    ALKPHOS 414 (H) 12/30/2018    BILITOT 2.3 (H) 12/30/2018     Albumin:   Albumin   Date Value Ref Range Status   01/01/2019 1.8 (L) 3.5 - 5.2 g/dL Final     Protein:   Total Protein   Date Value Ref Range Status   12/30/2018 6.8 6.0 - 8.4 g/dL Final     Lactic acid:   Lab Results   Component Value Date    LACTATE 1.5 12/31/2018    LACTATE 1.6 12/16/2018       Significant Imaging: I have reviewed all pertinent imaging results/findings within the past 24 hours.    Advanced Directives::  Living Will: No  LaPOST: No  Do Not Resuscitate Status: No  Medical Power of : No    Decision-Making Capacity:     Living Arrangements: Lives alone    Psychosocial/Cultural: not , one daughter Tari, who lives across the street, has home services through [employer]     Spiritual:     F- Naya and Belief:  Denominational       I - Importance:   .  C - Community:     A - Address in Care:

## 2019-01-01 NOTE — PROGRESS NOTES
[facility] Hospital Medicine  Progress Note    Patient Name: Alfredina Claudette Parks  MRN: [id number]  Patient Class: IP- Inpatient   Admission Date: 12/30/2018  Length of Stay: 1 days  Attending Physician: Alfredo Jones MD  Primary Care Provider: Veronica Frost MD    [facility] Medicine Team: [facility] 4 Steve Walters DO    Subjective:     Principal Problem:Acute cholangitis    HPI:  Mrs. Garcia is a 78-year-old female with known stage IV pancreatic cancer (diagnosed on 12/21/2018) presents to the ED with 2 days of worsening abdominal pain with nausea and 1 day of fever.  She is pending follow-up with Oncology and palliative care. She was recently admitted, MRI showed liver and pancreatic masses, ERCP was performed on 12/19 and pathology confirmed poorly differentiated carcinoma most likely adeno. 2 stents were placed and plan was to f/u after 3 months to exchange the stent. At the moment she is a full code.  She denies vomiting, diarrhea, cough, shortness of breath, chest pain, or dysuria.  Daughter gave her oxycodone and gabapentin without resolution of symptoms. Patient lives alone in an apartment and her daughter lives across the street.  A ten point review of systems was completed and is negative except as documented above.  Patient denies any other acute medical complaint.    In the ED, she received 1L of fluid and pain medication. She has high WBC and a spike of fever at 100.4, one dose of Zosyn was given. Palliative care and [facility] were consulted.    Hospital Course:  01/01/2019 ERCP yesterday, pt resting comfortably in bed this AM. Noting migratory abdominal pain, well controlled at time of interview.     No new subjective & objective note has been filed under this hospital service since the last note was generated.    Assessment/Plan:      * Acute cholangitis    - she has a spike of fever at 100.4. Currently afebrile.  - 1L of fluid was given. Started on D5 1/2 NS 125ml/hr  for 8 hours (total 1L).  - WBC was high, f/u with blood culture and lactate.  - started on Zosyn.  - [facility] was consulted as patient has 2 biliary stents which placed on 12/19  -  ERCP showed Two visibly occluded stents from the biliary tree. A single localized biliary stricture was found in the left main hepatic duct. The stricture was malignant appearing with malignant invasion and could not be traversed. Two biliary stents removed, one placed in the common bile duct. GI recommending repeat ERCP in 6 weeks.  - Will continue IV zosyn at this time     Hypomagnesemia    Replete with Mag Sulfate 2mg IV, monitor daily       Palliative care encounter    Palliative care spoke with patient previously, pt discharged previously with HH/AIM and f/u with Dr. Richey. Palliative care to follow along during this admission.        Pancreatic cancer    Patient recently diagnosed with stage IV pancreatic cancer. She is on oxycodone 5 mg Q6h at home. She came because of worsening of abdominal pain and nausea. Palliative care and [facility] were consulted.    - f/u with oncology as an outpatient at time of discharge.  - pain control on oxycodone and hydromorphone.         Cholangiocarcinoma    -Stage 4 cancer diagnosed during previous visit 12/21. Malignant appearance of strictures occluding recently placed stent suggesting aggressive course  -Outpatient oncology follow-up previously  scheduled for 1/2/19  -Will consult Oncology to discuss prognosis with patient       A-fib    - she has loop recorder which was placed on 02/2017.  - resume home dose Amiodarone and Carvedilol.         Hypothyroid    Resume home dose levothyroxine.         VTE Risk Mitigation (From admission, onward)        Ordered     IP VTE HIGH RISK PATIENT  Once      12/31/18 1343     Place sequential compression device  Until discontinued      12/31/18 1343              Steve Walters DO  Department of Hospital Medicine   [facility]

## 2019-01-02 LAB
ALBUMIN SERPL BCP-MCNC: 1.6 G/DL
ANION GAP SERPL CALC-SCNC: 12 MMOL/L
BASOPHILS # BLD AUTO: 0.04 K/UL
BASOPHILS NFR BLD: 0.3 %
BUN SERPL-MCNC: 22 MG/DL
CALCIUM SERPL-MCNC: 8.4 MG/DL
CHLORIDE SERPL-SCNC: 98 MMOL/L
CO2 SERPL-SCNC: 23 MMOL/L
CREAT SERPL-MCNC: 0.8 MG/DL
DIFFERENTIAL METHOD: ABNORMAL
EOSINOPHIL # BLD AUTO: 0.1 K/UL
EOSINOPHIL NFR BLD: 0.4 %
ERYTHROCYTE [DISTWIDTH] IN BLOOD BY AUTOMATED COUNT: 19.9 %
EST. GFR  (AFRICAN AMERICAN): >60 ML/MIN/1.73 M^2
EST. GFR  (NON AFRICAN AMERICAN): >60 ML/MIN/1.73 M^2
GLUCOSE SERPL-MCNC: 98 MG/DL
HCT VFR BLD AUTO: 30.4 %
HGB BLD-MCNC: 9.6 G/DL
IMM GRANULOCYTES # BLD AUTO: 0.09 K/UL
IMM GRANULOCYTES NFR BLD AUTO: 0.6 %
LYMPHOCYTES # BLD AUTO: 2 K/UL
LYMPHOCYTES NFR BLD: 14.3 %
MAGNESIUM SERPL-MCNC: 1.9 MG/DL
MCH RBC QN AUTO: 30.3 PG
MCHC RBC AUTO-ENTMCNC: 31.6 G/DL
MCV RBC AUTO: 96 FL
MONOCYTES # BLD AUTO: 1.5 K/UL
MONOCYTES NFR BLD: 10.8 %
NEUTROPHILS # BLD AUTO: 10.5 K/UL
NEUTROPHILS NFR BLD: 73.6 %
NRBC BLD-RTO: 0 /100 WBC
PHOSPHATE SERPL-MCNC: 2.3 MG/DL
PLATELET # BLD AUTO: 227 K/UL
PMV BLD AUTO: 10.8 FL
POTASSIUM SERPL-SCNC: 3.5 MMOL/L
RBC # BLD AUTO: 3.17 M/UL
SODIUM SERPL-SCNC: 133 MMOL/L
WBC # BLD AUTO: 14.28 K/UL

## 2019-01-02 PROCEDURE — 99232 PR SUBSEQUENT HOSPITAL CARE,LEVL II: ICD-10-PCS | Mod: GC,,, | Performed by: HOSPITALIST

## 2019-01-02 PROCEDURE — 97161 PT EVAL LOW COMPLEX 20 MIN: CPT

## 2019-01-02 PROCEDURE — 36415 COLL VENOUS BLD VENIPUNCTURE: CPT

## 2019-01-02 PROCEDURE — 99223 1ST HOSP IP/OBS HIGH 75: CPT | Mod: GC,,, | Performed by: INTERNAL MEDICINE

## 2019-01-02 PROCEDURE — 99223 PR INITIAL HOSPITAL CARE,LEVL III: ICD-10-PCS | Mod: GC,,, | Performed by: INTERNAL MEDICINE

## 2019-01-02 PROCEDURE — 25000003 PHARM REV CODE 250: Performed by: INTERNAL MEDICINE

## 2019-01-02 PROCEDURE — 20600001 HC STEP DOWN PRIVATE ROOM

## 2019-01-02 PROCEDURE — 83735 ASSAY OF MAGNESIUM: CPT

## 2019-01-02 PROCEDURE — G8978 MOBILITY CURRENT STATUS: HCPCS | Mod: CK

## 2019-01-02 PROCEDURE — 97530 THERAPEUTIC ACTIVITIES: CPT

## 2019-01-02 PROCEDURE — 25000003 PHARM REV CODE 250: Performed by: STUDENT IN AN ORGANIZED HEALTH CARE EDUCATION/TRAINING PROGRAM

## 2019-01-02 PROCEDURE — 85025 COMPLETE CBC W/AUTO DIFF WBC: CPT

## 2019-01-02 PROCEDURE — G8979 MOBILITY GOAL STATUS: HCPCS | Mod: CJ

## 2019-01-02 PROCEDURE — 80069 RENAL FUNCTION PANEL: CPT

## 2019-01-02 PROCEDURE — 99232 SBSQ HOSP IP/OBS MODERATE 35: CPT | Mod: GC,,, | Performed by: HOSPITALIST

## 2019-01-02 PROCEDURE — 63600175 PHARM REV CODE 636 W HCPCS: Performed by: STUDENT IN AN ORGANIZED HEALTH CARE EDUCATION/TRAINING PROGRAM

## 2019-01-02 RX ADMIN — FLUTICASONE PROPIONATE 50 MCG: 50 SPRAY, METERED NASAL at 09:01

## 2019-01-02 RX ADMIN — PANTOPRAZOLE SODIUM 40 MG: 40 TABLET, DELAYED RELEASE ORAL at 08:01

## 2019-01-02 RX ADMIN — STANDARDIZED SENNA CONCENTRATE AND DOCUSATE SODIUM 1 TABLET: 8.6; 5 TABLET, FILM COATED ORAL at 09:01

## 2019-01-02 RX ADMIN — OXYCODONE HYDROCHLORIDE 5 MG: 5 TABLET ORAL at 12:01

## 2019-01-02 RX ADMIN — CARVEDILOL 3.12 MG: 3.12 TABLET, FILM COATED ORAL at 05:01

## 2019-01-02 RX ADMIN — PIPERACILLIN AND TAZOBACTAM 4.5 G: 4; .5 INJECTION, POWDER, LYOPHILIZED, FOR SOLUTION INTRAVENOUS; PARENTERAL at 05:01

## 2019-01-02 RX ADMIN — PIPERACILLIN AND TAZOBACTAM 4.5 G: 4; .5 INJECTION, POWDER, LYOPHILIZED, FOR SOLUTION INTRAVENOUS; PARENTERAL at 12:01

## 2019-01-02 RX ADMIN — SOLIFENACIN SUCCINATE 5 MG: 5 TABLET, FILM COATED ORAL at 08:01

## 2019-01-02 RX ADMIN — POLYETHYLENE GLYCOL 3350 17 G: 17 POWDER, FOR SOLUTION ORAL at 08:01

## 2019-01-02 RX ADMIN — ALLOPURINOL 100 MG: 100 TABLET ORAL at 08:01

## 2019-01-02 RX ADMIN — LEVOTHYROXINE SODIUM 125 MCG: 25 TABLET ORAL at 05:01

## 2019-01-02 RX ADMIN — GABAPENTIN 200 MG: 100 CAPSULE ORAL at 08:01

## 2019-01-02 RX ADMIN — ASPIRIN 81 MG: 81 TABLET, COATED ORAL at 08:01

## 2019-01-02 RX ADMIN — GABAPENTIN 200 MG: 100 CAPSULE ORAL at 03:01

## 2019-01-02 RX ADMIN — AMIODARONE HYDROCHLORIDE 100 MG: 100 TABLET ORAL at 08:01

## 2019-01-02 RX ADMIN — CARVEDILOL 3.12 MG: 3.12 TABLET, FILM COATED ORAL at 08:01

## 2019-01-02 RX ADMIN — HYDROMORPHONE HYDROCHLORIDE 1 MG: 1 INJECTION, SOLUTION INTRAMUSCULAR; INTRAVENOUS; SUBCUTANEOUS at 08:01

## 2019-01-02 RX ADMIN — PIPERACILLIN AND TAZOBACTAM 4.5 G: 4; .5 INJECTION, POWDER, LYOPHILIZED, FOR SOLUTION INTRAVENOUS; PARENTERAL at 08:01

## 2019-01-02 NOTE — TREATMENT PLAN
[facility]  Follow-up Note    Pt seen / examined this AM.  No acute events overnight.  Doing her PT exercises this am.  Daughter at bedside.    Febrile to 100.4F  Vitals:    01/02/19 0740   BP:    Pulse: 96   Resp:    Temp:        Gen: NAD, pleasant  Abd: soft , mild TTP throughout    Chart reviewed.  LFTs remain elevated with slight uptrend this AM.    Planning palliative and oncology consultations.    Plan:  With continued fevers, cannot be certain that fevers are not from infectious process from unachieved drainage of the left side of liver.    Discussed with family re: options of possible IR percutaneous drainage of left side of liver and the resultant external biliary drain that would be necessary.    Family will await further discussions with palliative and oncology teams.  Pending GOC discussions and further potential options for therapy, would consider IR PTC if this aligns with family wishes / GOC / therapy options etc.      Will sign off at this time. Please call with any additional concerns /questions  Case discussed with [facility] staff Dr. Fernando Biswas MD  Gastroenterology Fellow ([id number])  Pager: [phone]

## 2019-01-02 NOTE — CONSULTS
Consult Note    Consults  SUBJECTIVE:     History of Present Illness:  78 year old woman with medical history significant for HTN, DM, Afib, diverticulosis, known pancreatic cyst, HCV, h/o constipation, and h/o liver lesion who presented to the ED with persistent abdominal and fevers.  Denies symptoms of pruritis, jaundice, or change in the color of her urine at time of initial diagnosis.     18 MRI abdomen significant for large 5.6cm hepatic lesion with characteristics suggestive of hilar cholangiocarcinoma/klatskin tumor or metastatic disease.  Less likely hepatic abscess.  There are two pancreatic head/neck mass lesions on both sides of the CBD with associated biliary ductal dilation.  There is also retroperitoneal, mesenteric, and anterior mediastinal lymphadenopathy.  The left portal vein is narrowed but patent.  CA 19-9 2096.Initial AES evaluation for biopsy 18 with results of adenocarcinoma, favored to be biliary through review of clinical picture scans but no definitve pathology. tThe pt had two stents that were placed and prior two biliary stents in biliary tree that were placed were removed and another one was placed in CBD. There is a plan for repeat ERCP in 6 weeks.      Family history significant for mother who  of cervical cancer in her 30s and younger sister who was diagnosed with breast cancer at age 44.  Father and brother were both smokers who were diagnosed with lung and esophageal cancer, respectively.  Sister who  of breast cancer also had two daughters who are still alive today.     At baseline she lives in an apartment by herself with her daughter next door to her.     The pt had been using walker at home. Her daughter has to assist her in preparing foods and taking a shower. The pt is confused in the hospital with current pain meds but apparently doesn't have this occurring when she is at home. Has had frequent falls this past year.  Former  at [employer], and retired  "over 20 years ago after she hurt her back.    The pt was not previously amenable to hospice. However, after extensive discussion with daughter, she mentioned about her mom wanting quality of life and leaving things in "god's hands". However, she did mention that she would like to reassess how her mother feels about risks/benefits of chemotherapy once she is able to interact more logically with her.     Review of patient's allergies indicates:   Allergen Reactions    Milk containing products Shortness Of Breath    Nuts [tree nut] Anaphylaxis    Fosamax [alendronate]      dizziness     Past Medical History:   Diagnosis Date    Absence of bladder continence 4/20/2015    Asthma     Asthma without status asthmaticus 6/28/2012    Atrial fibrillation     BMI 36.0-36.9,adult 8/25/2014    Bulging lumbar disc     Cataract     Cervical radiculopathy     Claudication 5/18/2017    Constipation 8/30/2013    Diabetes mellitus     pre diabetes     Diverticular disease 8/30/2013    GERD (gastroesophageal reflux disease) 6/28/2012    Hepatitis C     Hyperglycemia     Hypertension     Hypothyroidism     Osteoporosis     Pancreatic cyst     Vitamin D deficiency disease      Past Surgical History:   Procedure Laterality Date    BREAST CYST EXCISION Bilateral     COLONOSCOPY N/A 7/11/2013    Performed by [facility]ga [facility] at [facility]    CYST REMOVAL      ERCP (ENDOSCOPIC RETROGRADE CHOLANGIOPANCREATOGRAPHY) N/A 12/31/2018    Performed by [facility]il King [facility] at [facility]O (2ND FLR)    ERCP (ENDOSCOPIC RETROGRADE CHOLANGIOPANCREATOGRAPHY) N/A 12/19/2018    Per[facility] by [facility]hya Mustafa [facility] at [facility] END[facility] (2ND FLR)    ESOPHAGOGASTRODUODENOSCOPY (EGD) N/A 1/21/2015    Per[facility] by [facility] at [facility]    EYE SURGERY      cataracts    HYSTERECTOMY      TOE SURGERY Bilateral     big toenails    ULTRASOUND, ENDOSCOPIC, UPPER GI TRACT N/A 12/19/2018    Per[facility]afa [facility] at [facility] " ENDO (2ND FLR)    ULTRASOUND-ENDOSCOPIC-UPPER N/A 1/15/2018    [facility] (2[facility] F[facility])    ULTRASOUND-ENDOSCOPIC-UPPER N/A 8/14/2015    [facility] at [facility]    ULTRASOUND-ENDOSCOPIC-UPPER N/A 1/21/2015    Performed by [facility] MD at [facility] END[facility]    ULTRASOUND-ENDOSCOPIC-UPPER W/POSSIBLE FNA N/A 2/15/2016    Performed by Genaro [facility]o MD at [facility] ENDO     Family History   Problem Relation Age of Onset    Ovarian cancer Mother     Lung cancer Father     Stomach cancer Sister     Asthma Sister     Throat cancer Brother     Diabetes Maternal Grandfather     Diabetes Maternal Aunt     Breast cancer Sister 55    Asthma Other     Emphysema Neg Hx      Social History     Tobacco Use    Smoking status: Never Smoker    Smokeless tobacco: Never Used   Substance Use Topics    Alcohol use: No     Alcohol/week: 0.0 oz    Drug use: No     ROS   Constitutional: Positive for fever, appetite change  Respiratory: Negative for cough and shortness of breath.    Cardiovascular: Negative for chest pain and palpitations.   Gastrointestinal: Positive for abdominal pain, nausea, vomiting  Genitourinary: Negative for dysuria and hematuria  Skin: Negative for color change and rash  Neurological: Positive for confusion, weakness             OBJECTIVE:     Vital Signs:  Temp:  [97.7 °F (36.5 °C)-100.2 °F (37.9 °C)]   Pulse:  [86-96]   Resp:  [17]   BP: (109-117)/(55-56)   SpO2:  [90 %-96 %]     Physical Exam   Constitutional: She appears well-developed and well-nourished. No distress.   Intermittent somnolence   HENT:   Head: Normocephalic and atraumatic.   Eyes: EOM are normal. Pupils are equal, round, and reactive to light.   Neck: Normal range of motion. Neck supple.   Cardiovascular: Normal rate, regular rhythm and normal heart sounds. Exam reveals no gallop and no friction rub.   No murmur heard.  Pulmonary/Chest: Effort normal and breath sounds normal. No stridor.  No respiratory distress. She has no wheezes.   Abdominal: Soft. Bowel sounds are normal. She exhibits no mass.   Tenderness to epigastric palpation   Musculoskeletal: Normal range of motion.   Neurological: She is alert. A cranial nerve deficit is present.   Skin: Skin is warm and dry. No rash noted.      Constitutional: She is oriented to person, place, and time. No distress.   Nontoxic appearing but chronically ill appearing   HENT:   Head: Normocephalic and atraumatic.   Eyes: Conjunctivae are normal. No scleral icterus.   Neck: Neck supple.   Cardiovascular: Normal rate and intact distal pulses.   Pulmonary/Chest: Effort normal. No stridor. No respiratory distress.   Abdominal: Soft. She exhibits no distension. There is tenderness. There is no rebound and no guarding.   mildy TTP in the epigastrium without rigidity or rebound   Musculoskeletal: She exhibits no tenderness or deformity.   Neurological: She is alert and oriented to person, place, and time.   Skin: Skin is warm and dry. No rash noted.   Psychiatric: She has a normal mood and affect.   Vitals reviewed.           Laboratory:  CBC:   Recent Labs   Lab 01/02/19 0445   WBC 14.28*   RBC 3.17*   HGB 9.6*   HCT 30.4*      MCV 96   MCH 30.3   MCHC 31.6*     BMP:   Recent Labs   Lab 01/02/19 0445   GLU 98   *   K 3.5   CL 98   CO2 23   BUN 22   CREATININE 0.8   CALCIUM 8.4*   MG 1.9     CMP:   Recent Labs   Lab 01/01/19  0529 01/02/19  0445   GLU 89 98   CALCIUM 8.5* 8.4*   ALBUMIN 1.8*  1.8* 1.6*   PROT 6.8  --     133*   K 3.9 3.5   CO2 26 23    98   BUN 22 22   CREATININE 0.9 0.8   ALKPHOS 425*  --    ALT 86*  --    *  --    BILITOT 3.0*  --      LFTs:   Recent Labs   Lab 01/01/19 0529 01/02/19 0445   ALT 86*  --    *  --    ALKPHOS 425*  --    BILITOT 3.0*  --    PROT 6.8  --    ALBUMIN 1.8*  1.8* 1.6*     Coagulation: No results for input(s): LABPROT, INR, APTT in the last 168 hours.  Cardiac markers: No  results for input(s): CKMB, CPKMB, TROPONINT, TROPONINI, MYOGLOBIN in the last 168 hours.  ABGs: No results for input(s): PH, PCO2, PO2, HCO3, POCSATURATED, BE in the last 168 hours.  Microbiology Results (last 7 days)     Procedure Component Value Units Date/Time    Blood culture #2 **CANNOT BE ORDERED STAT** [[id number]] Collected:  12/31/18 0521    Order Status:  Completed Specimen:  Blood from Peripheral, Wrist, Right Updated:  01/02/19 0812     Blood Culture, Routine No Growth to date     Blood Culture, Routine No Growth to date     Blood Culture, Routine No Growth to date    Blood culture #1 **CANNOT BE ORDERED STAT** [[id number]] Collected:  12/31/18 0521    Order Status:  Completed Specimen:  Blood from Peripheral, Antecubital, Right Updated:  01/02/19 0812     Blood Culture, Routine No Growth to date     Blood Culture, Routine No Growth to date     Blood Culture, Routine No Growth to date        Specimen (12h ago, onward)    None        Recent Labs   Lab 12/31/18  0126   COLORU Roselia   SPECGRAV >=1.030*   PHUR 5.0   PROTEINUA Negative   NITRITE Negative   LEUKOCYTESUR Negative   HYALINECASTS 16*       Diagnostic Results:  EXAMINATION:  CT ABDOMEN PELVIS WITH CONTRAST    CLINICAL HISTORY:  Abdominal pain, unspecified;    TECHNIQUE:  Low dose axial images, sagittal and coronal reformations were obtained from the lung bases to the pubic symphysis following the IV administration of 75 mL of Omnipaque 350 .  Oral contrast was not given. Postcontrast images were also obtained in the delayed phase.    COMPARISON:  MRI abdomen from 12/18/2018 and CT abdomen/pelvis from 12/17/2018.    FINDINGS:  Heart: Normal in size. No pericardial effusion.    Lung Bases: Bilateral band like opacities seen at the lung bases, left greater than right.  Findings likely represent atelectasis or scarring.  No significant pleural fluid.    Liver: Evaluation is limited by suboptimal contrast timing.  Nodular contour.  Low-density mass  lesion re-identified within the right hepatic lobe measuring approximately 6.0 x 4.3 x 5.1 cm, previously 5.1 x 4.2 x 4.8 cm.  There is persistent dilatation of left and right intrahepatic bile ducts in close proximity to the large hepatics mass.  Interval placement of biliary stents.  Portal vein not well characterized secondary to suboptimal bolus timing.    Gallbladder: Irregular gallbladder wall thickening with edema and mucosal hyperenhancement, similar to most recent CT.    Bile Ducts: Stable intrahepatic biliary duct dilatation with biliary duct stents in place.    Pancreas: Ill-defined hypoattenuating mass re-identified in the region of the pancreatic neck/head, although better characterized on prior MRI.  No pancreatic ductal dilation, suggesting that this may represent a peripancreatic mass.    Spleen: Mildly enlarged.    Adrenals: Unremarkable.    Kidneys/ Ureters: Normal in size and location.  Stable simple left renal cyst.  Bilateral subcentimeter renal hypodensities which are too small to fully characterize, likely cysts.  Normal concentration of contrast. No hydronephrosis or nephrolithiasis. No ureteral dilatation.    Bladder: No evidence of wall thickening.    GI Tract/Mesentery: Scattered diverticulosis without evidence of acute diverticulitis.  No evidence of bowel obstruction or inflammation.    Peritoneal Space/mesenteric: No ascites. No free air. Multiple foci of abnormal soft tissue re-identified in the upper abdomen and peripancreatic region adjacent to the celiac artery.  Enlarged celiac axis lymph node re-identified measuring 1.2 cm in short axis.    Abdominal wall:  Unremarkable.    Vasculature: Atherosclerosis without aneurysm.    Bones: No acute fracture.  Stable degenerative changes and dextro scoliosis of the thoracolumbar spine.      Impression       No new acute abnormality.    Persistent ill-defined hypoattenuating lesion in the central liver with associated intrahepatic biliary  ductal dilation as described previously, slightly enlarged when compared with prior CT.  Interval placement of 2 biliary stents which terminate in the duodenum.    Stable mass lesion in the region of the pancreatic head/neck, possibly a peripancreatic mass as there is no significant pancreatic ductal dilation.  Recommend correlation with recent biopsy and EUS findings.  Additional soft tissue lesions in the upper abdomen and retroperitoneal lymphadenopathy.  Overall similar to prior CT and MRI.    Status post ERCP with biliary stent placement.  Stable intrahepatic biliary duct dilatation.    Nonspecific diffuse gallbladder wall thickening, similar to prior studies.    Additional findings as above.    Electronically signed by resident: Jayce Matthews  Date: 12/31/2018  Time: 00:36     Contains abnormal data MRI Abdomen W WO Contrast   Order: [id number]   Status:  Final result   Visible to patient:  Yes (Patient Portal) Next appt:  01/10/2019 at 11:20 AM in Internal Medicine (Veronica Frost MD)   Details     Reading Physician Reading Date Result Priority   Priya Bob MD 12/18/2018    Buzz Lunsford MD 12/18/2018       Narrative     EXAMINATION:  MRI ABDOMEN W WO CONTRAST    CLINICAL HISTORY:  Neoplasm: pancreas, suspected    TECHNIQUE:  Multiplanar, multisequence images are performed through the liver and upper abdomen.  Additionally 2D and 3D MRCP sequences are performed as well as MIP images.    COMPARISON:  CT abdomen pelvis with contrast 12/17/2018, limited abdominal ultrasound 12/10/2018    FINDINGS:  - Lung bases: No infiltrates and no nodules.  There is mild bilateral dependent atelectatic change.  No pleural fluid.    - Heart/Pericardial structures: Enlarged nodes at the right cardiophrenic angle measure 1.3 and 1.2 cm in short axis.    - EG Junction: No hiatal hernia.    ABDOMEN:    - Liver: Normal in size, and contour.  A large 5.6 x 4.8 cm heterogeneous lesion demonstrating T2  hyperintensity with T1 hypointensity, peripheral enhancement, and peripheral diffusion restriction.  The left portal vein is narrowed, but patent.  The hepatic vein, main and right portal veins, splenic vein, and hepatic artery are patent.    - Spleen: Not enlarged and has no collateral vessel formation.  A 0.9 cm T2 hyperintense/T1 hypointense lesion without postcontrast enhancement is suggestive of a cyst.    - Stomach/Duodenum: Unremarkable.    - Pancreas: The pancreas is atrophic.  At the head and neck of the pancreas lie two T2 hypointense rounded lesions measuring 2.5 and 2.0 cm on either side of the common bile duct with narrowing of the duct.  These lesions demonstrate diffusion restriction with mild postcontrast enhancement.    - Adrenals: Unremarkable.    - Kidneys/urinary bladder: No hydronephrosis.  There are innumerable bilateral T2 hyperintense/T2 hypointense renal lesions without post-contrast enhancement, the largest measuring 1.3 cm at the superior left renal pole.  Larger lesions are compatible with cysts, smaller lesions are too small to characterize.    - Retroperitoneum: There are multiple enlarged periaortic nodes, perhaps the largest measuring 1.7 cm on just superior to the SMA origin (axial series 4, image 27).  These nodes are most evident on diffusion imaging.    BOWEL/MESENTERY:    No evidence of bowel obstruction or inflammatory process. Enlarged mesenteric node lying inferior to the left hepatic lobe measures 1.5 cm in short axis (axial series 4, image 31).    VASCULATURE: Aorta is normal in caliber.    BONES: No evidence of bone marrow replacement process. There is dextroscoliosis of the lumbar spine.    EXTRAPERITONEAL SOFT TISSUES: Unremarkable.    MRCP:    Gallbladder demonstrates nonspecific gallbladder wall thickening without intraluminal stone, gallbladder distention, or significant pericholecystic fluid.  The central and suprapancreatic extrahepatic ducts are not well seen,  appearing compressed by the 2 previously described pancreatic lesions.  The visualized intra pancreatic portion of the bile duct is not enlarged, with a caliber of 5 mm.  There is significant intrahepatic biliary ductal dilatation, measuring up to 1.2 cm on the left and 0.7 cm on the right.    Pancreatic duct has maximum caliber of 3 mm which is within normal limits. No ductal strictures are identified.      Impression       Large 5.6 cm central hepatic lesion.  MR characteristics are most suggestive of mass lesion such as hilar cholangiocarcinoma/Klatskin tumor or metastatic disease.  Hepatic abscess felt much less likely.    Two pancreatic head/neck mass lesions on either side of the common bile duct with significant upstream biliary ductal dilatation as described above.    Retroperitoneal, mesenteric, and anterior mediastinal lymphadenopathy, noting an enlarged 1.7 cm periaortic node just superior to the SMA origin, an enlarged 1.4 cm mesenteric node just inferior to the left hepatic lobe, and an enlarged 1.3 cm right cardiophrenic angle node.    Nonspecific diffuse gallbladder wall thickening.    Multiple additional findings as above.    This report was flagged in Epic as abnormal.    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 Capecitabine planned previously if pancreatic vs. pembrolizumab could be considered if tissue sent previously found to be MSI-H positive  - Does not seem to be a good candidate for gem/cis or other combination therapies      Could possibly re-evaluate PS as outpatient and see in  outpatient clinic. We discussed the possibility that there would be some control to disease with single agent chemotherapy but that there was also no curative intent given metastatic cancer. Also, discussed with mostly daughter, as pt was intermittently somnolent, that the significant side effects of chemotherapy may only prolong survival for a short period of time in this type of cancer and that early palliative care involvement may help the most with overall management of pain and directing goals of care. Daughter seemed to be agreeable but still mentioned about a one time follow-up in our clinic. Please let us know around date of discharge so that we can schedule a follow-up with Dr. Turner/ Peter.    Discussed with Dr. Steve Parker MD  Hematology/Oncology Fellow, PGY IV  [facility]

## 2019-01-02 NOTE — PLAN OF CARE
Veronica Frost MD   [address] / [place] [address]     Payor: [employer] MANAGED MEDICARE / Plan: [employer] HEALTH / Product Type: Medicare Advantage /        [id number] - [place], [place] - [address] AT [facility] OF [address]  [address]  [place] [address]  Phone: [phone] Fax: [phone]       01/02/19 1430   Discharge Assessment   Assessment Type Discharge Planning Assessment   Confirmed/corrected address and phone number on facesheet? Yes   Assessment information obtained from? Caregiver   Expected Length of Stay (days) 3   Communicated expected length of stay with patient/caregiver yes   Prior to hospitilization cognitive status: Alert/Oriented   Prior to hospitalization functional status: Assistive Equipment   Current cognitive status: Unable to Assess   Current Functional Status: Assistive Equipment;Needs Assistance   Lives With alone  (Pt's daughter lives across the street.)   Able to Return to Prior Arrangements (Unable to determine at this time.)   Is patient able to care for self after discharge? Unable to determine at this time (comments)   Who are your caregiver(s) and their phone number(s)? Tari Ortiz (daughter) [phone]   Patient's perception of discharge disposition home health   If yes, most recent facility name: Recently discharged from [facility] on 12/22.   Patient currently being followed by outpatient case management? No   Patient currently receives any other outside agency services? Yes   Name and contact number of agency or person providing outside services [facility]( provides CNA services)   Is it the patient/care giver preference to resume care with the current outside agency? Yes   Equipment Currently Used at Home rollator;bath bench;bedside commode   Do you have any problems affording any of your prescribed medications? No   Is the patient taking medications as prescribed? yes   Does  the patient have transportation home? Yes   Transportation Anticipated family or friend will provide   Discharge Plan A Hospice/home   Discharge Plan B Home Health  (Current with [facility])   Patient/Family in Agreement with Plan yes   Readmission Questionnaire   At the time of your discharge, did someone talk to you about what your health problems were? Yes   At the time of discharge, did someone talk to you about what to watch out for regarding worsening of your health problem? Yes   At the time of discharge, did someone talk to you about what to do if you experienced worsening of your health problem? Yes   At the time of discharge, did someone talk to you about which medication to take when you left the hospital and which ones to stop taking? Yes   At the time of discharge, did someone talk to you about when and where to follow up with a doctor after you left the hospital? Yes   What do you believe caused you to be sick enough to be re-admitted? Abdominal pain worsening   Do you have problems taking your medications as prescribed? No   Do you have any problems affording any of  your prescribed medications? No   Do you have problems obtaining/receiving your medications? No   Does the patient have transportation to healthcare appointments? Yes   Does the patient have family/friends to help with healtcare needs after discharge? yes

## 2019-01-02 NOTE — PROGRESS NOTES
[facility] Hospital Medicine  Progress Note    Patient Name: Alfredina Claudette Parks  MRN: [id number]  Patient Class: IP- Inpatient   Admission Date: 12/30/2018  Length of Stay: 2 days  Attending Physician: Alfredo Jones MD  Primary Care Provider: Veronica Frost MD    [facility] Medicine Team: [facility] MED 4 Cezar Chinchilla MD    Subjective:     Principal Problem:Acute cholangitis    Interval History:   No acute events overnight.  Patient reports some mild abdominal pain that is well controlled with pain meds.  Further planning re: dispo is pending oncology evaluation.    Review of Systems   Constitutional: Negative for chills and fever.   Respiratory: Negative for cough and shortness of breath.    Cardiovascular: Negative for chest pain and palpitations.   Gastrointestinal: Positive for abdominal pain. Negative for constipation, diarrhea, nausea and vomiting.     Objective:     Vital Signs (Most Recent):  Temp: 97.7 °F (36.5 °C) (01/02/19 1202)  Pulse: 86 (01/02/19 1202)  Resp: 17 (01/02/19 1202)  BP: (!) 117/55 (01/02/19 1202)  SpO2: (!) 94 % (01/02/19 1202) Vital Signs (24h Range):  Temp:  [97.7 °F (36.5 °C)-100.4 °F (38 °C)] 97.7 °F (36.5 °C)  Pulse:  [] 86  Resp:  [16-18] 17  SpO2:  [92 %-97 %] 94 %  BP: ()/(54-64) 117/55     Weight: 68 kg (150 lb)  Body mass index is 32.46 kg/m².    Intake/Output Summary (Last 24 hours) at 1/2/2019 1527  Last data filed at 1/2/2019 0536  Gross per 24 hour   Intake 320 ml   Output --   Net 320 ml      Physical Exam   Constitutional: She is oriented to person, place, and time. She appears cachectic. No distress.   Cardiovascular: Normal rate, regular rhythm and intact distal pulses. Exam reveals no gallop and no friction rub.   No murmur heard.  Pulmonary/Chest: Breath sounds normal. No respiratory distress. She has no wheezes. She has no rales.   Abdominal: Soft. Bowel sounds are normal. She exhibits no distension. There is no  tenderness.   Neurological: She is alert and oriented to person, place, and time.       Significant Labs: All pertinent labs within the past 24 hours have been reviewed.    Significant Imaging: I have reviewed and interpreted all pertinent imaging results/findings within the past 24 hours.    Assessment/Plan:      * Acute cholangitis    - she has a spike of fever at 100.4. Currently afebrile.  - 1L of fluid was given. Started on D5 1/2 NS 125ml/hr for 8 hours (total 1L).  - WBC was high, f/u with blood culture and lactate.  - started on Zosyn.  - AES was consulted as patient has 2 biliary stents which placed on 12/19  -  ERCP showed Two visibly occluded stents from the biliary tree. A single localized biliary stricture was found in the left main hepatic duct. The stricture was malignant appearing with malignant invasion and could not be traversed. Two biliary stents removed, one placed in the common bile duct. GI recommending repeat ERCP in 6 weeks.  - Palliative consult pending  - Oncology consult pending  - Will continue IV zosyn at this time     Hypomagnesemia    Replete with Mag Sulfate 2mg IV, monitor daily       Palliative care encounter    Palliative care spoke with patient previously, pt discharged previously with HH/AIM and f/u with Dr. Richey. Palliative care to follow along during this admission.      Pancreatic cancer    Patient recently diagnosed with stage IV pancreatic cancer. She is on oxycodone 5 mg Q6h at home. She came because of worsening of abdominal pain and nausea. Palliative care and AES were consulted.  -Oncology consult pending     Cholangiocarcinoma    -Stage 4 cancer diagnosed during previous visit 12/21. Malignant appearance of strictures occluding recently placed stent suggesting aggressive course  -Outpatient oncology follow-up previously  scheduled for 1/2/19  -Will consult Oncology to discuss prognosis with patient     A-fib    - she has loop recorder which was placed on 02/2017.  -  resume home dose Amiodarone and Carvedilol.     Hypothyroid    Resume home dose levothyroxine.       VTE Risk Mitigation (From admission, onward)        Ordered     IP VTE HIGH RISK PATIENT  Once      12/31/18 1343     Place sequential compression device  Until discontinued      12/31/18 1343              Cezar Chinchilla MD  Department of Hospital Medicine   [facility]

## 2019-01-02 NOTE — ASSESSMENT & PLAN NOTE
- she has a spike of fever at 100.4. Currently afebrile.  - 1L of fluid was given. Started on D5 1/2 NS 125ml/hr for 8 hours (total 1L).  - WBC was high, f/u with blood culture and lactate.  - started on Zosyn.  - AES was consulted as patient has 2 biliary stents which placed on 12/19  -  ERCP showed Two visibly occluded stents from the biliary tree. A single localized biliary stricture was found in the left main hepatic duct. The stricture was malignant appearing with malignant invasion and could not be traversed. Two biliary stents removed, one placed in the common bile duct. GI recommending repeat ERCP in 6 weeks.  - Palliative consult pending  - Oncology consult pending  - Will continue IV zosyn at this time

## 2019-01-02 NOTE — PT/OT/SLP EVAL
Physical Therapy Evaluation    Patient Name:  Alfredina Claudette Parks   MRN:  [id number]    Recommendations:     Discharge Recommendations:  nursing facility, skilled, other (see comments)(possible home health PT if palliative measures taken)   Discharge Equipment Recommendations: (tbd)   Barriers to discharge: Decreased caregiver support and functional mobility limitations at this time    Assessment:     Alfredina Claudette Parks is a 78 y.o. female admitted with a medical diagnosis of Acute cholangitis.  She presents with the following impairments/functional limitations:  impaired endurance, gait instability, impaired functional mobilty, weakness, pain Patient tolerated evaluation/tx  fairly well. Patient limited at this time by increased dizziness and lethargy throuhgout session. Pt able to t/f to chair with min A using stand pivot and taking 3-4 steps with HHA. Further functional mobiltiy not assessed 2/2 pt lethargy, BP assessed and WFL.  Patient will continue to require skilled PT services to address the above impairments to return to prior level of function as independent as possible. Discharge recommendation  to skilled nursing facility  to maximize functional mobility, safety and decrease caregiver burden prior to returning home.       Rehab Prognosis: Fair; patient would benefit from acute skilled PT services to address these deficits and reach maximum level of function.    Recent Surgery: Procedure(s) (LRB):  ERCP (ENDOSCOPIC RETROGRADE CHOLANGIOPANCREATOGRAPHY) (N/A) 2 Days Post-Op    Plan:     During this hospitalization, patient to be seen 3 x/week to address the identified rehab impairments via gait training, therapeutic activities, therapeutic exercises, neuromuscular re-education and progress toward the following goals:    · Plan of Care Expires:  02/02/19    Subjective     Chief Complaint: dizziness and pain   Patient/Family Comments/goals: to get stronger; daughter present states she would like her  mother to be as strong as possible before she goes home  Pain/Comfort:  · Pain Rating 1: (pain in LLQ, not rated but increased wiht activity and decrassed with cessation of activity)    Patients cultural, spiritual, Zoroastrianism conflicts given the current situation: no    Living Environment:  PER OT CHART:   Living Environment: Pt lives alone in a 1st floor apartment with 0 JOSELIN an elevator access. Pt has a ramp to get to the elevator. Bathroom set up: Tub shower combo with grab bars  Previous level of function: Mod I in all ADLs/ IADLs; Pt uses rollator at baseline. Pt has assistance from [facility] for self care tasks and nursing care in the home every morning. Pt has an aid that assist pt during the evening. Pt is only alone during the night. Pt's daughter reported that pt has been increasingly week for the past 2 weeks requiring more assistance with ADLs.   Roles and Routines: Homemaker  Equipment Used at Home:  rollator, bedside commode, nebulizer, bath bench  Assistance upon Discharge: Pt will have assistance from daughter that stays across the street upon discharge.        Objective:     Communicated with RN  prior to session.  Patient found all lines intact, call button in reach and supine oxygen  upon PT entry to room.    General Precautions: Standard, fall   Orthopedic Precautions:N/A   Braces: N/A     Exams:  · Cognitive Exam:  Patient is oriented to Person, Place, Time and Situation  · Fine Motor Coordination: -       Intact  · Gross Motor Coordination:  WFL  · Postural Exam:  Patient presented with the following abnormalities: -       Rounded shoulders  · -       Forward head  · Sensation: -       Intact  · Skin Integrity/Edema:  -       Skin integrity: Visible skin intact  · RLE ROM: WFL  · RLE Strength: WFL  · LLE ROM: WFL  · LLE Strength: WFL    Functional Mobility:  · Bed Mobility:  Rolling Left:  contact guard assistance  · Rolling Right: contact guard assistance  · Supine to Sit: contact guard  assistance  · Sit to Supine: minimum assistance  · Transfers:  Sit to Stand:  minimum assistance with no AD  · Bed to Chair: minimum assistance with  no AD and hand-held assist  using  Step Transfer  · Gait: x 3-4 steps to chair with min A  · Balance: min A for standing balance      Therapeutic Activities and Exercises:     Patient education  · Patient educated on the role of PT and POC  · Patient educated on importance  activity while in the hosptial per tolerance for improved endurance and to limit deconditioning   · Patient educated on safe transfers with nursing as appropriate  · Patient educated on energy conservation, pursed lip breathing  · Patient educated on proper transfer mechanics and safety  · All of patients questions were answered within the scope of PT        AM-PAC 6 CLICK MOBILITY  Total Score:16     Patient left up in chair with all lines intact, call button in reach, RN  notified and daughter present.    GOALS:   Multidisciplinary Problems     Physical Therapy Goals        Problem: Physical Therapy Goal    Goal Priority Disciplines Outcome Goal Variances Interventions   Physical Therapy Goal     PT, PT/OT Ongoing (interventions implemented as appropriate)     Description:  Goals to be met by: 19     Patient will increase functional independence with mobility by performin. Supine to sit with Contact Guard Assistance  2. Sit to supine with Contact Guard Assistance  3. Sit to stand transfer with Stand-by Assistance  4. Bed to chair transfer with Stand-by Assistance using Rolling Walker or LRD.   5. Gait  x 20 feet with Contact Guard Assistance using Rolling Walker or LRD.                       History:     Past Medical History:   Diagnosis Date    Absence of bladder continence 2015    Asthma     Asthma without status asthmaticus 2012    Atrial fibrillation     BMI 36.0-36.9,adult 2014    Bulging lumbar disc     Cataract     Cervical radiculopathy     Claudication  5/18/2017    Constipation 8/30/2013    Diabetes mellitus     pre diabetes     Diverticular disease 8/30/2013    GERD (gastroesophageal reflux disease) 6/28/2012    Hepatitis C     Hyperglycemia     Hypertension     Hypothyroidism     Osteoporosis     Pancreatic cyst     Vitamin D deficiency disease        Past Surgical History:   Procedure Laterality Date    BREAST CYST EXCISION Bilateral     COLONOSCOPY N/A 7/11/2013    Performed by [facility]isha [facility]ga [facility] at [facility]    CYST REMOVAL      ERCP (ENDOSCOPIC RETROGRADE CHOLANGIOPANCREATOGRAPHY) N/A 12/31/2018    Performed by Jamil King MD at [facility] END[facility] (2ND FLR)    ERCP (ENDOSCOPIC RETROGRADE CHOLANGIOPANCREATOGRAPHY) N/A 12/19/2018    Performed by [facility]a Mustafa [facility] at [facility] (2ND FLR)    ESOPHAGOGASTRODUODENOSCOPY (EGD) N/A 1/21/2015    Performed by Genaro Cordero MD at [facility]    EYE SURGERY      cataracts    HYSTERECTOMY      TOE SURGERY Bilateral     big toenails    ULTRASOUND, ENDOSCOPIC, UPPER GI TRACT N/A 12/19/2018    Performed by Vidhya Mustafa MD at [facility] ENDO (2ND FLR)    ULTRASOUND-ENDOSCOPIC-UPPER N/A 1/15/2018    Performed by Jamil King MD at [facility] ENDO (2ND FLR)    ULTRASOUND-ENDOSCOPIC-UPPER N/A 8/14/2015    Performed by Genaro Cordero MD at [facility]    ULTRASOUND-ENDOSCOPIC-UPPER N/A 1/21/2015    Performed by Genaro Cordero MD at [facility]    ULTRASOUND-ENDOSCOPIC-UPPER W/POSSIBLE FNA N/A 2/15/2016    Performed by Genaro Cordero MD at [facility]       Clinical Decision Making:     History  Co-morbidities and personal factors that may impact the plan of care Examination  Body Structures and Functions, activity limitations and participation restrictions that may impact the plan of care Clinical Presentation   Decision Making/ Complexity Score   Co-morbidities:   [] Time since onset of injury / illness / exacerbation  [x] Status of current condition  []Patient's cognitive status and safety concerns     [] Multiple Medical Problems (see med hx)  Personal Factors:   [] Patient's age  [] Prior Level of function   [] Patient's home situation (environment and family support)  [x] Patient's level of motivation  [] Expected progression of patient      HISTORY:(criteria)    [] 89452 - no personal factors/history    [x] 88253 - has 1-2 personal factor/comorbidity     [] 08709 - has >3 personal factor/comorbidity     Body Regions:  [] Objective examination findings  [] Head     []  Neck  [] Trunk   [] Upper Extremity  [x] Lower Extremity    Body Systems:  [] For communication ability, affect, cognition, language, and learning style: the assessment of the ability to make needs known, consciousness, orientation (person, place, and time), expected emotional /behavioral responses, and learning preferences (eg, learning barriers, education  needs)  [] For the neuromuscular system: a general assessment of gross coordinated movement (eg, balance, gait, locomotion, transfers, and transitions) and motor function  (motor control and motor learning)  [] For the musculoskeletal system: the assessment of gross symmetry, gross range of motion, gross strength, height, and weight  [x] For the integumentary system: the assessment of pliability(texture), presence of scar formation, skin color, and skin integrity  [] For cardiovascular/pulmonary system: the assessment of heart rate, respiratory rate, blood pressure, and edema     Activity limitations:    [] Patient's cognitive status and saf ety concerns          [] Status of current condition      [] Weight bearing restriction  [] Cardiopulmunary Restriction    Participation Restrictions:   [] Goals and goal agreement with the patient     [] Rehab potential (prognosis) and probable outcome      Examination of Body System: (criteria)    [] 14094 - addressing 1-2 elements    [x] 52419 - addressing a total of 3 or more elements     [] 35110 -  Addressing a total of 4 or more elements          Clinical Presentation: (criteria)  Stable - 58791     On examination of body system using standardized tests and measures patient presents with 1-2 elements from any of the following: body structures and functions, activity limitations, and/or participation restrictions.  Leading to a clinical presentation that is considered stable and/or uncomplicated                              Clinical Decision Making  (Eval Complexity):  Low- 53521     Time Tracking:     PT Received On: 01/02/19  PT Start Time: 0926     PT Stop Time: 0949  PT Total Time (min): 23 min     Billable Minutes: Evaluation 10 min and Therapeutic Activity 10  min      Neftaly Larson, PT  01/02/2019

## 2019-01-02 NOTE — PLAN OF CARE
Problem: Physical Therapy Goal  Goal: Physical Therapy Goal  Goals to be met by: 19     Patient will increase functional independence with mobility by performin. Supine to sit with Contact Guard Assistance  2. Sit to supine with Contact Guard Assistance  3. Sit to stand transfer with Stand-by Assistance  4. Bed to chair transfer with Stand-by Assistance using Rolling Walker or LRD.   5. Gait  x 20 feet with Contact Guard Assistance using Rolling Walker or LRD.     Outcome: Ongoing (interventions implemented as appropriate)  Patient evaluated today. All goals established are appropriate for patient progression at this time.   Neftaly Larson PT, DPT  2019  Pager: [phone]

## 2019-01-02 NOTE — PLAN OF CARE
Problem: Adult Inpatient Plan of Care  Goal: Plan of Care Review  Outcome: Ongoing (interventions implemented as appropriate)  POC reviewed at bedside with patient. Questions and concerns addressed. VSS. Up to BSC to void. Stress incont at times. IV abx as ordered. Safety maintained. Bed in low and locked position. Call light within reach. Side rails up x2. Frequent rounds.

## 2019-01-02 NOTE — [employer] PATIENT INSTRUCTIONS
Discharge Summary/Instructions after an Endoscopic Procedure  Patient Name: Mitzy Garcia  Patient MRN: [id number]  Patient YOB: 1940  Monday, December 31, 2018  Jamil King MD  RESTRICTIONS:  During your procedure today, you received medications for sedation.  These   medications may affect your judgment, balance and coordination.  Therefore,   for 24 hours, you have the following restrictions:   - DO NOT drive a car, operate machinery, make legal/financial decisions,   sign important papers or drink alcohol.    ACTIVITY:  Today: no heavy lifting, straining or running due to procedural   sedation/anesthesia.  The following day: return to full activity including work.  DIET:  Eat and drink normally unless instructed otherwise.     TREATMENT FOR COMMON SIDE EFFECTS:  - Mild abdominal pain, nausea, belching, bloating or excessive gas:  rest,   eat lightly and use a heating pad.  - Sore Throat: treat with throat lozenges and/or gargle with warm salt   water.  - Because air was used during the procedure, expelling large amounts of air   from your rectum or belching is normal.  - If a bowel prep was taken, you may not have a bowel movement for 1-3 days.    This is normal.  SYMPTOMS TO WATCH FOR AND REPORT TO YOUR PHYSICIAN:  1. Abdominal pain or bloating, other than gas cramps.  2. Chest pain.  3. Back pain.  4. Signs of infection such as: chills or fever occurring within 24 hours   after the procedure.  5. Rectal bleeding, which would show as bright red, maroon, or black stools.   (A tablespoon of blood from the rectum is not serious, especially if   hemorrhoids are present.)  6. Vomiting.  7. Weakness or dizziness.  GO DIRECTLY TO THE NEAREST EMERGENCY ROOM IF YOU HAVE ANY OF THE FOLLOWING:      Difficulty breathing              Chills and/or fever over 101 F   Persistent vomiting and/or vomiting blood   Severe abdominal pain   Severe chest pain   Black, tarry stools   Bleeding- more than one  tablespoon   Any other symptom or condition that you feel may need urgent attention  Your doctor recommends these additional instructions:  If any biopsies were taken, your doctors clinic will contact you in 1 to 2   weeks with any results.  - Return patient to [facility] for [facility] care.   - Continue present medications.   - Repeat ERCP in 6 weeks to exchange stent.   - Continue antibiotics.  - May need percutaneous access to the left intrahepatic system  For questions, problems or results please call your physician - Jamil King MD at Work:  [phone].  [facility], EMERGENCY ROOM PHONE NUMBER: [phone]  IF A COMPLICATION OR EMERGENCY SITUATION ARISES AND YOU ARE UNABLE TO REACH   YOUR PHYSICIAN - GO DIRECTLY TO THE EMERGENCY ROOM.  Jamil King MD  12/31/2018 1:53:17 PM  This report has been verified and signed electronically.  [employer]

## 2019-01-02 NOTE — NURSING
Notified Dr. Morse of rhythm change from Sinus to Afib. Informed pt. Has history of Afib. Stated would look at chart and place orders.

## 2019-01-02 NOTE — PLAN OF CARE
"Problem: Spiritual Distress Risk or Actual  Goal: Spiritual Wellbeing    Intervention: Promote Spiritual Wellbeing  Facts (Spiritual Perception of Illness): Pt requested prayer that "God's will be done whatever happens." Pt's daughter agreed with this request.     Feelings (Emotions/Experiences/Coping):    Pt appeared lethargic, concerned about speaking with family re: her prognosis, and accepting re: prognosis.     Family/Friends:   Pt's daughter bedside. Mentioned she was an only child. Daughter mentioned family/friend support in the [facility].     Naya (Belief/Meaning/Philosophy): Pt and family of Scientologist naya. Both daughter and patient expressed reliance on their naya for strength. Daughter mentioned relying on "prayer and meditation" for strength.    Future (Spiritual Care Plan of Action): Introduced and offered pastoral support. Pt also requested devotional material with large print.  will provide if available.  offered prayer per family request.  Pt and family made aware of 's presence as needed. Will continue to follow.         "

## 2019-01-02 NOTE — TREATMENT PLAN
AES treatment plan    Patient feeling tired today however with mild lower abdominal pain, and mild nausea. Tolerated breakfast and lunch well. Denies fevers or chills.   LFTs were not sent with AM labs. Will add on. WBC trending down. Tmax 100    Continue IV abx, and trend CBC/CMP.   We will continue to follow

## 2019-01-02 NOTE — SUBJECTIVE & OBJECTIVE
Interval History:   No acute events overnight.  Patient reports some mild abdominal pain that is well controlled with pain meds.  Further planning re: dispo is pending oncology evaluation.    Review of Systems   Constitutional: Negative for chills and fever.   Respiratory: Negative for cough and shortness of breath.    Cardiovascular: Negative for chest pain and palpitations.   Gastrointestinal: Positive for abdominal pain. Negative for constipation, diarrhea, nausea and vomiting.     Objective:     Vital Signs (Most Recent):  Temp: 97.7 °F (36.5 °C) (01/02/19 1202)  Pulse: 86 (01/02/19 1202)  Resp: 17 (01/02/19 1202)  BP: (!) 117/55 (01/02/19 1202)  SpO2: (!) 94 % (01/02/19 1202) Vital Signs (24h Range):  Temp:  [97.7 °F (36.5 °C)-100.4 °F (38 °C)] 97.7 °F (36.5 °C)  Pulse:  [] 86  Resp:  [16-18] 17  SpO2:  [92 %-97 %] 94 %  BP: ()/(54-64) 117/55     Weight: 68 kg (150 lb)  Body mass index is 32.46 kg/m².    Intake/Output Summary (Last 24 hours) at 1/2/2019 1527  Last data filed at 1/2/2019 0536  Gross per 24 hour   Intake 320 ml   Output --   Net 320 ml      Physical Exam   Constitutional: She is oriented to person, place, and time. She appears cachectic. No distress.   Cardiovascular: Normal rate, regular rhythm and intact distal pulses. Exam reveals no gallop and no friction rub.   No murmur heard.  Pulmonary/Chest: Breath sounds normal. No respiratory distress. She has no wheezes. She has no rales.   Abdominal: Soft. Bowel sounds are normal. She exhibits no distension. There is no tenderness.   Neurological: She is alert and oriented to person, place, and time.       Significant Labs: All pertinent labs within the past 24 hours have been reviewed.    Significant Imaging: I have reviewed and interpreted all pertinent imaging results/findings within the past 24 hours.

## 2019-01-02 NOTE — PLAN OF CARE
Problem: Adult Inpatient Plan of Care  Goal: Plan of Care Review  Outcome: Ongoing (interventions implemented as appropriate)  No acute changes throughout the day. Pt sat up in chair most of the day. Pt c/o abdominal pain and was given pain medications prn. Pt running Afib on monitor. Will continue to monitor.

## 2019-01-03 ENCOUNTER — TELEPHONE (OUTPATIENT)
Dept: ENDOSCOPY | Facility: HOSPITAL | Age: 79
End: 2019-01-03

## 2019-01-03 DIAGNOSIS — K83.1 BILIARY STRICTURE: Primary | ICD-10-CM

## 2019-01-03 PROBLEM — D64.9 ANEMIA: Status: ACTIVE | Noted: 2019-01-03

## 2019-01-03 PROBLEM — E83.39 HYPOPHOSPHATEMIA: Status: ACTIVE | Noted: 2019-01-03

## 2019-01-03 LAB
ALBUMIN SERPL BCP-MCNC: 1.5 G/DL
ANION GAP SERPL CALC-SCNC: 9 MMOL/L
BASOPHILS # BLD AUTO: 0.03 K/UL
BASOPHILS NFR BLD: 0.2 %
BUN SERPL-MCNC: 19 MG/DL
CALCIUM SERPL-MCNC: 8.4 MG/DL
CHLORIDE SERPL-SCNC: 97 MMOL/L
CO2 SERPL-SCNC: 25 MMOL/L
CREAT SERPL-MCNC: 0.7 MG/DL
DIFFERENTIAL METHOD: ABNORMAL
EOSINOPHIL # BLD AUTO: 0.1 K/UL
EOSINOPHIL NFR BLD: 0.8 %
ERYTHROCYTE [DISTWIDTH] IN BLOOD BY AUTOMATED COUNT: 20.3 %
EST. GFR  (AFRICAN AMERICAN): >60 ML/MIN/1.73 M^2
EST. GFR  (NON AFRICAN AMERICAN): >60 ML/MIN/1.73 M^2
GLUCOSE SERPL-MCNC: 86 MG/DL
HCT VFR BLD AUTO: 30.1 %
HGB BLD-MCNC: 9.7 G/DL
IMM GRANULOCYTES # BLD AUTO: 0.11 K/UL
IMM GRANULOCYTES NFR BLD AUTO: 0.8 %
LYMPHOCYTES # BLD AUTO: 2 K/UL
LYMPHOCYTES NFR BLD: 13.8 %
MAGNESIUM SERPL-MCNC: 1.8 MG/DL
MCH RBC QN AUTO: 31.4 PG
MCHC RBC AUTO-ENTMCNC: 32.2 G/DL
MCV RBC AUTO: 97 FL
MONOCYTES # BLD AUTO: 1.5 K/UL
MONOCYTES NFR BLD: 10.6 %
NEUTROPHILS # BLD AUTO: 10.5 K/UL
NEUTROPHILS NFR BLD: 73.8 %
NRBC BLD-RTO: 0 /100 WBC
PHOSPHATE SERPL-MCNC: 2 MG/DL
PLATELET # BLD AUTO: 207 K/UL
PMV BLD AUTO: 10.4 FL
POTASSIUM SERPL-SCNC: 3.8 MMOL/L
RBC # BLD AUTO: 3.09 M/UL
SODIUM SERPL-SCNC: 131 MMOL/L
WBC # BLD AUTO: 14.22 K/UL

## 2019-01-03 PROCEDURE — 25000003 PHARM REV CODE 250: Performed by: STUDENT IN AN ORGANIZED HEALTH CARE EDUCATION/TRAINING PROGRAM

## 2019-01-03 PROCEDURE — 80069 RENAL FUNCTION PANEL: CPT

## 2019-01-03 PROCEDURE — 25000003 PHARM REV CODE 250: Performed by: INTERNAL MEDICINE

## 2019-01-03 PROCEDURE — 99232 PR SUBSEQUENT HOSPITAL CARE,LEVL II: ICD-10-PCS | Mod: GC,,, | Performed by: HOSPITALIST

## 2019-01-03 PROCEDURE — 99232 SBSQ HOSP IP/OBS MODERATE 35: CPT | Mod: GC,,, | Performed by: HOSPITALIST

## 2019-01-03 PROCEDURE — 63600175 PHARM REV CODE 636 W HCPCS: Performed by: STUDENT IN AN ORGANIZED HEALTH CARE EDUCATION/TRAINING PROGRAM

## 2019-01-03 PROCEDURE — 25000003 PHARM REV CODE 250: Performed by: HOSPITALIST

## 2019-01-03 PROCEDURE — 20600001 HC STEP DOWN PRIVATE ROOM

## 2019-01-03 PROCEDURE — 94640 AIRWAY INHALATION TREATMENT: CPT

## 2019-01-03 PROCEDURE — 63600175 PHARM REV CODE 636 W HCPCS: Performed by: HOSPITALIST

## 2019-01-03 PROCEDURE — 27000221 HC OXYGEN, UP TO 24 HOURS

## 2019-01-03 PROCEDURE — 86580 TB INTRADERMAL TEST: CPT | Performed by: HOSPITALIST

## 2019-01-03 PROCEDURE — 83735 ASSAY OF MAGNESIUM: CPT

## 2019-01-03 PROCEDURE — 36415 COLL VENOUS BLD VENIPUNCTURE: CPT

## 2019-01-03 PROCEDURE — 85025 COMPLETE CBC W/AUTO DIFF WBC: CPT

## 2019-01-03 PROCEDURE — 25000242 PHARM REV CODE 250 ALT 637 W/ HCPCS: Performed by: STUDENT IN AN ORGANIZED HEALTH CARE EDUCATION/TRAINING PROGRAM

## 2019-01-03 PROCEDURE — 94761 N-INVAS EAR/PLS OXIMETRY MLT: CPT

## 2019-01-03 RX ORDER — CETIRIZINE HYDROCHLORIDE 5 MG/1
10 TABLET ORAL DAILY
Status: DISCONTINUED | OUTPATIENT
Start: 2019-01-03 | End: 2019-01-10 | Stop reason: HOSPADM

## 2019-01-03 RX ORDER — FLUTICASONE FUROATE AND VILANTEROL 200; 25 UG/1; UG/1
1 POWDER RESPIRATORY (INHALATION) DAILY
Status: DISCONTINUED | OUTPATIENT
Start: 2019-01-03 | End: 2019-01-10 | Stop reason: HOSPADM

## 2019-01-03 RX ORDER — IPRATROPIUM BROMIDE AND ALBUTEROL SULFATE 2.5; .5 MG/3ML; MG/3ML
3 SOLUTION RESPIRATORY (INHALATION) EVERY 4 HOURS
Status: DISCONTINUED | OUTPATIENT
Start: 2019-01-03 | End: 2019-01-03

## 2019-01-03 RX ORDER — IPRATROPIUM BROMIDE AND ALBUTEROL SULFATE 2.5; .5 MG/3ML; MG/3ML
3 SOLUTION RESPIRATORY (INHALATION) EVERY 4 HOURS PRN
Status: DISCONTINUED | OUTPATIENT
Start: 2019-01-03 | End: 2019-01-03

## 2019-01-03 RX ORDER — IPRATROPIUM BROMIDE AND ALBUTEROL SULFATE 2.5; .5 MG/3ML; MG/3ML
3 SOLUTION RESPIRATORY (INHALATION) EVERY 4 HOURS PRN
Status: DISCONTINUED | OUTPATIENT
Start: 2019-01-03 | End: 2019-01-10 | Stop reason: HOSPADM

## 2019-01-03 RX ORDER — SODIUM,POTASSIUM PHOSPHATES 280-250MG
2 POWDER IN PACKET (EA) ORAL
Status: COMPLETED | OUTPATIENT
Start: 2019-01-03 | End: 2019-01-04

## 2019-01-03 RX ORDER — SODIUM,POTASSIUM PHOSPHATES 280-250MG
1 POWDER IN PACKET (EA) ORAL
Status: DISCONTINUED | OUTPATIENT
Start: 2019-01-03 | End: 2019-01-03

## 2019-01-03 RX ADMIN — IPRATROPIUM BROMIDE AND ALBUTEROL SULFATE 3 ML: .5; 3 SOLUTION RESPIRATORY (INHALATION) at 05:01

## 2019-01-03 RX ADMIN — CETIRIZINE HYDROCHLORIDE 10 MG: 5 TABLET ORAL at 04:01

## 2019-01-03 RX ADMIN — PIPERACILLIN AND TAZOBACTAM 4.5 G: 4; .5 INJECTION, POWDER, LYOPHILIZED, FOR SOLUTION INTRAVENOUS; PARENTERAL at 05:01

## 2019-01-03 RX ADMIN — GABAPENTIN 200 MG: 100 CAPSULE ORAL at 04:01

## 2019-01-03 RX ADMIN — CARVEDILOL 3.12 MG: 3.12 TABLET, FILM COATED ORAL at 08:01

## 2019-01-03 RX ADMIN — POTASSIUM & SODIUM PHOSPHATES POWDER PACK 280-160-250 MG 2 PACKET: 280-160-250 PACK at 04:01

## 2019-01-03 RX ADMIN — OXYCODONE HYDROCHLORIDE 5 MG: 5 TABLET ORAL at 05:01

## 2019-01-03 RX ADMIN — PIPERACILLIN AND TAZOBACTAM 4.5 G: 4; .5 INJECTION, POWDER, LYOPHILIZED, FOR SOLUTION INTRAVENOUS; PARENTERAL at 12:01

## 2019-01-03 RX ADMIN — OXYCODONE HYDROCHLORIDE 5 MG: 5 TABLET ORAL at 08:01

## 2019-01-03 RX ADMIN — POTASSIUM & SODIUM PHOSPHATES POWDER PACK 280-160-250 MG 2 PACKET: 280-160-250 PACK at 09:01

## 2019-01-03 RX ADMIN — GABAPENTIN 200 MG: 100 CAPSULE ORAL at 09:01

## 2019-01-03 RX ADMIN — STANDARDIZED SENNA CONCENTRATE AND DOCUSATE SODIUM 1 TABLET: 8.6; 5 TABLET, FILM COATED ORAL at 08:01

## 2019-01-03 RX ADMIN — GABAPENTIN 200 MG: 100 CAPSULE ORAL at 08:01

## 2019-01-03 RX ADMIN — AMIODARONE HYDROCHLORIDE 100 MG: 100 TABLET ORAL at 08:01

## 2019-01-03 RX ADMIN — ONDANSETRON 4 MG: 4 TABLET, FILM COATED ORAL at 08:01

## 2019-01-03 RX ADMIN — FLUTICASONE PROPIONATE 50 MCG: 50 SPRAY, METERED NASAL at 08:01

## 2019-01-03 RX ADMIN — PIPERACILLIN AND TAZOBACTAM 4.5 G: 4; .5 INJECTION, POWDER, LYOPHILIZED, FOR SOLUTION INTRAVENOUS; PARENTERAL at 09:01

## 2019-01-03 RX ADMIN — LEVOTHYROXINE SODIUM 125 MCG: 25 TABLET ORAL at 05:01

## 2019-01-03 RX ADMIN — POLYETHYLENE GLYCOL 3350 17 G: 17 POWDER, FOR SOLUTION ORAL at 08:01

## 2019-01-03 RX ADMIN — HYDROMORPHONE HYDROCHLORIDE 1 MG: 1 INJECTION, SOLUTION INTRAMUSCULAR; INTRAVENOUS; SUBCUTANEOUS at 06:01

## 2019-01-03 RX ADMIN — SOLIFENACIN SUCCINATE 5 MG: 5 TABLET, FILM COATED ORAL at 08:01

## 2019-01-03 RX ADMIN — POTASSIUM & SODIUM PHOSPHATES POWDER PACK 280-160-250 MG 1 PACKET: 280-160-250 PACK at 12:01

## 2019-01-03 RX ADMIN — Medication 5 UNITS: at 04:01

## 2019-01-03 RX ADMIN — PANTOPRAZOLE SODIUM 40 MG: 40 TABLET, DELAYED RELEASE ORAL at 08:01

## 2019-01-03 RX ADMIN — ASPIRIN 81 MG: 81 TABLET, COATED ORAL at 08:01

## 2019-01-03 RX ADMIN — OXYCODONE HYDROCHLORIDE 5 MG: 5 TABLET ORAL at 09:01

## 2019-01-03 RX ADMIN — ALLOPURINOL 100 MG: 100 TABLET ORAL at 08:01

## 2019-01-03 NOTE — ASSESSMENT & PLAN NOTE
Palliative care consulted for goals of care and advanced care planning.  Patient discussed with IM 4 resident.       Impression. . Jose is is a 79 yo lady with hx of afib, DM and HTN. Known to palliative care from recent hospital admission - diagnosed with stage IV biliary - pancreatic cancer/ s/p biliary stent placement.  Presented with complaints of pain and fever  With concerns for cholangitis or stent occlusion. GI-AES. ERCP today. From previous admission, cancer treatment options limited to palliative approach with hope of relieving symptoms. Current clinical condition is appropriate for transition to hospice.  Patient and family have not been amenable.     Advanced Care Planning:   - No advanced directives have been received.  - next of kin for medical decisions: daughter Aileen [phone]  - Resuscitation status: Full code per primary team.   - Disease understanding: patient and daughter have understanding the that the cancer is advanced and possible treatment options would not be curable.  Both understand treatment may help with control of symptoms and would most probably prolong her life for a few months.     Goals of Care:  During previous encounter, palliative care has initiated discussion regarding hospice.  Ms Garcia had understanding of the role of hospice at the end of life.  At this time she was not amenable.  Recommendations given for home palliative care and referral sent to [facility] for home palliative care.    - Met with patient and daughter Aileen at bedside.  Lengthy discussion regarding goals of care.    As per patient and daughter the goal remains to follow up in oncology clinic for treatment options.    - Daughter states the oncologist were at bedside today.  Aileen states understanding this is a stage IV biliary cancer and treatment does not guarantee long term survival.  Further states knowing all treatment options is important.   - Patient and family have considerable knowledge of hospice -  they are not amenable to transition to hospice at this time.     Plan/Recommendation  - Patient's goal is to follow up with oncology clinic for treatment options  - When making follow appointment with oncology clinic please indicate patient to be followed by palliative care, Dr. Richey, in oncology clinic.   -  home palliative care referral has been sent to [facility] prior to 12/18 discharge from hospital , consult with case management/  For update regarding referral.   - Goals of care have been established.  Palliative care will sign off.  Please re- consult as needed.

## 2019-01-03 NOTE — ASSESSMENT & PLAN NOTE
-Stage 4 cancer diagnosed during previous visit 12/21. Malignant appearance of strictures occluding recently placed stent suggesting aggressive course  -Outpatient oncology follow-up previously  scheduled for 1/2/19  -Oncology noting unclear whether patient would derive any benefit from chemotherapy, given her poor performance status and the aggressiveness of her disease.  -Pt and daughter interested in short stay at SNF/Rehab to try to regain strength and make outpatient oncology appointment.

## 2019-01-03 NOTE — PLAN OF CARE
Problem: Adult Inpatient Plan of Care  Goal: Plan of Care Review  Outcome: Ongoing (interventions implemented as appropriate)  Pt rested comfortably most of night. Requested Breathing Tx twice. IV Abx given.

## 2019-01-03 NOTE — PROGRESS NOTES
[facility]  Palliative Medicine  Progress Note    Patient Name: Alfredina Claudette Parks  MRN: [id number]  Admission Date: 12/30/2018  Hospital Length of Stay: 3 days  Code Status: Full Code   Attending Provider: Alfredo Jones MD  Consulting Provider: KIRK Fuentes  Primary Care Physician: Veronica Frost MD  Principal Problem:Acute cholangitis    Patient information was obtained from patient, relative(s), past medical records and ER records.      Assessment/Plan:     Palliative care encounter    Palliative care consulted for goals of care and advanced care planning.  Patient discussed with  4 resident.       Impression. Brady horan is a 79 yo lady with hx of afib, DM and HTN. Known to palliative care from recent hospital admission - diagnosed with stage IV biliary - pancreatic cancer/ s/p biliary stent placement.  Presented with complaints of pain and fever  With concerns for cholangitis or stent occlusion. GI-AES. ERCP today. From previous admission, cancer treatment options limited to palliative approach with hope of relieving symptoms. Current clinical condition is appropriate for transition to hospice.  Patient and family have not been amenable.     Advanced Care Planning:   - No advanced directives have been received.  - next of kin for medical decisions: daughter Tari [phone]  - Resuscitation status: Full code per primary team.   - Disease understanding: patient and daughter have understanding the that the cancer is advanced and possible treatment options would not be curable.  Both understand treatment may help with control of symptoms and would most probably prolong her life for a few months.     Goals of Care:  During previous encounter, palliative care has initiated discussion regarding hospice.  Ms Garcia had understanding of the role of hospice at the end of life.  At this time she was not amenable.  Recommendations given for home palliative care and referral  sent to [facility] for home palliative care.    - Met with patient and daughter Aileen at bedside.  Lengthy discussion regarding goals of care.    As per patient and daughter the goal remains to follow up in oncology clinic for treatment options.    - Daughter states the oncologist were at bedside today.  Aileen states understanding this is a stage IV biliary cancer and treatment does not guarantee long term survival.  Further states knowing all treatment options is important.   - Patient and family have considerable knowledge of hospice - they are not amenable to transition to hospice at this time.     Plan/Recommendation  - Patient's goal is to follow up with oncology clinic for treatment options  - When making follow appointment with oncology clinic please indicate patient to be followed by palliative care, Dr. Richey, in oncology clinic.   -  home palliative care referral has been sent to [facility] prior to 12/18 discharge from hospital , consult with case management/  For update regarding referral.   - Goals of care have been established.  Palliative care will sign off.  Please re- consult as needed.                           I will sign off. Please contact us if you have any additional questions.    Subjective:     Chief Complaint:   Chief Complaint   Patient presents with    Abdominal Pain     chronic abd pain worsening since Friday, hx of pancreatic cancer, family reports fever onset today, no chemo/radiation       HPI:    Mrs. Garcia is a 78-year-old lady with PMHX of Afib, diabetes, hypertension and dx 12/121/18 with stage IV pancreatic cancer.  She presented to [facility] ED with c/o   worsening abdominal pain x2 days.  Pain not relieved with oxycodone and gabapentin.  No reports of vomiting, diarrhea, shortness of breath.      Mrs. Garcia is known to palliative care from previous admission (12/20/18) .  Goals at that time were to follow up in the oncology clinic to determine treatment options  and to be seen in the oncology palliative care clinic.      Palliative care consulted today for goals of care and advanced care planning.              Hospital Course:  No notes on file    No new subjective & objective note has been filed under this hospital service since the last note was generated.      > 50% of 35 min visit spent in chart review, face to face discussion of goals of care,  symptom assessment, coordination of care and emotional support.    Estefania Belle, CNS  Palliative Medicine  [facility]

## 2019-01-03 NOTE — SUBJECTIVE & OBJECTIVE
Interval History: as above    Review of Systems   Constitutional: Negative for activity change, appetite change, fatigue and fever.   HENT: Negative for ear discharge, mouth sores, rhinorrhea and sneezing.    Eyes: Negative for photophobia.   Respiratory: Negative for cough, choking, shortness of breath and wheezing.    Cardiovascular: Positive for leg swelling. Negative for chest pain.   Gastrointestinal: Positive for abdominal pain and nausea. Negative for diarrhea and vomiting.   Endocrine: Negative for polydipsia, polyphagia and polyuria.   Genitourinary: Negative for dysuria, flank pain, hematuria and urgency.   Musculoskeletal: Negative for back pain, joint swelling, myalgias, neck pain and neck stiffness.   Skin: Negative for color change, pallor, rash and wound.   Allergic/Immunologic: Negative for immunocompromised state.   Neurological: Negative for seizures, facial asymmetry, weakness, numbness and headaches.   Psychiatric/Behavioral: Negative for agitation, behavioral problems and confusion.     Objective:     Vital Signs (Most Recent):  Temp: 97.8 °F (36.6 °C) (01/03/19 1126)  Pulse: 75 (01/03/19 1126)  Resp: 16 (01/03/19 1126)  BP: (!) 124/56 (01/03/19 1126)  SpO2: 96 % (01/03/19 1126) Vital Signs (24h Range):  Temp:  [97.8 °F (36.6 °C)-100.2 °F (37.9 °C)] 97.8 °F (36.6 °C)  Pulse:  [69-97] 75  Resp:  [16-20] 16  SpO2:  [90 %-97 %] 96 %  BP: (109-136)/(56-84) 124/56     Weight: 68 kg (150 lb)  Body mass index is 32.46 kg/m².    Intake/Output Summary (Last 24 hours) at 1/3/2019 1353  Last data filed at 1/3/2019 1231  Gross per 24 hour   Intake 1260 ml   Output 750 ml   Net 510 ml      Physical Exam   Constitutional: She is oriented to person, place, and time. She appears well-developed and well-nourished. No distress.   HENT:   Head: Normocephalic and atraumatic.   Mouth/Throat: No oropharyngeal exudate.   Eyes: Conjunctivae and EOM are normal. Pupils are equal, round, and reactive to light. Right eye  exhibits no discharge. Left eye exhibits no discharge. No scleral icterus.   Neck: Normal range of motion.   Cardiovascular: Exam reveals no gallop.   No murmur heard.  Pulmonary/Chest: Effort normal and breath sounds normal. No respiratory distress. She has no wheezes. She has no rales.   Abdominal: Soft. Bowel sounds are normal. She exhibits no mass. There is tenderness. There is no guarding.   Musculoskeletal: Normal range of motion. She exhibits edema. She exhibits no tenderness or deformity.   Bilateral lower limb edema   Neurological: She is alert and oriented to person, place, and time.   Skin: Skin is warm and dry. Capillary refill takes less than 2 seconds. She is not diaphoretic.   Psychiatric: She has a normal mood and affect. Her behavior is normal.       Significant Labs:   CBC:   Recent Labs   Lab 01/02/19 0445 01/03/19 0426   WBC 14.28* 14.22*   HGB 9.6* 9.7*   HCT 30.4* 30.1*    207     CMP:   Recent Labs   Lab 01/02/19 0445 01/03/19  0426   * 131*   K 3.5 3.8   CL 98 97   CO2 23 25   GLU 98 86   BUN 22 19   CREATININE 0.8 0.7   CALCIUM 8.4* 8.4*   ALBUMIN 1.6* 1.5*   ANIONGAP 12 9   EGFRNONAA >60.0 >60.0     Lactic Acid:   No results for input(s): LACTATE in the last 48 hours.    Significant Imaging: I have reviewed all pertinent imaging results/findings within the past 24 hours.

## 2019-01-03 NOTE — PHARMACY MED REC
"Admission Medication Reconciliation - Pharmacy Consult Note    The home medication history was taken by Evelin Crump, Pharmacy Technician.  Based on information gathered and subsequent review by the clinical pharmacist, the items below may need attention.     You may go to "Admission" then "Reconcile Home Medications" tabs to review and/or act upon these items.     Potentially problematic discrepancies with current MAR  o Patient IS taking the following which was not ordered upon admit  o Furosemide 20mg by mouth twice daily     Please address this information as you see fit.  Feel free to contact us if you have any questions or require assistance.    Winsome Barnett, PharmD, BCPS, Internal Medicine Clinical Pharmacy Specialist  EXT [phone]                  .    .            "

## 2019-01-03 NOTE — PROGRESS NOTES
[facility] Hospital Medicine  Progress Note    Patient Name: Alfredina Claudette Parks  MRN: [id number]  Patient Class: IP- Inpatient   Admission Date: 12/30/2018  Length of Stay: 3 days  Attending Physician: Alfredo Jones MD  Primary Care Provider: Veronica Frost MD    [facility] Medicine Team: [facility] 4 Steve Walters DO    Subjective:     Principal Problem:Acute cholangitis    HPI:  Mrs. Garcia is a 78-year-old female with known stage IV pancreatic cancer (diagnosed on 12/21/2018) presents to the ED with 2 days of worsening abdominal pain with nausea and 1 day of fever.  She is pending follow-up with Oncology and palliative care. She was recently admitted, MRI showed liver and pancreatic masses, ERCP was performed on 12/19 and pathology confirmed poorly differentiated carcinoma most likely adeno. 2 stents were placed and plan was to f/u after 3 months to exchange the stent. At the moment she is a full code.  She denies vomiting, diarrhea, cough, shortness of breath, chest pain, or dysuria.  Daughter gave her oxycodone and gabapentin without resolution of symptoms. Patient lives alone in an apartment and her daughter lives across the street.  A ten point review of systems was completed and is negative except as documented above.  Patient denies any other acute medical complaint.    In the ED, she received 1L of fluid and pain medication. She has high WBC and a spike of fever at 100.4, one dose of Zosyn was given. Palliative care and AES were consulted.    Hospital Course:  01/01/2019 ERCP yesterday, pt resting comfortably in bed this AM. Noting migratory abdominal pain, well controlled at time of interview.   01/02/2019 No acute events overnight. Pt and family discussed possible treatment options with oncology; oncology unsure of benefit to palliative chemotherapy.   01/03/2019 Pt notes increased pain and nausea this AM, but controlled with PRN medications.     Interval History:  as above    Review of Systems   Constitutional: Negative for activity change, appetite change, fatigue and fever.   HENT: Negative for ear discharge, mouth sores, rhinorrhea and sneezing.    Eyes: Negative for photophobia.   Respiratory: Negative for cough, choking, shortness of breath and wheezing.    Cardiovascular: Positive for leg swelling. Negative for chest pain.   Gastrointestinal: Positive for abdominal pain and nausea. Negative for diarrhea and vomiting.   Endocrine: Negative for polydipsia, polyphagia and polyuria.   Genitourinary: Negative for dysuria, flank pain, hematuria and urgency.   Musculoskeletal: Negative for back pain, joint swelling, myalgias, neck pain and neck stiffness.   Skin: Negative for color change, pallor, rash and wound.   Allergic/Immunologic: Negative for immunocompromised state.   Neurological: Negative for seizures, facial asymmetry, weakness, numbness and headaches.   Psychiatric/Behavioral: Negative for agitation, behavioral problems and confusion.     Objective:     Vital Signs (Most Recent):  Temp: 97.8 °F (36.6 °C) (01/03/19 1126)  Pulse: 75 (01/03/19 1126)  Resp: 16 (01/03/19 1126)  BP: (!) 124/56 (01/03/19 1126)  SpO2: 96 % (01/03/19 1126) Vital Signs (24h Range):  Temp:  [97.8 °F (36.6 °C)-100.2 °F (37.9 °C)] 97.8 °F (36.6 °C)  Pulse:  [69-97] 75  Resp:  [16-20] 16  SpO2:  [90 %-97 %] 96 %  BP: (109-136)/(56-84) 124/56     Weight: 68 kg (150 lb)  Body mass index is 32.46 kg/m².    Intake/Output Summary (Last 24 hours) at 1/3/2019 1353  Last data filed at 1/3/2019 1231  Gross per 24 hour   Intake 1260 ml   Output 750 ml   Net 510 ml      Physical Exam   Constitutional: She is oriented to person, place, and time. She appears well-developed and well-nourished. No distress.   HENT:   Head: Normocephalic and atraumatic.   Mouth/Throat: No oropharyngeal exudate.   Eyes: Conjunctivae and EOM are normal. Pupils are equal, round, and reactive to light. Right eye exhibits no  discharge. Left eye exhibits no discharge. No scleral icterus.   Neck: Normal range of motion.   Cardiovascular: Exam reveals no gallop.   No murmur heard.  Pulmonary/Chest: Effort normal and breath sounds normal. No respiratory distress. She has no wheezes. She has no rales.   Abdominal: Soft. Bowel sounds are normal. She exhibits no mass. There is tenderness. There is no guarding.   Musculoskeletal: Normal range of motion. She exhibits edema. She exhibits no tenderness or deformity.   Bilateral lower limb edema   Neurological: She is alert and oriented to person, place, and time.   Skin: Skin is warm and dry. Capillary refill takes less than 2 seconds. She is not diaphoretic.   Psychiatric: She has a normal mood and affect. Her behavior is normal.       Significant Labs:   CBC:   Recent Labs   Lab 01/02/19 0445 01/03/19 0426   WBC 14.28* 14.22*   HGB 9.6* 9.7*   HCT 30.4* 30.1*    207     CMP:   Recent Labs   Lab 01/02/19 0445 01/03/19  0426   * 131*   K 3.5 3.8   CL 98 97   CO2 23 25   GLU 98 86   BUN 22 19   CREATININE 0.8 0.7   CALCIUM 8.4* 8.4*   ALBUMIN 1.6* 1.5*   ANIONGAP 12 9   EGFRNONAA >60.0 >60.0     Lactic Acid:   No results for input(s): LACTATE in the last 48 hours.    Significant Imaging: I have reviewed all pertinent imaging results/findings within the past 24 hours.    Assessment/Plan:      * Acute cholangitis    - she has a spike of fever at 100.4. Currently afebrile.  - 1L of fluid was given. Started on D5 1/2 NS 125ml/hr for 8 hours (total 1L).  - WBC was high, f/u with blood culture and lactate.  - started on Zosyn.  - AES was consulted as patient has 2 biliary stents which placed on 12/19  -  ERCP showed Two visibly occluded stents from the biliary tree. A single localized biliary stricture was found in the left main hepatic duct. The stricture was malignant appearing with malignant invasion and could not be traversed. Two biliary stents removed, one placed in the common bile  duct. GI recommending repeat ERCP in 6 weeks.  - Oncology spoke with pt on 1/2, again noted unclear whether patient would derive any benefit from chemotherapy, given her poor performance status and the aggressiveness of her disease  - Will continue IV zosyn at this time     Hypomagnesemia    Replete with Mag Sulfate 2mg IV, monitor daily       Palliative care encounter    Palliative care spoke with patient previously, pt discharged previously with /CHUY and f/u with Dr. Richey.      Pancreatic cancer    Patient recently diagnosed with stage IV pancreatic cancer. She is on oxycodone 5 mg Q6h at home. She came because of worsening of abdominal pain and nausea. Palliative care and AES were consulted.    - f/u with oncology as an outpatient at time of discharge.  - pain control on oxycodone and hydromorphone.         Cholangiocarcinoma    -Stage 4 cancer diagnosed during previous visit 12/21. Malignant appearance of strictures occluding recently placed stent suggesting aggressive course  -Outpatient oncology follow-up previously  scheduled for 1/2/19  -Oncology noting unclear whether patient would derive any benefit from chemotherapy, given her poor performance status and the aggressiveness of her disease.  -Pt and daughter interested in short stay at SNF/Rehab to try to regain strength and make outpatient oncology appointment.      Chronic atrial fibrillation    - she has loop recorder which was placed on 02/2017.  - resume home dose Amiodarone and Carvedilol.     Hypothyroid    Resume home dose levothyroxine.       VTE Risk Mitigation (From admission, onward)        Ordered     IP VTE HIGH RISK PATIENT  Once      12/31/18 1343     Place sequential compression device  Until discontinued      12/31/18 1343              Steve Walters DO  Department of Hospital Medicine   [facility]

## 2019-01-03 NOTE — PHYSICIAN QUERY
PT Name: Alfredina Claudette Parks  MR #: [id number]    Physician Query Form - Atrial Fibrillation Specificity     CDS Renate Ruano RN, BSN        Contact Information:  [phone]    [email or web address]            This form is a permanent document in the medical record.     Query Date: January 3, 2019    By submitting this query, we are merely seeking further clarification of documentation. Please utilize your independent clinical judgment when addressing the question(s) below.    The medical record contains the following:   Indicators     Supporting Clinical Findings Location in Medical Record   X   Atrial Fibrillation A-fib H&P, PNs     EKG results     X   Medication resume home dose Amiodarone and Carvedilol. H&P    Treatment     X   Other she has loop recorder which was placed on 02/2017. H&P       Provider, please further specify the Atrial Fibrillation diagnosis.    [x  ] Chronic   [  ] Paroxysmal   [  ] Permanent   [  ] Persistent   [  ] Other (please specify):   [  ] Clinically Undetermined       Please document in your progress notes daily for the duration of treatment until resolved, and include in your discharge summary.

## 2019-01-03 NOTE — ASSESSMENT & PLAN NOTE
Palliative care spoke with patient previously, pt discharged previously with HH/AIM and f/u with Dr. Richey.

## 2019-01-03 NOTE — PLAN OF CARE
Problem: Fall Injury Risk  Goal: Absence of Fall and Fall-Related Injury    Intervention: Identify and Manage Contributors to Fall Injury Risk  Pt remains free of falls, pt up with one assist to BS commode.       Problem: Adult Inpatient Plan of Care  Goal: Plan of Care Review  Outcome: Ongoing (interventions implemented as appropriate)  Pt VSS; lower BP this evening-Coreg held. Pt has no appetite for food, drinking fluids appropriately. Pt daughter at BS this AM. Pt has no skin breakdown, up OOB to chair with PCT this afternoon for at least 2 hours.

## 2019-01-03 NOTE — PLAN OF CARE
01/03/19 1438   Post-Acute Status   Post-Acute Authorization Placement   Post-Acute Placement Status Patient List Provided       SW gave list to the daughter of in network facilities with [facility]. Daughter stated she will talk with family and get back in touch with me about which one.  Talked with her about the benefits of SNF and how [facility] works.    Adam Almonte, AWAISSW

## 2019-01-04 PROBLEM — Z75.8 DISCHARGE PLANNING ISSUES: Status: ACTIVE | Noted: 2019-01-04

## 2019-01-04 LAB
ALBUMIN SERPL BCP-MCNC: 1.4 G/DL
ANION GAP SERPL CALC-SCNC: 10 MMOL/L
BASOPHILS # BLD AUTO: 0.03 K/UL
BASOPHILS NFR BLD: 0.2 %
BUN SERPL-MCNC: 17 MG/DL
CALCIUM SERPL-MCNC: 8.2 MG/DL
CHLORIDE SERPL-SCNC: 95 MMOL/L
CO2 SERPL-SCNC: 26 MMOL/L
CREAT SERPL-MCNC: 0.7 MG/DL
DIFFERENTIAL METHOD: ABNORMAL
EOSINOPHIL # BLD AUTO: 0.2 K/UL
EOSINOPHIL NFR BLD: 1.2 %
ERYTHROCYTE [DISTWIDTH] IN BLOOD BY AUTOMATED COUNT: 20.6 %
EST. GFR  (AFRICAN AMERICAN): >60 ML/MIN/1.73 M^2
EST. GFR  (NON AFRICAN AMERICAN): >60 ML/MIN/1.73 M^2
GLUCOSE SERPL-MCNC: 74 MG/DL
HCT VFR BLD AUTO: 30 %
HGB BLD-MCNC: 9.5 G/DL
IMM GRANULOCYTES # BLD AUTO: 0.06 K/UL
IMM GRANULOCYTES NFR BLD AUTO: 0.5 %
LYMPHOCYTES # BLD AUTO: 1.7 K/UL
LYMPHOCYTES NFR BLD: 13.6 %
MAGNESIUM SERPL-MCNC: 1.5 MG/DL
MCH RBC QN AUTO: 30.3 PG
MCHC RBC AUTO-ENTMCNC: 31.7 G/DL
MCV RBC AUTO: 96 FL
MONOCYTES # BLD AUTO: 1.3 K/UL
MONOCYTES NFR BLD: 10.2 %
NEUTROPHILS # BLD AUTO: 9.4 K/UL
NEUTROPHILS NFR BLD: 74.3 %
NRBC BLD-RTO: 0 /100 WBC
PHOSPHATE SERPL-MCNC: 3.3 MG/DL
PLATELET # BLD AUTO: 214 K/UL
PMV BLD AUTO: 10.6 FL
POTASSIUM SERPL-SCNC: 3.8 MMOL/L
RBC # BLD AUTO: 3.14 M/UL
SODIUM SERPL-SCNC: 131 MMOL/L
WBC # BLD AUTO: 12.6 K/UL

## 2019-01-04 PROCEDURE — 25000242 PHARM REV CODE 250 ALT 637 W/ HCPCS: Performed by: STUDENT IN AN ORGANIZED HEALTH CARE EDUCATION/TRAINING PROGRAM

## 2019-01-04 PROCEDURE — 20600001 HC STEP DOWN PRIVATE ROOM

## 2019-01-04 PROCEDURE — 25000003 PHARM REV CODE 250: Performed by: INTERNAL MEDICINE

## 2019-01-04 PROCEDURE — 25000003 PHARM REV CODE 250: Performed by: STUDENT IN AN ORGANIZED HEALTH CARE EDUCATION/TRAINING PROGRAM

## 2019-01-04 PROCEDURE — 83735 ASSAY OF MAGNESIUM: CPT

## 2019-01-04 PROCEDURE — 36415 COLL VENOUS BLD VENIPUNCTURE: CPT

## 2019-01-04 PROCEDURE — 27000221 HC OXYGEN, UP TO 24 HOURS

## 2019-01-04 PROCEDURE — 97530 THERAPEUTIC ACTIVITIES: CPT

## 2019-01-04 PROCEDURE — 99232 PR SUBSEQUENT HOSPITAL CARE,LEVL II: ICD-10-PCS | Mod: GC,,, | Performed by: HOSPITALIST

## 2019-01-04 PROCEDURE — 80069 RENAL FUNCTION PANEL: CPT

## 2019-01-04 PROCEDURE — 94761 N-INVAS EAR/PLS OXIMETRY MLT: CPT

## 2019-01-04 PROCEDURE — 94640 AIRWAY INHALATION TREATMENT: CPT

## 2019-01-04 PROCEDURE — 63600175 PHARM REV CODE 636 W HCPCS: Performed by: STUDENT IN AN ORGANIZED HEALTH CARE EDUCATION/TRAINING PROGRAM

## 2019-01-04 PROCEDURE — 85025 COMPLETE CBC W/AUTO DIFF WBC: CPT

## 2019-01-04 PROCEDURE — 99232 SBSQ HOSP IP/OBS MODERATE 35: CPT | Mod: GC,,, | Performed by: HOSPITALIST

## 2019-01-04 RX ORDER — MAGNESIUM SULFATE HEPTAHYDRATE 40 MG/ML
2 INJECTION, SOLUTION INTRAVENOUS ONCE
Status: COMPLETED | OUTPATIENT
Start: 2019-01-04 | End: 2019-01-04

## 2019-01-04 RX ORDER — SIMETHICONE 80 MG
1 TABLET,CHEWABLE ORAL 3 TIMES DAILY PRN
Status: DISCONTINUED | OUTPATIENT
Start: 2019-01-04 | End: 2019-01-10 | Stop reason: HOSPADM

## 2019-01-04 RX ADMIN — FLUTICASONE FUROATE AND VILANTEROL TRIFENATATE 1 PUFF: 200; 25 POWDER RESPIRATORY (INHALATION) at 08:01

## 2019-01-04 RX ADMIN — POTASSIUM & SODIUM PHOSPHATES POWDER PACK 280-160-250 MG 2 PACKET: 280-160-250 PACK at 08:01

## 2019-01-04 RX ADMIN — GABAPENTIN 200 MG: 100 CAPSULE ORAL at 09:01

## 2019-01-04 RX ADMIN — GABAPENTIN 200 MG: 100 CAPSULE ORAL at 03:01

## 2019-01-04 RX ADMIN — ALLOPURINOL 100 MG: 100 TABLET ORAL at 08:01

## 2019-01-04 RX ADMIN — PANTOPRAZOLE SODIUM 40 MG: 40 TABLET, DELAYED RELEASE ORAL at 08:01

## 2019-01-04 RX ADMIN — PIPERACILLIN AND TAZOBACTAM 4.5 G: 4; .5 INJECTION, POWDER, LYOPHILIZED, FOR SOLUTION INTRAVENOUS; PARENTERAL at 09:01

## 2019-01-04 RX ADMIN — AMIODARONE HYDROCHLORIDE 100 MG: 100 TABLET ORAL at 08:01

## 2019-01-04 RX ADMIN — CARVEDILOL 3.12 MG: 3.12 TABLET, FILM COATED ORAL at 06:01

## 2019-01-04 RX ADMIN — Medication 1 CAPSULE: at 01:01

## 2019-01-04 RX ADMIN — FLUTICASONE PROPIONATE 50 MCG: 50 SPRAY, METERED NASAL at 08:01

## 2019-01-04 RX ADMIN — ASPIRIN 81 MG: 81 TABLET, COATED ORAL at 08:01

## 2019-01-04 RX ADMIN — MAGNESIUM SULFATE IN WATER 2 G: 40 INJECTION, SOLUTION INTRAVENOUS at 09:01

## 2019-01-04 RX ADMIN — CETIRIZINE HYDROCHLORIDE 10 MG: 5 TABLET ORAL at 08:01

## 2019-01-04 RX ADMIN — LEVOTHYROXINE SODIUM 125 MCG: 25 TABLET ORAL at 05:01

## 2019-01-04 RX ADMIN — POLYETHYLENE GLYCOL 3350 17 G: 17 POWDER, FOR SOLUTION ORAL at 08:01

## 2019-01-04 RX ADMIN — STANDARDIZED SENNA CONCENTRATE AND DOCUSATE SODIUM 1 TABLET: 8.6; 5 TABLET, FILM COATED ORAL at 08:01

## 2019-01-04 RX ADMIN — OXYCODONE HYDROCHLORIDE 5 MG: 5 TABLET ORAL at 08:01

## 2019-01-04 RX ADMIN — ONDANSETRON 4 MG: 4 TABLET, FILM COATED ORAL at 06:01

## 2019-01-04 RX ADMIN — PIPERACILLIN AND TAZOBACTAM 4.5 G: 4; .5 INJECTION, POWDER, LYOPHILIZED, FOR SOLUTION INTRAVENOUS; PARENTERAL at 05:01

## 2019-01-04 RX ADMIN — CARVEDILOL 3.12 MG: 3.12 TABLET, FILM COATED ORAL at 08:01

## 2019-01-04 RX ADMIN — PIPERACILLIN AND TAZOBACTAM 4.5 G: 4; .5 INJECTION, POWDER, LYOPHILIZED, FOR SOLUTION INTRAVENOUS; PARENTERAL at 01:01

## 2019-01-04 RX ADMIN — SIMETHICONE CHEW TAB 80 MG 80 MG: 80 TABLET ORAL at 09:01

## 2019-01-04 RX ADMIN — GABAPENTIN 200 MG: 100 CAPSULE ORAL at 08:01

## 2019-01-04 RX ADMIN — ONDANSETRON 4 MG: 4 TABLET, FILM COATED ORAL at 09:01

## 2019-01-04 RX ADMIN — SOLIFENACIN SUCCINATE 5 MG: 5 TABLET, FILM COATED ORAL at 08:01

## 2019-01-04 RX ADMIN — IPRATROPIUM BROMIDE AND ALBUTEROL SULFATE 3 ML: .5; 3 SOLUTION RESPIRATORY (INHALATION) at 04:01

## 2019-01-04 NOTE — PLAN OF CARE
01/04/19 1432   Post-Acute Status   Post-Acute Authorization Placement   Post-Acute Placement Status Discharge Plan Changed  ([facility] declined)     Called the daughter twice to let her know that [facility] declined her mother.  No answer and no VM setup for me to leave a message. Will continue to try and talk with daughter to see what NH she wishes for me to try and place her mother.  Let the CM know of the situation.    Adam Almonte LMSW

## 2019-01-04 NOTE — SUBJECTIVE & OBJECTIVE
Interval History: as above    Review of Systems   Constitutional: Negative for activity change, appetite change, fatigue and fever.   HENT: Negative for ear discharge, mouth sores, rhinorrhea and sneezing.    Eyes: Negative for photophobia.   Respiratory: Negative for cough, choking, shortness of breath and wheezing.    Cardiovascular: Positive for leg swelling. Negative for chest pain.   Gastrointestinal: Positive for abdominal pain and nausea. Negative for diarrhea and vomiting.   Endocrine: Negative for polydipsia, polyphagia and polyuria.   Genitourinary: Negative for dysuria, flank pain, hematuria and urgency.   Musculoskeletal: Negative for back pain, joint swelling, myalgias, neck pain and neck stiffness.   Skin: Negative for color change, pallor, rash and wound.   Allergic/Immunologic: Negative for immunocompromised state.   Neurological: Negative for seizures, facial asymmetry, weakness, numbness and headaches.   Psychiatric/Behavioral: Negative for agitation, behavioral problems and confusion.     Objective:     Vital Signs (Most Recent):  Temp: 99.4 °F (37.4 °C) (01/04/19 1120)  Pulse: 75 (01/04/19 1120)  Resp: 16 (01/04/19 1120)  BP: (!) 144/69 (01/04/19 1120)  SpO2: 95 % (01/04/19 1120) Vital Signs (24h Range):  Temp:  [98.1 °F (36.7 °C)-99.4 °F (37.4 °C)] 99.4 °F (37.4 °C)  Pulse:  [] 75  Resp:  [16-20] 16  SpO2:  [85 %-98 %] 95 %  BP: ()/(55-69) 144/69     Weight: 68 kg (150 lb)  Body mass index is 32.46 kg/m².    Intake/Output Summary (Last 24 hours) at 1/4/2019 1340  Last data filed at 1/4/2019 0600  Gross per 24 hour   Intake 650 ml   Output --   Net 650 ml      Physical Exam   Constitutional: She is oriented to person, place, and time. She appears well-developed and well-nourished. No distress.   HENT:   Head: Normocephalic and atraumatic.   Mouth/Throat: No oropharyngeal exudate.   Eyes: Conjunctivae and EOM are normal. Pupils are equal, round, and reactive to light. Right eye  exhibits no discharge. Left eye exhibits no discharge. No scleral icterus.   Neck: Normal range of motion.   Cardiovascular: Exam reveals no gallop.   No murmur heard.  Pulmonary/Chest: Effort normal and breath sounds normal. No respiratory distress. She has no wheezes. She has no rales.   Abdominal: Soft. Bowel sounds are normal. She exhibits no mass. There is tenderness. There is no guarding.   Musculoskeletal: Normal range of motion. She exhibits edema. She exhibits no tenderness or deformity.   Bilateral lower limb edema   Neurological: She is alert and oriented to person, place, and time.   Skin: Skin is warm and dry. Capillary refill takes less than 2 seconds. She is not diaphoretic.   Psychiatric: She has a normal mood and affect. Her behavior is normal.       Significant Labs:   CBC:   Recent Labs   Lab 01/03/19  0426 01/04/19  0542   WBC 14.22* 12.60   HGB 9.7* 9.5*   HCT 30.1* 30.0*    214     CMP:   Recent Labs   Lab 01/03/19  0426 01/04/19  0542   * 131*   K 3.8 3.8   CL 97 95   CO2 25 26   GLU 86 74   BUN 19 17   CREATININE 0.7 0.7   CALCIUM 8.4* 8.2*   ALBUMIN 1.5* 1.4*   ANIONGAP 9 10   EGFRNONAA >60.0 >60.0       Significant Imaging: I have reviewed all pertinent imaging results/findings within the past 24 hours.

## 2019-01-04 NOTE — ASSESSMENT & PLAN NOTE
-Febrile with leukocytosis on admission  - AES was consulted as patient has 2 biliary stents which placed on 12/19  -  ERCP showed Two visibly occluded stents from the biliary tree. A single localized biliary stricture was found in the left main hepatic duct. The stricture was malignant appearing with malignant invasion and could not be traversed. Two biliary stents removed, one placed in the common bile duct. GI recommending repeat ERCP in 6 weeks and possible biliary drain placement. Pt and family decline drain at this time.   - Oncology spoke with pt on 1/2, again noted unclear whether patient would derive any benefit from chemotherapy, given her poor performance status and the aggressiveness of her disease  - Pt with unsuccessful source control secondary to malignant stricture. Will continue IV zosyn while inpatient, with 2 week course of Augmentin on discharge.

## 2019-01-04 NOTE — PROGRESS NOTES
Breathing treatment requested per patient nurse for wheezing. Upon assessment/auscultation, no wheezing heard. Tolerated treatment well, no distress noted.

## 2019-01-04 NOTE — PLAN OF CARE
Problem: Adult Inpatient Plan of Care  Goal: Plan of Care Review  Alfredina Claudette Parks tolerated treatment fair this morning. Pt with urge to have bowel movement so primary focus of session was to assist with commode transfers and cleaning; had already had partial bowel movement on blue pad in bed upon getting out of bed. Appears weaker today compared to previous evaluation, requires moderate (A) to stand pivot slowly from bed to/from commode and chair, more flexed fwd posture today with mobility. Significantly deconditioned, evident as patient needing max A to stand and balance while attempting to wipe herself after bowel movement on commode, longest stand was ~1.5 minutes. Discussed POC and goals with patient and daughter who agrees that she appears weaker today. I've increased her POC frequency for PT to 4x/week, and will place on her weekend schedule list; in agreement with SNF upon discharge, daughter verbalized understanding. Will cont to benefit from acute PT services.    Kelby Rick, PT  1/4/2019

## 2019-01-04 NOTE — PLAN OF CARE
Patient stable for discharge, working on NH/SNF placement.       01/04/19 1531   Discharge Reassessment   Assessment Type Discharge Planning Reassessment   Do you have any problems affording any of your prescribed medications? No   Discharge Plan [facility]

## 2019-01-04 NOTE — PLAN OF CARE
Problem: Adult Inpatient Plan of Care  Goal: Plan of Care Review  Outcome: Ongoing (interventions implemented as appropriate)  Pt VSS. Pt complained of pain with complete relief of prn pain meds. Pt up OOB to chair majority of day, call light and personal belongings within reach.  Worked with PT/ OT. Pt received breathing treatment x1; unable to wean off of 1L NC.     Problem: Spiritual Distress Risk or Actual  Goal: Spiritual Wellbeing    Intervention: Promote Spiritual Wellbeing  Pt and pt daughter noted to be crying today. Pt and daughter encouraged to express emotions. Caregiver stress acknowledged and encouraged self care.

## 2019-01-04 NOTE — PROGRESS NOTES
[facility] Hospital Medicine  Progress Note    Patient Name: Alfredina Claudette Parks  MRN: [id number]  Patient Class: IP- Inpatient   Admission Date: 12/30/2018  Length of Stay: 4 days  Attending Physician: Alfredo Jones MD  Primary Care Provider: Veronica Frost MD    [facility] Medicine Team: [facility] [place] [facility] 4 Steve Walters DO    Subjective:     Principal Problem:Acute cholangitis    HPI:  Mrs. Garcia is a 78-year-old female with known stage IV pancreatic cancer (diagnosed on 12/21/2018) presents to the ED with 2 days of worsening abdominal pain with nausea and 1 day of fever.  She is pending follow-up with Oncology and palliative care. She was recently admitted, MRI showed liver and pancreatic masses, ERCP was performed on 12/19 and pathology confirmed poorly differentiated carcinoma most likely adeno. 2 stents were placed and plan was to f/u after 3 months to exchange the stent. At the moment she is a full code.  She denies vomiting, diarrhea, cough, shortness of breath, chest pain, or dysuria.  Daughter gave her oxycodone and gabapentin without resolution of symptoms. Patient lives alone in an apartment and her daughter lives across the street.  A ten point review of systems was completed and is negative except as documented above.  Patient denies any other acute medical complaint.    In the ED, she received 1L of fluid and pain medication. She has high WBC and a spike of fever at 100.4, one dose of Zosyn was given. Palliative care and AES were consulted.    Hospital Course:  01/01/2019 ERCP yesterday, pt resting comfortably in bed this AM. Noting migratory abdominal pain, well controlled at time of interview.   01/02/2019 No acute events overnight. Pt and family discussed possible treatment options with oncology; oncology unsure of benefit to palliative chemotherapy.   01/03/2019 Pt notes increased pain and nausea this AM, but controlled with PRN medications.   01/04/2019 No acute  events overnight. Pt  Noting pain and nausea, controlled with PRN medications.  Family awaiting acceptance at SNF    Interval History: as above    Review of Systems   Constitutional: Negative for activity change, appetite change, fatigue and fever.   HENT: Negative for ear discharge, mouth sores, rhinorrhea and sneezing.    Eyes: Negative for photophobia.   Respiratory: Negative for cough, choking, shortness of breath and wheezing.    Cardiovascular: Positive for leg swelling. Negative for chest pain.   Gastrointestinal: Positive for abdominal pain and nausea. Negative for diarrhea and vomiting.   Endocrine: Negative for polydipsia, polyphagia and polyuria.   Genitourinary: Negative for dysuria, flank pain, hematuria and urgency.   Musculoskeletal: Negative for back pain, joint swelling, myalgias, neck pain and neck stiffness.   Skin: Negative for color change, pallor, rash and wound.   Allergic/Immunologic: Negative for immunocompromised state.   Neurological: Negative for seizures, facial asymmetry, weakness, numbness and headaches.   Psychiatric/Behavioral: Negative for agitation, behavioral problems and confusion.     Objective:     Vital Signs (Most Recent):  Temp: 99.4 °F (37.4 °C) (01/04/19 1120)  Pulse: 75 (01/04/19 1120)  Resp: 16 (01/04/19 1120)  BP: (!) 144/69 (01/04/19 1120)  SpO2: 95 % (01/04/19 1120) Vital Signs (24h Range):  Temp:  [98.1 °F (36.7 °C)-99.4 °F (37.4 °C)] 99.4 °F (37.4 °C)  Pulse:  [] 75  Resp:  [16-20] 16  SpO2:  [85 %-98 %] 95 %  BP: ()/(55-69) 144/69     Weight: 68 kg (150 lb)  Body mass index is 32.46 kg/m².    Intake/Output Summary (Last 24 hours) at 1/4/2019 1340  Last data filed at 1/4/2019 0600  Gross per 24 hour   Intake 650 ml   Output --   Net 650 ml      Physical Exam   Constitutional: She is oriented to person, place, and time. She appears well-developed and well-nourished. No distress.   HENT:   Head: Normocephalic and atraumatic.   Mouth/Throat: No  oropharyngeal exudate.   Eyes: Conjunctivae and EOM are normal. Pupils are equal, round, and reactive to light. Right eye exhibits no discharge. Left eye exhibits no discharge. No scleral icterus.   Neck: Normal range of motion.   Cardiovascular: Exam reveals no gallop.   No murmur heard.  Pulmonary/Chest: Effort normal and breath sounds normal. No respiratory distress. She has no wheezes. She has no rales.   Abdominal: Soft. Bowel sounds are normal. She exhibits no mass. There is tenderness. There is no guarding.   Musculoskeletal: Normal range of motion. She exhibits edema. She exhibits no tenderness or deformity.   Bilateral lower limb edema   Neurological: She is alert and oriented to person, place, and time.   Skin: Skin is warm and dry. Capillary refill takes less than 2 seconds. She is not diaphoretic.   Psychiatric: She has a normal mood and affect. Her behavior is normal.       Significant Labs:   CBC:   Recent Labs   Lab 01/03/19  0426 01/04/19  0542   WBC 14.22* 12.60   HGB 9.7* 9.5*   HCT 30.1* 30.0*    214     CMP:   Recent Labs   Lab 01/03/19  0426 01/04/19  0542   * 131*   K 3.8 3.8   CL 97 95   CO2 25 26   GLU 86 74   BUN 19 17   CREATININE 0.7 0.7   CALCIUM 8.4* 8.2*   ALBUMIN 1.5* 1.4*   ANIONGAP 9 10   EGFRNONAA >60.0 >60.0       Significant Imaging: I have reviewed all pertinent imaging results/findings within the past 24 hours.    Assessment/Plan:      * Acute cholangitis    -Febrile with leukocytosis on admission  - AES was consulted as patient has 2 biliary stents which placed on 12/19  -  ERCP showed Two visibly occluded stents from the biliary tree. A single localized biliary stricture was found in the left main hepatic duct. The stricture was malignant appearing with malignant invasion and could not be traversed. Two biliary stents removed, one placed in the common bile duct. GI recommending repeat ERCP in 6 weeks and possible biliary drain placement. Pt and family decline  drain at this time.   - Oncology spoke with pt on 1/2, again noted unclear whether patient would derive any benefit from chemotherapy, given her poor performance status and the aggressiveness of her disease  - Pt with unsuccessful source control secondary to malignant stricture. Will continue IV zosyn while inpatient, with 2 week course of Augmentin on discharge.        Discharge planning issues    Pending approval to [facility]  Follow up with Oncology and Palliative care on discharge.   Home palliative care referral has been sent to [facility] prior to 12/18 discharge from hospital, will consult with case management/ for update regarding referral.        Hypomagnesemia    Replete with Mag Sulfate 2mg IV, monitor daily       Palliative care encounter    Palliative care spoke with patient previously, pt discharged previously with [facility] and f/u with Dr. Richey.      Pancreatic cancer    Patient recently diagnosed with stage IV pancreatic cancer. She is on oxycodone 5 mg Q6h at home. She came because of worsening of abdominal pain and nausea. Palliative care and AES were consulted.    - f/u with oncology as an outpatient at time of discharge.  - pain control on oxycodone and hydromorphone.         Cholangiocarcinoma    -Stage 4 cancer diagnosed during previous visit 12/21. Malignant appearance of strictures occluding recently placed stent suggesting aggressive course  -Outpatient oncology follow-up previously  scheduled for 1/2/19  -Oncology noting unclear whether patient would derive any benefit from chemotherapy, given her poor performance status and the aggressiveness of her disease.  -Pt and daughter interested in short stay at SNF/Rehab at [facility] to try to regain strength and make outpatient oncology appointment.      Chronic atrial fibrillation    - she has loop recorder which was placed on 02/2017.  - resume home dose Amiodarone and Carvedilol.     Hypothyroid    Resume home dose  levothyroxine.       VTE Risk Mitigation (From admission, onward)        Ordered     IP VTE HIGH RISK PATIENT  Once      12/31/18 1343     Place sequential compression device  Until discontinued      12/31/18 1343              Steve Walters DO  Department of Hospital Medicine   [facility]

## 2019-01-04 NOTE — ASSESSMENT & PLAN NOTE
Pending approval to [facility]  Follow up with Oncology and Palliative care on discharge.   Home palliative care referral has been sent to [facility] prior to 12/18 discharge from hospital, will consult with case management/ for update regarding referral.

## 2019-01-04 NOTE — PT/OT/SLP PROGRESS
Physical Therapy  Treatment    Patient Name:  Alfredina Claudette Parks   MRN:  [id number]    Recommendations:     Discharge Recommendations:  nursing facility, skilled     Discharge Equipment Recommendations: wheelchair (already has rollator, commode, bath bench at home)    Barriers to discharge: Decreased caregiver support    Assessment:     Alfredina Claudette Parks tolerated treatment fair this morning. Pt with urge to have bowel movement so primary focus of session was to assist with commode transfers and cleaning; had already had partial bowel movement on blue pad in bed upon getting out of bed. Appears weaker today compared to previous evaluation, requires moderate (A) to stand pivot slowly from bed to/from commode and chair, more flexed fwd posture today with mobility. Significantly deconditioned, evident as patient needing max A to stand and balance while attempting to wipe herself after bowel movement on commode, longest stand was ~1.5 minutes. Discussed POC and goals with patient and daughter who agrees that she appears weaker today. I've increased her POC frequency for PT to 4x/week, and will place on her weekend schedule list; in agreement with SNF upon discharge, daughter verbalized understanding. Will cont to benefit from acute PT services.    She presents with the following impairments/functional limitations:  weakness, impaired endurance, impaired self care skills, impaired functional mobilty, gait instability, impaired balance, visual deficits, decreased upper extremity function, decreased lower extremity function, impaired cardiopulmonary response to activity.    Rehab Prognosis: Fair; patient would benefit from acute skilled PT services to address these deficits and reach maximum level of function.      Recent Surgery: Procedure(s) (LRB):  ERCP (ENDOSCOPIC RETROGRADE CHOLANGIOPANCREATOGRAPHY) (N/A) 4 Days Post-Op    Plan:     Alter POC for patient, during this hospitalization, patient to be seen  "4x/week to address the identified rehab impairments via gait training, therapeutic activities, therapeutic exercises, neuromuscular re-education and progress toward the following goals:    · Plan of Care Expires:  02/02/19    Subjective     Chief Complaint: urge to have bowel movement  Patient/Family Comments/goals: "it's embarrassing that I need help to use the bathroom"    Pain/Comfort:  · Pain Rating 1: 0/10  · Pain Rating Post-Intervention 1: 0/10    Objective:     Communicated with RN prior to session.  Patient found supine in bed (HOB elevated) with daughter present and peripheral IV, oxygen intact upon PT entry to room.     General Precautions: Standard, fall   Orthopedic Precautions:N/A     Functional Mobility:    · Bed Mobility:  · Scooting: moderate assistance towards EOB in sitting    · Supine to Sit: maximal assistance with HOB elevated, mainly for trunk elevation    · Transfers  · Sit to Stand:  minimum assistance with no AD x 4 trials in all; 2 trials from bed, 2 trials from commode    · Bed to Chair: moderate assistance with hand-held assist using Stand Pivot x 1 trial    · Toilet Transfer: moderate assistance with hand-held assist using Stand Pivot x 2 trials (1st trial from bed to commode, 2nd trial from commode to bed)    · Gait:  · At best 3-4 steps with mod (A) of therapist via hand-held assist, cueing for upright posture and hand placement for reaching for armrest or bed rail before coming to sit. Generalized weakness    · Balance:  · Static Sit: CGA at EOB, SBA on commode; kyphotic, forward-head  · Static Stand: min - max (A); min (A) if simply standing but max (A) if requiring help to clean her bottom after bowel movement    AM-PAC 6 CLICK MOBILITY  Turning over in bed (including adjusting bedclothes, sheets and blankets)?: 2  Sitting down on and standing up from a chair with arms (e.g., wheelchair, bedside commode, etc.): 3  Moving from lying on back to sitting on the side of the bed?: " 2  Moving to and from a bed to a chair (including a wheelchair)?: 2  Need to walk in hospital room?: 2  Climbing 3-5 steps with a railing?: 1  Basic Mobility Total Score: 12     Therapeutic Activities and Exercises:  1. Pt with urge to have BM at start of session so assisted to commode; saw that she had a partial BM on blue pad prior to standing. Sat on commode for 3-4 minutes before patient stating she was finished. Min (A) to stand from commode but mod-max (A) of therapist to hold onto patient as she used her (R) hand to reach back and wipe her bottom after bowel movement. Required at least 5-6 rounds of wiping so after standing for 1.5 minutes, cued to patient to sit back on toilet to rest for 2 minutes then once again standing and PT performed final perineal cleaning. Pivoted back to bed, pt appearing quite fatigued. Gave patient option of resting back in supine or getting into chair and she chose the chair. Provided mod (A) stand pivot of patient from bed to chair, positioned in chair with it reclined and she stated no pain.    2. Reviewed PT role, goals, weekend therapy with daughter as well as recs for SNF. Verbalized understanding of all education.    Patient left up in chair with all lines intact, call button in reach, RN notified and daughter present.    GOALS:   Multidisciplinary Problems     Physical Therapy Goals        Problem: Physical Therapy Goal    Goal Priority Disciplines Outcome Goal Variances Interventions   Physical Therapy Goal     PT, PT/OT Ongoing (interventions implemented as appropriate)     Description:  Goals to be met by: 19     Patient will increase functional independence with mobility by performin. Supine to sit with Contact Guard Assistance - Not met  2. Sit to supine with Contact Guard Assistance - Not met  3. Sit to stand transfer with Stand-by Assistance with or without device - Not met  4. Bed to chair transfer with Stand-by Assistance using Rolling Walker or LRD -  Not met  5. Gait x 20 feet with Contact Guard Assistance using Rolling Walker or LRD - Not met                     Time Tracking:     PT Received On: 01/04/19  PT Start Time: 0954     PT Stop Time: 1025  PT Total Time (min): 31 min     Billable Minutes: Therapeutic Activity 31    Treatment Type: Treatment  PT/PTA: PT       Kelby Rick, PT   01/04/2019

## 2019-01-04 NOTE — PT/OT/SLP PROGRESS
Occupational Therapy      Patient Name:  Alfredina Claudette Parks   MRN:  [id number]    Patient not seen today secondary to OT deferred as pt with decreased alertness and lethargy from pain meds (per nurse). Will follow-up per schedule.    KOFI Ivey  1/4/2019

## 2019-01-04 NOTE — PLAN OF CARE
01/04/19 1306   Post-Acute Status   Post-Acute Authorization Placement       Family called and stated they chose [facility] for SNF. Referral sent to them for acceptance.    Adam Almonte LMSW

## 2019-01-04 NOTE — PLAN OF CARE
Problem: Adult Inpatient Plan of Care  Goal: Plan of Care Review  Outcome: Ongoing (interventions implemented as appropriate)  Pt rested comfortably most of night.Arrangements being made for SNF/Rehab for pt.

## 2019-01-05 PROBLEM — E43 SEVERE MALNUTRITION: Status: ACTIVE | Noted: 2019-01-05

## 2019-01-05 LAB
ALBUMIN SERPL BCP-MCNC: 1.3 G/DL
ANION GAP SERPL CALC-SCNC: 6 MMOL/L
BACTERIA BLD CULT: NORMAL
BACTERIA BLD CULT: NORMAL
BASOPHILS # BLD AUTO: 0.06 K/UL
BASOPHILS NFR BLD: 0.4 %
BUN SERPL-MCNC: 12 MG/DL
CALCIUM SERPL-MCNC: 8 MG/DL
CHLORIDE SERPL-SCNC: 94 MMOL/L
CO2 SERPL-SCNC: 30 MMOL/L
CREAT SERPL-MCNC: 0.7 MG/DL
DIFFERENTIAL METHOD: ABNORMAL
EOSINOPHIL # BLD AUTO: 0.1 K/UL
EOSINOPHIL NFR BLD: 0.9 %
ERYTHROCYTE [DISTWIDTH] IN BLOOD BY AUTOMATED COUNT: 20.4 %
EST. GFR  (AFRICAN AMERICAN): >60 ML/MIN/1.73 M^2
EST. GFR  (NON AFRICAN AMERICAN): >60 ML/MIN/1.73 M^2
GLUCOSE SERPL-MCNC: 77 MG/DL
HCT VFR BLD AUTO: 30.2 %
HGB BLD-MCNC: 9.8 G/DL
IMM GRANULOCYTES # BLD AUTO: 0.09 K/UL
IMM GRANULOCYTES NFR BLD AUTO: 0.6 %
LYMPHOCYTES # BLD AUTO: 1.8 K/UL
LYMPHOCYTES NFR BLD: 12 %
MAGNESIUM SERPL-MCNC: 1.8 MG/DL
MCH RBC QN AUTO: 30.7 PG
MCHC RBC AUTO-ENTMCNC: 32.5 G/DL
MCV RBC AUTO: 95 FL
MONOCYTES # BLD AUTO: 1.5 K/UL
MONOCYTES NFR BLD: 10 %
NEUTROPHILS # BLD AUTO: 11.5 K/UL
NEUTROPHILS NFR BLD: 76.1 %
NRBC BLD-RTO: 0 /100 WBC
PHOSPHATE SERPL-MCNC: 2.9 MG/DL
PLATELET # BLD AUTO: 226 K/UL
PMV BLD AUTO: 10.4 FL
POTASSIUM SERPL-SCNC: 3.9 MMOL/L
RBC # BLD AUTO: 3.19 M/UL
SODIUM SERPL-SCNC: 130 MMOL/L
WBC # BLD AUTO: 15.04 K/UL

## 2019-01-05 PROCEDURE — 25000003 PHARM REV CODE 250: Performed by: STUDENT IN AN ORGANIZED HEALTH CARE EDUCATION/TRAINING PROGRAM

## 2019-01-05 PROCEDURE — 83735 ASSAY OF MAGNESIUM: CPT

## 2019-01-05 PROCEDURE — 99232 SBSQ HOSP IP/OBS MODERATE 35: CPT | Mod: GC,,, | Performed by: HOSPITALIST

## 2019-01-05 PROCEDURE — 25000003 PHARM REV CODE 250: Performed by: INTERNAL MEDICINE

## 2019-01-05 PROCEDURE — 63600175 PHARM REV CODE 636 W HCPCS: Performed by: STUDENT IN AN ORGANIZED HEALTH CARE EDUCATION/TRAINING PROGRAM

## 2019-01-05 PROCEDURE — 80069 RENAL FUNCTION PANEL: CPT

## 2019-01-05 PROCEDURE — 97530 THERAPEUTIC ACTIVITIES: CPT

## 2019-01-05 PROCEDURE — 85025 COMPLETE CBC W/AUTO DIFF WBC: CPT

## 2019-01-05 PROCEDURE — 20600001 HC STEP DOWN PRIVATE ROOM

## 2019-01-05 PROCEDURE — 97110 THERAPEUTIC EXERCISES: CPT

## 2019-01-05 PROCEDURE — 36415 COLL VENOUS BLD VENIPUNCTURE: CPT

## 2019-01-05 PROCEDURE — 99232 PR SUBSEQUENT HOSPITAL CARE,LEVL II: ICD-10-PCS | Mod: GC,,, | Performed by: HOSPITALIST

## 2019-01-05 RX ORDER — MAGNESIUM SULFATE HEPTAHYDRATE 40 MG/ML
2 INJECTION, SOLUTION INTRAVENOUS ONCE
Status: COMPLETED | OUTPATIENT
Start: 2019-01-05 | End: 2019-01-05

## 2019-01-05 RX ADMIN — OXYCODONE HYDROCHLORIDE 5 MG: 5 TABLET ORAL at 04:01

## 2019-01-05 RX ADMIN — GABAPENTIN 200 MG: 100 CAPSULE ORAL at 03:01

## 2019-01-05 RX ADMIN — LEVOTHYROXINE SODIUM 125 MCG: 25 TABLET ORAL at 05:01

## 2019-01-05 RX ADMIN — ONDANSETRON 4 MG: 4 TABLET, FILM COATED ORAL at 08:01

## 2019-01-05 RX ADMIN — OXYCODONE HYDROCHLORIDE 5 MG: 5 TABLET ORAL at 08:01

## 2019-01-05 RX ADMIN — FLUTICASONE PROPIONATE 50 MCG: 50 SPRAY, METERED NASAL at 08:01

## 2019-01-05 RX ADMIN — ASPIRIN 81 MG: 81 TABLET, COATED ORAL at 08:01

## 2019-01-05 RX ADMIN — ALLOPURINOL 100 MG: 100 TABLET ORAL at 08:01

## 2019-01-05 RX ADMIN — Medication 1 CAPSULE: at 08:01

## 2019-01-05 RX ADMIN — GABAPENTIN 200 MG: 100 CAPSULE ORAL at 08:01

## 2019-01-05 RX ADMIN — FLUTICASONE FUROATE AND VILANTEROL TRIFENATATE 1 PUFF: 200; 25 POWDER RESPIRATORY (INHALATION) at 08:01

## 2019-01-05 RX ADMIN — PIPERACILLIN AND TAZOBACTAM 4.5 G: 4; .5 INJECTION, POWDER, LYOPHILIZED, FOR SOLUTION INTRAVENOUS; PARENTERAL at 12:01

## 2019-01-05 RX ADMIN — SIMETHICONE CHEW TAB 80 MG 80 MG: 80 TABLET ORAL at 04:01

## 2019-01-05 RX ADMIN — SOLIFENACIN SUCCINATE 5 MG: 5 TABLET, FILM COATED ORAL at 08:01

## 2019-01-05 RX ADMIN — CARVEDILOL 3.12 MG: 3.12 TABLET, FILM COATED ORAL at 08:01

## 2019-01-05 RX ADMIN — AMIODARONE HYDROCHLORIDE 100 MG: 100 TABLET ORAL at 08:01

## 2019-01-05 RX ADMIN — PIPERACILLIN AND TAZOBACTAM 4.5 G: 4; .5 INJECTION, POWDER, LYOPHILIZED, FOR SOLUTION INTRAVENOUS; PARENTERAL at 08:01

## 2019-01-05 RX ADMIN — SIMETHICONE CHEW TAB 80 MG 80 MG: 80 TABLET ORAL at 08:01

## 2019-01-05 RX ADMIN — CETIRIZINE HYDROCHLORIDE 10 MG: 5 TABLET ORAL at 08:01

## 2019-01-05 RX ADMIN — PANTOPRAZOLE SODIUM 40 MG: 40 TABLET, DELAYED RELEASE ORAL at 08:01

## 2019-01-05 RX ADMIN — MAGNESIUM SULFATE IN WATER 2 G: 40 INJECTION, SOLUTION INTRAVENOUS at 08:01

## 2019-01-05 RX ADMIN — ONDANSETRON 4 MG: 4 TABLET, FILM COATED ORAL at 04:01

## 2019-01-05 RX ADMIN — PIPERACILLIN AND TAZOBACTAM 4.5 G: 4; .5 INJECTION, POWDER, LYOPHILIZED, FOR SOLUTION INTRAVENOUS; PARENTERAL at 05:01

## 2019-01-05 NOTE — ASSESSMENT & PLAN NOTE
Likely secondary to poor absorption and appetite related to metastatic cancer  Albumin 1.6 on admission  Continue to recommend PO intake  Boost supplements with meals

## 2019-01-05 NOTE — ASSESSMENT & PLAN NOTE
Pending approval to [facility]  Follow up with Oncology and Palliative care on discharge.   Home palliative care referral has been sent to [facility] prior to 12/18 discharge from hospital, will consult with case management/ for update regarding referral.

## 2019-01-05 NOTE — PLAN OF CARE
"Problem: Adult Inpatient Plan of Care  Goal: Plan of Care Review  Outcome: Ongoing (interventions implemented as appropriate)  POC reviewed with patient. AAOx4, VSS; on 1L NC unable to wean off due to SpO2 dropping to the high 80s. Pt was teary and withdrawn all night. Pt stated of having abdominal and back pain but refused pain meds, heat packs and other inventions. Pt did agree to take PRN simethicone. Pt stated, "I don't want anymore pain medicine." Pt used the bedpan and bedside commote to void. No BM tonight. Pending SNF placement. Call-light within reach, bed alarm on, pt stated no other concerns at this time, safety maintained, WCTM      "

## 2019-01-05 NOTE — PLAN OF CARE
Problem: Physical Therapy Goal  Goal: Physical Therapy Goal  Goals to be met by: 19     Patient will increase functional independence with mobility by performin. Supine to sit with Contact Guard Assistance - Not met  2. Sit to supine with Contact Guard Assistance - Not met  3. Sit to stand transfer with Stand-by Assistance with or without device - Not met  4. Bed to chair transfer with Stand-by Assistance using Rolling Walker or LRD - Not met  5. Gait x 20 feet with Contact Guard Assistance using Rolling Walker or LRD - Not met      Pt progressing towards goals. continue with PT POC.Goals remain appropriate.  Jamil Crabtree PTA  2019

## 2019-01-05 NOTE — ASSESSMENT & PLAN NOTE
-Stage 4 cancer diagnosed during previous visit 12/21. Malignant appearance of strictures occluding recently placed stent suggesting aggressive course  -Outpatient oncology follow-up previously  scheduled for 1/2/19  -Oncology noting unclear whether patient would derive any benefit from chemotherapy, given her poor performance status and the aggressiveness of her disease.  -Pt and daughter interested in short stay at SNF/Rehab at [facility] to try to regain strength and make outpatient oncology appointment.

## 2019-01-05 NOTE — PT/OT/SLP PROGRESS
Physical Therapy Treatment    Patient Name:  Alfredina Claudette Parks   MRN:  [id number]    Recommendations:     Discharge Recommendations:  nursing facility, skilled   Discharge Equipment Recommendations: wheelchair, manual   Barriers to discharge: Decreased caregiver support    Assessment:     Alfredina Claudette Parks is a 78 y.o. female admitted with a medical diagnosis of Acute cholangitis.  She presents with the following impairments/functional limitations:  weakness, impaired endurance, impaired self care skills, gait instability, impaired functional mobilty, impaired balance, decreased lower extremity function, decreased upper extremity function, impaired cardiopulmonary response to activity .Pt tolerated treatment fairly well and is progressing slowly with mobility. Pt limited due to c/o of fatigue. Pt would continue to benefit from skilled PT to address overall functional mobility and goals. Goals remain appropriate      Rehab Prognosis: Fair; patient would benefit from acute skilled PT services to address these deficits and reach maximum level of function.    Recent Surgery: Procedure(s) (LRB):  ERCP (ENDOSCOPIC RETROGRADE CHOLANGIOPANCREATOGRAPHY) (N/A) 5 Days Post-Op    Plan:     During this hospitalization, patient to be seen 4 x/week to address the identified rehab impairments via gait training, therapeutic activities, therapeutic exercises, neuromuscular re-education and progress toward the following goals:    · Plan of Care Expires:  02/02/19    Subjective     Chief Complaint: fatigue  Patient/Family Comments/goals: I need to usethe bathroom  Pain/Comfort:  · Pain Rating 1: 0/10  · Pain Rating Post-Intervention 1: 0/10      Objective:     Communicated with RN prior to session.  Patient found supine peripheral IV, oxygen  upon PT entry to room.     General Precautions: Standard, fall   Orthopedic Precautions:N/A   Braces: N/A     Functional Mobility:  · Bed Mobility:     · Scooting: moderate  assistance  · Supine to Sit: maximal assistance  · Transfers:  Sit to Stand:  minimum assistance with rolling walker  · Toilet Transfer: minimum assistance and moderate assistance with  rolling walker  using  Stand Pivot  ? Static Stand: min with Rw; min (A) pt required total (A)  requiring for meghna cleaning  · Gait: pt ambulated 3 ft commode to chair with RW with min/modA with vcs and (A) for RW management      AM-PAC 6 CLICK MOBILITY  Turning over in bed (including adjusting bedclothes, sheets and blankets)?: 2  Sitting down on and standing up from a chair with arms (e.g., wheelchair, bedside commode, etc.): 3  Moving from lying on back to sitting on the side of the bed?: 2  Moving to and from a bed to a chair (including a wheelchair)?: 2  Need to walk in hospital room?: 2  Climbing 3-5 steps with a railing?: 1  Basic Mobility Total Score: 12       Therapeutic Activities and Exercises:   Discussed/educated patient on progress, safety, importance of OOB mobility for improving outcomes, d/c, PT POC   Patient performed therex X 10 reps seated in bedside chair B LE AROM AP, LAQ, Hip Flexion, Hip Abd/Add   White board updated in patients room to current assistance level   Donned an extra gown   Bedside table in front of patient and area set up for function, convenience, and safety. RN aware of patient's mobility needs and status. Questions/concerns addressed within PTA scope of practice; patient with no further questions.      Patient left up in chair with all lines intact, call button in reach, nsg notified and family present..    GOALS:   Multidisciplinary Problems     Physical Therapy Goals        Problem: Physical Therapy Goal    Goal Priority Disciplines Outcome Goal Variances Interventions   Physical Therapy Goal     PT, PT/OT Ongoing (interventions implemented as appropriate)     Description:  Goals to be met by: 19     Patient will increase functional independence with mobility by performin. Supine  to sit with Contact Guard Assistance - Not met  2. Sit to supine with Contact Guard Assistance - Not met  3. Sit to stand transfer with Stand-by Assistance with or without device - Not met  4. Bed to chair transfer with Stand-by Assistance using Rolling Walker or LRD - Not met  5. Gait x 20 feet with Contact Guard Assistance using Rolling Walker or LRD - Not met                       Time Tracking:     PT Received On: 01/05/19  PT Start Time: 1003     PT Stop Time: 1041  PT Total Time (min): 38 min     Billable Minutes: Therapeutic Activity 30 and Therapeutic Exercise 8    Treatment Type: Treatment  PT/PTA: PTA     PTA Visit Number: 1     Jamil Crabtree, PTA  01/05/2019

## 2019-01-05 NOTE — SUBJECTIVE & OBJECTIVE
Interval History: No acute events overnight. Vitals remain stable. Pt with continued abdominal pain, but patient not wishing to take oxycodone at this time. Notes some relief with heating pads applied to the abdomen.     Review of Systems   Constitutional: Negative for activity change, appetite change, fatigue and fever.   HENT: Negative for ear discharge, mouth sores, rhinorrhea and sneezing.    Eyes: Negative for photophobia.   Respiratory: Negative for cough, choking, shortness of breath and wheezing.    Cardiovascular: Positive for leg swelling. Negative for chest pain.   Gastrointestinal: Positive for abdominal pain and nausea. Negative for diarrhea and vomiting.   Endocrine: Negative for polydipsia, polyphagia and polyuria.   Genitourinary: Negative for dysuria, flank pain, hematuria and urgency.   Musculoskeletal: Negative for back pain, joint swelling, myalgias, neck pain and neck stiffness.   Skin: Negative for color change, pallor, rash and wound.   Allergic/Immunologic: Negative for immunocompromised state.   Neurological: Negative for seizures, facial asymmetry, weakness, numbness and headaches.   Psychiatric/Behavioral: Negative for agitation, behavioral problems and confusion.     Objective:     Vital Signs (Most Recent):  Temp: 97.5 °F (36.4 °C) (01/05/19 1224)  Pulse: 60 (01/05/19 1224)  Resp: 16 (01/05/19 1224)  BP: (!) 109/58 (01/05/19 1224)  SpO2: 97 % (01/05/19 1224) Vital Signs (24h Range):  Temp:  [97.5 °F (36.4 °C)-98.6 °F (37 °C)] 97.5 °F (36.4 °C)  Pulse:  [] 60  Resp:  [16-20] 16  SpO2:  [94 %-98 %] 97 %  BP: ()/(53-90) 109/58     Weight: 68 kg (150 lb)  Body mass index is 32.46 kg/m².    Intake/Output Summary (Last 24 hours) at 1/5/2019 1434  Last data filed at 1/5/2019 1100  Gross per 24 hour   Intake 1550 ml   Output 850 ml   Net 700 ml      Physical Exam   Constitutional: She is oriented to person, place, and time. She appears well-developed and well-nourished. No distress.    HENT:   Head: Normocephalic and atraumatic.   Mouth/Throat: No oropharyngeal exudate.   Eyes: Conjunctivae and EOM are normal. Pupils are equal, round, and reactive to light. Right eye exhibits no discharge. Left eye exhibits no discharge. No scleral icterus.   Neck: Normal range of motion.   Cardiovascular: Exam reveals no gallop.   No murmur heard.  Pulmonary/Chest: Effort normal and breath sounds normal. No respiratory distress. She has no wheezes. She has no rales.   Abdominal: Soft. Bowel sounds are normal. She exhibits no mass. There is tenderness. There is no guarding.   Musculoskeletal: Normal range of motion. She exhibits edema. She exhibits no tenderness or deformity.   Bilateral lower limb edema   Neurological: She is alert and oriented to person, place, and time.   Skin: Skin is warm and dry. Capillary refill takes less than 2 seconds. She is not diaphoretic.   Psychiatric: She has a normal mood and affect. Her behavior is normal.       Significant Labs:   CBC:   Recent Labs   Lab 01/04/19  0542 01/05/19  0344   WBC 12.60 15.04*   HGB 9.5* 9.8*   HCT 30.0* 30.2*    226     CMP:   Recent Labs   Lab 01/04/19  0542 01/05/19  0344   * 130*   K 3.8 3.9   CL 95 94*   CO2 26 30*   GLU 74 77   BUN 17 12   CREATININE 0.7 0.7   CALCIUM 8.2* 8.0*   ALBUMIN 1.4* 1.3*   ANIONGAP 10 6*   EGFRNONAA >60.0 >60.0       Significant Imaging: I have reviewed all pertinent imaging results/findings within the past 24 hours.

## 2019-01-05 NOTE — NURSING
"Pt verbalized wanting to "go home" and  "...go through the light" to this nurse this evening. Pt then proceeded to recite prayers. Pt daughter updated and spoke to pt on the phone. Pt complained of ABD pain, but refused pain medicine stating she had been taking "too much pain medicine". MD made aware.  "

## 2019-01-05 NOTE — PROGRESS NOTES
[facility] Hospital Medicine  Progress Note    Patient Name: Alfredina Claudette Parks  MRN: [id number]  Patient Class: IP- Inpatient   Admission Date: 12/30/2018  Length of Stay: 5 days  Attending Physician: Alfredo Jones MD  Primary Care Provider: Veronica Frost MD    [facility] Medicine Team: [facility] HOSP MED 4 Steve Walters DO    Subjective:     Principal Problem:Acute cholangitis    HPI:  Mrs. Garcia is a 78-year-old female with known stage IV pancreatic cancer (diagnosed on 12/21/2018) presents to the ED with 2 days of worsening abdominal pain with nausea and 1 day of fever.  She is pending follow-up with Oncology and palliative care. She was recently admitted, MRI showed liver and pancreatic masses, ERCP was performed on 12/19 and pathology confirmed poorly differentiated carcinoma most likely adeno. 2 stents were placed and plan was to f/u after 3 months to exchange the stent. At the moment she is a full code.  She denies vomiting, diarrhea, cough, shortness of breath, chest pain, or dysuria.  Daughter gave her oxycodone and gabapentin without resolution of symptoms. Patient lives alone in an apartment and her daughter lives across the street.  A ten point review of systems was completed and is negative except as documented above.  Patient denies any other acute medical complaint.    In the ED, she received 1L of fluid and pain medication. She has high WBC and a spike of fever at 100.4, one dose of Zosyn was given. Palliative care and AES were consulted.    Hospital Course:  01/01/2019 ERCP yesterday, pt resting comfortably in bed this AM. Noting migratory abdominal pain, well controlled at time of interview.   01/02/2019 No acute events overnight. Pt and family discussed possible treatment options with oncology; oncology unsure of benefit to palliative chemotherapy.   01/03/2019 Pt notes increased pain and nausea this AM, but controlled with PRN medications.   01/04/2019 No acute  events overnight. Pt  Noting pain and nausea, controlled with PRN medications.  Family awaiting acceptance at [facility]      Interval History: No acute events overnight. Vitals remain stable. Pt with continued abdominal pain, but patient not wishing to take oxycodone at this time. Notes some relief with heating pads applied to the abdomen.     Review of Systems   Constitutional: Negative for activity change, appetite change, fatigue and fever.   HENT: Negative for ear discharge, mouth sores, rhinorrhea and sneezing.    Eyes: Negative for photophobia.   Respiratory: Negative for cough, choking, shortness of breath and wheezing.    Cardiovascular: Positive for leg swelling. Negative for chest pain.   Gastrointestinal: Positive for abdominal pain and nausea. Negative for diarrhea and vomiting.   Endocrine: Negative for polydipsia, polyphagia and polyuria.   Genitourinary: Negative for dysuria, flank pain, hematuria and urgency.   Musculoskeletal: Negative for back pain, joint swelling, myalgias, neck pain and neck stiffness.   Skin: Negative for color change, pallor, rash and wound.   Allergic/Immunologic: Negative for immunocompromised state.   Neurological: Negative for seizures, facial asymmetry, weakness, numbness and headaches.   Psychiatric/Behavioral: Negative for agitation, behavioral problems and confusion.     Objective:     Vital Signs (Most Recent):  Temp: 97.5 °F (36.4 °C) (01/05/19 1224)  Pulse: 60 (01/05/19 1224)  Resp: 16 (01/05/19 1224)  BP: (!) 109/58 (01/05/19 1224)  SpO2: 97 % (01/05/19 1224) Vital Signs (24h Range):  Temp:  [97.5 °F (36.4 °C)-98.6 °F (37 °C)] 97.5 °F (36.4 °C)  Pulse:  [] 60  Resp:  [16-20] 16  SpO2:  [94 %-98 %] 97 %  BP: ()/(53-90) 109/58     Weight: 68 kg (150 lb)  Body mass index is 32.46 kg/m².    Intake/Output Summary (Last 24 hours) at 1/5/2019 1434  Last data filed at 1/5/2019 1100  Gross per 24 hour   Intake 1550 ml   Output 850 ml   Net 700 ml      Physical Exam    Constitutional: She is oriented to person, place, and time. She appears well-developed and well-nourished. No distress.   HENT:   Head: Normocephalic and atraumatic.   Mouth/Throat: No oropharyngeal exudate.   Eyes: Conjunctivae and EOM are normal. Pupils are equal, round, and reactive to light. Right eye exhibits no discharge. Left eye exhibits no discharge. No scleral icterus.   Neck: Normal range of motion.   Cardiovascular: Exam reveals no gallop.   No murmur heard.  Pulmonary/Chest: Effort normal and breath sounds normal. No respiratory distress. She has no wheezes. She has no rales.   Abdominal: Soft. Bowel sounds are normal. She exhibits no mass. There is tenderness. There is no guarding.   Musculoskeletal: Normal range of motion. She exhibits edema. She exhibits no tenderness or deformity.   Bilateral lower limb edema   Neurological: She is alert and oriented to person, place, and time.   Skin: Skin is warm and dry. Capillary refill takes less than 2 seconds. She is not diaphoretic.   Psychiatric: She has a normal mood and affect. Her behavior is normal.       Significant Labs:   CBC:   Recent Labs   Lab 01/04/19  0542 01/05/19  0344   WBC 12.60 15.04*   HGB 9.5* 9.8*   HCT 30.0* 30.2*    226     CMP:   Recent Labs   Lab 01/04/19  0542 01/05/19  0344   * 130*   K 3.8 3.9   CL 95 94*   CO2 26 30*   GLU 74 77   BUN 17 12   CREATININE 0.7 0.7   CALCIUM 8.2* 8.0*   ALBUMIN 1.4* 1.3*   ANIONGAP 10 6*   EGFRNONAA >60.0 >60.0       Significant Imaging: I have reviewed all pertinent imaging results/findings within the past 24 hours.    Assessment/Plan:      * Acute cholangitis    -Febrile with leukocytosis on admission  - AES was consulted as patient has 2 biliary stents which placed on 12/19  -  ERCP showed Two visibly occluded stents from the biliary tree. A single localized biliary stricture was found in the left main hepatic duct. The stricture was malignant appearing with malignant invasion and  could not be traversed. Two biliary stents removed, one placed in the common bile duct. GI recommending repeat ERCP in 6 weeks and possible biliary drain placement. Pt and family decline drain at this time.   - Oncology spoke with pt on 1/2, again noted unclear whether patient would derive any benefit from chemotherapy, given her poor performance status and the aggressiveness of her disease  - Pt with unsuccessful source control secondary to malignant stricture. Will continue IV zosyn while inpatient, with 2 week course of Augmentin on discharge.        Cholangiocarcinoma    -Stage 4 cancer diagnosed during previous visit 12/21. Malignant appearance of strictures occluding recently placed stent suggesting aggressive course  -Outpatient oncology follow-up previously  scheduled for 1/2/19  -Oncology noting unclear whether patient would derive any benefit from chemotherapy, given her poor performance status and the aggressiveness of her disease.  -Pt and daughter interested in short stay at SNF/Rehab at [facility] to try to regain strength and make outpatient oncology appointment.      Severe malnutrition    Likely secondary to poor absorption and appetite related to metastatic cancer  Albumin 1.6 on admission  Continue to recommend PO intake  Boost supplements with meals        Discharge planning issues    Pending approval to [facility]  Follow up with Oncology and Palliative care on discharge.   Home palliative care referral has been sent to [facility] prior to 12/18 discharge from hospital, will consult with case management/ for update regarding referral.        Hypomagnesemia    Replete with Mag Sulfate 2mg IV, monitor daily       Palliative care encounter    Palliative care spoke with patient previously, pt discharged previously with HH/AIM and f/u with Dr. Richey.      Pancreatic cancer    Patient recently diagnosed with stage IV pancreatic cancer. She is on oxycodone 5 mg Q6h at home. She  came because of worsening of abdominal pain and nausea. Palliative care and AES were consulted.    - f/u with oncology as an outpatient at time of discharge.  - pain control on oxycodone and hydromorphone.         Chronic atrial fibrillation    - she has loop recorder which was placed on 02/2017.  - resume home dose Amiodarone and Carvedilol.     Hypothyroid    Resume home dose levothyroxine.       VTE Risk Mitigation (From admission, onward)        Ordered     IP VTE HIGH RISK PATIENT  Once      12/31/18 1343     Place sequential compression device  Until discontinued      12/31/18 1343              Steve Walters DO  Department of Hospital Medicine   [facility]

## 2019-01-06 PROBLEM — Z66 DNR (DO NOT RESUSCITATE): Status: ACTIVE | Noted: 2019-01-06

## 2019-01-06 LAB
ALBUMIN SERPL BCP-MCNC: 1.4 G/DL
ANION GAP SERPL CALC-SCNC: 9 MMOL/L
BASOPHILS # BLD AUTO: 0.07 K/UL
BASOPHILS NFR BLD: 0.4 %
BUN SERPL-MCNC: 11 MG/DL
CALCIUM SERPL-MCNC: 8 MG/DL
CHLORIDE SERPL-SCNC: 91 MMOL/L
CO2 SERPL-SCNC: 28 MMOL/L
CREAT SERPL-MCNC: 0.7 MG/DL
DIFFERENTIAL METHOD: ABNORMAL
EOSINOPHIL # BLD AUTO: 0.1 K/UL
EOSINOPHIL NFR BLD: 0.6 %
ERYTHROCYTE [DISTWIDTH] IN BLOOD BY AUTOMATED COUNT: 21.1 %
EST. GFR  (AFRICAN AMERICAN): >60 ML/MIN/1.73 M^2
EST. GFR  (NON AFRICAN AMERICAN): >60 ML/MIN/1.73 M^2
GLUCOSE SERPL-MCNC: 81 MG/DL
HCT VFR BLD AUTO: 32.1 %
HGB BLD-MCNC: 10.3 G/DL
IMM GRANULOCYTES # BLD AUTO: 0.11 K/UL
IMM GRANULOCYTES NFR BLD AUTO: 0.6 %
LYMPHOCYTES # BLD AUTO: 2.1 K/UL
LYMPHOCYTES NFR BLD: 11.7 %
MAGNESIUM SERPL-MCNC: 1.8 MG/DL
MCH RBC QN AUTO: 31.2 PG
MCHC RBC AUTO-ENTMCNC: 32.1 G/DL
MCV RBC AUTO: 97 FL
MONOCYTES # BLD AUTO: 1.8 K/UL
MONOCYTES NFR BLD: 10.1 %
NEUTROPHILS # BLD AUTO: 13.5 K/UL
NEUTROPHILS NFR BLD: 76.6 %
NRBC BLD-RTO: 0 /100 WBC
PHOSPHATE SERPL-MCNC: 2.3 MG/DL
PLATELET # BLD AUTO: 235 K/UL
PMV BLD AUTO: 10.7 FL
POTASSIUM SERPL-SCNC: 3.9 MMOL/L
RBC # BLD AUTO: 3.3 M/UL
SODIUM SERPL-SCNC: 128 MMOL/L
TB INDURATION 48 - 72 HR READ: 0 MM
WBC # BLD AUTO: 17.65 K/UL

## 2019-01-06 PROCEDURE — 83735 ASSAY OF MAGNESIUM: CPT

## 2019-01-06 PROCEDURE — 63600175 PHARM REV CODE 636 W HCPCS: Performed by: STUDENT IN AN ORGANIZED HEALTH CARE EDUCATION/TRAINING PROGRAM

## 2019-01-06 PROCEDURE — 25000003 PHARM REV CODE 250: Performed by: STUDENT IN AN ORGANIZED HEALTH CARE EDUCATION/TRAINING PROGRAM

## 2019-01-06 PROCEDURE — 36415 COLL VENOUS BLD VENIPUNCTURE: CPT

## 2019-01-06 PROCEDURE — 80069 RENAL FUNCTION PANEL: CPT

## 2019-01-06 PROCEDURE — 85025 COMPLETE CBC W/AUTO DIFF WBC: CPT

## 2019-01-06 PROCEDURE — 25000003 PHARM REV CODE 250: Performed by: INTERNAL MEDICINE

## 2019-01-06 PROCEDURE — 20600001 HC STEP DOWN PRIVATE ROOM

## 2019-01-06 PROCEDURE — 99233 SBSQ HOSP IP/OBS HIGH 50: CPT | Mod: GC,,, | Performed by: HOSPITALIST

## 2019-01-06 PROCEDURE — 99233 PR SUBSEQUENT HOSPITAL CARE,LEVL III: ICD-10-PCS | Mod: GC,,, | Performed by: HOSPITALIST

## 2019-01-06 RX ORDER — SODIUM,POTASSIUM PHOSPHATES 280-250MG
1 POWDER IN PACKET (EA) ORAL ONCE
Status: COMPLETED | OUTPATIENT
Start: 2019-01-06 | End: 2019-01-06

## 2019-01-06 RX ORDER — MAGNESIUM SULFATE HEPTAHYDRATE 40 MG/ML
2 INJECTION, SOLUTION INTRAVENOUS ONCE
Status: COMPLETED | OUTPATIENT
Start: 2019-01-06 | End: 2019-01-06

## 2019-01-06 RX ORDER — SODIUM CHLORIDE, SODIUM LACTATE, POTASSIUM CHLORIDE, CALCIUM CHLORIDE 600; 310; 30; 20 MG/100ML; MG/100ML; MG/100ML; MG/100ML
INJECTION, SOLUTION INTRAVENOUS CONTINUOUS
Status: ACTIVE | OUTPATIENT
Start: 2019-01-06 | End: 2019-01-07

## 2019-01-06 RX ADMIN — MAGNESIUM SULFATE IN WATER 2 G: 40 INJECTION, SOLUTION INTRAVENOUS at 11:01

## 2019-01-06 RX ADMIN — SOLIFENACIN SUCCINATE 5 MG: 5 TABLET, FILM COATED ORAL at 09:01

## 2019-01-06 RX ADMIN — GABAPENTIN 200 MG: 100 CAPSULE ORAL at 04:01

## 2019-01-06 RX ADMIN — OXYCODONE HYDROCHLORIDE 5 MG: 5 TABLET ORAL at 01:01

## 2019-01-06 RX ADMIN — ALLOPURINOL 100 MG: 100 TABLET ORAL at 09:01

## 2019-01-06 RX ADMIN — GABAPENTIN 200 MG: 100 CAPSULE ORAL at 09:01

## 2019-01-06 RX ADMIN — CARVEDILOL 3.12 MG: 3.12 TABLET, FILM COATED ORAL at 09:01

## 2019-01-06 RX ADMIN — PANTOPRAZOLE SODIUM 40 MG: 40 TABLET, DELAYED RELEASE ORAL at 09:01

## 2019-01-06 RX ADMIN — SODIUM CHLORIDE, SODIUM LACTATE, POTASSIUM CHLORIDE, AND CALCIUM CHLORIDE: .6; .31; .03; .02 INJECTION, SOLUTION INTRAVENOUS at 08:01

## 2019-01-06 RX ADMIN — FLUTICASONE PROPIONATE 50 MCG: 50 SPRAY, METERED NASAL at 09:01

## 2019-01-06 RX ADMIN — ASPIRIN 81 MG: 81 TABLET, COATED ORAL at 09:01

## 2019-01-06 RX ADMIN — STANDARDIZED SENNA CONCENTRATE AND DOCUSATE SODIUM 1 TABLET: 8.6; 5 TABLET, FILM COATED ORAL at 09:01

## 2019-01-06 RX ADMIN — Medication 1 CAPSULE: at 09:01

## 2019-01-06 RX ADMIN — FLUTICASONE FUROATE AND VILANTEROL TRIFENATATE 1 PUFF: 200; 25 POWDER RESPIRATORY (INHALATION) at 09:01

## 2019-01-06 RX ADMIN — OXYCODONE HYDROCHLORIDE 5 MG: 5 TABLET ORAL at 09:01

## 2019-01-06 RX ADMIN — LEVOTHYROXINE SODIUM 125 MCG: 25 TABLET ORAL at 05:01

## 2019-01-06 RX ADMIN — CETIRIZINE HYDROCHLORIDE 10 MG: 5 TABLET ORAL at 09:01

## 2019-01-06 RX ADMIN — HYPROMELLOSE 2910 1 DROP: 5 SOLUTION OPHTHALMIC at 09:01

## 2019-01-06 RX ADMIN — AMIODARONE HYDROCHLORIDE 100 MG: 100 TABLET ORAL at 09:01

## 2019-01-06 RX ADMIN — HYDROMORPHONE HYDROCHLORIDE 1 MG: 1 INJECTION, SOLUTION INTRAMUSCULAR; INTRAVENOUS; SUBCUTANEOUS at 04:01

## 2019-01-06 RX ADMIN — SIMETHICONE CHEW TAB 80 MG 80 MG: 80 TABLET ORAL at 01:01

## 2019-01-06 RX ADMIN — PIPERACILLIN AND TAZOBACTAM 4.5 G: 4; .5 INJECTION, POWDER, LYOPHILIZED, FOR SOLUTION INTRAVENOUS; PARENTERAL at 04:01

## 2019-01-06 RX ADMIN — HYPROMELLOSE 2910 1 DROP: 5 SOLUTION OPHTHALMIC at 04:01

## 2019-01-06 RX ADMIN — ONDANSETRON 4 MG: 4 TABLET, FILM COATED ORAL at 04:01

## 2019-01-06 RX ADMIN — CARVEDILOL 3.12 MG: 3.12 TABLET, FILM COATED ORAL at 04:01

## 2019-01-06 RX ADMIN — ONDANSETRON 4 MG: 4 TABLET, FILM COATED ORAL at 09:01

## 2019-01-06 RX ADMIN — PIPERACILLIN AND TAZOBACTAM 4.5 G: 4; .5 INJECTION, POWDER, LYOPHILIZED, FOR SOLUTION INTRAVENOUS; PARENTERAL at 05:01

## 2019-01-06 RX ADMIN — SIMETHICONE CHEW TAB 80 MG 80 MG: 80 TABLET ORAL at 05:01

## 2019-01-06 RX ADMIN — PIPERACILLIN AND TAZOBACTAM 4.5 G: 4; .5 INJECTION, POWDER, LYOPHILIZED, FOR SOLUTION INTRAVENOUS; PARENTERAL at 09:01

## 2019-01-06 RX ADMIN — POTASSIUM & SODIUM PHOSPHATES POWDER PACK 280-160-250 MG 1 PACKET: 280-160-250 PACK at 09:01

## 2019-01-06 RX ADMIN — OXYCODONE HYDROCHLORIDE 5 MG: 5 TABLET ORAL at 03:01

## 2019-01-06 RX ADMIN — HYPROMELLOSE 2910 1 DROP: 5 SOLUTION OPHTHALMIC at 11:01

## 2019-01-06 NOTE — ASSESSMENT & PLAN NOTE
Pending approval to [facility]  Follow up with Oncology and Palliative care on discharge.   Home palliative care referral has been sent to [facility] prior to 12/18 discharge from hospital, will consult with case management/ for update regarding referral.

## 2019-01-06 NOTE — ASSESSMENT & PLAN NOTE
Replete with Mag Sulfate 2mg IV, monitor daily. Pt requiring daily IV infusions, likely 2/2 poor po intake and limited absorption.

## 2019-01-06 NOTE — ASSESSMENT & PLAN NOTE
Pt increasingly disoriented. Pt previously has deferred medical decisions to daughter because she has difficulty remembering things.   Discussed with Daughter, Tari, as to patient's wishes in the event pt was found unresponsive. Daughter notes that at this point, she wants to do what she can to help her mother feel better but does not want to put her through additional pain with little chance of improvement. She stated that she did not wish for chest compressions, shocks, or intubation in the event her mother became unresponsive. DNR form signed by two physicians placed in Pt's chart and code status updated in EMR.

## 2019-01-06 NOTE — PLAN OF CARE
Problem: Fall Injury Risk  Goal: Absence of Fall and Fall-Related Injury    Intervention: Identify and Manage Contributors to Fall Injury Risk  Pt VSS, hypotensive during day. Pain well controlled with prn pain meds and heat packs. PT worked with pt; pt up OOB to chair for half of the day; assisted back into bed with 1 assist.

## 2019-01-06 NOTE — ASSESSMENT & PLAN NOTE
-Febrile with leukocytosis on admission  - AES was consulted as patient has 2 biliary stents which placed on 12/19  -  ERCP showed Two visibly occluded stents from the biliary tree. A single localized biliary stricture was found in the left main hepatic duct. The stricture was malignant appearing with malignant invasion and could not be traversed. Two biliary stents removed, one placed in the common bile duct. GI recommending repeat ERCP in 6 weeks and possible biliary drain placement.   -1/6 patient with increasing abdominal pain and jaundice with rising leukocytosis despite IV zosyn. Discussed biliary drain placement with daughter. Will obtain CT abdomen with contrast to evaluate for possible IR drain placement.   - Oncology spoke with pt on 1/2, again noted unclear whether patient would derive any benefit from chemotherapy, given her poor performance status and the aggressiveness of her disease  - Pt with unsuccessful source control secondary to malignant stricture. Will continue IV zosyn while inpatient, with 2 week course of Augmentin on discharge.

## 2019-01-06 NOTE — SUBJECTIVE & OBJECTIVE
Interval History: No acute events overnight. Vitals remain stable. Pt with continued abdominal pain, and appearing more jaundiced and less alert today. Daughter at bedside notes she seems to be in more pain.      Review of Systems   Constitutional: Negative for activity change, appetite change, fatigue and fever.   HENT: Negative for ear discharge, mouth sores, rhinorrhea and sneezing.    Eyes: Negative for photophobia.   Respiratory: Negative for cough, choking, shortness of breath and wheezing.    Cardiovascular: Positive for leg swelling. Negative for chest pain.   Gastrointestinal: Positive for abdominal pain and nausea. Negative for diarrhea and vomiting.   Endocrine: Negative for polydipsia, polyphagia and polyuria.   Genitourinary: Negative for dysuria, flank pain, hematuria and urgency.   Musculoskeletal: Negative for back pain, joint swelling, myalgias, neck pain and neck stiffness.   Skin: Negative for color change, pallor, rash and wound.   Allergic/Immunologic: Negative for immunocompromised state.   Neurological: Negative for seizures, facial asymmetry, weakness, numbness and headaches.   Psychiatric/Behavioral: Negative for agitation, behavioral problems and confusion.     Objective:     Vital Signs (Most Recent):  Temp: 97.8 °F (36.6 °C) (01/06/19 1122)  Pulse: 77 (01/06/19 1122)  Resp: 16 (01/06/19 1122)  BP: 133/66 (01/06/19 1122)  SpO2: (!) 93 % (01/06/19 1133) Vital Signs (24h Range):  Temp:  [97.8 °F (36.6 °C)-99.8 °F (37.7 °C)] 97.8 °F (36.6 °C)  Pulse:  [52-88] 77  Resp:  [16-20] 16  SpO2:  [91 %-95 %] 93 %  BP: (100-143)/(58-68) 133/66     Weight: 68 kg (150 lb)  Body mass index is 32.46 kg/m².    Intake/Output Summary (Last 24 hours) at 1/6/2019 1451  Last data filed at 1/6/2019 1200  Gross per 24 hour   Intake 1025 ml   Output 1100 ml   Net -75 ml      Physical Exam   Constitutional: She appears well-developed and well-nourished. She has a sickly appearance. No distress.   HENT:   Head:  Normocephalic and atraumatic.   Mouth/Throat: No oropharyngeal exudate.   Scleral icterus   Eyes: Conjunctivae and EOM are normal. Pupils are equal, round, and reactive to light. Right eye exhibits no discharge. Left eye exhibits no discharge. No scleral icterus.   Neck: Normal range of motion.   Cardiovascular: Normal rate. An irregularly irregular rhythm present. Exam reveals no gallop.   No murmur heard.  Pulmonary/Chest: Effort normal and breath sounds normal. No respiratory distress. She has no wheezes. She has no rales.   Abdominal: Soft. Bowel sounds are normal. She exhibits no mass. There is tenderness in the right upper quadrant and epigastric area. There is no guarding.   Musculoskeletal: Normal range of motion. She exhibits edema. She exhibits no tenderness or deformity.   Bilateral lower limb edema   Neurological: She is alert. She is disoriented.   Skin: Skin is warm and dry. Capillary refill takes less than 2 seconds. She is not diaphoretic.   Psychiatric: She has a normal mood and affect. Her behavior is normal.       Significant Labs:   CBC:   Recent Labs   Lab 01/05/19  0344 01/06/19  0545   WBC 15.04* 17.65*   HGB 9.8* 10.3*   HCT 30.2* 32.1*    235     CMP:   Recent Labs   Lab 01/05/19  0344 01/06/19  0545   * 128*   K 3.9 3.9   CL 94* 91*   CO2 30* 28   GLU 77 81   BUN 12 11   CREATININE 0.7 0.7   CALCIUM 8.0* 8.0*   ALBUMIN 1.3* 1.4*   ANIONGAP 6* 9   EGFRNONAA >60.0 >60.0       Significant Imaging: I have reviewed all pertinent imaging results/findings within the past 24 hours.

## 2019-01-06 NOTE — ASSESSMENT & PLAN NOTE
-Stage 4 cancer diagnosed during previous visit 12/21. Malignant appearance of strictures occluding recently placed stent suggesting aggressive course  -Outpatient oncology follow-up previously  scheduled for 1/2/19  -Oncology noting unclear whether patient would derive any benefit from chemotherapy, given her poor performance status and the aggressiveness of her disease.  -Pt and daughter interested in short stay at SNF/Rehab at [facility] to try to regain strength and make outpatient oncology appointment.

## 2019-01-06 NOTE — PLAN OF CARE
Problem: Adult Inpatient Plan of Care  Goal: Plan of Care Review  Outcome: Ongoing (interventions implemented as appropriate)  POC reviewed with patient. AAOx4, VSS; on 1L NC. Pt's mood improved tonight. Pt is talking more and requested pain meds and heat packs for abdominal and back pain. Pending SNF placement. Call-light within reach, bed alarm on, pt stated no other concerns at this time, safety maintained, WCTM

## 2019-01-06 NOTE — PROGRESS NOTES
[facility] Hospital Medicine  Progress Note    Patient Name: Alfredina Claudette Parks  MRN: [id number]  Patient Class: IP- Inpatient   Admission Date: 12/30/2018  Length of Stay: 6 days  Attending Physician: Alfredo Jones MD  Primary Care Provider: Veronica Frost MD    [facility] Medicine Team: [facility]OS[facility] MED 4 Steve Walters DO    Subjective:     Principal Problem:Acute cholangitis    HPI:  Mrs. Garcia is a 78-year-old female with known stage IV pancreatic cancer (diagnosed on 12/21/2018) presents to the ED with 2 days of worsening abdominal pain with nausea and 1 day of fever.  She is pending follow-up with Oncology and palliative care. She was recently admitted, MRI showed liver and pancreatic masses, ERCP was performed on 12/19 and pathology confirmed poorly differentiated carcinoma most likely adeno. 2 stents were placed and plan was to f/u after 3 months to exchange the stent. At the moment she is a full code.  She denies vomiting, diarrhea, cough, shortness of breath, chest pain, or dysuria.  Daughter gave her oxycodone and gabapentin without resolution of symptoms. Patient lives alone in an apartment and her daughter lives across the street.  A ten point review of systems was completed and is negative except as documented above.  Patient denies any other acute medical complaint.    In the ED, she received 1L of fluid and pain medication. She has high WBC and a spike of fever at 100.4, one dose of Zosyn was given. Palliative care and AES were consulted.    Hospital Course:  01/01/2019 ERCP yesterday, pt resting comfortably in bed this AM. Noting migratory abdominal pain, well controlled at time of interview.   01/02/2019 No acute events overnight. Pt and family discussed possible treatment options with oncology; oncology unsure of benefit to palliative chemotherapy.   01/03/2019 Pt notes increased pain and nausea this AM, but controlled with PRN medications.   01/04/2019 No acute  events overnight. Pt  Noting pain and nausea, controlled with PRN medications.  Family awaiting acceptance at [facility]  1/6/19 Worsening abdominal pain and nausea. Leukocytosis despite IV zosyn. CT abdomen and will consider percutaneous biliary drain.    Interval History: No acute events overnight. Vitals remain stable. Pt with continued abdominal pain, and appearing more jaundiced and less alert today. Daughter at bedside notes she seems to be in more pain.      Review of Systems   Constitutional: Negative for activity change, appetite change, fatigue and fever.   HENT: Negative for ear discharge, mouth sores, rhinorrhea and sneezing.    Eyes: Negative for photophobia.   Respiratory: Negative for cough, choking, shortness of breath and wheezing.    Cardiovascular: Positive for leg swelling. Negative for chest pain.   Gastrointestinal: Positive for abdominal pain and nausea. Negative for diarrhea and vomiting.   Endocrine: Negative for polydipsia, polyphagia and polyuria.   Genitourinary: Negative for dysuria, flank pain, hematuria and urgency.   Musculoskeletal: Negative for back pain, joint swelling, myalgias, neck pain and neck stiffness.   Skin: Negative for color change, pallor, rash and wound.   Allergic/Immunologic: Negative for immunocompromised state.   Neurological: Negative for seizures, facial asymmetry, weakness, numbness and headaches.   Psychiatric/Behavioral: Negative for agitation, behavioral problems and confusion.     Objective:     Vital Signs (Most Recent):  Temp: 97.8 °F (36.6 °C) (01/06/19 1122)  Pulse: 77 (01/06/19 1122)  Resp: 16 (01/06/19 1122)  BP: 133/66 (01/06/19 1122)  SpO2: (!) 93 % (01/06/19 1133) Vital Signs (24h Range):  Temp:  [97.8 °F (36.6 °C)-99.8 °F (37.7 °C)] 97.8 °F (36.6 °C)  Pulse:  [52-88] 77  Resp:  [16-20] 16  SpO2:  [91 %-95 %] 93 %  BP: (100-143)/(58-68) 133/66     Weight: 68 kg (150 lb)  Body mass index is 32.46 kg/m².    Intake/Output Summary (Last 24 hours) at 1/6/2019  1451  Last data filed at 1/6/2019 1200  Gross per 24 hour   Intake 1025 ml   Output 1100 ml   Net -75 ml      Physical Exam   Constitutional: She appears well-developed and well-nourished. She has a sickly appearance. No distress.   HENT:   Head: Normocephalic and atraumatic.   Mouth/Throat: No oropharyngeal exudate.   Scleral icterus   Eyes: Conjunctivae and EOM are normal. Pupils are equal, round, and reactive to light. Right eye exhibits no discharge. Left eye exhibits no discharge. No scleral icterus.   Neck: Normal range of motion.   Cardiovascular: Normal rate. An irregularly irregular rhythm present. Exam reveals no gallop.   No murmur heard.  Pulmonary/Chest: Effort normal and breath sounds normal. No respiratory distress. She has no wheezes. She has no rales.   Abdominal: Soft. Bowel sounds are normal. She exhibits no mass. There is tenderness in the right upper quadrant and epigastric area. There is no guarding.   Musculoskeletal: Normal range of motion. She exhibits edema. She exhibits no tenderness or deformity.   Bilateral lower limb edema   Neurological: She is alert. She is disoriented.   Skin: Skin is warm and dry. Capillary refill takes less than 2 seconds. She is not diaphoretic.   Psychiatric: She has a normal mood and affect. Her behavior is normal.       Significant Labs:   CBC:   Recent Labs   Lab 01/05/19  0344 01/06/19  0545   WBC 15.04* 17.65*   HGB 9.8* 10.3*   HCT 30.2* 32.1*    235     CMP:   Recent Labs   Lab 01/05/19  0344 01/06/19  0545   * 128*   K 3.9 3.9   CL 94* 91*   CO2 30* 28   GLU 77 81   BUN 12 11   CREATININE 0.7 0.7   CALCIUM 8.0* 8.0*   ALBUMIN 1.3* 1.4*   ANIONGAP 6* 9   EGFRNONAA >60.0 >60.0       Significant Imaging: I have reviewed all pertinent imaging results/findings within the past 24 hours.    Assessment/Plan:      * Acute cholangitis    -Febrile with leukocytosis on admission  - AES was consulted as patient has 2 biliary stents which placed on  12/19  -  ERCP showed Two visibly occluded stents from the biliary tree. A single localized biliary stricture was found in the left main hepatic duct. The stricture was malignant appearing with malignant invasion and could not be traversed. Two biliary stents removed, one placed in the common bile duct. GI recommending repeat ERCP in 6 weeks and possible biliary drain placement.   -1/6 patient with increasing abdominal pain and jaundice with rising leukocytosis despite IV zosyn. Discussed biliary drain placement with daughter. Will obtain CT abdomen with contrast to evaluate for possible IR drain placement.   - Oncology spoke with pt on 1/2, again noted unclear whether patient would derive any benefit from chemotherapy, given her poor performance status and the aggressiveness of her disease  - Pt with unsuccessful source control secondary to malignant stricture. Will continue IV zosyn while inpatient, with 2 week course of Augmentin on discharge.        Cholangiocarcinoma    -Stage 4 cancer diagnosed during previous visit 12/21. Malignant appearance of strictures occluding recently placed stent suggesting aggressive course  -Outpatient oncology follow-up previously  scheduled for 1/2/19  -Oncology noting unclear whether patient would derive any benefit from chemotherapy, given her poor performance status and the aggressiveness of her disease.  -Pt and daughter interested in short stay at SNF/Rehab at [facility] to try to regain strength and make outpatient oncology appointment.      DNR (do not resuscitate)    Pt increasingly disoriented. Pt previously has deferred medical decisions to daughter because she has difficulty remembering things.   Discussed with Daughter, Tari, as to patient's wishes in the event pt was found unresponsive. Daughter notes that at this point, she wants to do what she can to help her mother feel better but does not want to put her through additional pain with little chance of  improvement. She stated that she did not wish for chest compressions, shocks, or intubation in the event her mother became unresponsive. DNR form signed by two physicians placed in Pt's chart and code status updated in EMR.        Severe malnutrition    Likely secondary to poor absorption and appetite related to metastatic cancer  Albumin 1.6 on admission  Continue to recommend PO intake  Boost supplements with meals        Discharge planning issues    Pending approval to [facility]  Follow up with Oncology and Palliative care on discharge.   Home palliative care referral has been sent to [facility] prior to 12/18 discharge from hospital, will consult with case management/ for update regarding referral.        Hypophosphatemia    Potassium/Sodium/Phosphate packets to replete as needed  Daily CMP/Mag/phos       Hypomagnesemia    Replete with Mag Sulfate 2mg IV, monitor daily. Pt requiring daily IV infusions, likely 2/2 poor po intake and limited absorption.        Palliative care encounter    Palliative care spoke with patient previously, pt discharged previously with [facility]IC/[facility]UY and f/u with Dr. Richey.      Pancreatic cancer    Patient recently diagnosed with stage IV pancreatic cancer. She is on oxycodone 5 mg Q6h at home. She came because of worsening of abdominal pain and nausea. Palliative care and AES were consulted.    - f/u with oncology as an outpatient at time of discharge.  - pain control on oxycodone and hydromorphone.         Chronic atrial fibrillation    - she has loop recorder which was placed on 02/2017.  - resume home dose Amiodarone and Carvedilol.     Hypothyroid    Resume home dose levothyroxine.       VTE Risk Mitigation (From admission, onward)        Ordered     IP VTE HIGH RISK PATIENT  Once      12/31/18 1343     Place sequential compression device  Until discontinued      12/31/18 1343              Steve Walters DO  Department of Hospital Medicine   [facility]  [place]

## 2019-01-07 LAB
ALBUMIN SERPL BCP-MCNC: 1 G/DL
ALP SERPL-CCNC: 214 U/L
ALT SERPL W/O P-5'-P-CCNC: 52 U/L
ANION GAP SERPL CALC-SCNC: 14 MMOL/L
AST SERPL-CCNC: 107 U/L
BASOPHILS # BLD AUTO: 0.04 K/UL
BASOPHILS NFR BLD: 0.3 %
BILIRUB SERPL-MCNC: 3.4 MG/DL
BUN SERPL-MCNC: 11 MG/DL
CALCIUM SERPL-MCNC: 7.2 MG/DL
CHLORIDE SERPL-SCNC: 97 MMOL/L
CO2 SERPL-SCNC: 21 MMOL/L
CREAT SERPL-MCNC: 0.7 MG/DL
DIFFERENTIAL METHOD: ABNORMAL
EOSINOPHIL # BLD AUTO: 0.1 K/UL
EOSINOPHIL NFR BLD: 0.6 %
ERYTHROCYTE [DISTWIDTH] IN BLOOD BY AUTOMATED COUNT: 21.2 %
EST. GFR  (AFRICAN AMERICAN): >60 ML/MIN/1.73 M^2
EST. GFR  (NON AFRICAN AMERICAN): >60 ML/MIN/1.73 M^2
GLUCOSE SERPL-MCNC: 59 MG/DL
HCT VFR BLD AUTO: 27.8 %
HGB BLD-MCNC: 8.9 G/DL
IMM GRANULOCYTES # BLD AUTO: 0.16 K/UL
IMM GRANULOCYTES NFR BLD AUTO: 1.1 %
LYMPHOCYTES # BLD AUTO: 1.4 K/UL
LYMPHOCYTES NFR BLD: 10.3 %
MAGNESIUM SERPL-MCNC: 1.4 MG/DL
MCH RBC QN AUTO: 31.4 PG
MCHC RBC AUTO-ENTMCNC: 32 G/DL
MCV RBC AUTO: 98 FL
MONOCYTES # BLD AUTO: 1.4 K/UL
MONOCYTES NFR BLD: 9.7 %
NEUTROPHILS # BLD AUTO: 11 K/UL
NEUTROPHILS NFR BLD: 78 %
NRBC BLD-RTO: 0 /100 WBC
PLATELET # BLD AUTO: 180 K/UL
PMV BLD AUTO: 10.2 FL
POTASSIUM SERPL-SCNC: 3.8 MMOL/L
PROT SERPL-MCNC: 4.8 G/DL
RBC # BLD AUTO: 2.83 M/UL
SODIUM SERPL-SCNC: 132 MMOL/L
WBC # BLD AUTO: 14.04 K/UL

## 2019-01-07 PROCEDURE — 25000003 PHARM REV CODE 250: Performed by: INTERNAL MEDICINE

## 2019-01-07 PROCEDURE — 25500020 PHARM REV CODE 255: Performed by: HOSPITALIST

## 2019-01-07 PROCEDURE — 25000003 PHARM REV CODE 250: Performed by: STUDENT IN AN ORGANIZED HEALTH CARE EDUCATION/TRAINING PROGRAM

## 2019-01-07 PROCEDURE — 63600175 PHARM REV CODE 636 W HCPCS: Performed by: RADIOLOGY

## 2019-01-07 PROCEDURE — 97110 THERAPEUTIC EXERCISES: CPT

## 2019-01-07 PROCEDURE — 36415 COLL VENOUS BLD VENIPUNCTURE: CPT

## 2019-01-07 PROCEDURE — 63600175 PHARM REV CODE 636 W HCPCS: Performed by: STUDENT IN AN ORGANIZED HEALTH CARE EDUCATION/TRAINING PROGRAM

## 2019-01-07 PROCEDURE — 83735 ASSAY OF MAGNESIUM: CPT

## 2019-01-07 PROCEDURE — 20600001 HC STEP DOWN PRIVATE ROOM

## 2019-01-07 PROCEDURE — 80053 COMPREHEN METABOLIC PANEL: CPT

## 2019-01-07 PROCEDURE — 99238 HOSP IP/OBS DSCHRG MGMT 30/<: CPT | Mod: GC,,, | Performed by: HOSPITALIST

## 2019-01-07 PROCEDURE — 99238 PR HOSPITAL DISCHARGE DAY,<30 MIN: ICD-10-PCS | Mod: GC,,, | Performed by: HOSPITALIST

## 2019-01-07 PROCEDURE — 85025 COMPLETE CBC W/AUTO DIFF WBC: CPT

## 2019-01-07 RX ORDER — FENTANYL CITRATE 50 UG/ML
INJECTION, SOLUTION INTRAMUSCULAR; INTRAVENOUS CODE/TRAUMA/SEDATION MEDICATION
Status: COMPLETED | OUTPATIENT
Start: 2019-01-07 | End: 2019-01-07

## 2019-01-07 RX ORDER — MAGNESIUM SULFATE HEPTAHYDRATE 40 MG/ML
4 INJECTION, SOLUTION INTRAVENOUS ONCE
Status: COMPLETED | OUTPATIENT
Start: 2019-01-07 | End: 2019-01-08

## 2019-01-07 RX ORDER — MIDAZOLAM HYDROCHLORIDE 1 MG/ML
INJECTION INTRAMUSCULAR; INTRAVENOUS CODE/TRAUMA/SEDATION MEDICATION
Status: COMPLETED | OUTPATIENT
Start: 2019-01-07 | End: 2019-01-07

## 2019-01-07 RX ORDER — BISACODYL 10 MG
10 SUPPOSITORY, RECTAL RECTAL DAILY PRN
Status: DISCONTINUED | OUTPATIENT
Start: 2019-01-07 | End: 2019-01-10 | Stop reason: HOSPADM

## 2019-01-07 RX ORDER — POLYETHYLENE GLYCOL 3350 17 G/17G
17 POWDER, FOR SOLUTION ORAL 3 TIMES DAILY
Status: DISCONTINUED | OUTPATIENT
Start: 2019-01-07 | End: 2019-01-10 | Stop reason: HOSPADM

## 2019-01-07 RX ORDER — HYDROMORPHONE HYDROCHLORIDE 1 MG/ML
INJECTION, SOLUTION INTRAMUSCULAR; INTRAVENOUS; SUBCUTANEOUS CODE/TRAUMA/SEDATION MEDICATION
Status: COMPLETED | OUTPATIENT
Start: 2019-01-07 | End: 2019-01-07

## 2019-01-07 RX ADMIN — GABAPENTIN 200 MG: 100 CAPSULE ORAL at 09:01

## 2019-01-07 RX ADMIN — CETIRIZINE HYDROCHLORIDE 10 MG: 5 TABLET ORAL at 08:01

## 2019-01-07 RX ADMIN — MIDAZOLAM HYDROCHLORIDE 1 MG: 1 INJECTION, SOLUTION INTRAMUSCULAR; INTRAVENOUS at 04:01

## 2019-01-07 RX ADMIN — PIPERACILLIN AND TAZOBACTAM 4.5 G: 4; .5 INJECTION, POWDER, LYOPHILIZED, FOR SOLUTION INTRAVENOUS; PARENTERAL at 05:01

## 2019-01-07 RX ADMIN — ALLOPURINOL 100 MG: 100 TABLET ORAL at 09:01

## 2019-01-07 RX ADMIN — HYDROMORPHONE HYDROCHLORIDE 0.5 MG: 1 INJECTION, SOLUTION INTRAMUSCULAR; INTRAVENOUS; SUBCUTANEOUS at 05:01

## 2019-01-07 RX ADMIN — ASPIRIN 81 MG: 81 TABLET, COATED ORAL at 09:01

## 2019-01-07 RX ADMIN — AMIODARONE HYDROCHLORIDE 100 MG: 100 TABLET ORAL at 09:01

## 2019-01-07 RX ADMIN — SODIUM CHLORIDE, SODIUM LACTATE, POTASSIUM CHLORIDE, AND CALCIUM CHLORIDE: .6; .31; .03; .02 INJECTION, SOLUTION INTRAVENOUS at 12:01

## 2019-01-07 RX ADMIN — FENTANYL CITRATE 50 MCG: 50 INJECTION, SOLUTION INTRAMUSCULAR; INTRAVENOUS at 04:01

## 2019-01-07 RX ADMIN — IOHEXOL 75 ML: 350 INJECTION, SOLUTION INTRAVENOUS at 10:01

## 2019-01-07 RX ADMIN — OXYCODONE HYDROCHLORIDE 5 MG: 5 TABLET ORAL at 01:01

## 2019-01-07 RX ADMIN — OXYCODONE HYDROCHLORIDE 5 MG: 5 TABLET ORAL at 09:01

## 2019-01-07 RX ADMIN — Medication 1 CAPSULE: at 08:01

## 2019-01-07 RX ADMIN — SOLIFENACIN SUCCINATE 5 MG: 5 TABLET, FILM COATED ORAL at 09:01

## 2019-01-07 RX ADMIN — CARVEDILOL 3.12 MG: 3.12 TABLET, FILM COATED ORAL at 09:01

## 2019-01-07 RX ADMIN — POLYETHYLENE GLYCOL 3350 17 G: 17 POWDER, FOR SOLUTION ORAL at 08:01

## 2019-01-07 RX ADMIN — MAGNESIUM SULFATE IN WATER 4 G: 40 INJECTION, SOLUTION INTRAVENOUS at 08:01

## 2019-01-07 RX ADMIN — HYPROMELLOSE 2910 1 DROP: 5 SOLUTION OPHTHALMIC at 09:01

## 2019-01-07 RX ADMIN — FLUTICASONE PROPIONATE 50 MCG: 50 SPRAY, METERED NASAL at 09:01

## 2019-01-07 RX ADMIN — POLYETHYLENE GLYCOL 3350 17 G: 17 POWDER, FOR SOLUTION ORAL at 09:01

## 2019-01-07 RX ADMIN — FLUTICASONE FUROATE AND VILANTEROL TRIFENATATE 1 PUFF: 200; 25 POWDER RESPIRATORY (INHALATION) at 09:01

## 2019-01-07 RX ADMIN — CARVEDILOL 3.12 MG: 3.12 TABLET, FILM COATED ORAL at 06:01

## 2019-01-07 RX ADMIN — LEVOTHYROXINE SODIUM 125 MCG: 25 TABLET ORAL at 05:01

## 2019-01-07 RX ADMIN — PANTOPRAZOLE SODIUM 40 MG: 40 TABLET, DELAYED RELEASE ORAL at 08:01

## 2019-01-07 RX ADMIN — PIPERACILLIN AND TAZOBACTAM 4.5 G: 4; .5 INJECTION, POWDER, LYOPHILIZED, FOR SOLUTION INTRAVENOUS; PARENTERAL at 12:01

## 2019-01-07 RX ADMIN — PIPERACILLIN AND TAZOBACTAM 4.5 G: 4; .5 INJECTION, POWDER, LYOPHILIZED, FOR SOLUTION INTRAVENOUS; PARENTERAL at 09:01

## 2019-01-07 RX ADMIN — STANDARDIZED SENNA CONCENTRATE AND DOCUSATE SODIUM 1 TABLET: 8.6; 5 TABLET, FILM COATED ORAL at 09:01

## 2019-01-07 NOTE — PROGRESS NOTES
Biliary tube placement complete.Pt tolerated procedure well.Vss, no s/s of distress noted. Report called to Mychal irene RN.Pt to [facility] for recovery.

## 2019-01-07 NOTE — PT/OT/SLP PROGRESS
Occupational Therapy   Treatment    Name: Alfredina Claudette Parks  MRN: [id number]  Admitting Diagnosis:  Acute cholangitis  7 Days Post-Op    Recommendations:     Discharge Recommendations: nursing facility, skilled  Discharge Equipment Recommendations:  none  Barriers to discharge:       Subjective     Pain/Comfort:  · Pain Rating 1: 9/10  · Location 1: abdomen    Objective:     Communicated with: RN prior to session.  Patient found with all lines intact and call button in reach and   upon OT entry to room.    General Precautions: Standard, fall   Orthopedic Precautions:N/A   Braces:       Occupational Performance:    Bed Mobility:    · Pt declined due to 9/10 pain.    Functional Mobility/Transfers:  Pt declined due to 9/10 pain.    Activities of Daily Living:  Pt declined due to 9/10 pain.    [facility] 6 Click ADL: 15    Treatment & Education:  From supine position, pt completed BUE/LE therex/AAROM (x 10 reps each) including:  - elbow flexion/extension  - shld presses  - hor Abd/Add  - knee-hip flexion/extension  - ankle pumps    Patient left supine with all lines intact and call button in reach  Education:    Assessment:     Alfredina Claudette Parks is a 78 y.o. female with a medical diagnosis of Acute cholangitis.  She presents with the following performance deficits affecting function are weakness, impaired self care skills, impaired balance, decreased coordination, decreased safety awareness, decreased upper extremity function, impaired functional mobilty, impaired endurance, gait instability. Limited this session by 9/10 pain.    Rehab Prognosis:  Fair; patient would benefit from acute skilled OT services to address these deficits and reach maximum level of function.       Plan:     Patient to be seen 3 x/week to address the above listed problems via self-care/home management, therapeutic activities, therapeutic exercises  · Plan of Care Expires: 01/30/19  · Plan of Care Reviewed with: patient, daughter    This  Plan of care has been discussed with the patient who was involved in its development and understands and is in agreement with the identified goals and treatment plan    GOALS:   Multidisciplinary Problems     Occupational Therapy Goals        Problem: Occupational Therapy Goal    Goal Priority Disciplines Outcome Interventions   Occupational Therapy Goal     OT, PT/OT Ongoing (interventions implemented as appropriate)    Description:  Goals to be met by:1/8/19    Patient will increase functional independence with ADLs by performing:    Feeding with Set-up Assistance.  UE Dressing with Stand-by Assistance.  LE Dressing with Contact Guard Assistance.  Grooming while standing at bedside with Minimal Assistance.  Toileting from bedside commode with Minimal Assistance for hygiene and clothing management.   Supine to sit with Contact Guard Assistance to prepare for functional activities  Step transfer with Contact Guard Assistance with AD as needed to prepare for household and community mobility.   Toilet transfer to bedside commode with Minimal Assistance.                      Time Tracking:     OT Date of Treatment: 01/07/19  OT Start Time: 0904  OT Stop Time: 0920  OT Total Time (min): 16 min    Billable Minutes:Therapeutic Exercise 16    KOFI Ivey  1/7/2019

## 2019-01-07 NOTE — PROCEDURES
Radiology Post-Procedure Note    Pre Op Diagnosis:cholangiocarcinoma with biliary stricture    Post Op Diagnosis: Same    Procedure: Percutaneous transhepatic biliary drainage     Procedure performed by: Jovon Mesa MD     Written Informed Consent Obtained: Yes, by daughter     Specimen Removed: YES     Estimated Blood Loss: Minimal    Findings:    Local anesthesia and moderate sedation were used.    An 8.0-[place] citizen all-purpose drainage catheter was inserted into a peripheral biliary duct transhepatically using modified Seldinger technique under ultrasound and fluoroscopic guidance.  The biliary stricture was bypassed and the major papilla was cannulated and contrast was visualized entering the duodenum. Bile was removed and the catheter was secured in place using pigtail loop of the distal and skin suture.  A collection bag and tubing were attached to the drain.      There were no complications.  Please see Imaging report for further details.      Marsha Lozano, PGY-2

## 2019-01-07 NOTE — PLAN OF CARE
Problem: Adult Inpatient Plan of Care  Goal: Plan of Care Review  Outcome: Ongoing (interventions implemented as appropriate)  POC discussed : Pt noted AO x 2-3 , c/o abdomen pain frequently , refused ivp pain medications, oral pain medication admin per request : requires assist x2 with bed mobility : pt due to have a CT of abd/pelvis with contrast  , Possible biliary drain placement today : Palliative care  discussed with family :unable to wean off supplement oxygen , desat to lower 70% , remains O2 @ 2 liters per nasal cannula : pure wick in place/changed : no bm this shift : iv fluids infusing , improved urine output , 600 ml out:VSS, will cont to monitor.

## 2019-01-07 NOTE — PLAN OF CARE
Problem: Occupational Therapy Goal  Goal: Occupational Therapy Goal  Goals to be met by:1/8/19    Patient will increase functional independence with ADLs by performing:    Feeding with Set-up Assistance.  UE Dressing with Stand-by Assistance.  LE Dressing with Contact Guard Assistance.  Grooming while standing at bedside with Minimal Assistance.  Toileting from bedside commode with Minimal Assistance for hygiene and clothing management.   Supine to sit with Contact Guard Assistance to prepare for functional activities  Step transfer with Contact Guard Assistance with AD as needed to prepare for household and community mobility.   Toilet transfer to bedside commode with Minimal Assistance.     Outcome: Ongoing (interventions implemented as appropriate)  Con't POC.    KOFI Ivey

## 2019-01-07 NOTE — PLAN OF CARE
Problem: Adult Inpatient Plan of Care  Goal: Plan of Care Review  Outcome: Ongoing (interventions implemented as appropriate)  POc reviewed with Pt and daughter. VSS. No acute changes. A&Ox4. Biliary drained placed to left upper quadrant. Will continue to monitor.

## 2019-01-07 NOTE — H&P
Inpatient Radiology Pre-procedure Note    History of Present Illness:  Alfredina Claudette Parks is a 78 y.o. female with a history of cholangiocarcinoma and biliary stricture s/p ERCP December 19, 2018  with placement of 2 stents and subsequent removal of the stents secondary to occulsion with placement of a stent now in the common bile duct who presented to the hospital with leukocytosis, abdominal pain, and nausea. T. Bili elevated and biliary dilatation on CT. Patient will have percutaneous biliary drain placed.    Admission H&P reviewed.  Past Medical History:   Diagnosis Date    Absence of bladder continence 4/20/2015    Asthma     Asthma without status asthmaticus 6/28/2012    Atrial fibrillation     BMI 36.0-36.9,adult 8/25/2014    Bulging lumbar disc     Cataract     Cervical radiculopathy     Claudication 5/18/2017    Constipation 8/30/2013    Diabetes mellitus     pre diabetes     Diverticular disease 8/30/2013    DNR (do not resuscitate) 1/6/2019    GERD (gastroesophageal reflux disease) 6/28/2012    Hepatitis C     Hyperglycemia     Hypertension     Hypothyroidism     Osteoporosis     Pancreatic cyst     Vitamin D deficiency disease      Past Surgical History:   Procedure Laterality Date    BREAST CYST EXCISION Bilateral     COLONOSCOPY N/A 7/11/2013    Performed by [facility]isha [facility]iega MD at [facility] ENDO    CYST REMOVAL      ERCP (ENDOSCOPIC RETROGRADE CHOLANGIOPANCREATOGRAPHY) N/A 12/31/2018    Performed by Jamil King [facility] at [facility]O (2ND FLR)    ERCP (ENDOSCOPIC RETROGRADE CHOLANGIOPANCREATOGRAPHY) N/A 12/19/2018    Performed by Vidhya Mustafa MD at [facility] ENDO (2ND FLR)    ESOPHAGOGASTRODUODENOSCOPY (EGD) N/A 1/21/2015    Performed by Genaro Cordero MD at [facility] ENDO    EYE SURGERY      cataracts    HYSTERECTOMY      TOE SURGERY Bilateral     big toenails    ULTRASOUND, ENDOSCOPIC, UPPER GI TRACT N/A 12/19/2018    Performed by [facility]hya [facility]afa MD at [facility] ENDO  (2ND FLR)    ULTRASOUND-ENDOSCOPIC-UPPER N/A 1/15/2018    [facility] at [facility] ([facility])    ULTRASOUND-ENDOSCOPIC-UPPER N/A 8/14/2015    Performed by [facility] MD at [facility] ENDO    ULTRASOUND-ENDOSCOPIC-UPPER N/A 1/21/2015    Performed by Genaro [facility]ordero MD at [facility] ENDO    ULTRASOUND-ENDOSCOPIC-UPPER W/POSSIBLE FNA N/A 2/15/2016    Performed by Genaro Cordero MD at [facility] ENDO       Review of Systems:   As documented in primary team H&P    Home Meds:   Prior to Admission medications    Medication Sig Start Date End Date Taking? Authorizing Provider   acetaminophen (TYLENOL) 325 MG tablet Take 2 tablets (650 mg total) by mouth every 6 (six) hours. 12/22/18  Yes tSeve Walters DO   albuterol (PROVENTIL) 2.5 mg /3 mL (0.083 %) nebulizer solution TAKE 3ML BY NEBULIZATION EVERY 6 HOURS AS NEEDED FOR WHEEZING. 8/5/16  Yes JARON Bynum MD   albuterol (VENTOLIN HFA) 90 mcg/actuation inhaler INHALE 2 PUFFS PO Q 6 H PRN  Patient taking differently: Inhale 2 puffs into the lungs every 6 (six) hours as needed for Wheezing or Shortness of Breath.  2/20/18  Yes Veronica Frost MD   allopurinol (ZYLOPRIM) 100 MG tablet Take 1 tablet (100 mg total) by mouth once daily. 9/17/18  Yes Veronica Frost MD   amiodarone (PACERONE) 100 MG Tab Take 100 mg by mouth once daily 9/10/18  Yes Historical Provider, MD   aspirin (ECOTRIN) 81 MG EC tablet Take 81 mg by mouth once daily.     Yes Historical Provider, MD   carvedilol (COREG) 3.125 MG tablet Take 3.125 mg by mouth 2 (two) times daily with meals.   Yes Historical Provider, MD   fluticasone (FLONASE) 50 mcg/actuation nasal spray SHAKE LIQUID AND USE 1 SPRAY IN EACH NOSTRIL EVERY DAY  Patient taking differently: SHAKE LIQUID AND USE 1 SPRAY IN EACH NOSTRIL EVERY DAY AS NEEDED FOR ALLERGIES 5/23/18  Yes Veronica Frost MD   fluticasone-salmeterol 250-50 mcg/dose (ADVAIR DISKUS) 250-50 mcg/dose diskus inhaler Inhale 1 puff into the  lungs 2 (two) times daily. Controller 8/19/18  Yes Veronica Frost MD   furosemide (LASIX) 20 MG tablet TAKE 2 TABLETS(40 MG) BY MOUTH EVERY DAY  Patient taking differently: Take 20 mg by mouth twice daily 12/12/18  Yes Hao Flynn PA-C   gabapentin (NEURONTIN) 100 MG capsule Take 2 capsules (200 mg total) by mouth 3 (three) times daily. 12/21/18 12/21/19 Yes Steve Walters DO   levothyroxine (SYNTHROID) 125 MCG tablet Take 1 tablet (125 mcg total) by mouth once daily. 10/3/18 10/3/19 Yes Veronica Frost MD   loratadine (CLARITIN) 10 mg tablet Take 10 mg by mouth once daily.   Yes Ayo Tee MD   oxyCODONE (ROXICODONE) 5 MG immediate release tablet Take 1 tablet (5 mg total) by mouth every 6 (six) hours as needed. 12/21/18  Yes Steve Walters DO   pantoprazole (PROTONIX) 40 MG tablet TAKE 1 TABLET BY MOUTH ONCE DAILY, 30 MINUTES BEFORE EATING 11/6/18  Yes Veronica Frost MD   polyethylene glycol (GLYCOLAX) 17 gram/dose powder Take 17 g by mouth once daily.  Patient taking differently: Take 17 g by mouth daily as needed (constipation).  8/22/18  Yes Veronica Frost MD   senna-docusate 8.6-50 mg (PERICOLACE) 8.6-50 mg per tablet Take 1 tablet by mouth once daily. 12/22/18  Yes Steve Walters DO   solifenacin (VESICARE) 5 MG tablet Take 1 tablet (5 mg total) by mouth once daily. 2/17/17 1/2/19 Yes Veronica Frost MD   denosumab (PROLIA) 60 mg/mL Syrg Inject 1 mL (60 mg total) into the skin every 6 (six) months. 4/24/13 6/17/13  Shy Hickey MD     Scheduled Meds:    allopurinol  100 mg Oral Daily    amiodarone  100 mg Oral Daily    artificial tears  1 drop Both Eyes TID    aspirin  81 mg Oral Daily    carvedilol  3.125 mg Oral BID WM    cetirizine  10 mg Oral Daily    fluticasone  1 spray Each Nare Daily    fluticasone-vilanterol  1 puff Inhalation Daily    gabapentin  200 mg Oral TID    Lactobacillus rhamnosus GG  1 capsule Oral Daily     levothyroxine  125 mcg Oral Before breakfast    pantoprazole  40 mg Oral Daily    piperacillin-tazobactam (ZOSYN) IVPB  4.5 g Intravenous Q8H    polyethylene glycol  17 g Oral TID    senna-docusate 8.6-50 mg  1 tablet Oral Daily    solifenacin  5 mg Oral Daily     Continuous Infusions:    lactated ringers 100 mL/hr at 01/07/19 1243     PRN Meds:albuterol-ipratropium, bisacodyl, dextrose 50%, dextrose 50%, glucagon (human recombinant), glucose, glucose, HYDROmorphone, ondansetron, oxyCODONE, simethicone, sodium chloride 0.9%  Anticoagulants/Antiplatelets: aspirin    Allergies:   Review of patient's allergies indicates:   Allergen Reactions    Milk containing products Shortness Of Breath    Nuts [tree nut] Anaphylaxis    Fosamax [alendronate]      dizziness     Sedation Hx: have not been any systemic reactions    Labs:  No results for input(s): INR in the last 168 hours.    Invalid input(s):  PT,  PTT    Recent Labs   Lab 01/07/19  0847   WBC 14.04*   HGB 8.9*   HCT 27.8*   MCV 98         Recent Labs   Lab 01/01/19  0529  01/07/19  0847   GLU 89   < > 59*      < > 132*   K 3.9   < > 3.8      < > 97   CO2 26   < > 21*   BUN 22   < > 11   CREATININE 0.9   < > 0.7   CALCIUM 8.5*   < > 7.2*   MG 1.5*   < > 1.4*   ALT 86*  --  52*   *  --  107*   ALBUMIN 1.8*  1.8*   < > 1.0*   BILITOT 3.0*  --  3.4*   BILIDIR 2.2*  --   --     < > = values in this interval not displayed.         Vitals:  Temp: 99 °F (37.2 °C) (01/07/19 1238)  Pulse: 73 (01/07/19 1238)  Resp: 16 (01/07/19 1238)  BP: 110/66 (01/07/19 1238)  SpO2: 95 % (01/07/19 1238)     Physical Exam:  ASA: III  Mallampati: III    General: no acute distress  Mental Status: alert and oriented to person, place and time  HEENT: normocephalic, atraumatic  Chest: unlabored breathing  Heart: regular heart rate  Abdomen: nondistended  Extremity: moves all extremities    Plan: percutaneous biliary drain   Sedation Plan: moderate     Marsha  Blake, PGY-2

## 2019-01-07 NOTE — CONSULTS
Radiology Consult    Alfredina Claudette Parks is a 78 y.o. female with a history of cholangiocarcinoma and biliary stricture s/p ERCP December 19, 2018  with placement of 2 stents and subsequent removal of the stents secondary to occulsion with placement of a stent in the common bile duct who presented to the hospital with leukocytosis, abdominal pain, and nausea. IR has been consulted for possible percutaneous biliary drain.    Past Medical History:   Diagnosis Date    Absence of bladder continence 4/20/2015    Asthma     Asthma without status asthmaticus 6/28/2012    Atrial fibrillation     BMI 36.0-36.9,adult 8/25/2014    Bulging lumbar disc     Cataract     Cervical radiculopathy     Claudication 5/18/2017    Constipation 8/30/2013    Diabetes mellitus     pre diabetes     Diverticular disease 8/30/2013    GERD (gastroesophageal reflux disease) 6/28/2012    Hepatitis C     Hyperglycemia     Hypertension     Hypothyroidism     Osteoporosis     Pancreatic cyst     Vitamin D deficiency disease      Past Surgical History:   Procedure Laterality Date    BREAST CYST EXCISION Bilateral     COLONOSCOPY N/A 7/11/2013    Performed by [facility]ga MD at [facility] ENDO    CYST REMOVAL      ERCP (ENDOSCOPIC RETROGRADE CHOLANGIOPANCREATOGRAPHY) N/A 12/31/2018    [facility] by [facility] MD at [facility] ENDO (2ND FLR)    ERCP (ENDOSCOPIC RETROGRADE CHOLANGIOPANCREATOGRAPHY) N/A 12/19/2018    Performed by Vidhya Mustafa MD at [facility] ENDO (2ND FLR)    ESOPHAGOGASTRODUODENOSCOPY (EGD) N/A 1/21/2015    Performed by Genaro Cordero MD at [facility] ENDO    EYE SURGERY      cataracts    HYSTERECTOMY      TOE SURGERY Bilateral     big toenails    ULTRASOUND, ENDOSCOPIC, UPPER GI TRACT N/A 12/19/2018    Performed by [facility]hya Mustafa MD at [facility] ENDO (2ND FLR)    ULTRASOUND-ENDOSCOPIC-UPPER N/A 1/15/2018    Performed by Jamil King MD at [facility] ENDO (2ND FLR)    ULTRASOUND-ENDOSCOPIC-UPPER N/A  8/14/2015    Performed by Gen[facility]ordero MD at [facility] ENDO    ULTRASOUND-ENDOSCOPIC-UPPER N/A 1/21/2015    Performed by Genaro Cordero MD at [facility] ENDO    ULTRASOUND-ENDOSCOPIC-UPPER W/POSSIBLE FNA N/A 2/15/2016    Performed by Genaro Cordero MD at [facility] ENDO     Imaging reviewed with Radiology staff, Dr. Ogden.     Scheduled Meds:    allopurinol  100 mg Oral Daily    amiodarone  100 mg Oral Daily    artificial tears  1 drop Both Eyes TID    aspirin  81 mg Oral Daily    carvedilol  3.125 mg Oral BID WM    cetirizine  10 mg Oral Daily    fluticasone  1 spray Each Nare Daily    fluticasone-vilanterol  1 puff Inhalation Daily    gabapentin  200 mg Oral TID    Lactobacillus rhamnosus GG  1 capsule Oral Daily    levothyroxine  125 mcg Oral Before breakfast    pantoprazole  40 mg Oral Daily    piperacillin-tazobactam (ZOSYN) IVPB  4.5 g Intravenous Q8H    polyethylene glycol  17 g Oral Daily    senna-docusate 8.6-50 mg  1 tablet Oral Daily    solifenacin  5 mg Oral Daily     Continuous Infusions:    lactated ringers 100 mL/hr at 01/06/19 2058     PRN Meds:albuterol-ipratropium, dextrose 50%, dextrose 50%, glucagon (human recombinant), glucose, glucose, HYDROmorphone, ondansetron, oxyCODONE, simethicone, sodium chloride 0.9%    Allergies:   Review of patient's allergies indicates:   Allergen Reactions    Milk containing products Shortness Of Breath    Nuts [tree nut] Anaphylaxis    Fosamax [alendronate]      dizziness       Labs:  No results for input(s): INR in the last 168 hours.    Invalid input(s):  PT,  PTT    Recent Labs   Lab 01/06/19  0545   WBC 17.65*   HGB 10.3*   HCT 32.1*   MCV 97         Recent Labs   Lab 01/01/19  0529  01/06/19  0545   GLU 89   < > 81      < > 128*   K 3.9   < > 3.9      < > 91*   CO2 26   < > 28   BUN 22   < > 11   CREATININE 0.9   < > 0.7   CALCIUM 8.5*   < > 8.0*   MG 1.5*   < > 1.8   ALT 86*  --   --    *  --   --    ALBUMIN 1.8*  1.8*   <  > 1.4*   BILITOT 3.0*  --   --    BILIDIR 2.2*  --   --     < > = values in this interval not displayed.         Vitals (Most Recent):  Temp: 98.8 °F (37.1 °C) (01/07/19 0739)  Pulse: 95 (01/07/19 0739)  Resp: 18 (01/07/19 0739)  BP: 124/74 (01/07/19 0739)  SpO2: (!) 93 % (01/07/19 0739)    Plan:   Please obtain CT abdomen and total bilirubin level.   Pending imaging, a percutaneous drain may be considered.    Marsha Lozano, PGY-2

## 2019-01-08 LAB
ALBUMIN SERPL BCP-MCNC: 1.2 G/DL
ALP SERPL-CCNC: 278 U/L
ALT SERPL W/O P-5'-P-CCNC: 60 U/L
ANION GAP SERPL CALC-SCNC: 8 MMOL/L
AST SERPL-CCNC: 124 U/L
BASOPHILS # BLD AUTO: 0.05 K/UL
BASOPHILS NFR BLD: 0.3 %
BILIRUB SERPL-MCNC: 4.3 MG/DL
BUN SERPL-MCNC: 15 MG/DL
CALCIUM SERPL-MCNC: 7.8 MG/DL
CHLORIDE SERPL-SCNC: 92 MMOL/L
CO2 SERPL-SCNC: 29 MMOL/L
CREAT SERPL-MCNC: 0.8 MG/DL
DIFFERENTIAL METHOD: ABNORMAL
EOSINOPHIL # BLD AUTO: 0.1 K/UL
EOSINOPHIL NFR BLD: 0.4 %
ERYTHROCYTE [DISTWIDTH] IN BLOOD BY AUTOMATED COUNT: 21.5 %
EST. GFR  (AFRICAN AMERICAN): >60 ML/MIN/1.73 M^2
EST. GFR  (NON AFRICAN AMERICAN): >60 ML/MIN/1.73 M^2
GLUCOSE SERPL-MCNC: 67 MG/DL
HCT VFR BLD AUTO: 28.7 %
HGB BLD-MCNC: 9.2 G/DL
IMM GRANULOCYTES # BLD AUTO: 0.12 K/UL
IMM GRANULOCYTES NFR BLD AUTO: 0.7 %
INR PPP: 1
LYMPHOCYTES # BLD AUTO: 1.8 K/UL
LYMPHOCYTES NFR BLD: 9.6 %
MAGNESIUM SERPL-MCNC: 2.5 MG/DL
MCH RBC QN AUTO: 30.8 PG
MCHC RBC AUTO-ENTMCNC: 32.1 G/DL
MCV RBC AUTO: 96 FL
MONOCYTES # BLD AUTO: 1.6 K/UL
MONOCYTES NFR BLD: 8.6 %
NEUTROPHILS # BLD AUTO: 14.7 K/UL
NEUTROPHILS NFR BLD: 80.4 %
NRBC BLD-RTO: 0 /100 WBC
PHOSPHATE SERPL-MCNC: 3.3 MG/DL
PLATELET # BLD AUTO: 202 K/UL
PMV BLD AUTO: 10.4 FL
POTASSIUM SERPL-SCNC: 4 MMOL/L
PROT SERPL-MCNC: 6.1 G/DL
PROTHROMBIN TIME: 10.7 SEC
RBC # BLD AUTO: 2.99 M/UL
SODIUM SERPL-SCNC: 129 MMOL/L
WBC # BLD AUTO: 18.22 K/UL

## 2019-01-08 PROCEDURE — 99232 PR SUBSEQUENT HOSPITAL CARE,LEVL II: ICD-10-PCS | Mod: GC,,, | Performed by: INTERNAL MEDICINE

## 2019-01-08 PROCEDURE — 99232 SBSQ HOSP IP/OBS MODERATE 35: CPT | Mod: GC,,, | Performed by: INTERNAL MEDICINE

## 2019-01-08 PROCEDURE — 97530 THERAPEUTIC ACTIVITIES: CPT

## 2019-01-08 PROCEDURE — 25000003 PHARM REV CODE 250: Performed by: STUDENT IN AN ORGANIZED HEALTH CARE EDUCATION/TRAINING PROGRAM

## 2019-01-08 PROCEDURE — 84100 ASSAY OF PHOSPHORUS: CPT

## 2019-01-08 PROCEDURE — 20600001 HC STEP DOWN PRIVATE ROOM

## 2019-01-08 PROCEDURE — 25000003 PHARM REV CODE 250: Performed by: INTERNAL MEDICINE

## 2019-01-08 PROCEDURE — G8980 MOBILITY D/C STATUS: HCPCS | Mod: CL

## 2019-01-08 PROCEDURE — 36415 COLL VENOUS BLD VENIPUNCTURE: CPT

## 2019-01-08 PROCEDURE — 80053 COMPREHEN METABOLIC PANEL: CPT

## 2019-01-08 PROCEDURE — 85610 PROTHROMBIN TIME: CPT

## 2019-01-08 PROCEDURE — G8979 MOBILITY GOAL STATUS: HCPCS | Mod: CJ

## 2019-01-08 PROCEDURE — 97116 GAIT TRAINING THERAPY: CPT

## 2019-01-08 PROCEDURE — 85025 COMPLETE CBC W/AUTO DIFF WBC: CPT

## 2019-01-08 PROCEDURE — 63600175 PHARM REV CODE 636 W HCPCS: Performed by: STUDENT IN AN ORGANIZED HEALTH CARE EDUCATION/TRAINING PROGRAM

## 2019-01-08 PROCEDURE — 99223 PR INITIAL HOSPITAL CARE,LEVL III: ICD-10-PCS | Mod: ,,, | Performed by: CLINICAL NURSE SPECIALIST

## 2019-01-08 PROCEDURE — 99223 1ST HOSP IP/OBS HIGH 75: CPT | Mod: ,,, | Performed by: CLINICAL NURSE SPECIALIST

## 2019-01-08 PROCEDURE — 83735 ASSAY OF MAGNESIUM: CPT

## 2019-01-08 RX ORDER — OXYCODONE HYDROCHLORIDE 5 MG/1
5 TABLET ORAL
Status: DISCONTINUED | OUTPATIENT
Start: 2019-01-08 | End: 2019-01-10 | Stop reason: HOSPADM

## 2019-01-08 RX ADMIN — PIPERACILLIN AND TAZOBACTAM 4.5 G: 4; .5 INJECTION, POWDER, LYOPHILIZED, FOR SOLUTION INTRAVENOUS; PARENTERAL at 01:01

## 2019-01-08 RX ADMIN — HYPROMELLOSE 2910 1 DROP: 5 SOLUTION OPHTHALMIC at 03:01

## 2019-01-08 RX ADMIN — LEVOTHYROXINE SODIUM 125 MCG: 25 TABLET ORAL at 05:01

## 2019-01-08 RX ADMIN — PIPERACILLIN AND TAZOBACTAM 4.5 G: 4; .5 INJECTION, POWDER, LYOPHILIZED, FOR SOLUTION INTRAVENOUS; PARENTERAL at 05:01

## 2019-01-08 RX ADMIN — HYPROMELLOSE 2910 1 DROP: 5 SOLUTION OPHTHALMIC at 09:01

## 2019-01-08 RX ADMIN — GABAPENTIN 200 MG: 100 CAPSULE ORAL at 02:01

## 2019-01-08 RX ADMIN — HYPROMELLOSE 2910 1 DROP: 5 SOLUTION OPHTHALMIC at 08:01

## 2019-01-08 RX ADMIN — PIPERACILLIN AND TAZOBACTAM 4.5 G: 4; .5 INJECTION, POWDER, LYOPHILIZED, FOR SOLUTION INTRAVENOUS; PARENTERAL at 09:01

## 2019-01-08 RX ADMIN — PANTOPRAZOLE SODIUM 40 MG: 40 TABLET, DELAYED RELEASE ORAL at 08:01

## 2019-01-08 RX ADMIN — CARVEDILOL 3.12 MG: 3.12 TABLET, FILM COATED ORAL at 05:01

## 2019-01-08 RX ADMIN — OXYCODONE HYDROCHLORIDE 5 MG: 5 TABLET ORAL at 09:01

## 2019-01-08 RX ADMIN — FLUTICASONE FUROATE AND VILANTEROL TRIFENATATE 1 PUFF: 200; 25 POWDER RESPIRATORY (INHALATION) at 08:01

## 2019-01-08 RX ADMIN — Medication 1 CAPSULE: at 09:01

## 2019-01-08 RX ADMIN — CETIRIZINE HYDROCHLORIDE 10 MG: 5 TABLET ORAL at 08:01

## 2019-01-08 RX ADMIN — POLYETHYLENE GLYCOL 3350 17 G: 17 POWDER, FOR SOLUTION ORAL at 08:01

## 2019-01-08 RX ADMIN — FLUTICASONE PROPIONATE 50 MCG: 50 SPRAY, METERED NASAL at 08:01

## 2019-01-08 RX ADMIN — SOLIFENACIN SUCCINATE 5 MG: 5 TABLET, FILM COATED ORAL at 08:01

## 2019-01-08 RX ADMIN — OXYCODONE HYDROCHLORIDE 5 MG: 5 TABLET ORAL at 12:01

## 2019-01-08 RX ADMIN — STANDARDIZED SENNA CONCENTRATE AND DOCUSATE SODIUM 1 TABLET: 8.6; 5 TABLET, FILM COATED ORAL at 08:01

## 2019-01-08 RX ADMIN — ONDANSETRON 4 MG: 4 TABLET, FILM COATED ORAL at 05:01

## 2019-01-08 RX ADMIN — ALLOPURINOL 100 MG: 100 TABLET ORAL at 08:01

## 2019-01-08 RX ADMIN — AMIODARONE HYDROCHLORIDE 100 MG: 100 TABLET ORAL at 08:01

## 2019-01-08 RX ADMIN — POLYETHYLENE GLYCOL 3350 17 G: 17 POWDER, FOR SOLUTION ORAL at 09:01

## 2019-01-08 RX ADMIN — ASPIRIN 81 MG: 81 TABLET, COATED ORAL at 08:01

## 2019-01-08 RX ADMIN — GABAPENTIN 200 MG: 100 CAPSULE ORAL at 09:01

## 2019-01-08 RX ADMIN — GABAPENTIN 200 MG: 100 CAPSULE ORAL at 08:01

## 2019-01-08 RX ADMIN — OXYCODONE HYDROCHLORIDE 5 MG: 5 TABLET ORAL at 05:01

## 2019-01-08 RX ADMIN — SODIUM CHLORIDE 500 ML: 0.9 INJECTION, SOLUTION INTRAVENOUS at 09:01

## 2019-01-08 NOTE — PLAN OF CARE
Problem: Physical Therapy Goal  Goal: Physical Therapy Goal  Goals to be met by: 19     Patient will increase functional independence with mobility by performin. Supine to sit with Contact Guard Assistance - Not met  2. Sit to supine with Contact Guard Assistance - Not met  3. Sit to stand transfer with Stand-by Assistance with or without device - Not met  4. Bed to chair transfer with Stand-by Assistance using Rolling Walker or LRD - Not met  5. Gait x 20 feet with Contact Guard Assistance using Rolling Walker or LRD - Not met      Outcome: Ongoing (interventions implemented as appropriate)  No goals met today

## 2019-01-08 NOTE — ASSESSMENT & PLAN NOTE
Palliative care re-consulted for symptom management - pain.  Patient discussed with IM 4 resident.       Impression. Brady horan is a 79 yo lady with hx of afib, DM and HTN. Known to palliative care from recent hospital admission - diagnosed with stage IV biliary - pancreatic cancer/ s/p biliary stent placement.  Presented with complaints of pain and fever  With concerns for cholangitis or stent occlusion. GI-AES. ERCP today. From previous admission, cancer treatment options limited to palliative approach with hope of relieving symptoms. Current clinical condition is appropriate for transition to hospice.  Patient and family have not been amenable.     Symptom Management:   Pain: associated with metastatic cancer  Pain level 5-10/10   24 hr oral morphine equivalent:(OME)  15   Current orders   - hydromorphone 1 mg IVP every 4 hrs as needed for severe pain 7-10/10   - Oxycodone 5 mg by mouth every 4 hrs as needed for moderate pain 4-6/10   - gabapentin 200 mg by mouth three times daily   - Current pain medications have not been optimized as Ms. Garcia has been reluctant to ask for pain medications.   Recommendation  - Continue currently ordered hydromorphone and gabapentin  - change Oxycodone to 5 mg by mouth every 4 hrs scheduled.  Hold if patient is somnolent or refuses.   - Re-calculate OME daily for dose adjustment and or transition to long acting opiate when appropriate.    Advanced Care Planning:   - No advanced directives have been received.  - next of kin for medical decisions: daughter Tari [phone]  - Resuscitation status: Full code per primary team.   - Disease understanding: patient and daughter have understanding the that the cancer is advanced and possible treatment options would not be curable.  Both understand treatment may help with control of symptoms and would most probably prolong her life for a few months.     Goals of Care:  During previous encounter, palliative care has initiated discussion  regarding hospice.  Ms Garcia had understanding of the role of hospice at the end of life.  At this time she was not amenable.  Recommendations given for home palliative care and referral sent to [facility] for home palliative care.    - Met with patient and daughter Aileen at bedside.  Lengthy discussion regarding goals of care.    As per patient and daughter the goal remains to follow up in oncology clinic for treatment options.    - Daughter states the oncologist were at bedside today.  Aileen states understanding this is a stage IV biliary cancer and treatment does not guarantee long term survival.  Further states knowing all treatment options is important.   - Patient and family have considerable knowledge of hospice - they are not amenable to transition to hospice at this time.     Plan/Recommendation  -see above symptom management recommendations   - Patient's goal is to follow up with oncology clinic for treatment options  - When making follow appointment with oncology clinic please indicate patient to be followed by palliative care, Dr. Richey, in oncology clinic.   -  home palliative care referral has been sent to [facility] prior to 12/18 discharge from hospital , consult with case management/  For update regarding referral.

## 2019-01-08 NOTE — ASSESSMENT & PLAN NOTE
- she has loop recorder which was placed on 02/2017.  - continue home dose Amiodarone and Carvedilol.

## 2019-01-08 NOTE — PROGRESS NOTES
[facility] Hospital Medicine  Progress Note    Patient Name: Alfredina Claudette Parks  MRN: [id number]  Patient Class: IP- Inpatient   Admission Date: 12/30/2018  Length of Stay: 8 days  Attending Physician: Duyen Bryatn MD  Primary Care Provider: Veronica Frost MD    [facility] Medicine Team: [facility] MED 4 Steve Walters DO    Subjective:     Principal Problem:Acute cholangitis    HPI:  Mrs. Garcia is a 78-year-old female with known stage IV pancreatic cancer (diagnosed on 12/21/2018) presents to the ED with 2 days of worsening abdominal pain with nausea and 1 day of fever.  She is pending follow-up with Oncology and palliative care. She was recently admitted, MRI showed liver and pancreatic masses, ERCP was performed on 12/19 and pathology confirmed poorly differentiated carcinoma most likely adeno. 2 stents were placed and plan was to f/u after 3 months to exchange the stent. At the moment she is a full code.  She denies vomiting, diarrhea, cough, shortness of breath, chest pain, or dysuria.  Daughter gave her oxycodone and gabapentin without resolution of symptoms. Patient lives alone in an apartment and her daughter lives across the street.  A ten point review of systems was completed and is negative except as documented above.  Patient denies any other acute medical complaint.    In the ED, she received 1L of fluid and pain medication. She has high WBC and a spike of fever at 100.4, one dose of Zosyn was given. Palliative care and AES were consulted.    Hospital Course:  01/01/2019 ERCP yesterday, pt resting comfortably in bed this AM. Noting migratory abdominal pain, well controlled at time of interview.   01/02/2019 No acute events overnight. Pt and family discussed possible treatment options with oncology; oncology unsure of benefit to palliative chemotherapy.   01/03/2019 Pt notes increased pain and nausea this AM, but controlled with PRN medications.   01/04/2019 No acute  events overnight. Pt  Noting pain and nausea, controlled with PRN medications.  Family awaiting acceptance at [facility]  1/6/19 Worsening abdominal pain and nausea. Leukocytosis despite IV zosyn.   01/07/2019 CT abdomen and percutaneous biliary drain.      Interval History: No acute events overnight.    Review of Systems   Constitutional: Negative for activity change, appetite change, fatigue and fever.   HENT: Negative for ear discharge, mouth sores, rhinorrhea and sneezing.    Eyes: Negative for photophobia.   Respiratory: Negative for cough, choking, shortness of breath and wheezing.    Cardiovascular: Negative for chest pain.   Gastrointestinal: Positive for abdominal pain and nausea. Negative for diarrhea and vomiting.   Endocrine: Negative for polydipsia, polyphagia and polyuria.   Genitourinary: Negative for dysuria, flank pain, hematuria and urgency.   Musculoskeletal: Negative for back pain, joint swelling, myalgias, neck pain and neck stiffness.   Skin: Negative for color change, pallor, rash and wound.   Allergic/Immunologic: Negative for immunocompromised state.   Neurological: Negative for seizures, facial asymmetry, weakness, numbness and headaches.   Psychiatric/Behavioral: Negative for agitation, behavioral problems and confusion.     Objective:     Vital Signs (Most Recent):  Temp: 97.5 °F (36.4 °C) (01/08/19 1233)  Pulse: 80 (01/08/19 1500)  Resp: 16 (01/08/19 1233)  BP: (!) 155/63 (01/08/19 1233)  SpO2: 96 % (01/08/19 1233) Vital Signs (24h Range):  Temp:  [97.5 °F (36.4 °C)-98.4 °F (36.9 °C)] 97.5 °F (36.4 °C)  Pulse:  [20-89] 80  Resp:  [16-78] 16  SpO2:  [94 %-98 %] 96 %  BP: ()/(49-82) 155/63     Weight: 68 kg (150 lb)  Body mass index is 32.46 kg/m².    Intake/Output Summary (Last 24 hours) at 1/8/2019 1642  Last data filed at 1/8/2019 1200  Gross per 24 hour   Intake 2890 ml   Output 976 ml   Net 1914 ml      Physical Exam   Constitutional: She is oriented to person, place, and time. She  appears well-developed and well-nourished. No distress.   HENT:   Head: Normocephalic and atraumatic.   Mouth/Throat: No oropharyngeal exudate.   Eyes: Conjunctivae and EOM are normal. Pupils are equal, round, and reactive to light. Right eye exhibits no discharge. Left eye exhibits no discharge. No scleral icterus.   Neck: Normal range of motion.   Cardiovascular: Exam reveals no gallop.   No murmur heard.  Pulmonary/Chest: Effort normal and breath sounds normal. No respiratory distress. She has no wheezes. She has no rales.   Abdominal: Soft. Bowel sounds are normal. She exhibits no mass. There is generalized tenderness. There is no guarding.   Musculoskeletal: She exhibits edema (Bilateral lower limb edema). She exhibits no tenderness.   Neurological: She is alert and oriented to person, place, and time.   Skin: Skin is warm and dry. Capillary refill takes less than 2 seconds. She is not diaphoretic.   Psychiatric: She has a normal mood and affect. Her behavior is normal.       Significant Labs:   CBC:   Recent Labs   Lab 01/07/19  0847 01/08/19  0442   WBC 14.04* 18.22*   HGB 8.9* 9.2*   HCT 27.8* 28.7*    202     CMP:   Recent Labs   Lab 01/07/19  0847 01/08/19  0442   * 129*   K 3.8 4.0   CL 97 92*   CO2 21* 29   GLU 59* 67*   BUN 11 15   CREATININE 0.7 0.8   CALCIUM 7.2* 7.8*   PROT 4.8* 6.1   ALBUMIN 1.0* 1.2*   BILITOT 3.4* 4.3*   ALKPHOS 214* 278*   * 124*   ALT 52* 60*   ANIONGAP 14 8   EGFRNONAA >60.0 >60.0     Magnesium:   Recent Labs   Lab 01/07/19  0847 01/08/19  0442   MG 1.4* 2.5     All pertinent labs within the past 24 hours have been reviewed.    Significant Imaging: I have reviewed all pertinent imaging results/findings within the past 24 hours.    Assessment/Plan:      * Acute cholangitis    - Febrile with leukocytosis on admission  - AES was consulted as patient has 2 biliary stents which placed on 12/19  -  ERCP showed Two visibly occluded stents from the biliary tree. A  single localized biliary stricture was found in the left main hepatic duct. The stricture was malignant appearing with malignant invasion and could not be traversed. Two biliary stents removed, one placed in the common bile duct. GI recommending repeat ERCP in 6 weeks and possible biliary drain placement.   -1/6 patient with increasing abdominal pain and jaundice with rising leukocytosis despite IV zosyn. Discussed biliary drain placement with daughter. Will obtain CT abdomen with contrast to evaluate for possible IR drain placement.   - Oncology spoke with pt on 1/2, again noted unclear whether patient would derive any benefit from chemotherapy, given her poor performance status and the aggressiveness of her disease  - Pt with unsuccessful source control secondary to malignant stricture. Will continue IV zosyn while inpatient, with 2 week course of Augmentin on discharge.   - CT abdomen on 1/7 with continued biliary dilation and interval enlargement of hepatic lesion  - s/p percutaneous biliary drain placement on 1/7       Cholangiocarcinoma    -Stage 4 cancer diagnosed during previous visit 12/21. Malignant appearance of strictures occluding recently placed stent suggesting aggressive course  -Outpatient oncology follow-up previously  scheduled for 1/2/19  -Oncology noting unclear whether patient would derive any benefit from chemotherapy, given her poor performance status and the aggressiveness of her disease.  -Pt and daughter expressing interest in pursing inpatient hospice options  -Oxycodone 5mg q4H for pain     DNR (do not resuscitate)    Pt increasingly disoriented. Pt previously has deferred medical decisions to daughter because she has difficulty remembering things.   Discussed with Daughter, Tari, as to patient's wishes in the event pt was found unresponsive. Daughter notes that at this point, she wants to do what she can to help her mother feel better but does not want to put her through additional pain  with little chance of improvement. She stated that she did not wish for chest compressions, shocks, or intubation in the event her mother became unresponsive. DNR form signed by two physicians placed in Pt's chart and code status updated in EMR.   - On 1/7, daughter stated she wished to rescind DNR order. Updated in Epic.  -On 1/8, daughter again stated that she wished pt to be made DNR. Signed form placed in patient's chart, and code status updated in epic       Severe malnutrition    Likely secondary to poor absorption and appetite related to metastatic cancer  Albumin 1.6 on admission  Continue to recommend PO intake  Boost supplements with meals        Discharge planning issues    - Rejected by [facility]  - Follow up with Oncology and Palliative care on discharge.   - Home palliative care referral has been sent to [facility] prior to 12/18 discharge from hospital, will consult with case management/ for update regarding referral.        Hypophosphatemia    - Replete PRN       Hypomagnesemia    - Replete PRN       Palliative care encounter    - Palliative care spoke with patient previously, pt discharged previously with HH/AIM and f/u with Dr. Richey.   - Daughter Tari now expressing concerns with patient being unable to go to SNF or even Home Hospice. Tari states she wishes to explore inpatient hospice options  - Continue goals of care discussion this admission     Pancreatic cancer    Patient recently diagnosed with stage IV pancreatic cancer. She is on oxycodone 5 mg Q6h at home. She came because of worsening of abdominal pain and nausea. Palliative care and AES were consulted.    - f/u with oncology as an outpatient at time of discharge.  - pain control on oxycodone and hydromorphone.         Chronic atrial fibrillation    - she has loop recorder which was placed on 02/2017.  - continue home dose Amiodarone and Carvedilol.     Hypothyroid    - Continue home levothyroxine 125mcg po qAM        VTE Risk Mitigation (From admission, onward)        Ordered     IP VTE HIGH RISK PATIENT  Once      12/31/18 1343     Place sequential compression device  Until discontinued      12/31/18 1343              Steve Walters DO  Department of Hospital Medicine   [facility]

## 2019-01-08 NOTE — PROGRESS NOTES
[facility]  Palliative Medicine  Progress Note    Patient Name: Alfredina Claudette Parks  MRN: [id number]  Admission Date: 12/30/2018  Hospital Length of Stay: 8 days  Code Status: Full Code   Attending Provider: Duyen Bryant MD  Consulting Provider: KIRK Fuentes  Primary Care Physician: Veronica Frost MD  Principal Problem:Acute cholangitis    Patient information was obtained from past medical records and primary team .      Assessment/Plan:     Palliative care encounter    Palliative care re-consulted for symptom management - pain.  Patient discussed with  4 resident.       Impression. Brady horan is a 79 yo lady with hx of afib, DM and HTN. Known to palliative care from recent hospital admission - diagnosed with stage IV biliary - pancreatic cancer/ s/p biliary stent placement.  Presented with complaints of pain and fever  With concerns for cholangitis or stent occlusion. GI-AES. ERCP today. From previous admission, cancer treatment options limited to palliative approach with hope of relieving symptoms. Current clinical condition is appropriate for transition to hospice.  Patient and family have not been amenable.     Symptom Management:   Pain: associated with metastatic cancer  Pain level 5-10/10   24 hr oral morphine equivalent:(OME)  15   Current orders   - hydromorphone 1 mg IVP every 4 hrs as needed for severe pain 7-10/10   - Oxycodone 5 mg by mouth every 4 hrs as needed for moderate pain 4-6/10   - gabapentin 200 mg by mouth three times daily   - Current pain medications have not been optimized as Ms. Garcia has been reluctant to ask for pain medications.   Recommendation  - Continue currently ordered hydromorphone and gabapentin  - change Oxycodone to 5 mg by mouth every 4 hrs scheduled.  Hold if patient is somnolent or refuses.   - Re-calculate OME daily for dose adjustment and or transition to long acting opiate when appropriate.    Advanced Care Planning:   - No  advanced directives have been received.  - next of kin for medical decisions: daughter Aileen [phone]  - Resuscitation status: Full code per primary team.   - Disease understanding: patient and daughter have understanding the that the cancer is advanced and possible treatment options would not be curable.  Both understand treatment may help with control of symptoms and would most probably prolong her life for a few months.     Goals of Care:  During previous encounter, palliative care has initiated discussion regarding hospice.  Ms Garcia had understanding of the role of hospice at the end of life.  At this time she was not amenable.  Recommendations given for home palliative care and referral sent to [facility] for home palliative care.    - Met with patient and daughter Aileen at bedside.  Lengthy discussion regarding goals of care.    As per patient and daughter the goal remains to follow up in oncology clinic for treatment options.    - Daughter states the oncologist were at bedside today.  Aileen states understanding this is a stage IV biliary cancer and treatment does not guarantee long term survival.  Further states knowing all treatment options is important.   - Patient and family have considerable knowledge of hospice - they are not amenable to transition to hospice at this time.     Plan/Recommendation  -see above symptom management recommendations   - Patient's goal is to follow up with oncology clinic for treatment options  - When making follow appointment with oncology clinic please indicate patient to be followed by palliative care, Dr. Richey, in oncology clinic.   -  home palliative care referral has been sent to [facility] prior to 12/18 discharge from hospital , consult with case management/  For update regarding referral.                             I will follow-up with patient. Please contact us if you have any additional questions.    Subjective:     Chief Complaint:   Chief  Complaint   Patient presents with    Abdominal Pain     chronic abd pain worsening since Friday, hx of pancreatic cancer, family reports fever onset today, no chemo/radiation       HPI:    Mrs. Garcia is a 78-year-old lady with PMHX of Afib, diabetes, hypertension and dx 12/121/18 with stage IV pancreatic cancer.  She presented to [facility] ED with c/o   worsening abdominal pain x2 days.  Pain not relieved with oxycodone and gabapentin.  No reports of vomiting, diarrhea, shortness of breath.      Mrs. Garcia is known to palliative care from previous admission (12/20/18) .  Goals at that time were to follow up in the oncology clinic to determine treatment options and to be seen in the oncology palliative care clinic.      Palliative care consulted today for goals of care and advanced care planning.              Hospital Course:  No notes on file    Interval History:     Past Medical History:   Diagnosis Date    Absence of bladder continence 4/20/2015    Asthma     Asthma without status asthmaticus 6/28/2012    Atrial fibrillation     BMI 36.0-36.9,adult 8/25/2014    Bulging lumbar disc     Cataract     Cervical radiculopathy     Claudication 5/18/2017    Constipation 8/30/2013    Diabetes mellitus     pre diabetes     Diverticular disease 8/30/2013    DNR (do not resuscitate) 1/6/2019    GERD (gastroesophageal reflux disease) 6/28/2012    Hepatitis C     Hyperglycemia     Hypertension     Hypothyroidism     Osteoporosis     Pancreatic cyst     Vitamin D deficiency disease        Past Surgical History:   Procedure Laterality Date    BREAST CYST EXCISION Bilateral     COLONOSCOPY N/A 7/11/2013    Performed [facility]O    CYST REMOVAL      ERCP (ENDOSCOPIC RETROGRADE CHOLANGIOPANCREATOGRAPHY) N/A 12/31/2018    Performed by Jamil King MD at [facility] ENDO (2ND FLR)    ERCP (ENDOSCOPIC RETROGRADE CHOLANGIOPANCREATOGRAPHY) N/A 12/19/2018    Performed by Vidhya Gilbert  MD [facility] at [facility] ENDO (2ND FLR)    ESOPHAGOGASTRODUODENOSCOPY (EGD) N/A 1/21/2015    Performed by [facility]ordero [facility] at [facility]    EYE SURGERY      cataracts    HYSTERECTOMY      TOE SURGERY Bilateral     big toenails    ULTRASOUND, ENDOSCOPIC, UPPER GI TRACT N/A 12/19/2018    Performed by Vidhya Mustafa MD at [facility] ENDO (2ND FLR)    ULTRASOUND-ENDOSCOPIC-UPPER N/A 1/15/2018    Performed by Jamil King MD at [facility] ENDO (2ND FLR)    ULTRASOUND-ENDOSCOPIC-UPPER N/A 8/14/2015    Performed [facility] at [facility]    ULTRASOUND-ENDOSCOPIC-UPPER N/A 1/21/2015    Performed by [facility]o MD at [facility]    ULTRASOUND-ENDOSCOPIC-UPPER W/POSSIBLE FNA N/A 2/15/2016    Performed by Genaro Cordero MD at [facility]       Review of patient's allergies indicates:   Allergen Reactions    Milk containing products Shortness Of Breath    Nuts [tree nut] Anaphylaxis    Fosamax [alendronate]      dizziness       Medications:  Continuous Infusions:  Scheduled Meds:   allopurinol  100 mg Oral Daily    amiodarone  100 mg Oral Daily    artificial tears  1 drop Both Eyes TID    aspirin  81 mg Oral Daily    carvedilol  3.125 mg Oral BID WM    cetirizine  10 mg Oral Daily    fluticasone  1 spray Each Nare Daily    fluticasone-vilanterol  1 puff Inhalation Daily    gabapentin  200 mg Oral TID    Lactobacillus rhamnosus GG  1 capsule Oral Daily    levothyroxine  125 mcg Oral Before breakfast    oxyCODONE  5 mg Oral 6 times per day    pantoprazole  40 mg Oral Daily    piperacillin-tazobactam (ZOSYN) IVPB  4.5 g Intravenous Q8H    polyethylene glycol  17 g Oral TID    senna-docusate 8.6-50 mg  1 tablet Oral Daily    solifenacin  5 mg Oral Daily     PRN Meds:albuterol-ipratropium, bisacodyl, dextrose 50%, dextrose 50%, glucagon (human recombinant), glucose, glucose, HYDROmorphone, ondansetron, simethicone, sodium chloride 0.9%    Family History     Problem Relation (Age of Onset)    Asthma  Sister, Other    Breast cancer Sister (55)    Diabetes Maternal Grandfather, Maternal Aunt    Lung cancer Father    Ovarian cancer Mother    Stomach cancer Sister    Throat cancer Brother        Tobacco Use    Smoking status: Never Smoker    Smokeless tobacco: Never Used   Substance and Sexual Activity    Alcohol use: No     Alcohol/week: 0.0 oz    Drug use: No    Sexual activity: Not Currently     Partners: Male       Review of Systems   Constitutional: Positive for fatigue.   Gastrointestinal: Positive for abdominal pain and nausea. Negative for vomiting.   Genitourinary: Negative for dysuria.   Neurological: Positive for weakness.     Objective:     Vital Signs (Most Recent):  Temp: 97.5 °F (36.4 °C) (01/08/19 1233)  Pulse: 70 (01/08/19 1233)  Resp: 16 (01/08/19 1233)  BP: (!) 155/63 (01/08/19 1233)  SpO2: 96 % (01/08/19 1233) Vital Signs (24h Range):  Temp:  [97.5 °F (36.4 °C)-98.4 °F (36.9 °C)] 97.5 °F (36.4 °C)  Pulse:  [20-91] 70  Resp:  [16-78] 16  SpO2:  [94 %-100 %] 96 %  BP: ()/(49-82) 155/63     Weight: 68 kg (150 lb)  Body mass index is 32.46 kg/m².    Review of Symptoms  Symptom Assessment (ESAS 0-10 scale)   ESAS 0 1 2 3 4 5 6 7 8 9 10   Pain              Dyspnea              Anxiety              Nausea              Depression               Anorexia              Fatigue              Insomnia              Restlessness               Agitation              CAM / Delirium __ --  ___+   Constipation     __ --  ___+   Diarrhea           __ --  ___+  Bowel Management Plan (BMP): Yes    Comments: unable to assess    Pain Assessment: unable to assess     OME in 24 hours: 0    Performance Status: 40    ECOG Performance Status Grade: 2 - Ambulates, capable of self care only    Physical Exam   Nursing note and vitals reviewed.      Significant Labs: All pertinent labs within the past 24 hours have been reviewed.  CBC:   Recent Labs   Lab 01/08/19  0442   WBC 18.22*   HGB 9.2*   HCT 28.7*   MCV 96   PLT  202     BMP:  Recent Labs   Lab 01/08/19  0442   GLU 67*   *   K 4.0   CL 92*   CO2 29   BUN 15   CREATININE 0.8   CALCIUM 7.8*   MG 2.5     LFT:  Lab Results   Component Value Date     (H) 01/08/2019    ALKPHOS 278 (H) 01/08/2019    BILITOT 4.3 (H) 01/08/2019     Albumin:   Albumin   Date Value Ref Range Status   01/08/2019 1.2 (L) 3.5 - 5.2 g/dL Final     Protein:   Total Protein   Date Value Ref Range Status   01/08/2019 6.1 6.0 - 8.4 g/dL Final     Lactic acid:   Lab Results   Component Value Date    LACTATE 1.5 12/31/2018    LACTATE 1.6 12/16/2018       Significant Imaging: I have reviewed all pertinent imaging results/findings within the past 24 hours.    Advanced Directives::  Living Will: No  LaPOST: No  Do Not Resuscitate Status: No  Medical Power of : No    Decision-Making Capacity:     Living Arrangements: Lives alone    Psychosocial/Cultural: not , one daughter Tari, who lives across the street, has home services through [employer]     Spiritual:     F- Naya and Belief:  Confucianism       I - Importance:   .  C - Community:     A - Address in Care:       > 50% of 35  min visit spent in chart review, face to face discussion of goals of care,  symptom assessment, coordination of care and emotional support.    PAU Dill, ACNS-BC  Palliative Medicine  [facility]

## 2019-01-08 NOTE — ASSESSMENT & PLAN NOTE
- Palliative care spoke with patient previously, pt discharged previously with HH/CHUY and f/u with Dr. Richey.   - Daughter Tari now expressing concerns with patient being unable to go to SNF or even Home Hospice. Tari states she wishes to explore inpatient hospice options  - Continue goals of care discussion this admission

## 2019-01-08 NOTE — ASSESSMENT & PLAN NOTE
Pt increasingly disoriented. Pt previously has deferred medical decisions to daughter because she has difficulty remembering things.   Discussed with Daughter, Tari, as to patient's wishes in the event pt was found unresponsive. Daughter notes that at this point, she wants to do what she can to help her mother feel better but does not want to put her through additional pain with little chance of improvement. She stated that she did not wish for chest compressions, shocks, or intubation in the event her mother became unresponsive. DNR form signed by two physicians placed in Pt's chart and code status updated in EMR.     - On 1/7, daughter stated she wished to rescind DNR order. Updated in Epic.

## 2019-01-08 NOTE — ASSESSMENT & PLAN NOTE
- Rejected by [facility] SN[facility]  - Follow up with Oncology and Palliative care on discharge.   - Home palliative care referral has been sent to [facility] prior to 12/18 discharge from hospital, will consult with case management/ for update regarding referral.

## 2019-01-08 NOTE — ASSESSMENT & PLAN NOTE
Pt increasingly disoriented. Pt previously has deferred medical decisions to daughter because she has difficulty remembering things.   Discussed with Daughter, Tari, as to patient's wishes in the event pt was found unresponsive. Daughter notes that at this point, she wants to do what she can to help her mother feel better but does not want to put her through additional pain with little chance of improvement. She stated that she did not wish for chest compressions, shocks, or intubation in the event her mother became unresponsive. DNR form signed by two physicians placed in Pt's chart and code status updated in EMR.   - On 1/7, daughter stated she wished to rescind DNR order. Updated in Epic.  -On 1/8, daughter again stated that she wished pt to be made DNR. Signed form placed in patient's chart, and code status updated in epic

## 2019-01-08 NOTE — ASSESSMENT & PLAN NOTE
- Rejected by [facility]  - Follow up with Oncology and Palliative care on discharge.   - Home palliative care referral has been sent to [facility] prior to 12/18 discharge from hospital, will consult with case management/ for update regarding referral.

## 2019-01-08 NOTE — ASSESSMENT & PLAN NOTE
-Stage 4 cancer diagnosed during previous visit 12/21. Malignant appearance of strictures occluding recently placed stent suggesting aggressive course  -Outpatient oncology follow-up previously  scheduled for 1/2/19  -Oncology noting unclear whether patient would derive any benefit from chemotherapy, given her poor performance status and the aggressiveness of her disease.  -Pt and daughter expressing interest in pursing inpatient hospice options  -Oxycodone 5mg q4H for pain

## 2019-01-08 NOTE — ASSESSMENT & PLAN NOTE
- Febrile with leukocytosis on admission  - AES was consulted as patient has 2 biliary stents which placed on 12/19  -  ERCP showed Two visibly occluded stents from the biliary tree. A single localized biliary stricture was found in the left main hepatic duct. The stricture was malignant appearing with malignant invasion and could not be traversed. Two biliary stents removed, one placed in the common bile duct. GI recommending repeat ERCP in 6 weeks and possible biliary drain placement.   -1/6 patient with increasing abdominal pain and jaundice with rising leukocytosis despite IV zosyn. Discussed biliary drain placement with daughter. Will obtain CT abdomen with contrast to evaluate for possible IR drain placement.   - Oncology spoke with pt on 1/2, again noted unclear whether patient would derive any benefit from chemotherapy, given her poor performance status and the aggressiveness of her disease  - Pt with unsuccessful source control secondary to malignant stricture. Will continue IV zosyn while inpatient, with 2 week course of Augmentin on discharge.   - CT abdomen on 1/7 with continued biliary dilation and interval enlargement of hepatic lesion  - s/p percutaneous biliary drain placement on 1/7

## 2019-01-08 NOTE — SUBJECTIVE & OBJECTIVE
Interval History: No acute events overnight.    Review of Systems   Constitutional: Negative for activity change, appetite change, fatigue and fever.   HENT: Negative for ear discharge, mouth sores, rhinorrhea and sneezing.    Eyes: Negative for photophobia.   Respiratory: Negative for cough, choking, shortness of breath and wheezing.    Cardiovascular: Negative for chest pain.   Gastrointestinal: Positive for abdominal pain and nausea. Negative for diarrhea and vomiting.   Endocrine: Negative for polydipsia, polyphagia and polyuria.   Genitourinary: Negative for dysuria, flank pain, hematuria and urgency.   Musculoskeletal: Negative for back pain, joint swelling, myalgias, neck pain and neck stiffness.   Skin: Negative for color change, pallor, rash and wound.   Allergic/Immunologic: Negative for immunocompromised state.   Neurological: Negative for seizures, facial asymmetry, weakness, numbness and headaches.   Psychiatric/Behavioral: Negative for agitation, behavioral problems and confusion.     Objective:     Vital Signs (Most Recent):  Temp: 97.5 °F (36.4 °C) (01/08/19 1233)  Pulse: 80 (01/08/19 1500)  Resp: 16 (01/08/19 1233)  BP: (!) 155/63 (01/08/19 1233)  SpO2: 96 % (01/08/19 1233) Vital Signs (24h Range):  Temp:  [97.5 °F (36.4 °C)-98.4 °F (36.9 °C)] 97.5 °F (36.4 °C)  Pulse:  [20-89] 80  Resp:  [16-78] 16  SpO2:  [94 %-98 %] 96 %  BP: ()/(49-82) 155/63     Weight: 68 kg (150 lb)  Body mass index is 32.46 kg/m².    Intake/Output Summary (Last 24 hours) at 1/8/2019 1642  Last data filed at 1/8/2019 1200  Gross per 24 hour   Intake 2890 ml   Output 976 ml   Net 1914 ml      Physical Exam   Constitutional: She is oriented to person, place, and time. She appears well-developed and well-nourished. No distress.   HENT:   Head: Normocephalic and atraumatic.   Mouth/Throat: No oropharyngeal exudate.   Eyes: Conjunctivae and EOM are normal. Pupils are equal, round, and reactive to light. Right eye exhibits no  discharge. Left eye exhibits no discharge. No scleral icterus.   Neck: Normal range of motion.   Cardiovascular: Exam reveals no gallop.   No murmur heard.  Pulmonary/Chest: Effort normal and breath sounds normal. No respiratory distress. She has no wheezes. She has no rales.   Abdominal: Soft. Bowel sounds are normal. She exhibits no mass. There is generalized tenderness. There is no guarding.   Musculoskeletal: She exhibits edema (Bilateral lower limb edema). She exhibits no tenderness.   Neurological: She is alert and oriented to person, place, and time.   Skin: Skin is warm and dry. Capillary refill takes less than 2 seconds. She is not diaphoretic.   Psychiatric: She has a normal mood and affect. Her behavior is normal.       Significant Labs:   CBC:   Recent Labs   Lab 01/07/19  0847 01/08/19  0442   WBC 14.04* 18.22*   HGB 8.9* 9.2*   HCT 27.8* 28.7*    202     CMP:   Recent Labs   Lab 01/07/19  0847 01/08/19 0442   * 129*   K 3.8 4.0   CL 97 92*   CO2 21* 29   GLU 59* 67*   BUN 11 15   CREATININE 0.7 0.8   CALCIUM 7.2* 7.8*   PROT 4.8* 6.1   ALBUMIN 1.0* 1.2*   BILITOT 3.4* 4.3*   ALKPHOS 214* 278*   * 124*   ALT 52* 60*   ANIONGAP 14 8   EGFRNONAA >60.0 >60.0     Magnesium:   Recent Labs   Lab 01/07/19  0847 01/08/19 0442   MG 1.4* 2.5     All pertinent labs within the past 24 hours have been reviewed.    Significant Imaging: I have reviewed all pertinent imaging results/findings within the past 24 hours.

## 2019-01-08 NOTE — CONSULTS
[facility]  Palliative Medicine  Consult Note    Patient Name: Alfredina Claudette Parks  MRN: [id number]  Admission Date: 12/30/2018  Hospital Length of Stay: 8 days  Code Status: Full Code   Attending Provider: Duyen Bryant MD  Consulting Provider: KIRK Fuentes  Primary Care Physician: Veronica Frost MD  Principal Problem:Acute cholangitis        Inpatient consult to Palliative Care  Consult performed by: KIRK Perez  Consult ordered by: Steve Walters DO  Reason for consult: pain control   Assessment/Recommendations: Re-consult for palliative care.  Palliative care provider will contact primary team.   Thank you for consult and opportunity to participate in Mrs. Hernandez's care.   PAU Dill, ACNS-BC, OCN

## 2019-01-08 NOTE — PLAN OF CARE
01/08/19 1546   Discharge Reassessment   Assessment Type Discharge Planning Reassessment   Provided patient/caregiver education on the expected discharge date and the discharge plan Yes   Do you have any problems affording any of your prescribed medications? No   Discharge Plan A Hospice/home  ([place])

## 2019-01-08 NOTE — SUBJECTIVE & OBJECTIVE
Interval History: No acute events overnight.    Review of Systems   Constitutional: Negative for activity change, appetite change, fatigue and fever.   HENT: Negative for ear discharge, mouth sores, rhinorrhea and sneezing.    Eyes: Negative for photophobia.   Respiratory: Negative for cough, choking, shortness of breath and wheezing.    Cardiovascular: Negative for chest pain.   Gastrointestinal: Positive for abdominal pain and nausea. Negative for diarrhea and vomiting.   Endocrine: Negative for polydipsia, polyphagia and polyuria.   Genitourinary: Negative for dysuria, flank pain, hematuria and urgency.   Musculoskeletal: Negative for back pain, joint swelling, myalgias, neck pain and neck stiffness.   Skin: Negative for color change, pallor, rash and wound.   Allergic/Immunologic: Negative for immunocompromised state.   Neurological: Negative for seizures, facial asymmetry, weakness, numbness and headaches.   Psychiatric/Behavioral: Negative for agitation, behavioral problems and confusion.     Objective:     Vital Signs (Most Recent):  Temp: 99 °F (37.2 °C) (01/07/19 1238)  Pulse: 89 (01/07/19 1805)  Resp: 18 (01/07/19 1805)  BP: 138/78 (01/07/19 1805)  SpO2: 98 % (01/07/19 1805) Vital Signs (24h Range):  Temp:  [97.7 °F (36.5 °C)-99 °F (37.2 °C)] 99 °F (37.2 °C)  Pulse:  [] 89  Resp:  [16-78] 18  SpO2:  [93 %-100 %] 98 %  BP: ()/(51-82) 138/78     Weight: 68 kg (150 lb)  Body mass index is 32.46 kg/m².    Intake/Output Summary (Last 24 hours) at 1/7/2019 1902  Last data filed at 1/7/2019 0500  Gross per 24 hour   Intake 1543.33 ml   Output 600 ml   Net 943.33 ml      Physical Exam   Constitutional: She is oriented to person, place, and time. She appears well-developed and well-nourished. No distress.   HENT:   Head: Normocephalic and atraumatic.   Mouth/Throat: No oropharyngeal exudate.   Eyes: Conjunctivae and EOM are normal. Pupils are equal, round, and reactive to light. Right eye exhibits no  discharge. Left eye exhibits no discharge. No scleral icterus.   Neck: Normal range of motion.   Cardiovascular: Exam reveals no gallop.   No murmur heard.  Pulmonary/Chest: Effort normal and breath sounds normal. No respiratory distress. She has no wheezes. She has no rales.   Abdominal: Soft. Bowel sounds are normal. She exhibits no mass. There is generalized tenderness. There is no guarding.   Musculoskeletal: She exhibits edema (Bilateral lower limb edema). She exhibits no tenderness.   Neurological: She is alert and oriented to person, place, and time.   Skin: Skin is warm and dry. Capillary refill takes less than 2 seconds. She is not diaphoretic.   Psychiatric: She has a normal mood and affect. Her behavior is normal.       Significant Labs:   CBC:   Recent Labs   Lab 01/06/19  0545 01/07/19  0847   WBC 17.65* 14.04*   HGB 10.3* 8.9*   HCT 32.1* 27.8*    180     CMP:   Recent Labs   Lab 01/06/19  0545 01/07/19  0847   * 132*   K 3.9 3.8   CL 91* 97   CO2 28 21*   GLU 81 59*   BUN 11 11   CREATININE 0.7 0.7   CALCIUM 8.0* 7.2*   PROT  --  4.8*   ALBUMIN 1.4* 1.0*   BILITOT  --  3.4*   ALKPHOS  --  214*   AST  --  107*   ALT  --  52*   ANIONGAP 9 14   EGFRNONAA >60.0 >60.0     Magnesium:   Recent Labs   Lab 01/06/19  0545 01/07/19  0847   MG 1.8 1.4*     All pertinent labs within the past 24 hours have been reviewed.    Significant Imaging: I have reviewed all pertinent imaging results/findings within the past 24 hours.

## 2019-01-08 NOTE — PROGRESS NOTES
[facility] Hospital Medicine  Progress Note    Patient Name: Alfredina Claudette Parks  MRN: [id number]  Patient Class: IP- Inpatient   Admission Date: 12/30/2018  Length of Stay: 7 days  Attending Physician: Alfredo Jones MD  Primary Care Provider: Veronica Frost MD    [facility] Medicine Team: [facility] 4 Miguel Angel Blue MD    Subjective:     Principal Problem:Acute cholangitis    HPI:  Mrs. Garcia is a 78-year-old female with known stage IV pancreatic cancer (diagnosed on 12/21/2018) presents to the ED with 2 days of worsening abdominal pain with nausea and 1 day of fever.  She is pending follow-up with Oncology and palliative care. She was recently admitted, MRI showed liver and pancreatic masses, ERCP was performed on 12/19 and pathology confirmed poorly differentiated carcinoma most likely adeno. 2 stents were placed and plan was to f/u after 3 months to exchange the stent. At the moment she is a full code.  She denies vomiting, diarrhea, cough, shortness of breath, chest pain, or dysuria.  Daughter gave her oxycodone and gabapentin without resolution of symptoms. Patient lives alone in an apartment and her daughter lives across the street.  A ten point review of systems was completed and is negative except as documented above.  Patient denies any other acute medical complaint.    In the ED, she received 1L of fluid and pain medication. She has high WBC and a spike of fever at 100.4, one dose of Zosyn was given. Palliative care and AES were consulted.    Hospital Course:  01/01/2019 ERCP yesterday, pt resting comfortably in bed this AM. Noting migratory abdominal pain, well controlled at time of interview.   01/02/2019 No acute events overnight. Pt and family discussed possible treatment options with oncology; oncology unsure of benefit to palliative chemotherapy.   01/03/2019 Pt notes increased pain and nausea this AM, but controlled with PRN medications.   01/04/2019 No acute  events overnight. Pt  Noting pain and nausea, controlled with PRN medications.  Family awaiting acceptance at [facility]  1/6/19 Worsening abdominal pain and nausea. Leukocytosis despite IV zosyn.   01/07/2019 CT abdomen and percutaneous biliary drain.    Interval History: No acute events overnight.    Review of Systems   Constitutional: Negative for activity change, appetite change, fatigue and fever.   HENT: Negative for ear discharge, mouth sores, rhinorrhea and sneezing.    Eyes: Negative for photophobia.   Respiratory: Negative for cough, choking, shortness of breath and wheezing.    Cardiovascular: Negative for chest pain.   Gastrointestinal: Positive for abdominal pain and nausea. Negative for diarrhea and vomiting.   Endocrine: Negative for polydipsia, polyphagia and polyuria.   Genitourinary: Negative for dysuria, flank pain, hematuria and urgency.   Musculoskeletal: Negative for back pain, joint swelling, myalgias, neck pain and neck stiffness.   Skin: Negative for color change, pallor, rash and wound.   Allergic/Immunologic: Negative for immunocompromised state.   Neurological: Negative for seizures, facial asymmetry, weakness, numbness and headaches.   Psychiatric/Behavioral: Negative for agitation, behavioral problems and confusion.     Objective:     Vital Signs (Most Recent):  Temp: 99 °F (37.2 °C) (01/07/19 1238)  Pulse: 89 (01/07/19 1805)  Resp: 18 (01/07/19 1805)  BP: 138/78 (01/07/19 1805)  SpO2: 98 % (01/07/19 1805) Vital Signs (24h Range):  Temp:  [97.7 °F (36.5 °C)-99 °F (37.2 °C)] 99 °F (37.2 °C)  Pulse:  [] 89  Resp:  [16-78] 18  SpO2:  [93 %-100 %] 98 %  BP: ()/(51-82) 138/78     Weight: 68 kg (150 lb)  Body mass index is 32.46 kg/m².    Intake/Output Summary (Last 24 hours) at 1/7/2019 1902  Last data filed at 1/7/2019 0500  Gross per 24 hour   Intake 1543.33 ml   Output 600 ml   Net 943.33 ml      Physical Exam   Constitutional: She is oriented to person, place, and time. She appears  well-developed and well-nourished. No distress.   HENT:   Head: Normocephalic and atraumatic.   Mouth/Throat: No oropharyngeal exudate.   Eyes: Conjunctivae and EOM are normal. Pupils are equal, round, and reactive to light. Right eye exhibits no discharge. Left eye exhibits no discharge. No scleral icterus.   Neck: Normal range of motion.   Cardiovascular: Exam reveals no gallop.   No murmur heard.  Pulmonary/Chest: Effort normal and breath sounds normal. No respiratory distress. She has no wheezes. She has no rales.   Abdominal: Soft. Bowel sounds are normal. She exhibits no mass. There is generalized tenderness. There is no guarding.   Musculoskeletal: She exhibits edema (Bilateral lower limb edema). She exhibits no tenderness.   Neurological: She is alert and oriented to person, place, and time.   Skin: Skin is warm and dry. Capillary refill takes less than 2 seconds. She is not diaphoretic.   Psychiatric: She has a normal mood and affect. Her behavior is normal.       Significant Labs:   CBC:   Recent Labs   Lab 01/06/19  0545 01/07/19  0847   WBC 17.65* 14.04*   HGB 10.3* 8.9*   HCT 32.1* 27.8*    180     CMP:   Recent Labs   Lab 01/06/19  0545 01/07/19  0847   * 132*   K 3.9 3.8   CL 91* 97   CO2 28 21*   GLU 81 59*   BUN 11 11   CREATININE 0.7 0.7   CALCIUM 8.0* 7.2*   PROT  --  4.8*   ALBUMIN 1.4* 1.0*   BILITOT  --  3.4*   ALKPHOS  --  214*   AST  --  107*   ALT  --  52*   ANIONGAP 9 14   EGFRNONAA >60.0 >60.0     Magnesium:   Recent Labs   Lab 01/06/19  0545 01/07/19  0847   MG 1.8 1.4*     All pertinent labs within the past 24 hours have been reviewed.    Significant Imaging: I have reviewed all pertinent imaging results/findings within the past 24 hours.    Assessment/Plan:      * Acute cholangitis    - Febrile with leukocytosis on admission  - AES was consulted as patient has 2 biliary stents which placed on 12/19  -  ERCP showed Two visibly occluded stents from the biliary tree. A single  localized biliary stricture was found in the left main hepatic duct. The stricture was malignant appearing with malignant invasion and could not be traversed. Two biliary stents removed, one placed in the common bile duct. GI recommending repeat ERCP in 6 weeks and possible biliary drain placement.   -1/6 patient with increasing abdominal pain and jaundice with rising leukocytosis despite IV zosyn. Discussed biliary drain placement with daughter. Will obtain CT abdomen with contrast to evaluate for possible IR drain placement.   - Oncology spoke with pt on 1/2, again noted unclear whether patient would derive any benefit from chemotherapy, given her poor performance status and the aggressiveness of her disease  - Pt with unsuccessful source control secondary to malignant stricture. Will continue IV zosyn while inpatient, with 2 week course of Augmentin on discharge.   - CT abdomen on 1/7 with continued biliary dilation and interval enlargement of hepatic lesion  - s/p percutaneous biliary drain placement on 1/7       DNR (do not resuscitate)    Pt increasingly disoriented. Pt previously has deferred medical decisions to daughter because she has difficulty remembering things.   Discussed with Daughter, Tari, as to patient's wishes in the event pt was found unresponsive. Daughter notes that at this point, she wants to do what she can to help her mother feel better but does not want to put her through additional pain with little chance of improvement. She stated that she did not wish for chest compressions, shocks, or intubation in the event her mother became unresponsive. DNR form signed by two physicians placed in Pt's chart and code status updated in EMR.     - On 1/7, daughter stated she wished to rescind DNR order. Updated in Epic.       Severe malnutrition    Likely secondary to poor absorption and appetite related to metastatic cancer  Albumin 1.6 on admission  Continue to recommend PO intake  Boost supplements  with meals        Discharge planning issues    - Rejected by [facility]  - Follow up with Oncology and Palliative care on discharge.   - Home palliative care referral has been sent to [facility] prior to 12/18 discharge from hospital, will consult with case management/ for update regarding referral.        Hypophosphatemia    - Replete PRN       Hypomagnesemia    - Replete PRN       Palliative care encounter    - Palliative care spoke with patient previously, pt discharged previously with HH/AIM and f/u with Dr. Richey.   - Continue goals of care discussion this admission     Pancreatic cancer    Patient recently diagnosed with stage IV pancreatic cancer. She is on oxycodone 5 mg Q6h at home. She came because of worsening of abdominal pain and nausea. Palliative care and AES were consulted.    - f/u with oncology as an outpatient at time of discharge.  - pain control on oxycodone and hydromorphone.         Cholangiocarcinoma    -Stage 4 cancer diagnosed during previous visit 12/21. Malignant appearance of strictures occluding recently placed stent suggesting aggressive course  -Outpatient oncology follow-up previously  scheduled for 1/2/19  -Oncology noting unclear whether patient would derive any benefit from chemotherapy, given her poor performance status and the aggressiveness of her disease.  -Pt and daughter interested in short stay at SNF/Rehab at [facility] to try to regain strength and make outpatient oncology appointment.      Chronic atrial fibrillation    - she has loop recorder which was placed on 02/2017.  - continue home dose Amiodarone and Carvedilol.     Hypothyroid    - Continue home levothyroxine 125mcg po qAM       VTE Risk Mitigation (From admission, onward)        Ordered     IP VTE HIGH RISK PATIENT  Once      12/31/18 1343     Place sequential compression device  Until discontinued      12/31/18 1343              Miguel Angel Blue MD  Department of Hospital Medicine    [facility]

## 2019-01-08 NOTE — PLAN OF CARE
Problem: Adult Inpatient Plan of Care  Goal: Plan of Care Review  Outcome: Ongoing (interventions implemented as appropriate)  Pt AO x3 c/o abdomen pain : Biliary drain in place mid lateral left abdomen , dark brown/ greenish drainage : supplement oxygen in place @ 2 liters per nasal cannula ; safety maintained : TQ2 : external urine device in place , drains dark sally frothy urine :: oral pain medication admin with nausea meds once this shift : VSS: will cont to monitor.

## 2019-01-08 NOTE — SUBJECTIVE & OBJECTIVE
Interval History:     Past Medical History:   Diagnosis Date    Absence of bladder continence 4/20/2015    Asthma     Asthma without status asthmaticus 6/28/2012    Atrial fibrillation     BMI 36.0-36.9,adult 8/25/2014    Bulging lumbar disc     Cataract     Cervical radiculopathy     Claudication 5/18/2017    Constipation 8/30/2013    Diabetes mellitus     pre diabetes     Diverticular disease 8/30/2013    DNR (do not resuscitate) 1/6/2019    GERD (gastroesophageal reflux disease) 6/28/2012    Hepatitis C     Hyperglycemia     Hypertension     Hypothyroidism     Osteoporosis     Pancreatic cyst     Vitamin D deficiency disease        Past Surgical History:   Procedure Laterality Date    BREAST CYST EXCISION Bilateral     COLONOSCOPY N/A 7/11/2013    Performed by [facility]isha [facility]iega [facility] at [facility]    CYST REMOVAL      ERCP (ENDOSCOPIC RETROGRADE CHOLANGIOPANCREATOGRAPHY) N/A 12/31/2018    Performed by [facility]il King [facility] at [facility] ENDO (2ND FLR)    ERCP (ENDOSCOPIC RETROGRADE CHOLANGIOPANCREATOGRAPHY) N/A 12/19/2018    Performed by [facility] at [facility] (2ND FLR)    ESOPHAGOGASTRODUODENOSCOPY (EGD) N/A 1/21/2015    Performed by Genaro Cordero MD at [facility]    EYE SURGERY      cataracts    HYSTERECTOMY      TOE SURGERY Bilateral     big toenails    ULTRASOUND, ENDOSCOPIC, UPPER GI TRACT N/A 12/19/2018    Performed by [facility]hya Mustafa MD at [facility] ENDO (2ND FLR)    ULTRASOUND-ENDOSCOPIC-UPPER N/A 1/15/2018    Performed by Jamil King [facility] at [facility] ENDO (2ND FLR)    ULTRASOUND-ENDOSCOPIC-UPPER N/A 8/14/2015    [facility] at [facility]O    ULTRASOUND-ENDOSCOPIC-UPPER N/A 1/21/2015    Performed by [facility]o MD at [facility]    ULTRASOUND-ENDOSCOPIC-UPPER W/POSSIBLE FNA N/A 2/15/2016    Performed by Genaro Cordero MD at [facility]       Review of patient's allergies indicates:   Allergen Reactions    Milk containing products Shortness Of Breath     Nuts [tree nut] Anaphylaxis    Fosamax [alendronate]      dizziness       Medications:  Continuous Infusions:  Scheduled Meds:   allopurinol  100 mg Oral Daily    amiodarone  100 mg Oral Daily    artificial tears  1 drop Both Eyes TID    aspirin  81 mg Oral Daily    carvedilol  3.125 mg Oral BID WM    cetirizine  10 mg Oral Daily    fluticasone  1 spray Each Nare Daily    fluticasone-vilanterol  1 puff Inhalation Daily    gabapentin  200 mg Oral TID    Lactobacillus rhamnosus GG  1 capsule Oral Daily    levothyroxine  125 mcg Oral Before breakfast    oxyCODONE  5 mg Oral 6 times per day    pantoprazole  40 mg Oral Daily    piperacillin-tazobactam (ZOSYN) IVPB  4.5 g Intravenous Q8H    polyethylene glycol  17 g Oral TID    senna-docusate 8.6-50 mg  1 tablet Oral Daily    solifenacin  5 mg Oral Daily     PRN Meds:albuterol-ipratropium, bisacodyl, dextrose 50%, dextrose 50%, glucagon (human recombinant), glucose, glucose, HYDROmorphone, ondansetron, simethicone, sodium chloride 0.9%    Family History     Problem Relation (Age of Onset)    Asthma Sister, Other    Breast cancer Sister (55)    Diabetes Maternal Grandfather, Maternal Aunt    Lung cancer Father    Ovarian cancer Mother    Stomach cancer Sister    Throat cancer Brother        Tobacco Use    Smoking status: Never Smoker    Smokeless tobacco: Never Used   Substance and Sexual Activity    Alcohol use: No     Alcohol/week: 0.0 oz    Drug use: No    Sexual activity: Not Currently     Partners: Male       Review of Systems   Constitutional: Positive for fatigue.   Gastrointestinal: Positive for abdominal pain and nausea. Negative for vomiting.   Genitourinary: Negative for dysuria.   Neurological: Positive for weakness.     Objective:     Vital Signs (Most Recent):  Temp: 97.5 °F (36.4 °C) (01/08/19 1233)  Pulse: 70 (01/08/19 1233)  Resp: 16 (01/08/19 1233)  BP: (!) 155/63 (01/08/19 1233)  SpO2: 96 % (01/08/19 1233) Vital Signs (24h  Range):  Temp:  [97.5 °F (36.4 °C)-98.4 °F (36.9 °C)] 97.5 °F (36.4 °C)  Pulse:  [20-91] 70  Resp:  [16-78] 16  SpO2:  [94 %-100 %] 96 %  BP: ()/(49-82) 155/63     Weight: 68 kg (150 lb)  Body mass index is 32.46 kg/m².    Review of Symptoms  Symptom Assessment (ESAS 0-10 scale)   ESAS 0 1 2 3 4 5 6 7 8 9 10   Pain              Dyspnea              Anxiety              Nausea              Depression               Anorexia              Fatigue              Insomnia              Restlessness               Agitation              CAM / Delirium __ --  ___+   Constipation     __ --  ___+   Diarrhea           __ --  ___+  Bowel Management Plan (BMP): Yes    Comments: unable to assess    Pain Assessment: unable to assess     OME in 24 hours: 0    Performance Status: 40    ECOG Performance Status Grade: 2 - Ambulates, capable of self care only    Physical Exam   Nursing note and vitals reviewed.      Significant Labs: All pertinent labs within the past 24 hours have been reviewed.  CBC:   Recent Labs   Lab 01/08/19  0442   WBC 18.22*   HGB 9.2*   HCT 28.7*   MCV 96        BMP:  Recent Labs   Lab 01/08/19  0442   GLU 67*   *   K 4.0   CL 92*   CO2 29   BUN 15   CREATININE 0.8   CALCIUM 7.8*   MG 2.5     LFT:  Lab Results   Component Value Date     (H) 01/08/2019    ALKPHOS 278 (H) 01/08/2019    BILITOT 4.3 (H) 01/08/2019     Albumin:   Albumin   Date Value Ref Range Status   01/08/2019 1.2 (L) 3.5 - 5.2 g/dL Final     Protein:   Total Protein   Date Value Ref Range Status   01/08/2019 6.1 6.0 - 8.4 g/dL Final     Lactic acid:   Lab Results   Component Value Date    LACTATE 1.5 12/31/2018    LACTATE 1.6 12/16/2018       Significant Imaging: I have reviewed all pertinent imaging results/findings within the past 24 hours.    Advanced Directives::  Living Will: No  LaPOST: No  Do Not Resuscitate Status: No  Medical Power of : No    Decision-Making Capacity:     Living Arrangements: Lives  alone    Psychosocial/Cultural: not , one daughter Tari, who lives across the street, has home services through [employer]     Spiritual:     F- Naya and Belief:  Latter day       I - Importance:   .  C - Community:     A - Address in Care:

## 2019-01-08 NOTE — ASSESSMENT & PLAN NOTE
- Palliative care spoke with patient previously, pt discharged previously with HH/AIM and f/u with Dr. Richey.   - Continue goals of care discussion this admission

## 2019-01-08 NOTE — PLAN OF CARE
Problem: Adult Inpatient Plan of Care  Goal: Plan of Care Review  Outcome: Ongoing (interventions implemented as appropriate)  POC reviewed with Pt and family. No acute changes. VSS. A&Ox4. Free of fall and injury. All questions answered. Will continue to monitor.

## 2019-01-09 ENCOUNTER — TELEPHONE (OUTPATIENT)
Dept: INTERNAL MEDICINE | Facility: CLINIC | Age: 79
End: 2019-01-09

## 2019-01-09 LAB
ALBUMIN SERPL BCP-MCNC: 1.2 G/DL
ALP SERPL-CCNC: 255 U/L
ALT SERPL W/O P-5'-P-CCNC: 49 U/L
ANION GAP SERPL CALC-SCNC: 8 MMOL/L
AST SERPL-CCNC: 96 U/L
BASOPHILS # BLD AUTO: 0.04 K/UL
BASOPHILS NFR BLD: 0.2 %
BILIRUB SERPL-MCNC: 3.6 MG/DL
BUN SERPL-MCNC: 14 MG/DL
CALCIUM SERPL-MCNC: 7.7 MG/DL
CHLORIDE SERPL-SCNC: 92 MMOL/L
CO2 SERPL-SCNC: 29 MMOL/L
CREAT SERPL-MCNC: 0.7 MG/DL
DIFFERENTIAL METHOD: ABNORMAL
EOSINOPHIL # BLD AUTO: 0.1 K/UL
EOSINOPHIL NFR BLD: 0.8 %
ERYTHROCYTE [DISTWIDTH] IN BLOOD BY AUTOMATED COUNT: 21.9 %
EST. GFR  (AFRICAN AMERICAN): >60 ML/MIN/1.73 M^2
EST. GFR  (NON AFRICAN AMERICAN): >60 ML/MIN/1.73 M^2
GLUCOSE SERPL-MCNC: 68 MG/DL
HCT VFR BLD AUTO: 29.6 %
HGB BLD-MCNC: 9.5 G/DL
IMM GRANULOCYTES # BLD AUTO: 0.14 K/UL
IMM GRANULOCYTES NFR BLD AUTO: 0.8 %
LYMPHOCYTES # BLD AUTO: 1.4 K/UL
LYMPHOCYTES NFR BLD: 8.5 %
MAGNESIUM SERPL-MCNC: 2 MG/DL
MCH RBC QN AUTO: 31.8 PG
MCHC RBC AUTO-ENTMCNC: 32.1 G/DL
MCV RBC AUTO: 99 FL
MONOCYTES # BLD AUTO: 1.4 K/UL
MONOCYTES NFR BLD: 8.6 %
NEUTROPHILS # BLD AUTO: 13.6 K/UL
NEUTROPHILS NFR BLD: 81.1 %
NRBC BLD-RTO: 0 /100 WBC
PHOSPHATE SERPL-MCNC: 2.2 MG/DL
PLATELET # BLD AUTO: 185 K/UL
PMV BLD AUTO: 10.3 FL
POTASSIUM SERPL-SCNC: 3.9 MMOL/L
PROT SERPL-MCNC: 6.1 G/DL
RBC # BLD AUTO: 2.99 M/UL
SODIUM SERPL-SCNC: 129 MMOL/L
WBC # BLD AUTO: 16.8 K/UL

## 2019-01-09 PROCEDURE — 63600175 PHARM REV CODE 636 W HCPCS: Performed by: STUDENT IN AN ORGANIZED HEALTH CARE EDUCATION/TRAINING PROGRAM

## 2019-01-09 PROCEDURE — 85025 COMPLETE CBC W/AUTO DIFF WBC: CPT

## 2019-01-09 PROCEDURE — 36415 COLL VENOUS BLD VENIPUNCTURE: CPT

## 2019-01-09 PROCEDURE — 97530 THERAPEUTIC ACTIVITIES: CPT

## 2019-01-09 PROCEDURE — 83735 ASSAY OF MAGNESIUM: CPT

## 2019-01-09 PROCEDURE — 25000003 PHARM REV CODE 250: Performed by: STUDENT IN AN ORGANIZED HEALTH CARE EDUCATION/TRAINING PROGRAM

## 2019-01-09 PROCEDURE — 99233 PR SUBSEQUENT HOSPITAL CARE,LEVL III: ICD-10-PCS | Mod: ,,, | Performed by: CLINICAL NURSE SPECIALIST

## 2019-01-09 PROCEDURE — 84100 ASSAY OF PHOSPHORUS: CPT

## 2019-01-09 PROCEDURE — 99232 SBSQ HOSP IP/OBS MODERATE 35: CPT | Mod: GC,,, | Performed by: INTERNAL MEDICINE

## 2019-01-09 PROCEDURE — 25000003 PHARM REV CODE 250: Performed by: INTERNAL MEDICINE

## 2019-01-09 PROCEDURE — 99233 SBSQ HOSP IP/OBS HIGH 50: CPT | Mod: ,,, | Performed by: CLINICAL NURSE SPECIALIST

## 2019-01-09 PROCEDURE — 20600001 HC STEP DOWN PRIVATE ROOM

## 2019-01-09 PROCEDURE — 99232 PR SUBSEQUENT HOSPITAL CARE,LEVL II: ICD-10-PCS | Mod: GC,,, | Performed by: INTERNAL MEDICINE

## 2019-01-09 PROCEDURE — 80053 COMPREHEN METABOLIC PANEL: CPT

## 2019-01-09 RX ORDER — ACETAMINOPHEN 325 MG/1
650 TABLET ORAL EVERY 6 HOURS PRN
Refills: 0 | COMMUNITY
Start: 2019-01-09

## 2019-01-09 RX ADMIN — PANTOPRAZOLE SODIUM 40 MG: 40 TABLET, DELAYED RELEASE ORAL at 09:01

## 2019-01-09 RX ADMIN — LEVOTHYROXINE SODIUM 125 MCG: 25 TABLET ORAL at 06:01

## 2019-01-09 RX ADMIN — ALLOPURINOL 100 MG: 100 TABLET ORAL at 09:01

## 2019-01-09 RX ADMIN — Medication 1 CAPSULE: at 09:01

## 2019-01-09 RX ADMIN — PIPERACILLIN AND TAZOBACTAM 4.5 G: 4; .5 INJECTION, POWDER, LYOPHILIZED, FOR SOLUTION INTRAVENOUS; PARENTERAL at 08:01

## 2019-01-09 RX ADMIN — GABAPENTIN 200 MG: 100 CAPSULE ORAL at 09:01

## 2019-01-09 RX ADMIN — OXYCODONE HYDROCHLORIDE 5 MG: 5 TABLET ORAL at 09:01

## 2019-01-09 RX ADMIN — CARVEDILOL 3.12 MG: 3.12 TABLET, FILM COATED ORAL at 09:01

## 2019-01-09 RX ADMIN — HYPROMELLOSE 2910 1 DROP: 5 SOLUTION OPHTHALMIC at 09:01

## 2019-01-09 RX ADMIN — PIPERACILLIN AND TAZOBACTAM 4.5 G: 4; .5 INJECTION, POWDER, LYOPHILIZED, FOR SOLUTION INTRAVENOUS; PARENTERAL at 06:01

## 2019-01-09 RX ADMIN — AMIODARONE HYDROCHLORIDE 100 MG: 100 TABLET ORAL at 09:01

## 2019-01-09 RX ADMIN — PIPERACILLIN AND TAZOBACTAM 4.5 G: 4; .5 INJECTION, POWDER, LYOPHILIZED, FOR SOLUTION INTRAVENOUS; PARENTERAL at 01:01

## 2019-01-09 RX ADMIN — ASPIRIN 81 MG: 81 TABLET, COATED ORAL at 09:01

## 2019-01-09 RX ADMIN — OXYCODONE HYDROCHLORIDE 5 MG: 5 TABLET ORAL at 01:01

## 2019-01-09 RX ADMIN — CETIRIZINE HYDROCHLORIDE 10 MG: 5 TABLET ORAL at 09:01

## 2019-01-09 RX ADMIN — HYPROMELLOSE 2910 1 DROP: 5 SOLUTION OPHTHALMIC at 03:01

## 2019-01-09 RX ADMIN — OXYCODONE HYDROCHLORIDE 5 MG: 5 TABLET ORAL at 03:01

## 2019-01-09 RX ADMIN — FLUTICASONE FUROATE AND VILANTEROL TRIFENATATE 1 PUFF: 200; 25 POWDER RESPIRATORY (INHALATION) at 09:01

## 2019-01-09 RX ADMIN — FLUTICASONE PROPIONATE 50 MCG: 50 SPRAY, METERED NASAL at 09:01

## 2019-01-09 RX ADMIN — SOLIFENACIN SUCCINATE 5 MG: 5 TABLET, FILM COATED ORAL at 09:01

## 2019-01-09 NOTE — PLAN OF CARE
01/09/19 1445   Post-Acute Status   Post-Acute Authorization Home Health/Hospice   Home Health/Hospice Status Authorization Obtained     Pt accepted for inpt hospice with [facility]. Plan is for a d/c tomorrow.     SW will f/u with [facility] in the morning.    Jodi Patricia, LCSW [id number]

## 2019-01-09 NOTE — PROGRESS NOTES
[facility]  Palliative Medicine  Progress Note    Patient Name: Alfredina Claudette Parks  MRN: [id number]  Admission Date: 12/30/2018  Hospital Length of Stay: 9 days  Code Status: DNR   Attending Provider: Duyen Bryant MD  Consulting Provider: KIRK Fuentes  Primary Care Physician: Veronica Frost MD  Principal Problem:Acute cholangitis    Patient information was obtained from relative(s) and ER records.      Assessment/Plan:     Palliative care encounter    Palliative care re-consulted for symptom management - pain.  Patient discussed with IM 4 resident.       Impression. Brady horan is a 79 yo lady with hx of afib, DM and HTN. Known to palliative care from recent hospital admission - diagnosed with stage IV biliary - pancreatic cancer/ s/p biliary stent placement.  Presented with complaints of pain and fever  S/p biliary drain. From previous admission, cancer treatment options limited to palliative approach with hope of relieving symptoms. Current clinical condition is appropriate for transition to hospice.      Symptom Management:   Pain: associated with metastatic cancer  Pain level 5-10/10   24 hr oral morphine equivalent:(OME)  25   Current orders   - hydromorphone 1 mg IVP every 4 hrs as needed for severe pain 7-10/10   - Oxycodone 5 mg by mouth every 4 hrs scheduled    - gabapentin 200 mg by mouth three times daily      Recommendation  - Continue currently ordered hydromorphone and gabapentin  - Continue  Oxycodone to 5 mg by mouth every 4 hrs scheduled.  Hold if patient is somnolent or refuses.   - Re-calculate OME daily for dose adjustment and or transition to long acting opiate when appropriate.   -OME of 25 mg - no recommendation for long acting opiate at this time    Advanced Care Planning:   - No advanced directives have been received.  - next of kin for medical decisions: daughter Tari [phone]  - Resuscitation status: Full code per primary team.   - Disease  understanding: patient and daughter have understanding the that the cancer is advanced and possible treatment options would not be curable.  Both understand treatment may help with control of symptoms and would most probably prolong her life for a few months.     Goals of Care:  - During previous encounter, palliative care has initiated discussion regarding hospice.  Ms Garcia has understanding of the role of hospice at the end of life.  At this time she was not amenable.    - Daughter Tari at bedside, states there have been many changes and after further discussion with oncologist, Mrs. Garcia does not meet criteria for systemic chemotherapy.  A decision has been made for hospice - inpatient hospice and have chosen [facility]. Consults have been completed.   - Reinforced previous hospice education. .     Plan/Recommendation  -see above symptom management recommendations   -Inpatient hospice consult completed per primary team.  Patient has been accepted to [facility] inpatient hospice and will be transported 1/10/19.   - Goals of care have been established.  Palliative care will sign off.                              I will sign off. Please contact us if you have any additional questions.    Subjective:     Chief Complaint:   Chief Complaint   Patient presents with    Abdominal Pain     chronic abd pain worsening since Friday, hx of pancreatic cancer, family reports fever onset today, no chemo/radiation       HPI:    Mrs. Garcia is a 78-year-old lady with PMHX of Afib, diabetes, hypertension and dx 12/121/18 with stage IV pancreatic cancer.  She presented to [facility] ED with c/o   worsening abdominal pain x2 days.  Pain not relieved with oxycodone and gabapentin.  No reports of vomiting, diarrhea, shortness of breath.      Mrs. Garcia is known to palliative care from previous admission (12/20/18) .  Goals at that time were to follow up in the oncology clinic to determine treatment options and to be seen in the oncology  palliative care clinic.      Palliative care consulted today for goals of care and advanced care planning.              Hospital Course:  No notes on file    Interval History:     Past Medical History:   Diagnosis Date    Absence of bladder continence 4/20/2015    Asthma     Asthma without status asthmaticus 6/28/2012    Atrial fibrillation     BMI 36.0-36.9,adult 8/25/2014    Bulging lumbar disc     Cataract     Cervical radiculopathy     Claudication 5/18/2017    Constipation 8/30/2013    Diabetes mellitus     pre diabetes     Diverticular disease 8/30/2013    DNR (do not resuscitate) 1/6/2019    GERD (gastroesophageal reflux disease) 6/28/2012    Hepatitis C     Hyperglycemia     Hypertension     Hypothyroidism     Osteoporosis     Pancreatic cyst     Vitamin D deficiency disease        Past Surgical History:   Procedure Laterality Date    BREAST CYST EXCISION Bilateral     COLONOSCOPY N/A 7/11/2013    Performed by [facility] at [facility] ENDO    CYST REMOVAL      ERCP (ENDOSCOPIC RETROGRADE CHOLANGIOPANCREATOGRAPHY) N/A 12/31/2018    Performed by Jamil King MD at [facility] ENDO (2ND FLR)    ERCP (ENDOSCOPIC RETROGRADE CHOLANGIOPANCREATOGRAPHY) N/A 12/19/2018    Performed by [facility]afa MD at [facility] (2ND FLR)    ESOPHAGOGASTRODUODENOSCOPY (EGD) N/A 1/21/2015    [facility]    EYE SURGERY      cataracts    HYSTERECTOMY      TOE SURGERY Bilateral     big toenails    ULTRASOUND, ENDOSCOPIC, UPPER GI TRACT N/A 12/19/2018    Performed by [facility]a [facility]afa MD at [facility] ENDO (2ND FLR)    ULTRASOUND-ENDOSCOPIC-UPPER N/A 1/15/2018    Performed by [facility]il King MD at [facility] ENDO (2ND FLR)    ULTRASOUND-ENDOSCOPIC-UPPER N/A 8/14/2015    Performed by [facility] at [facility] ENDO    ULTRASOUND-ENDOSCOPIC-UPPER N/A 1/21/2015    Per[facility] at [facility]    ULTRASOUND-ENDOSCOPIC-UPPER W/POSSIBLE FNA N/A 2/15/2016     Performed by [facility] at [facility] ENDO       Review of patient's allergies indicates:   Allergen Reactions    Milk containing products Shortness Of Breath    Nuts [tree nut] Anaphylaxis    Fosamax [alendronate]      dizziness       Medications:  Continuous Infusions:  Scheduled Meds:   allopurinol  100 mg Oral Daily    amiodarone  100 mg Oral Daily    artificial tears  1 drop Both Eyes TID    aspirin  81 mg Oral Daily    carvedilol  3.125 mg Oral BID WM    cetirizine  10 mg Oral Daily    fluticasone  1 spray Each Nare Daily    fluticasone-vilanterol  1 puff Inhalation Daily    gabapentin  200 mg Oral TID    Lactobacillus rhamnosus GG  1 capsule Oral Daily    levothyroxine  125 mcg Oral Before breakfast    oxyCODONE  5 mg Oral 6 times per day    pantoprazole  40 mg Oral Daily    piperacillin-tazobactam (ZOSYN) IVPB  4.5 g Intravenous Q8H    polyethylene glycol  17 g Oral TID    senna-docusate 8.6-50 mg  1 tablet Oral Daily    solifenacin  5 mg Oral Daily     PRN Meds:albuterol-ipratropium, bisacodyl, dextrose 50%, dextrose 50%, glucagon (human recombinant), glucose, glucose, HYDROmorphone, ondansetron, simethicone, sodium chloride 0.9%    Family History     Problem Relation (Age of Onset)    Asthma Sister, Other    Breast cancer Sister (55)    Diabetes Maternal Grandfather, Maternal Aunt    Lung cancer Father    Ovarian cancer Mother    Stomach cancer Sister    Throat cancer Brother        Tobacco Use    Smoking status: Never Smoker    Smokeless tobacco: Never Used   Substance and Sexual Activity    Alcohol use: No     Alcohol/week: 0.0 oz    Drug use: No    Sexual activity: Not Currently     Partners: Male       Review of Systems   Constitutional: Positive for fatigue.   Gastrointestinal: Positive for abdominal pain and nausea. Negative for vomiting.   Genitourinary: Negative for dysuria.   Neurological: Positive for weakness.     Objective:     Vital Signs (Most Recent):  Temp: 97.4  °F (36.3 °C) (01/09/19 0745)  Pulse: 81 (01/09/19 1111)  Resp: 18 (01/09/19 0745)  BP: (!) 119/55 (01/09/19 0745)  SpO2: 97 % (01/09/19 0745) Vital Signs (24h Range):  Temp:  [97.4 °F (36.3 °C)-99 °F (37.2 °C)] 97.4 °F (36.3 °C)  Pulse:  [78-96] 81  Resp:  [16-18] 18  SpO2:  [82 %-97 %] 97 %  BP: (114-150)/(55-96) 119/55     Weight: 68 kg (150 lb)  Body mass index is 32.46 kg/m².    Review of Symptoms  Symptom Assessment (ESAS 0-10 scale)   ESAS 0 1 2 3 4 5 6 7 8 9 10   Pain              Dyspnea              Anxiety              Nausea              Depression               Anorexia              Fatigue              Insomnia              Restlessness               Agitation              CAM / Delirium __ --  ___+   Constipation     __ --  ___+   Diarrhea           __ --  ___+  Bowel Management Plan (BMP): Yes    Comments: unable to assess    Pain Assessment: unable to assess     OME in 24 hours: 0    Performance Status: 40    ECOG Performance Status Grade: 2 - Ambulates, capable of self care only    Physical Exam   Nursing note and vitals reviewed.      Significant Labs: All pertinent labs within the past 24 hours have been reviewed.  CBC:   Recent Labs   Lab 01/09/19  0710   WBC 16.80*   HGB 9.5*   HCT 29.6*   MCV 99*        BMP:  Recent Labs   Lab 01/09/19  0710   GLU 68*   *   K 3.9   CL 92*   CO2 29   BUN 14   CREATININE 0.7   CALCIUM 7.7*   MG 2.0     LFT:  Lab Results   Component Value Date    AST 96 (H) 01/09/2019    ALKPHOS 255 (H) 01/09/2019    BILITOT 3.6 (H) 01/09/2019     Albumin:   Albumin   Date Value Ref Range Status   01/09/2019 1.2 (L) 3.5 - 5.2 g/dL Final     Protein:   Total Protein   Date Value Ref Range Status   01/09/2019 6.1 6.0 - 8.4 g/dL Final     Lactic acid:   Lab Results   Component Value Date    LACTATE 1.5 12/31/2018    LACTATE 1.6 12/16/2018       Significant Imaging: I have reviewed all pertinent imaging results/findings within the past 24 hours.    Advanced  Directives::  Living Will: No  LaPOST: No  Do Not Resuscitate Status: No  Medical Power of : No    Decision-Making Capacity:     Living Arrangements: Lives alone    Psychosocial/Cultural: not , one daughter Tari, who lives across the street, has home services through [employer]     Spiritual:     F- Naya and Belief:  Zoroastrianism       I - Importance:   .  C - Community:     A - Address in Care:       > 50% of 35 min visit spent in chart review, face to face discussion of goals of care,  symptom assessment, coordination of care and emotional support.    PAU Dill, ACNS-BC   Palliative Medicine  [facility]

## 2019-01-09 NOTE — PLAN OF CARE
Problem: Adult Inpatient Plan of Care  Goal: Plan of Care Review  Outcome: Ongoing (interventions implemented as appropriate)  POC reviewed by patient. AAOX4. VVS. Up in chair for couple of hours tolerated well. Pain manage with Oxy q4 hrs. Poor appetite. On Palliative care-In house hospice. Family at bedside.  Possible d/c tomorrow.  Hourly rounds performed. Safety maintained. WCTM

## 2019-01-09 NOTE — PROGRESS NOTES
[facility] Hospital Medicine  Progress Note    Patient Name: Alfredina Claudette Parks  MRN: [id number]  Patient Class: IP- Inpatient   Admission Date: 12/30/2018  Length of Stay: 9 days  Attending Physician: Duyen Bryant MD  Primary Care Provider: Veronica Frost MD    [facility] Medicine Team: [facility] [place], [place] HOSP MED 4 Steve Walters DO    Subjective:     Principal Problem:Acute cholangitis    HPI:  Mrs. Garcia is a 78-year-old female with known stage IV pancreatic cancer (diagnosed on 12/21/2018) presents to the ED with 2 days of worsening abdominal pain with nausea and 1 day of fever.  She is pending follow-up with Oncology and palliative care. She was recently admitted, MRI showed liver and pancreatic masses, ERCP was performed on 12/19 and pathology confirmed poorly differentiated carcinoma most likely adeno. 2 stents were placed and plan was to f/u after 3 months to exchange the stent. At the moment she is a full code.  She denies vomiting, diarrhea, cough, shortness of breath, chest pain, or dysuria.  Daughter gave her oxycodone and gabapentin without resolution of symptoms. Patient lives alone in an apartment and her daughter lives across the street.  A ten point review of systems was completed and is negative except as documented above.  Patient denies any other acute medical complaint.    In the ED, she received 1L of fluid and pain medication. She has high WBC and a spike of fever at 100.4, one dose of Zosyn was given. Palliative care and AES were consulted.    Hospital Course:  01/01/2019 ERCP yesterday, pt resting comfortably in bed this AM. Noting migratory abdominal pain, well controlled at time of interview.   01/02/2019 No acute events overnight. Pt and family discussed possible treatment options with oncology; oncology unsure of benefit to palliative chemotherapy.   01/03/2019 Pt notes increased pain and nausea this AM, but controlled with PRN medications.   01/04/2019 No acute  events overnight. Pt  Noting pain and nausea, controlled with PRN medications.  Family awaiting acceptance at [facility]  1/6/19 Worsening abdominal pain and nausea. Leukocytosis despite IV zosyn.   01/07/2019 CT abdomen and percutaneous biliary drain.  01/08/2019: Family expressed interest in inpatient hospice  01/09/2019  Pt accepted to [facility] inpatient hospice    Interval History: No acute events overnight. Pt resting in bed with pain 5.5/10 on current scheduled pain medications.     Review of Systems   Constitutional: Negative for activity change, appetite change, fatigue and fever.   HENT: Negative for ear discharge, mouth sores, rhinorrhea and sneezing.    Eyes: Negative for photophobia.   Respiratory: Negative for cough, choking, shortness of breath and wheezing.    Cardiovascular: Negative for chest pain.   Gastrointestinal: Positive for abdominal pain and nausea. Negative for diarrhea and vomiting.   Endocrine: Negative for polydipsia, polyphagia and polyuria.   Genitourinary: Negative for dysuria, flank pain, hematuria and urgency.   Musculoskeletal: Negative for back pain, joint swelling, myalgias, neck pain and neck stiffness.   Skin: Negative for color change, pallor, rash and wound.   Allergic/Immunologic: Negative for immunocompromised state.   Neurological: Negative for seizures, facial asymmetry, weakness, numbness and headaches.   Psychiatric/Behavioral: Negative for agitation, behavioral problems and confusion.     Objective:     Vital Signs (Most Recent):  Temp: 97.4 °F (36.3 °C) (01/09/19 1614)  Pulse: 81 (01/09/19 1614)  Resp: 18 (01/09/19 1614)  BP: 117/74 (01/09/19 1614)  SpO2: (!) 94 % (01/09/19 1614) Vital Signs (24h Range):  Temp:  [97.4 °F (36.3 °C)-99 °F (37.2 °C)] 97.4 °F (36.3 °C)  Pulse:  [78-96] 81  Resp:  [18] 18  SpO2:  [92 %-97 %] 94 %  BP: (114-131)/(55-74) 117/74     Weight: 68 kg (150 lb)  Body mass index is 32.46 kg/m².    Intake/Output Summary (Last 24 hours) at 1/9/2019  1715  Last data filed at 1/9/2019 1500  Gross per 24 hour   Intake 440 ml   Output 791 ml   Net -351 ml      Physical Exam   Constitutional: She is oriented to person, place, and time. She appears well-developed and well-nourished. No distress.   HENT:   Head: Normocephalic and atraumatic.   Mouth/Throat: No oropharyngeal exudate.   Eyes: Conjunctivae and EOM are normal. Pupils are equal, round, and reactive to light. Right eye exhibits no discharge. Left eye exhibits no discharge. No scleral icterus.   Neck: Normal range of motion.   Cardiovascular: Exam reveals no gallop.   No murmur heard.  Pulmonary/Chest: Effort normal and breath sounds normal. No respiratory distress. She has no wheezes. She has no rales.   Abdominal: Soft. Bowel sounds are normal. She exhibits no mass. There is generalized tenderness. There is no guarding.   Musculoskeletal: She exhibits edema (Bilateral lower limb edema). She exhibits no tenderness.   Neurological: She is alert and oriented to person, place, and time.   Skin: Skin is warm and dry. Capillary refill takes less than 2 seconds. She is not diaphoretic.   Psychiatric: She has a normal mood and affect. Her behavior is normal.       Significant Labs:   CBC:   Recent Labs   Lab 01/08/19  0442 01/09/19  0710   WBC 18.22* 16.80*   HGB 9.2* 9.5*   HCT 28.7* 29.6*    185     CMP:   Recent Labs   Lab 01/08/19  0442 01/09/19  0710   * 129*   K 4.0 3.9   CL 92* 92*   CO2 29 29   GLU 67* 68*   BUN 15 14   CREATININE 0.8 0.7   CALCIUM 7.8* 7.7*   PROT 6.1 6.1   ALBUMIN 1.2* 1.2*   BILITOT 4.3* 3.6*   ALKPHOS 278* 255*   * 96*   ALT 60* 49*   ANIONGAP 8 8   EGFRNONAA >60.0 >60.0     Magnesium:   Recent Labs   Lab 01/08/19  0442 01/09/19  0710   MG 2.5 2.0     All pertinent labs within the past 24 hours have been reviewed.    Significant Imaging: I have reviewed all pertinent imaging results/findings within the past 24 hours.    Assessment/Plan:      * Acute cholangitis    -  Febrile with leukocytosis on admission  - AES was consulted as patient has 2 biliary stents which placed on 12/19  -  ERCP showed Two visibly occluded stents from the biliary tree. A single localized biliary stricture was found in the left main hepatic duct. The stricture was malignant appearing with malignant invasion and could not be traversed. Two biliary stents removed, one placed in the common bile duct. GI recommending repeat ERCP in 6 weeks and possible biliary drain placement.   -1/6 patient with increasing abdominal pain and jaundice with rising leukocytosis despite IV zosyn. Discussed biliary drain placement with daughter. Will obtain CT abdomen with contrast to evaluate for possible IR drain placement.   - Oncology spoke with pt on 1/2, again noted unclear whether patient would derive any benefit from chemotherapy, given her poor performance status and the aggressiveness of her disease  - Pt with unsuccessful source control secondary to malignant stricture. Will continue IV zosyn while inpatient, with 2 week course of Augmentin on discharge.   - CT abdomen on 1/7 with continued biliary dilation and interval enlargement of hepatic lesion  - s/p percutaneous biliary drain placement on 1/7       Cholangiocarcinoma    -Stage 4 cancer diagnosed during previous visit 12/21. Malignant appearance of strictures occluding recently placed stent suggesting aggressive course  -Outpatient oncology follow-up previously  scheduled for 1/2/19  -Oncology noting unclear whether patient would derive any benefit from chemotherapy, given her poor performance status and the aggressiveness of her disease.  -Pt and daughter expressing interest in pursing inpatient hospice options  -Oxycodone 5mg q4H for pain     Anemia           Pain           DNR (do not resuscitate)    Pt increasingly disoriented. Pt previously has deferred medical decisions to daughter because she has difficulty remembering things.   Discussed with Daughter,  Tari, as to patient's wishes in the event pt was found unresponsive. Daughter notes that at this point, she wants to do what she can to help her mother feel better but does not want to put her through additional pain with little chance of improvement. She stated that she did not wish for chest compressions, shocks, or intubation in the event her mother became unresponsive. DNR form signed by two physicians placed in Pt's chart and code status updated in EMR.   - On 1/7, daughter stated she wished to rescind DNR order. Updated in Epic.  -On 1/8, daughter again stated that she wished pt to be made DNR. Signed form placed in patient's chart, and code status updated in epic       Severe malnutrition    Likely secondary to poor absorption and appetite related to metastatic cancer  Albumin 1.6 on admission  Continue to recommend PO intake  Boost supplements with meals        Discharge planning issues    -Pt accepted by [facility] hospice. Pt to be transported 01/10       Hypophosphatemia    - Replete PRN       Hypomagnesemia    - Replete PRN       Palliative care encounter    Pt to transport to [facility] hospice 1/10     Pancreatic cancer    Patient recently diagnosed with stage IV pancreatic cancer. She is on oxycodone 5 mg Q6h at home. She came because of worsening of abdominal pain and nausea. Palliative care and AES were consulted.    - f/u with oncology as an outpatient at time of discharge.  - pain control on oxycodone and hydromorphone.         Biliary obstruction           Chronic atrial fibrillation    - she has loop recorder which was placed on 02/2017.  - continue home dose Amiodarone and Carvedilol.     Hypothyroid    - Continue home levothyroxine 125mcg po qAM     HTN (hypertension), benign             VTE Risk Mitigation (From admission, onward)        Ordered     IP VTE HIGH RISK PATIENT  Once      12/31/18 1343     Place sequential compression device  Until discontinued      12/31/18 1343               Steve Walters DO  Department of Hospital Medicine   [facility]

## 2019-01-09 NOTE — ASSESSMENT & PLAN NOTE
Palliative care re-consulted for symptom management - pain.  Patient discussed with IM 4 resident.       Impression. Brady Garcia is is a 77 yo lady with hx of afib, DM and HTN. Known to palliative care from recent hospital admission - diagnosed with stage IV biliary - pancreatic cancer/ s/p biliary stent placement.  Presented with complaints of pain and fever  S/p biliary drain. From previous admission, cancer treatment options limited to palliative approach with hope of relieving symptoms. Current clinical condition is appropriate for transition to hospice.      Symptom Management:   Pain: associated with metastatic cancer  Pain level 5-10/10   24 hr oral morphine equivalent:(OME)  25   Current orders   - hydromorphone 1 mg IVP every 4 hrs as needed for severe pain 7-10/10   - Oxycodone 5 mg by mouth every 4 hrs scheduled    - gabapentin 200 mg by mouth three times daily      Recommendation  - Continue currently ordered hydromorphone and gabapentin  - Continue  Oxycodone to 5 mg by mouth every 4 hrs scheduled.  Hold if patient is somnolent or refuses.   - Re-calculate OME daily for dose adjustment and or transition to long acting opiate when appropriate.   -OME of 25 mg - no recommendation for long acting opiate at this time    Advanced Care Planning:   - No advanced directives have been received.  - next of kin for medical decisions: daughter Tari [phone]  - Resuscitation status: Full code per primary team.   - Disease understanding: patient and daughter have understanding the that the cancer is advanced and possible treatment options would not be curable.  Both understand treatment may help with control of symptoms and would most probably prolong her life for a few months.     Goals of Care:  - During previous encounter, palliative care has initiated discussion regarding hospice.  Ms Garcia has understanding of the role of hospice at the end of life.  At this time she was not amenable.    - Daughter Tari at  bedside, states there have been many changes and after further discussion with oncologist, Mrs. Garcia does not meet criteria for systemic chemotherapy.  A decision has been made for hospice - inpatient hospice and have chosen [facility]. Consults have been completed.   - Reinforced previous hospice education. .     Plan/Recommendation  -see above symptom management recommendations   -Inpatient hospice consult completed per primary team.  Patient has been accepted to [facility] inpatient hospice and will be transported 1/10/19.   - Goals of care have been established.  Palliative care will sign off.

## 2019-01-09 NOTE — SUBJECTIVE & OBJECTIVE
Interval History:     Past Medical History:   Diagnosis Date    Absence of bladder continence 4/20/2015    Asthma     Asthma without status asthmaticus 6/28/2012    Atrial fibrillation     BMI 36.0-36.9,adult 8/25/2014    Bulging lumbar disc     Cataract     Cervical radiculopathy     Claudication 5/18/2017    Constipation 8/30/2013    Diabetes mellitus     pre diabetes     Diverticular disease 8/30/2013    DNR (do not resuscitate) 1/6/2019    GERD (gastroesophageal reflux disease) 6/28/2012    Hepatitis C     Hyperglycemia     Hypertension     Hypothyroidism     Osteoporosis     Pancreatic cyst     Vitamin D deficiency disease        Past Surgical History:   Procedure Laterality Date    BREAST CYST EXCISION Bilateral     COLONOSCOPY N/A 7/11/2013    [facility]formed [facility] at [facility]    CYST REMOVAL      ERCP (ENDOSCOPIC RETROGRADE CHOLANGIOPANCREATOGRAPHY) N/A 12/31/2018    Performed by Jamil King MD at [facility] ENDO (2ND FLR)    ERCP (ENDOSCOPIC RETROGRADE CHOLANGIOPANCREATOGRAPHY) N/A 12/19/2018    Performed by [facility]a [facility]afa [facility] at [facility] (2ND FLR)    ESOPHAGOGASTRODUODENOSCOPY (EGD) N/A 1/21/2015    Performed by Genaro [facility]ordero [facility] at [facility]    EYE SURGERY      cataracts    HYSTERECTOMY      TOE SURGERY Bilateral     big toenails    ULTRASOUND, ENDOSCOPIC, UPPER GI TRACT N/A 12/19/2018    Performed by Vidhya Mustafa MD at [facility] ENDO (2ND FLR)    ULTRASOUND-ENDOSCOPIC-UPPER N/A 1/15/2018    Performed by Jamil King MD at [facility] ENDO (2ND FLR)    ULTRASOUND-ENDOSCOPIC-UPPER N/A 8/14/2015    [facility] at [facility]    ULTRASOUND-ENDOSCOPIC-UPPER N/A 1/21/2015    Performed by [facility] at [facility]    ULTRASOUND-ENDOSCOPIC-UPPER W/POSSIBLE FNA N/A 2/15/2016    Performed by [facility]o [facility] at [facility]       Review of patient's allergies indicates:   Allergen Reactions    Milk containing products Shortness Of Breath     Nuts [tree nut] Anaphylaxis    Fosamax [alendronate]      dizziness       Medications:  Continuous Infusions:  Scheduled Meds:   allopurinol  100 mg Oral Daily    amiodarone  100 mg Oral Daily    artificial tears  1 drop Both Eyes TID    aspirin  81 mg Oral Daily    carvedilol  3.125 mg Oral BID WM    cetirizine  10 mg Oral Daily    fluticasone  1 spray Each Nare Daily    fluticasone-vilanterol  1 puff Inhalation Daily    gabapentin  200 mg Oral TID    Lactobacillus rhamnosus GG  1 capsule Oral Daily    levothyroxine  125 mcg Oral Before breakfast    oxyCODONE  5 mg Oral 6 times per day    pantoprazole  40 mg Oral Daily    piperacillin-tazobactam (ZOSYN) IVPB  4.5 g Intravenous Q8H    polyethylene glycol  17 g Oral TID    senna-docusate 8.6-50 mg  1 tablet Oral Daily    solifenacin  5 mg Oral Daily     PRN Meds:albuterol-ipratropium, bisacodyl, dextrose 50%, dextrose 50%, glucagon (human recombinant), glucose, glucose, HYDROmorphone, ondansetron, simethicone, sodium chloride 0.9%    Family History     Problem Relation (Age of Onset)    Asthma Sister, Other    Breast cancer Sister (55)    Diabetes Maternal Grandfather, Maternal Aunt    Lung cancer Father    Ovarian cancer Mother    Stomach cancer Sister    Throat cancer Brother        Tobacco Use    Smoking status: Never Smoker    Smokeless tobacco: Never Used   Substance and Sexual Activity    Alcohol use: No     Alcohol/week: 0.0 oz    Drug use: No    Sexual activity: Not Currently     Partners: Male       Review of Systems   Constitutional: Positive for fatigue.   Gastrointestinal: Positive for abdominal pain and nausea. Negative for vomiting.   Genitourinary: Negative for dysuria.   Neurological: Positive for weakness.     Objective:     Vital Signs (Most Recent):  Temp: 97.4 °F (36.3 °C) (01/09/19 0745)  Pulse: 81 (01/09/19 1111)  Resp: 18 (01/09/19 0745)  BP: (!) 119/55 (01/09/19 0745)  SpO2: 97 % (01/09/19 0745) Vital Signs (24h  Range):  Temp:  [97.4 °F (36.3 °C)-99 °F (37.2 °C)] 97.4 °F (36.3 °C)  Pulse:  [78-96] 81  Resp:  [16-18] 18  SpO2:  [82 %-97 %] 97 %  BP: (114-150)/(55-96) 119/55     Weight: 68 kg (150 lb)  Body mass index is 32.46 kg/m².    Review of Symptoms  Symptom Assessment (ESAS 0-10 scale)   ESAS 0 1 2 3 4 5 6 7 8 9 10   Pain              Dyspnea              Anxiety              Nausea              Depression               Anorexia              Fatigue              Insomnia              Restlessness               Agitation              CAM / Delirium __ --  ___+   Constipation     __ --  ___+   Diarrhea           __ --  ___+  Bowel Management Plan (BMP): Yes    Comments: unable to assess    Pain Assessment: unable to assess     OME in 24 hours: 0    Performance Status: 40    ECOG Performance Status Grade: 2 - Ambulates, capable of self care only    Physical Exam   Nursing note and vitals reviewed.      Significant Labs: All pertinent labs within the past 24 hours have been reviewed.  CBC:   Recent Labs   Lab 01/09/19  0710   WBC 16.80*   HGB 9.5*   HCT 29.6*   MCV 99*        BMP:  Recent Labs   Lab 01/09/19  0710   GLU 68*   *   K 3.9   CL 92*   CO2 29   BUN 14   CREATININE 0.7   CALCIUM 7.7*   MG 2.0     LFT:  Lab Results   Component Value Date    AST 96 (H) 01/09/2019    ALKPHOS 255 (H) 01/09/2019    BILITOT 3.6 (H) 01/09/2019     Albumin:   Albumin   Date Value Ref Range Status   01/09/2019 1.2 (L) 3.5 - 5.2 g/dL Final     Protein:   Total Protein   Date Value Ref Range Status   01/09/2019 6.1 6.0 - 8.4 g/dL Final     Lactic acid:   Lab Results   Component Value Date    LACTATE 1.5 12/31/2018    LACTATE 1.6 12/16/2018       Significant Imaging: I have reviewed all pertinent imaging results/findings within the past 24 hours.    Advanced Directives::  Living Will: No  LaPOST: No  Do Not Resuscitate Status: No  Medical Power of : No    Decision-Making Capacity:     Living Arrangements: Lives  alone    Psychosocial/Cultural: not , one daughter Tari, who lives across the street, has home services through [employer]     Spiritual:     F- Naya and Belief:  Taoism       I - Importance:   .  C - Community:     A - Address in Care:

## 2019-01-09 NOTE — PLAN OF CARE
01/09/19 1120   Post-Acute Status   Post-Acute Authorization Home Health/Hospice   Home Health/Hospice Status Referrals Sent     CM informed sw pts family would like inpatient hospice. SW met with Tari huynh and discussed inpatient options. She would like referral sent to [facility]. SW explained referral process and provided sw name and number.    Referral sent to [facility] through [facility].    BASSAM spoke with [facility] intake-K[facility]by will be meeting with family at 1:30pm.    Jodi Patricia, VIV [phone]

## 2019-01-09 NOTE — SUBJECTIVE & OBJECTIVE
Interval History: No acute events overnight. Pt resting in bed with pain 5.5/10 on current scheduled pain medications.     Review of Systems   Constitutional: Negative for activity change, appetite change, fatigue and fever.   HENT: Negative for ear discharge, mouth sores, rhinorrhea and sneezing.    Eyes: Negative for photophobia.   Respiratory: Negative for cough, choking, shortness of breath and wheezing.    Cardiovascular: Negative for chest pain.   Gastrointestinal: Positive for abdominal pain and nausea. Negative for diarrhea and vomiting.   Endocrine: Negative for polydipsia, polyphagia and polyuria.   Genitourinary: Negative for dysuria, flank pain, hematuria and urgency.   Musculoskeletal: Negative for back pain, joint swelling, myalgias, neck pain and neck stiffness.   Skin: Negative for color change, pallor, rash and wound.   Allergic/Immunologic: Negative for immunocompromised state.   Neurological: Negative for seizures, facial asymmetry, weakness, numbness and headaches.   Psychiatric/Behavioral: Negative for agitation, behavioral problems and confusion.     Objective:     Vital Signs (Most Recent):  Temp: 97.4 °F (36.3 °C) (01/09/19 1614)  Pulse: 81 (01/09/19 1614)  Resp: 18 (01/09/19 1614)  BP: 117/74 (01/09/19 1614)  SpO2: (!) 94 % (01/09/19 1614) Vital Signs (24h Range):  Temp:  [97.4 °F (36.3 °C)-99 °F (37.2 °C)] 97.4 °F (36.3 °C)  Pulse:  [78-96] 81  Resp:  [18] 18  SpO2:  [92 %-97 %] 94 %  BP: (114-131)/(55-74) 117/74     Weight: 68 kg (150 lb)  Body mass index is 32.46 kg/m².    Intake/Output Summary (Last 24 hours) at 1/9/2019 1715  Last data filed at 1/9/2019 1500  Gross per 24 hour   Intake 440 ml   Output 791 ml   Net -351 ml      Physical Exam   Constitutional: She is oriented to person, place, and time. She appears well-developed and well-nourished. No distress.   HENT:   Head: Normocephalic and atraumatic.   Mouth/Throat: No oropharyngeal exudate.   Eyes: Conjunctivae and EOM are normal.  Pupils are equal, round, and reactive to light. Right eye exhibits no discharge. Left eye exhibits no discharge. No scleral icterus.   Neck: Normal range of motion.   Cardiovascular: Exam reveals no gallop.   No murmur heard.  Pulmonary/Chest: Effort normal and breath sounds normal. No respiratory distress. She has no wheezes. She has no rales.   Abdominal: Soft. Bowel sounds are normal. She exhibits no mass. There is generalized tenderness. There is no guarding.   Musculoskeletal: She exhibits edema (Bilateral lower limb edema). She exhibits no tenderness.   Neurological: She is alert and oriented to person, place, and time.   Skin: Skin is warm and dry. Capillary refill takes less than 2 seconds. She is not diaphoretic.   Psychiatric: She has a normal mood and affect. Her behavior is normal.       Significant Labs:   CBC:   Recent Labs   Lab 01/08/19 0442 01/09/19  0710   WBC 18.22* 16.80*   HGB 9.2* 9.5*   HCT 28.7* 29.6*    185     CMP:   Recent Labs   Lab 01/08/19 0442 01/09/19  0710   * 129*   K 4.0 3.9   CL 92* 92*   CO2 29 29   GLU 67* 68*   BUN 15 14   CREATININE 0.8 0.7   CALCIUM 7.8* 7.7*   PROT 6.1 6.1   ALBUMIN 1.2* 1.2*   BILITOT 4.3* 3.6*   ALKPHOS 278* 255*   * 96*   ALT 60* 49*   ANIONGAP 8 8   EGFRNONAA >60.0 >60.0     Magnesium:   Recent Labs   Lab 01/08/19 0442 01/09/19  0710   MG 2.5 2.0     All pertinent labs within the past 24 hours have been reviewed.    Significant Imaging: I have reviewed all pertinent imaging results/findings within the past 24 hours.

## 2019-01-09 NOTE — PT/OT/SLP PROGRESS
Occupational Therapy   Treatment    Name: Alfredina Claudette Parks  MRN: [id number]  Admitting Diagnosis:  Acute cholangitis  9 Days Post-Op    Recommendations:     Discharge Recommendations: other (see comments)( hospice)  Discharge Equipment Recommendations:  none  Barriers to discharge:       Subjective     Pain/Comfort:  · Pain Rating 1: 0/10    Objective:     Communicated with: RN prior to session.  Patient found with all lines intact and call button in reach and   upon OT entry to room.    General Precautions: Standard, fall   Orthopedic Precautions:N/A   Braces:       Occupational Performance:    Bed Mobility:    · Patient completed Rolling/Turning to Left with  maximal assistance  · Patient completed Scooting/Bridging with moderate assistance  · Patient completed Supine to Sit with maximal assistance  · Patient completed Sit to Supine with maximal assistance     Functional Mobility/Transfers:  · Patient completed Sit <> Stand Transfer with maximal assistance  with  no assistive device   · Patient completed Bed <> Chair Transfer using Stand Pivot technique with moderate assistance with no assistive device    Activities of Daily Living:  · Upper Body Dressing: maximal assistance   · Lower Body Dressing: total assistance       AMPA 6 Click ADL: 12    Treatment & Education:  Tolerated sitting OOB x 1.5 hrs.    Patient left supine with all lines intact and call button in reach  Education:    Assessment:     Alfredina Claudette Parks is a 78 y.o. female with a medical diagnosis of Acute cholangitis.  She presents with the following performance deficits affecting function are weakness, impaired functional mobilty, decreased safety awareness, decreased coordination, gait instability, impaired endurance, impaired balance, impaired self care skills, decreased lower extremity function.     Rehab Prognosis:  Fair; patient would benefit from acute skilled OT services to address these deficits and reach maximum level of  function.       Plan:     Patient to be seen 3 x/week to address the above listed problems via self-care/home management, therapeutic activities, therapeutic exercises  · Plan of Care Expires: 01/30/19  · Plan of Care Reviewed with: patient, daughter    This Plan of care has been discussed with the patient who was involved in its development and understands and is in agreement with the identified goals and treatment plan    GOALS:   Multidisciplinary Problems     Occupational Therapy Goals        Problem: Occupational Therapy Goal    Goal Priority Disciplines Outcome Interventions   Occupational Therapy Goal     OT, PT/OT Ongoing (interventions implemented as appropriate)    Description:  Goals to be met by:1/8/19    Patient will increase functional independence with ADLs by performing:    Feeding with Set-up Assistance.  UE Dressing with Stand-by Assistance.  LE Dressing with Contact Guard Assistance.  Grooming while standing at bedside with Minimal Assistance.  Toileting from bedside commode with Minimal Assistance for hygiene and clothing management.   Supine to sit with Contact Guard Assistance to prepare for functional activities  Step transfer with Contact Guard Assistance with AD as needed to prepare for household and community mobility.   Toilet transfer to bedside commode with Minimal Assistance.                      Time Tracking:     OT Date of Treatment: 01/09/19  OT Start Time: 1106  OT Stop Time: 1135  OT Total Time (min): 29 min    Billable Minutes:Therapeutic Activity 29    KOFI Ivey  1/9/2019

## 2019-01-09 NOTE — PLAN OF CARE
[facility]     Department of Hospital Medicine     [address] [place] [address]     [phone] after hours  [phone] fax                                   HOSPICE  ORDERS     01/09/2019    Admit to Hospice:  Inpatient Service    Diagnoses:  Active Hospital Problems    Diagnosis  POA    *Acute cholangitis [K83.09]  Yes    DNR (do not resuscitate) [Z66]  No    Severe malnutrition [E43]  Yes    Discharge planning issues [Z02.9]  Not Applicable    Hypophosphatemia [E83.39]  No    Anemia [D64.9]  Yes    Palliative care encounter [Z51.5]  Not Applicable    Hypomagnesemia [E83.42]  Yes    Biliary obstruction [K83.1]  Yes    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       Hospice Qualifying Diagnoses: Stage 4 Pancreatic Adenocarcinoma       Patient has a life expectancy < 6 months due to these conditions.    Vital Signs: Routine per Hospice Protocol.    Allergies:  Review of patient's allergies indicates:   Allergen Reactions    Milk containing products Shortness Of Breath    Nuts [tree nut] Anaphylaxis    Fosamax [alendronate]      dizziness       Diet: Adult regular    Activities: As tolerated    Nursing: Per Hospice Routine    Future Orders:  Hospice Medical Director may dictate new orders for comfortable care measures & sign death certificate.    Medications:    Mitzy Garcia Claudette Home Medication Instructions JUANITO:[id number]    Printed on:01/09/19 1003   Medication Information                      acetaminophen (TYLENOL) 325 MG tablet  Take 2 tablets (650 mg total) by mouth every 6 (six) hours.             albuterol (PROVENTIL) 2.5 mg /3 mL (0.083 %) nebulizer solution  TAKE 3ML BY NEBULIZATION EVERY 6 HOURS AS NEEDED FOR WHEEZING.             albuterol  (VENTOLIN HFA) 90 mcg/actuation inhaler  INHALE 2 PUFFS PO Q 6 H PRN             allopurinol (ZYLOPRIM) 100 MG tablet  Take 1 tablet (100 mg total) by mouth once daily.             amiodarone (PACERONE) 100 MG Tab  Take 100 mg by mouth once daily             artificial tears (ISOPTO TEARS) 0.5 % ophthalmic solution  Place 1 drop into both eyes 3 (three) times daily.             carvedilol (COREG) 3.125 MG tablet  Take 3.125 mg by mouth 2 (two) times daily with meals.             fluticasone (FLONASE) 50 mcg/actuation nasal spray  SHAKE LIQUID AND USE 1 SPRAY IN EACH NOSTRIL EVERY DAY             fluticasone-salmeterol 250-50 mcg/dose (ADVAIR DISKUS) 250-50 mcg/dose diskus inhaler  Inhale 1 puff into the lungs 2 (two) times daily. Controller             furosemide (LASIX) 20 MG tablet  TAKE 2 TABLETS(40 MG) BY MOUTH EVERY DAY             gabapentin (NEURONTIN) 100 MG capsule  Take 2 capsules (200 mg total) by mouth 3 (three) times daily.             levothyroxine (SYNTHROID) 125 MCG tablet  Take 1 tablet (125 mcg total) by mouth once daily.             loratadine (CLARITIN) 10 mg tablet  Take 10 mg by mouth once daily.             oxyCODONE (ROXICODONE) 5 MG immediate release tablet  Take 1 tablet (5 mg total) by mouth every 6 (six) hours as needed.             pantoprazole (PROTONIX) 40 MG tablet  TAKE 1 TABLET BY MOUTH ONCE DAILY, 30 MINUTES BEFORE EATING             polyethylene glycol (GLYCOLAX) 17 gram/dose powder  Take 17 g by mouth once daily.             senna-docusate 8.6-50 mg (PERICOLACE) 8.6-50 mg per tablet  Take 1 tablet by mouth once daily.                   ________________________________Rakel Chinchilla MD  01/09/2019

## 2019-01-09 NOTE — PLAN OF CARE
Problem: Adult Inpatient Plan of Care  Goal: Plan of Care Review  Outcome: Ongoing (interventions implemented as appropriate)  Pt AAOx4, VSS. C/O consistent abd pain controlled by scheduled oxycodone PO. Pain managed well. No acute changes overnight. Safety and frequent rounding maintained. WCTM.

## 2019-01-10 VITALS
TEMPERATURE: 97 F | HEIGHT: 57 IN | SYSTOLIC BLOOD PRESSURE: 118 MMHG | HEART RATE: 72 BPM | BODY MASS INDEX: 32.36 KG/M2 | OXYGEN SATURATION: 94 % | DIASTOLIC BLOOD PRESSURE: 58 MMHG | RESPIRATION RATE: 16 BRPM | WEIGHT: 150 LBS

## 2019-01-10 PROCEDURE — 99238 HOSP IP/OBS DSCHRG MGMT 30/<: CPT | Mod: GC,,, | Performed by: INTERNAL MEDICINE

## 2019-01-10 PROCEDURE — 25000003 PHARM REV CODE 250: Performed by: STUDENT IN AN ORGANIZED HEALTH CARE EDUCATION/TRAINING PROGRAM

## 2019-01-10 PROCEDURE — 99238 PR HOSPITAL DISCHARGE DAY,<30 MIN: ICD-10-PCS | Mod: GC,,, | Performed by: INTERNAL MEDICINE

## 2019-01-10 PROCEDURE — 63600175 PHARM REV CODE 636 W HCPCS: Performed by: STUDENT IN AN ORGANIZED HEALTH CARE EDUCATION/TRAINING PROGRAM

## 2019-01-10 RX ORDER — AMOXICILLIN AND CLAVULANATE POTASSIUM 875; 125 MG/1; MG/1
1 TABLET, FILM COATED ORAL 2 TIMES DAILY
Qty: 24 TABLET | Refills: 0 | Status: SHIPPED | OUTPATIENT
Start: 2019-01-10 | End: 2019-01-10 | Stop reason: SDUPTHER

## 2019-01-10 RX ORDER — AMOXICILLIN AND CLAVULANATE POTASSIUM 875; 125 MG/1; MG/1
1 TABLET, FILM COATED ORAL 2 TIMES DAILY
Qty: 24 TABLET | Refills: 0
Start: 2019-01-10

## 2019-01-10 RX ADMIN — PIPERACILLIN AND TAZOBACTAM 4.5 G: 4; .5 INJECTION, POWDER, LYOPHILIZED, FOR SOLUTION INTRAVENOUS; PARENTERAL at 05:01

## 2019-01-10 RX ADMIN — GABAPENTIN 200 MG: 100 CAPSULE ORAL at 10:01

## 2019-01-10 RX ADMIN — HYPROMELLOSE 2910 1 DROP: 5 SOLUTION OPHTHALMIC at 10:01

## 2019-01-10 RX ADMIN — FLUTICASONE PROPIONATE 50 MCG: 50 SPRAY, METERED NASAL at 10:01

## 2019-01-10 RX ADMIN — PANTOPRAZOLE SODIUM 40 MG: 40 TABLET, DELAYED RELEASE ORAL at 10:01

## 2019-01-10 RX ADMIN — AMIODARONE HYDROCHLORIDE 100 MG: 100 TABLET ORAL at 09:01

## 2019-01-10 RX ADMIN — ALLOPURINOL 100 MG: 100 TABLET ORAL at 10:01

## 2019-01-10 RX ADMIN — Medication 1 CAPSULE: at 10:01

## 2019-01-10 RX ADMIN — CETIRIZINE HYDROCHLORIDE 10 MG: 5 TABLET ORAL at 10:01

## 2019-01-10 RX ADMIN — CARVEDILOL 3.12 MG: 3.12 TABLET, FILM COATED ORAL at 10:01

## 2019-01-10 RX ADMIN — OXYCODONE HYDROCHLORIDE 5 MG: 5 TABLET ORAL at 12:01

## 2019-01-10 RX ADMIN — ASPIRIN 81 MG: 81 TABLET, COATED ORAL at 10:01

## 2019-01-10 RX ADMIN — SOLIFENACIN SUCCINATE 5 MG: 5 TABLET, FILM COATED ORAL at 10:01

## 2019-01-10 RX ADMIN — OXYCODONE HYDROCHLORIDE 5 MG: 5 TABLET ORAL at 08:01

## 2019-01-10 RX ADMIN — FLUTICASONE FUROATE AND VILANTEROL TRIFENATATE 1 PUFF: 200; 25 POWDER RESPIRATORY (INHALATION) at 10:01

## 2019-01-10 NOTE — NURSING
Report given to JES Hoffmann at [facility] patient going to room 10. Removal IV access. MIKHAIL

## 2019-01-10 NOTE — NURSING
Patient given discharge instructions and  folder given  To  [facility]-EMT-and daughter taken personal belongings-Remove Tele monitor. Pain med given prior to discharge. NAD

## 2019-01-10 NOTE — NURSING
"1226 - Pt requested RN to call her daughter, Tari, and two family members as well as the . Called Tari 7 times with no response. Was unable to leave a message.  called and came to bedside. Pt has been refusing all meds and midnight vitals. C/o abd pain, but refuses PRN pain meds. Pt tearful and states, "I don't want any pain medicine. It'll knock me out and I want to be awake to talk to my daughter." Told pt we would try to contact daughter again. No further requests at this time. WCTM.   "

## 2019-01-10 NOTE — PLAN OF CARE
01/10/19 1500   Final Note   Assessment Type Final Discharge Note   Anticipated Discharge Disposition [facility]   What phone number can be called within the next 1-3 days to see how you are doing after discharge? [phone]   Right Care Referral Info   Post Acute Recommendation Other   Facility Name [facility]

## 2019-01-10 NOTE — PLAN OF CARE
Problem: Adult Inpatient Plan of Care  Goal: Plan of Care Review  Outcome: Ongoing (interventions implemented as appropriate)  VSS. AAOx4. Pt very withdrawn, refused all oral meds, was able to administer IV abx.  came to bedside per pt's request; was unable to contact daughter after numerous attempts. Pt eventually fell asleep around 1:30 AM and rested throughout the night. Anticipated DC to inpatient hospice with [facility] today. Safety and frequent rounding maintained. WCTM.

## 2019-01-10 NOTE — PLAN OF CARE
01/10/19 1026   Post-Acute Status   Post-Acute Authorization Home Health/Hospice   Post-Acute Placement Status Authorization Obtained     [facility] is ready to accept pt. Nurse to call report to [phone], pt going to room 10. BASSAM infomred nurse.    Ambulance transfer with 2L O2 arranged through the [facility] ([id number]) for a 12pm  (PHN Auth #[id number]).    BASSAM informed pts dtg Tari of transfer details.    Jodi Patricia LCSW [id number]

## 2019-01-10 NOTE — DISCHARGE SUMMARY
[facility] Hospital Medicine  Discharge Summary      Patient Name: Alfredina Claudette Parks  MRN: [id number]  Admission Date: 12/30/2018  Hospital Length of Stay: 10 days  Discharge Date and Time: 1/10/2019 12:52 PM  Attending Physician: Sarahi att. providers found   Discharging Provider: Steve Walters DO  Primary Care Provider: Veronica Frost MD    [facility] Medicine Team: [facility] MED 4 Steve Walters DO    HPI: Mrs. Garcia is a 78-year-old female with known stage IV pancreatic cancer (diagnosed on 12/21/2018) presents to the ED with 2 days of worsening abdominal pain with nausea and 1 day of fever.  She is pending follow-up with Oncology and palliative care. She was recently admitted, MRI showed liver and pancreatic masses, ERCP was performed on 12/19 and pathology confirmed poorly differentiated carcinoma most likely adeno. 2 stents were placed and plan was to f/u after 3 months to exchange the stent. At the moment she is a full code.  She denies vomiting, diarrhea, cough, shortness of breath, chest pain, or dysuria.  Daughter gave her oxycodone and gabapentin without resolution of symptoms. Patient lives alone in an apartment and her daughter lives across the street.  A ten point review of systems was completed and is negative except as documented above.  Patient denies any other acute medical complaint.     In the ED, she received 1L of fluid and pain medication. She has high WBC and a spike of fever at 100.4, one dose of Zosyn was given. Palliative care and [facility] were consulted.        Procedure(s) (LRB):  ERCP (ENDOSCOPIC RETROGRADE CHOLANGIOPANCREATOGRAPHY) (N/A)      Hospital Course: 01/01/2019 ERCP yesterday, pt resting comfortably in bed this AM. Noting migratory abdominal pain, well controlled at time of interview.   01/02/2019 No acute events overnight. Pt and family discussed possible treatment options with oncology; oncology unsure of benefit to palliative chemotherapy.  "  01/03/2019 Pt notes increased pain and nausea this AM, but controlled with PRN medications.   01/04/2019 No acute events overnight. Pt  Noting pain and nausea, controlled with PRN medications.  Family awaiting acceptance at [facility]  1/6/19 Worsening abdominal pain and nausea. Leukocytosis despite IV zosyn.   01/07/2019 CT abdomen and percutaneous biliary drain.  01/08/2019: Family expressed interest in inpatient hospice  01/09/2019  Pt accepted to [facility] inpatient hospice, transport arranged for midday 1/10.    BP (!) 118/58 (BP Location: Left arm, Patient Position: Lying)   Pulse 72   Temp 97.3 °F (36.3 °C)   Resp 16   Ht 4' 9" (1.448 m)   Wt 68 kg (150 lb)   SpO2 (!) 94%   Breastfeeding? No   BMI 32.46 kg/m²     Physical Exam   Constitutional: She is oriented to person, place, and time. No distress.   HENT:   Head: Normocephalic and atraumatic.   Mouth/Throat: Oropharynx is clear and moist.   Eyes: EOM are normal. Pupils are equal, round, and reactive to light. Right eye exhibits no discharge. Left eye exhibits no discharge.   Cardiovascular: Normal rate, regular rhythm and intact distal pulses. Exam reveals no gallop and no friction rub.   No murmur heard.  Pulmonary/Chest: Effort normal and breath sounds normal. No respiratory distress. She has no wheezes. She has no rales. She exhibits no tenderness.   Abdominal: Soft. Bowel sounds are normal. She exhibits no distension and no mass. There is tenderness. There is no rebound and no guarding.   Musculoskeletal: She exhibits edema. She exhibits no tenderness or deformity.   Neurological: She is alert and oriented to person, place, and time.   Skin: Skin is warm and dry. No rash noted. She is not diaphoretic. No erythema. No pallor.   Psychiatric: Mood, memory, affect and judgment normal.         Consults:   Consults (From admission, onward)        Status Ordering Provider     Inpatient consult to [facility]  Once     Provider:  (Not " yet assigned)    Completed ANURADHA ARMENTA     Inpatient consult to Hematology/Oncology  Once     Provider:  (Not yet assigned)    Completed DANNY CAMILO     Inpatient consult to Interventional Radiology  Once     Provider:  (Not yet assigned)    Completed DANNY CAMILO     Inpatient consult to Palliative Care  Once     Provider:  (Not yet assigned)    Completed ANURADHA ARMENTA     Inpatient consult to Palliative Care  Once     Provider:  (Not yet assigned)    Completed DANNY CAMILO     Inpatient consult to Palliative Care  Once     Provider:  (Not yet assigned)    Completed DANNY CAMILO          Final Active Diagnoses:    Diagnosis Date Noted POA    PRINCIPAL PROBLEM:  Acute cholangitis [K83.09] 12/17/2018 Yes    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] 12/18/2018 Yes    Anemia [D64.9] 01/03/2019 Yes    Pain [R52]  Yes    DNR (do not resuscitate) [Z66] 01/06/2019 No    Severe malnutrition [E43] 01/05/2019 Yes    Discharge planning issues [Z02.9] 01/04/2019 Not Applicable    Hypophosphatemia [E83.39] 01/03/2019 No    Palliative care encounter [Z51.5] 01/01/2019 Not Applicable    Hypomagnesemia [E83.42] 01/01/2019 Yes    Biliary obstruction [K83.1] 12/19/2018 Yes    Chronic atrial fibrillation [I48.2] 06/28/2013 Yes    Hypothyroid [E03.9] 06/28/2012 Yes    HTN (hypertension), benign [I10] 06/28/2012 Yes      Problems Resolved During this Admission:      Discharged Condition: stable    Disposition: Hospice/Medical Facility    Follow Up:  Follow-up Information     Veronica Frost MD.    Specialty:  Family Medicine  Why:  Outpatient Services  Contact information:  [address]  [facility] [address]  [phone]                 Patient Instructions:      Activity as tolerated     Medications:  Reconciled Home Medications:      Medication List      START taking these medications    amoxicillin-clavulanate 875-125mg 875-125 mg per tablet  Commonly known as:   AUGMENTIN  Take 1 tablet by mouth 2 (two) times daily.     artificial tears 0.5 % ophthalmic solution  Commonly known as:  ISOPTO TEARS  Place 1 drop into both eyes 3 (three) times daily.        CHANGE how you take these medications    acetaminophen 325 MG tablet  Commonly known as:  TYLENOL  Take 2 tablets (650 mg total) by mouth every 6 (six) hours as needed for Pain.  What changed:    · when to take this  · reasons to take this     * albuterol 2.5 mg /3 mL (0.083 %) nebulizer solution  Commonly known as:  PROVENTIL  TAKE 3ML BY NEBULIZATION EVERY 6 HOURS AS NEEDED FOR WHEEZING.  What changed:  Another medication with the same name was changed. Make sure you understand how and when to take each.     * albuterol 90 mcg/actuation inhaler  Commonly known as:  VENTOLIN HFA  INHALE 2 PUFFS PO Q 6 H PRN  What changed:    · how much to take  · how to take this  · when to take this  · reasons to take this  · additional instructions     fluticasone 50 mcg/actuation nasal spray  Commonly known as:  FLONASE  SHAKE LIQUID AND USE 1 SPRAY IN EACH NOSTRIL EVERY DAY  What changed:  See the new instructions.     furosemide 20 MG tablet  Commonly known as:  LASIX  TAKE 2 TABLETS(40 MG) BY MOUTH EVERY DAY  What changed:  See the new instructions.     polyethylene glycol 17 gram/dose powder  Commonly known as:  GLYCOLAX  Take 17 g by mouth once daily.  What changed:    · when to take this  · reasons to take this         * This list has 2 medication(s) that are the same as other medications prescribed for you. Read the directions carefully, and ask your doctor or other care provider to review them with you.            CONTINUE taking these medications    allopurinol 100 MG tablet  Commonly known as:  ZYLOPRIM  Take 1 tablet (100 mg total) by mouth once daily.     amiodarone 100 MG Tab  Commonly known as:  PACERONE  Take 100 mg by mouth once daily     carvedilol 3.125 MG tablet  Commonly known as:  COREG  Take 3.125 mg by mouth 2  (two) times daily with meals.     fluticasone-salmeterol 250-50 mcg/dose 250-50 mcg/dose diskus inhaler  Commonly known as:  ADVAIR DISKUS  Inhale 1 puff into the lungs 2 (two) times daily. Controller     gabapentin 100 MG capsule  Commonly known as:  NEURONTIN  Take 2 capsules (200 mg total) by mouth 3 (three) times daily.     levothyroxine 125 MCG tablet  Commonly known as:  SYNTHROID  Take 1 tablet (125 mcg total) by mouth once daily.     loratadine 10 mg tablet  Commonly known as:  CLARITIN  Take 10 mg by mouth once daily.     oxyCODONE 5 MG immediate release tablet  Commonly known as:  ROXICODONE  Take 1 tablet (5 mg total) by mouth every 6 (six) hours as needed.     pantoprazole 40 MG tablet  Commonly known as:  PROTONIX  TAKE 1 TABLET BY MOUTH ONCE DAILY, 30 MINUTES BEFORE EATING     senna-docusate 8.6-50 mg 8.6-50 mg per tablet  Commonly known as:  PERICOLACE  Take 1 tablet by mouth once daily.        STOP taking these medications    aspirin 81 MG EC tablet  Commonly known as:  ECOTRIN     solifenacin 5 MG tablet  Commonly known as:  VESICARE            Significant Diagnostic Studies: Labs:   CMP   Recent Labs   Lab 01/09/19  0710   *   K 3.9   CL 92*   CO2 29   GLU 68*   BUN 14   CREATININE 0.7   CALCIUM 7.7*   PROT 6.1   ALBUMIN 1.2*   BILITOT 3.6*   ALKPHOS 255*   AST 96*   ALT 49*   ANIONGAP 8   ESTGFRAFRICA >60.0   EGFRNONAA >60.0    and CBC   Recent Labs   Lab 01/09/19  0710   WBC 16.80*   HGB 9.5*   HCT 29.6*        Microbiology:   Blood Culture   Lab Results   Component Value Date    LABBLOO No growth after 5 days. 12/31/2018    LABBLOO No growth after 5 days. 12/31/2018     Radiology: CT scan: CT ABDOMEN PELVIS WITH CONTRAST:   Results for orders placed or performed during the hospital encounter of 12/30/18   CT Abdomen Pelvis With Contrast    Narrative    EXAMINATION:  CT ABDOMEN PELVIS WITH CONTRAST    CLINICAL HISTORY:  Abd pain, fever, abscess suspected    TECHNIQUE:  Routine axial  CT images of the abdomen and pelvis were obtained after administration 75cc of IV Omnipaque 350.  Coronal and Sagittal reformatted images were also obtained.    COMPARISON:  CT abdomen pelvis 12/31/2018, MRI abdomen 12/18/2018    FINDINGS:  Heart is normal in size.  No pericardial effusion.  Stable enlarged cardiophrenic lymph nodes.    Small bilateral pleural effusions.  Opacification of the bilateral lower lobes with air bronchograms, left greater than right.  Finding may represent atelectasis versus pneumonia.    Interval enlargement of hypoattenuating central hepatic mass measuring 6.5 x 5.1 cm.  Additional hypoattenuating lesion within the right hepatic lobe adjacent to the IVC, unchanged comparison prior exam. Significant intrahepatic biliary ductal dilatation similar to prior exam.  Interval removal of a biliary stent.  Single biliary stent in stable position.  Increased attenuation of the gallbladder which may represent vicarious excretion of contrast.    Ill-defined hypodense mass at the pancreatic head and neck, similar to slightly progressed in comparison prior exam.    Nonspecific subcentimeter splenic hypodensity.  Adrenal glands are unremarkable.    Kidneys are stable in size and location.  No hydronephrosis or ureteral dilatation.  Subcentimeter left renal hypodensity too small to characterize however likely represents a cyst.  Urinary bladder is unremarkable.    Stomach is unremarkable.  Small and large bowel are normal caliber.  No evidence of obstruction.  Diverticulosis without evidence of diverticulitis.    No free intraperitoneal fluid or air.  Stable soft tissue nodule within the upper abdomen anterior to the stomach measuring 1.9 cm (series 2, image 55).  Stable peripherally calcified soft tissue nodule within the left lower quadrant.  1.5 cm para-aortic lymph node.    Edema of the body wall.      Impression    1. Interval enlargement of hypoattenuating central hepatic mass in this patient  with history of cholangiocarcinoma.  2. Significant intrahepatic biliary ductal dilatation similar to prior exam.  Interval removal of biliary stent with single biliary stent remaining.  3. Ill-defined hypodense mass at the pancreatic head and neck, similar to slightly progressed in comparison to prior exam.  4. Stable peritoneal soft tissue nodules/lymph nodes as above.  5. Stable enlarged cardiophrenic lymph nodes.  6. Nonspecific subcentimeter splenic hypodensity.  7. Consolidation of the bilateral lower lobes representing atelectasis versus pneumonia.  8. Additional findings as above.    Electronically signed by resident: Modesto Chadwick  Date:    01/07/2019  Time:    11:24    Electronically signed by: Cathi Pereira MD  Date:    01/07/2019  Time:    17:05     Endoscopy: ERCP: Impression:           - Two visibly occluded stents from the biliary tree                         were seen in the major papilla.                        - A single localized biliary stricture was found in                         the left main hepatic duct. The stricture was                         malignant appearing with malignant invasion and                         could not be traversed.                        - Two stents were removed from the biliary tree.                        - One biliary stent was placed into the common bile                         duct.  Recommendation:       - Return patient to hospital [facility] for ongoing care.                        - Continue present medications.                        - Repeat ERCP in 6 weeks to exchange stent.                        - Continue antibiotics.                        - May need percutaneous access to the left                         intrahepatic system  Attending Participation:       I personally performed the entire procedure.  Jamil King MD  12/31/2018 1:53:17 PM    Pending Diagnostic Studies:     Procedure Component Value Units Date/Time    FL ERCP Biliary And Pancreatic  [[id number]] Resulted:  12/31/18 1216    Order Status:  Sent Lab Status:  In process Updated:  12/31/18 1345        Indwelling Lines/Drains at time of discharge:   Lines/Drains/Airways     Drain                 Biliary Tube 01/07/19 1709 Other (Comment) 8 Fr. LUQ 2 days                Time spent on the discharge of patient: 40 minutes  Patient was seen and examined on the date of discharge and determined to be suitable for discharge.         Steve Walters DO  Department of Hospital Medicine  [facility]

## 2019-01-11 ENCOUNTER — TELEPHONE (OUTPATIENT)
Dept: INTERVENTIONAL RADIOLOGY/VASCULAR | Facility: HOSPITAL | Age: 79
End: 2019-01-11

## 2019-01-11 NOTE — PT/OT/SLP DISCHARGE
Physical Therapy Discharge Summary    Name: Alfredina Claudette Parks  MRN: [id number]   Principal Problem: Acute cholangitis     Patient Discharged from acute Physical Therapy on 1/10/2019.  Please refer to prior PT noted date on 2019 for functional status.     Assessment:     Patient has not met goals.    Objective:     GOALS:   Multidisciplinary Problems     Physical Therapy Goals     Not on file          Multidisciplinary Problems (Resolved)        Problem: Physical Therapy Goal    Goal Priority Disciplines Outcome Goal Variances Interventions   Physical Therapy Goal   (Resolved)     PT, PT/OT Outcome(s) achieved     Description:  Goals to be met by: 19     Patient will increase functional independence with mobility by performin. Supine to sit with Contact Guard Assistance - Not met  2. Sit to supine with Contact Guard Assistance - Not met  3. Sit to stand transfer with Stand-by Assistance with or without device - Not met  4. Bed to chair transfer with Stand-by Assistance using Rolling Walker or LRD - Not met  5. Gait x 20 feet with Contact Guard Assistance using Rolling Walker or LRD - Not met                       Reasons for Discontinuation of Therapy Services  Transfer to alternate level of care.      Plan:     Patient Discharged to: [facility].    Andrew Gordon, PT  2019

## 2019-01-21 ENCOUNTER — PATIENT MESSAGE (OUTPATIENT)
Dept: INTERNAL MEDICINE | Facility: CLINIC | Age: 79
End: 2019-01-21

## 2019-01-23 ENCOUNTER — TELEPHONE (OUTPATIENT)
Dept: INTERNAL MEDICINE | Facility: CLINIC | Age: 79
End: 2019-01-23

## 2019-01-25 ENCOUNTER — TELEPHONE (OUTPATIENT)
Dept: ADMINISTRATIVE | Facility: CLINIC | Age: 79
End: 2019-01-25

## 2019-01-25 NOTE — TELEPHONE ENCOUNTER
Home Health SOC 12/26/2018 - 02/23/2019 with [facility] ([place] - Dr. Juana Perez. Patient received SN, PT and OT services.

## 2021-01-01 NOTE — Clinical Note
Please recall for follow up and HCC screening in 6 months 
Zepatier x 12, please review for any DDI 
Statement Selected

## 2022-08-28 NOTE — PT/OT/SLP PROGRESS
"Physical Therapy Treatment    Patient Name:  Alfredina Claudette Parks   MRN:  [id number]    Recommendations:     Discharge Recommendations:  nursing facility, skilled   Discharge Equipment Recommendations: wheelchair, manual   Barriers to discharge: None    Assessment:     Alfredina Claudette Parks is a 78 y.o. female admitted with a medical diagnosis of Acute cholangitis.  She presents with the following impairments/functional limitations:  weakness, impaired endurance, impaired self care skills, impaired functional mobilty, gait instability, impaired balance, decreased safety awareness Pt. cooperative and tolerated treatment fairly, but tx limited by pt. having BM once in standing.    Rehab Prognosis: Fair; patient would benefit from acute skilled PT services to address these deficits and reach maximum level of function.    Recent Surgery: Procedure(s) (LRB):  ERCP (ENDOSCOPIC RETROGRADE CHOLANGIOPANCREATOGRAPHY) (N/A) 8 Days Post-Op    Plan:     During this hospitalization, patient to be seen 4 x/week to address the identified rehab impairments via gait training, therapeutic activities, therapeutic exercises and progress toward the following goals:    · Plan of Care Expires:  02/02/19    Subjective     Chief Complaint: weakness  Patient/Family Comments/goals: "I want to walk"  Pain/Comfort:  · Pain Rating 1: (pt. did not rate)  · Location - Orientation 1: lower  · Location 1: abdomen  · Pain Addressed 1: Reposition, Cessation of Activity      Objective:     Communicated with nursing prior to session.  Patient found supine in bed oxygen, PureWick, peripheral IV(biliary tube)  upon PT entry to room.     General Precautions: Standard, fall   Orthopedic Precautions:N/A   Braces:       Functional Mobility:  · Bed Mobility:     · Rolling Right: maximal assistance  · Scooting: maximal assistance  · Supine to Sit: maximal assistance  · Sit to Supine: maximal assistance  · Transfers:     · Sit to Stand:  maximal assistance " with rolling walker  · Gait: 12 steps(6 forward/backward) with RW and Mod A. Pt. amb. with flexed posture and decreased step length/laly  · Balance: fair sitting and poor standing      AM-PAC 6 CLICK MOBILITY  Turning over in bed (including adjusting bedclothes, sheets and blankets)?: 2  Sitting down on and standing up from a chair with arms (e.g., wheelchair, bedside commode, etc.): 2  Moving from lying on back to sitting on the side of the bed?: 2  Moving to and from a bed to a chair (including a wheelchair)?: 2  Need to walk in hospital room?: 2  Climbing 3-5 steps with a railing?: 1  Basic Mobility Total Score: 11       Therapeutic Activities and Exercises:   Pt. Performed sitting/standing balance/tolerance along EOB. Discussed pt.'s goals and POC.    Patient left supine with all lines intact and call button in reach..    GOALS:   Multidisciplinary Problems     Physical Therapy Goals        Problem: Physical Therapy Goal    Goal Priority Disciplines Outcome Goal Variances Interventions   Physical Therapy Goal     PT, PT/OT Ongoing (interventions implemented as appropriate)     Description:  Goals to be met by: 19     Patient will increase functional independence with mobility by performin. Supine to sit with Contact Guard Assistance - Not met  2. Sit to supine with Contact Guard Assistance - Not met  3. Sit to stand transfer with Stand-by Assistance with or without device - Not met  4. Bed to chair transfer with Stand-by Assistance using Rolling Walker or LRD - Not met  5. Gait x 20 feet with Contact Guard Assistance using Rolling Walker or LRD - Not met                       Time Tracking:     PT Received On: 19  PT Start Time: 1106     PT Stop Time: 1129  PT Total Time (min): 23 min     Billable Minutes: Gait Training 8 and Therapeutic Activity 15    Treatment Type: Treatment  PT/PTA: PT     PTA Visit Number: 0     Andrew Gordon, PT  2019   - - -

## 2022-10-13 NOTE — PLAN OF CARE
Plan of care reviewed with patient. Patient verbalized understanding.    Spoke to the pt and she stated that she was seen in the hospital by Dr. Timmy Moore and was informed to let him know if the next available  was far out. Pt stated that she scheduled her appointment for 1/11/23.

## 2023-05-12 NOTE — ASSESSMENT & PLAN NOTE
-Stage 4 cancer diagnosed during previous visit 12/21. Malignant appearance of strictures occluding recently placed stent suggesting aggressive course  -Outpatient oncology follow-up previously  scheduled for 1/2/19  -Oncology noting unclear whether patient would derive any benefit from chemotherapy, given her poor performance status and the aggressiveness of her disease.  -Pt and daughter interested in short stay at SNF/Rehab at [facility] to try to regain strength and make outpatient oncology appointment.    Thanks for sending her over we will get her in for lumbar injecitons thanks again adi

## 2024-10-07 NOTE — ASSESSMENT & PLAN NOTE
Currently is normotensive.  - Restarted Coreg 12/20, holding other home antihypertensives for now and continue monitoring     Quality 431: Preventive Care And Screening: Unhealthy Alcohol Use - Screening: Patient not identified as an unhealthy alcohol user when screened for unhealthy alcohol use using a systematic screening method Quality 130: Documentation Of Current Medications In The Medical Record: Current Medications Documented Quality 47: Advance Care Plan: Advance Care Planning discussed and documented; advance care plan or surrogate decision maker documented in the medical record. Quality 226: Preventive Care And Screening: Tobacco Use: Screening And Cessation Intervention: Patient screened for tobacco use and is an ex/non-smoker Detail Level: Detailed

## 2025-03-12 NOTE — TELEPHONE ENCOUNTER
Spoke with patient's daughter, Tari, about instructions for UEUS scheduled 12/21/18 at 1400.  Instructions Letter sent to patient's [facility] and emailed to Tari.   leg pain/fever